# Patient Record
Sex: FEMALE | Race: WHITE | NOT HISPANIC OR LATINO | Employment: OTHER | URBAN - METROPOLITAN AREA
[De-identification: names, ages, dates, MRNs, and addresses within clinical notes are randomized per-mention and may not be internally consistent; named-entity substitution may affect disease eponyms.]

---

## 2017-05-12 ENCOUNTER — TRANSFERRED RECORDS (OUTPATIENT)
Dept: HEALTH INFORMATION MANAGEMENT | Facility: CLINIC | Age: 72
End: 2017-05-12

## 2017-05-12 ENCOUNTER — HOSPITAL ENCOUNTER (INPATIENT)
Facility: CLINIC | Age: 72
LOS: 8 days | Discharge: HOME | DRG: 546 | End: 2017-05-21
Attending: INTERNAL MEDICINE | Admitting: INTERNAL MEDICINE
Payer: MEDICARE

## 2017-05-12 DIAGNOSIS — K59.00 CONSTIPATION, UNSPECIFIED CONSTIPATION TYPE: ICD-10-CM

## 2017-05-12 DIAGNOSIS — I77.6 VASCULITIS (H): ICD-10-CM

## 2017-05-12 DIAGNOSIS — R05.9 COUGH: ICD-10-CM

## 2017-05-12 DIAGNOSIS — Z29.9 PROPHYLACTIC MEASURE: ICD-10-CM

## 2017-05-12 DIAGNOSIS — Z29.89 NEED FOR PNEUMOCYSTIS PROPHYLAXIS: ICD-10-CM

## 2017-05-12 DIAGNOSIS — R52 GENERALIZED PAIN: Primary | ICD-10-CM

## 2017-05-12 PROBLEM — J18.9 PNEUMONIA: Status: ACTIVE | Noted: 2017-05-12

## 2017-05-12 PROCEDURE — 99220 ZZC INITIAL OBSERVATION CARE,LEVL III: CPT | Performed by: INTERNAL MEDICINE

## 2017-05-12 RX ORDER — PROCHLORPERAZINE 25 MG
12.5 SUPPOSITORY, RECTAL RECTAL EVERY 12 HOURS PRN
Status: DISCONTINUED | OUTPATIENT
Start: 2017-05-12 | End: 2017-05-21 | Stop reason: HOSPADM

## 2017-05-12 RX ORDER — AZITHROMYCIN 250 MG/1
250 TABLET, FILM COATED ORAL EVERY 24 HOURS
Status: COMPLETED | OUTPATIENT
Start: 2017-05-14 | End: 2017-05-17

## 2017-05-12 RX ORDER — CEFTRIAXONE 2 G/1
2 INJECTION, POWDER, FOR SOLUTION INTRAMUSCULAR; INTRAVENOUS EVERY 24 HOURS
Status: COMPLETED | OUTPATIENT
Start: 2017-05-13 | End: 2017-05-19

## 2017-05-12 RX ORDER — ONDANSETRON 2 MG/ML
4 INJECTION INTRAMUSCULAR; INTRAVENOUS EVERY 6 HOURS PRN
Status: DISCONTINUED | OUTPATIENT
Start: 2017-05-12 | End: 2017-05-21 | Stop reason: HOSPADM

## 2017-05-12 RX ORDER — LIDOCAINE 40 MG/G
CREAM TOPICAL
Status: DISCONTINUED | OUTPATIENT
Start: 2017-05-12 | End: 2017-05-21 | Stop reason: HOSPADM

## 2017-05-12 RX ORDER — ONDANSETRON 4 MG/1
4 TABLET, ORALLY DISINTEGRATING ORAL EVERY 6 HOURS PRN
Status: DISCONTINUED | OUTPATIENT
Start: 2017-05-12 | End: 2017-05-21 | Stop reason: HOSPADM

## 2017-05-12 RX ORDER — ACETAMINOPHEN 650 MG/1
650 SUPPOSITORY RECTAL EVERY 4 HOURS PRN
Status: DISCONTINUED | OUTPATIENT
Start: 2017-05-12 | End: 2017-05-18

## 2017-05-12 RX ORDER — ALBUTEROL SULFATE 0.83 MG/ML
2.5 SOLUTION RESPIRATORY (INHALATION)
Status: DISCONTINUED | OUTPATIENT
Start: 2017-05-12 | End: 2017-05-21 | Stop reason: HOSPADM

## 2017-05-12 RX ORDER — PROCHLORPERAZINE MALEATE 5 MG
5 TABLET ORAL EVERY 6 HOURS PRN
Status: DISCONTINUED | OUTPATIENT
Start: 2017-05-12 | End: 2017-05-21 | Stop reason: HOSPADM

## 2017-05-12 RX ORDER — ACETAMINOPHEN 325 MG/1
650 TABLET ORAL EVERY 4 HOURS PRN
Status: DISCONTINUED | OUTPATIENT
Start: 2017-05-12 | End: 2017-05-18

## 2017-05-12 NOTE — IP AVS SNAPSHOT
[address]    [facility] [address]    Phone:  [phone]                                       After Visit Summary   5/12/2017    Elizabeth Galicia    MRN: [id number]           After Visit Summary Signature Page     I have received my discharge instructions, and my questions have been answered. I have discussed any challenges I see with this plan with the nurse or doctor.    ..........................................................................................................................................  Patient/Patient Representative Signature      ..........................................................................................................................................  Patient Representative Print Name and Relationship to Patient    ..................................................               ................................................  Date                                            Time    ..........................................................................................................................................  Reviewed by Signature/Title    ...................................................              ..............................................  Date                                                            Time

## 2017-05-12 NOTE — IP AVS SNAPSHOT
MRN:[id number]                      After Visit Summary   5/12/2017    Elizabeth Galicia    MRN: [id number]           Thank you!     Thank you for choosing [facility] [place] for your care. Our goal is always to provide you with excellent care. Hearing back from our patients is one way we can continue to improve our services. Please take a few minutes to complete the written survey that you may receive in the mail after you visit with us. Thank you!        Patient Information     Date Of Birth          1945        Designated Caregiver       Most Recent Value    Caregiver    Will someone help with your care after discharge? 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 You were started on treatment here in the hospital, and this will continue after discharge.                  Who to Call     For medical emergencies, please call 911.  For non-urgent questions about your medical care, please call your primary care provider or clinic, [phone]          Attending Provider     Provider Specialty    Cooley, Larry Robertson MD Internal Medicine    ComShaan godoy MD Internal Medicine       Primary Care Provider Office Phone # Fax #    Noreen Anderson [phone]       [facility] [address] [place] [address]        After Care Instructions     Activity       Your activity upon discharge:  "activity as tolerated            Diet       Follow this diet upon discharge: Regular                  Follow-up Appointments     Follow-up and recommended labs and tests        1. Follow up with nephrology (Dr. Barkley) as advised - he will arrange your ongoing Cytoxan infusions.  2. Follow up with rheumatology (Dr. Arnold) as advised in the second week of June.  3. Follow up with pulmonology (Dr. De Dios) in 6 weeks for a repeat CT of your chest and pulmonary function testing.  4. Follow up with your new PCP (Dr. Nati Rodriguez) as scheduled.                  Your next 10 appointments already scheduled     May 23, 2017  2:00 PM CDT   SHORT with Nati Littlejohn,    [facility]    [address] [facility] [id number] [phone]              Further instructions from your care team                  Pending Results     Date and Time Order Name Status Description    5/19/2017 1143 Glucose 6 phosphate dehydrogenase In process             Statement of Approval     Ordered          05/21/17 0824  I have reviewed and agree with all the recommendations and orders detailed in this document.  EFFECTIVE NOW     Approved and electronically signed by:  Shaneka Laguerre DO             Admission Information     Date & Time Provider Department Dept. Phone    5/12/2017 Shaan Diaz MD [place] [address] Oncology [phone]      Your Vitals Were     Blood Pressure Pulse Temperature Respirations Height Weight    116/83 (BP Location: Right arm) 70 96.7  F (35.9  C) (Oral) 16 1.664 m (5' 5.5\") 106.1 kg (233 lb 14.5 oz)    Pulse Oximetry BMI (Body Mass Index)                97% 38.33 kg/m2          [facility] Information     [employer] lets you send messages to your doctor, view your test results, renew your prescriptions, schedule appointments and more. To sign up, go to [email or web address] . Click on \"Log in\" on the left side of the screen, which will take you to the Welcome " "page. Then click on \"Sign up Now\" on the right side of the page.     You will be asked to enter the access code listed below, as well as some personal information. Please follow the directions to create your username and password.     Your access code is: [email or web address]  Expires: 2017  7:53 AM     Your access code will  in 90 days. If you need help or a new code, please call your [place] clinic or [phone].        Care EveryWhere ID     This is your Care EveryWhere ID. This could be used by other organizations to access your [place] medical records  [id number]           Review of your medicines      START taking        Dose / Directions    acetaminophen 500 MG tablet   Commonly known as:  TYLENOL   Used for:  Generalized pain        Dose:  1000 mg   Take 2 tablets (1,000 mg) by mouth every 8 hours   Refills:  0       guaiFENesin-codeine 100-10 MG/5ML Soln solution   Commonly known as:  ROBITUSSIN AC   Used for:  Cough        Dose:  5 mL   Take 5 mLs by mouth every 4 hours as needed for cough   Quantity:  420 mL   Refills:  0       omeprazole 40 MG capsule   Commonly known as:  priLOSEC   Used for:  Prophylactic measure        Dose:  40 mg   Take 1 capsule (40 mg) by mouth every morning   Quantity:  30 capsule   Refills:  0       predniSONE 20 MG tablet   Commonly known as:  DELTASONE   Used for:  Vasculitis (H)        Dose:  60 mg   Take 3 tablets (60 mg) by mouth daily   Quantity:  90 tablet   Refills:  0       senna-docusate 8.6-50 MG per tablet   Commonly known as:  SENOKOT-S;PERICOLACE   Used for:  Constipation, unspecified constipation type        Dose:  1-2 tablet   Take 1-2 tablets by mouth 2 times daily   Quantity:  60 tablet   Refills:  0       sulfamethoxazole-trimethoprim 400-80 MG per tablet   Commonly known as:  BACTRIM/SEPTRA   Indication:  PCP prophylaxis   Used for:  Need for pneumocystis prophylaxis        Dose:  1 tablet   Take 1 tablet by mouth daily   Quantity:  30 tablet "   Refills:  0         CONTINUE these medicines which have NOT CHANGED        Dose / Directions    CALCIUM LACTATE PO        Dose:  4 tablet   Take 4 tablets by mouth daily   Refills:  0       Levothyroxine Sodium 112 MCG Caps        Dose:  112 mcg   Take 112 mcg by mouth daily.   Refills:  0       liothyronine 5 MCG tablet   Commonly known as:  CYTOMEL        Take by mouth 2 times daily Takes 15 mcg BID   Refills:  0       MAGNESIUM LACTATE PO        Dose:  2 tablet   Take 2 tablets by mouth daily   Refills:  0       VITAMIN D3 PO        Dose:  1000 Units   Take 1,000 Units by mouth daily   Refills:  0            Where to get your medicines      These medications were sent to [facility]  RENETTA López - [address]  [address] [place] [address], [facility] [address]     Phone:  [phone]     omeprazole 40 MG capsule    predniSONE 20 MG tablet    senna-docusate 8.6-50 MG per tablet    sulfamethoxazole-trimethoprim 400-80 MG per tablet         Some of these will need a paper prescription and others can be bought over the counter. Ask your nurse if you have questions.     Bring a paper prescription for each of these medications     guaiFENesin-codeine 100-10 MG/5ML Soln solution       You don't need a prescription for these medications     acetaminophen 500 MG tablet                Protect others around you: Learn how to safely use, store and throw away your medicines at www.disposemymeds.org.             Medication List: This is a list of all your medications and when to take them. Check marks below indicate your daily home schedule. Keep this list as a reference.      Medications           Morning Afternoon Evening Bedtime As Needed    acetaminophen 500 MG tablet   Commonly known as:  TYLENOL   Take 2 tablets (1,000 mg) by mouth every 8 hours   Last time this was given:  1,000 mg on 5/21/2017 11:16 AM                                CALCIUM LACTATE PO   Take 4 tablets by mouth daily                                 guaiFENesin-codeine 100-10 MG/5ML Soln solution   Commonly known as:  ROBITUSSIN AC   Take 5 mLs by mouth every 4 hours as needed for cough   Last time this was given:  5 mLs on 5/20/2017  9:34 PM                                Levothyroxine Sodium 112 MCG Caps   Take 112 mcg by mouth daily.                                liothyronine 5 MCG tablet   Commonly known as:  CYTOMEL   Take by mouth 2 times daily Takes 15 mcg BID   Last time this was given:  15 mcg on 5/21/2017  8:52 AM                                MAGNESIUM LACTATE PO   Take 2 tablets by mouth daily                                omeprazole 40 MG capsule   Commonly known as:  priLOSEC   Take 1 capsule (40 mg) by mouth every morning   Last time this was given:  40 mg on 5/21/2017 11:15 AM                                predniSONE 20 MG tablet   Commonly known as:  DELTASONE   Take 3 tablets (60 mg) by mouth daily   Last time this was given:  60 mg on 5/21/2017  8:51 AM                                senna-docusate 8.6-50 MG per tablet   Commonly known as:  SENOKOT-S;PERICOLACE   Take 1-2 tablets by mouth 2 times daily   Last time this was given:  2 tablets on 5/21/2017  8:51 AM                                sulfamethoxazole-trimethoprim 400-80 MG per tablet   Commonly known as:  BACTRIM/SEPTRA   Take 1 tablet by mouth daily   Last time this was given:  1 tablet on 5/21/2017  8:52 AM                                VITAMIN D3 PO   Take 1,000 Units by mouth daily

## 2017-05-13 ENCOUNTER — APPOINTMENT (OUTPATIENT)
Dept: CARDIOLOGY | Facility: CLINIC | Age: 72
DRG: 546 | End: 2017-05-13
Attending: INTERNAL MEDICINE
Payer: MEDICARE

## 2017-05-13 ENCOUNTER — APPOINTMENT (OUTPATIENT)
Dept: ULTRASOUND IMAGING | Facility: CLINIC | Age: 72
DRG: 546 | End: 2017-05-13
Attending: INTERNAL MEDICINE
Payer: MEDICARE

## 2017-05-13 PROBLEM — J18.9 CAP (COMMUNITY ACQUIRED PNEUMONIA): Status: ACTIVE | Noted: 2017-05-13

## 2017-05-13 LAB
ALBUMIN SERPL-MCNC: 2.3 G/DL (ref 3.4–5)
ALBUMIN UR-MCNC: 10 MG/DL
ALP SERPL-CCNC: 74 U/L (ref 40–150)
ALT SERPL W P-5'-P-CCNC: 21 U/L (ref 0–50)
ANION GAP SERPL CALCULATED.3IONS-SCNC: 10 MMOL/L (ref 3–14)
APPEARANCE UR: CLEAR
AST SERPL W P-5'-P-CCNC: 12 U/L (ref 0–45)
BACTERIA #/AREA URNS HPF: ABNORMAL /HPF
BILIRUB DIRECT SERPL-MCNC: <0.1 MG/DL (ref 0–0.2)
BILIRUB SERPL-MCNC: 0.3 MG/DL (ref 0.2–1.3)
BILIRUB UR QL STRIP: NEGATIVE
BUN SERPL-MCNC: 28 MG/DL (ref 7–30)
CALCIUM SERPL-MCNC: 8.5 MG/DL (ref 8.5–10.1)
CHLORIDE SERPL-SCNC: 108 MMOL/L (ref 94–109)
CHLORIDE UR-SCNC: 18 MMOL/L
CHOLEST SERPL-MCNC: 126 MG/DL
CK SERPL-CCNC: 131 U/L (ref 30–225)
CO2 SERPL-SCNC: 18 MMOL/L (ref 20–32)
COLOR UR AUTO: ABNORMAL
CREAT SERPL-MCNC: 1.77 MG/DL (ref 0.52–1.04)
CREAT SERPL-MCNC: 1.77 MG/DL (ref 0.52–1.04)
CREAT UR-MCNC: 44 MG/DL
CRP SERPL-MCNC: 125 MG/L (ref 0–8)
ERYTHROCYTE [DISTWIDTH] IN BLOOD BY AUTOMATED COUNT: 14.2 % (ref 10–15)
FRACT EXCRET NA UR+SERPL-RTO: 0.7 %
GFR SERPL CREATININE-BSD FRML MDRD: 28 ML/MIN/1.7M2
GFR SERPL CREATININE-BSD FRML MDRD: 28 ML/MIN/1.7M2
GLUCOSE SERPL-MCNC: 118 MG/DL (ref 70–99)
GLUCOSE UR STRIP-MCNC: NEGATIVE MG/DL
GRAM STN SPEC: NORMAL
HCT VFR BLD AUTO: 28.3 % (ref 35–47)
HDLC SERPL-MCNC: 26 MG/DL
HGB BLD-MCNC: 9.4 G/DL (ref 11.7–15.7)
HGB UR QL STRIP: ABNORMAL
KETONES UR STRIP-MCNC: NEGATIVE MG/DL
LDLC SERPL CALC-MCNC: 71 MG/DL
LEUKOCYTE ESTERASE UR QL STRIP: ABNORMAL
MCH RBC QN AUTO: 29.1 PG (ref 26.5–33)
MCHC RBC AUTO-ENTMCNC: 33.2 G/DL (ref 31.5–36.5)
MCV RBC AUTO: 88 FL (ref 78–100)
MICRO REPORT STATUS: NORMAL
NITRATE UR QL: NEGATIVE
NONHDLC SERPL-MCNC: 100 MG/DL
PH UR STRIP: 5.5 PH (ref 5–7)
PLATELET # BLD AUTO: 344 10E9/L (ref 150–450)
POTASSIUM SERPL-SCNC: 4.2 MMOL/L (ref 3.4–5.3)
POTASSIUM UR-SCNC: 18 MMOL/L
PROCALCITONIN SERPL-MCNC: 0.24 NG/ML
PROT SERPL-MCNC: 6.4 G/DL (ref 6.8–8.8)
PROT UR-MCNC: 0.37 G/L
PROT/CREAT 24H UR: 0.85 G/G CR (ref 0–0.2)
RBC # BLD AUTO: 3.23 10E12/L (ref 3.8–5.2)
RBC #/AREA URNS AUTO: 11 /HPF (ref 0–2)
SODIUM SERPL-SCNC: 136 MMOL/L (ref 133–144)
SODIUM SERPL-SCNC: 137 MMOL/L (ref 133–144)
SODIUM UR-SCNC: 23 MMOL/L
SP GR UR STRIP: 1.01 (ref 1–1.03)
SPECIMEN SOURCE: NORMAL
TRIGL SERPL-MCNC: 145 MG/DL
TSH SERPL DL<=0.05 MIU/L-ACNC: 4.87 MU/L (ref 0.4–4)
URN SPEC COLLECT METH UR: ABNORMAL
UROBILINOGEN UR STRIP-MCNC: NORMAL MG/DL (ref 0–2)
WBC # BLD AUTO: 14.4 10E9/L (ref 4–11)
WBC #/AREA URNS AUTO: 3 /HPF (ref 0–2)

## 2017-05-13 PROCEDURE — 82565 ASSAY OF CREATININE: CPT | Performed by: INTERNAL MEDICINE

## 2017-05-13 PROCEDURE — 36415 COLL VENOUS BLD VENIPUNCTURE: CPT | Performed by: INTERNAL MEDICINE

## 2017-05-13 PROCEDURE — 84295 ASSAY OF SERUM SODIUM: CPT | Performed by: INTERNAL MEDICINE

## 2017-05-13 PROCEDURE — 80076 HEPATIC FUNCTION PANEL: CPT | Performed by: INTERNAL MEDICINE

## 2017-05-13 PROCEDURE — 96375 TX/PRO/DX INJ NEW DRUG ADDON: CPT

## 2017-05-13 PROCEDURE — 86335 IMMUNFIX E-PHORSIS/URINE/CSF: CPT | Performed by: INTERNAL MEDICINE

## 2017-05-13 PROCEDURE — 84300 ASSAY OF URINE SODIUM: CPT | Performed by: INTERNAL MEDICINE

## 2017-05-13 PROCEDURE — 82550 ASSAY OF CK (CPK): CPT | Performed by: INTERNAL MEDICINE

## 2017-05-13 PROCEDURE — 80048 BASIC METABOLIC PNL TOTAL CA: CPT | Performed by: INTERNAL MEDICINE

## 2017-05-13 PROCEDURE — 84145 PROCALCITONIN (PCT): CPT | Performed by: INTERNAL MEDICINE

## 2017-05-13 PROCEDURE — 82436 ASSAY OF URINE CHLORIDE: CPT | Performed by: INTERNAL MEDICINE

## 2017-05-13 PROCEDURE — 87070 CULTURE OTHR SPECIMN AEROBIC: CPT | Performed by: INTERNAL MEDICINE

## 2017-05-13 PROCEDURE — 84156 ASSAY OF PROTEIN URINE: CPT | Performed by: INTERNAL MEDICINE

## 2017-05-13 PROCEDURE — 86140 C-REACTIVE PROTEIN: CPT | Performed by: INTERNAL MEDICINE

## 2017-05-13 PROCEDURE — 25500064 ZZH RX 255 OP 636: Performed by: INTERNAL MEDICINE

## 2017-05-13 PROCEDURE — A9270 NON-COVERED ITEM OR SERVICE: HCPCS | Mod: GY | Performed by: INTERNAL MEDICINE

## 2017-05-13 PROCEDURE — 84166 PROTEIN E-PHORESIS/URINE/CSF: CPT | Performed by: INTERNAL MEDICINE

## 2017-05-13 PROCEDURE — 25000128 H RX IP 250 OP 636: Performed by: INTERNAL MEDICINE

## 2017-05-13 PROCEDURE — 87205 SMEAR GRAM STAIN: CPT | Performed by: INTERNAL MEDICINE

## 2017-05-13 PROCEDURE — 83883 ASSAY NEPHELOMETRY NOT SPEC: CPT | Performed by: INTERNAL MEDICINE

## 2017-05-13 PROCEDURE — 25000132 ZZH RX MED GY IP 250 OP 250 PS 637: Mod: GY | Performed by: INTERNAL MEDICINE

## 2017-05-13 PROCEDURE — 87186 SC STD MICRODIL/AGAR DIL: CPT | Performed by: INTERNAL MEDICINE

## 2017-05-13 PROCEDURE — 12000007 ZZH R&B INTERMEDIATE

## 2017-05-13 PROCEDURE — 40000264 ECHO COMPLETE WITH OPTISON

## 2017-05-13 PROCEDURE — 96374 THER/PROPH/DIAG INJ IV PUSH: CPT

## 2017-05-13 PROCEDURE — 84133 ASSAY OF URINE POTASSIUM: CPT | Performed by: INTERNAL MEDICINE

## 2017-05-13 PROCEDURE — 84443 ASSAY THYROID STIM HORMONE: CPT | Performed by: INTERNAL MEDICINE

## 2017-05-13 PROCEDURE — 85027 COMPLETE CBC AUTOMATED: CPT | Performed by: INTERNAL MEDICINE

## 2017-05-13 PROCEDURE — 99233 SBSQ HOSP IP/OBS HIGH 50: CPT | Performed by: INTERNAL MEDICINE

## 2017-05-13 PROCEDURE — 76770 US EXAM ABDO BACK WALL COMP: CPT

## 2017-05-13 PROCEDURE — G0378 HOSPITAL OBSERVATION PER HR: HCPCS

## 2017-05-13 PROCEDURE — 81001 URINALYSIS AUTO W/SCOPE: CPT | Performed by: INTERNAL MEDICINE

## 2017-05-13 PROCEDURE — 87077 CULTURE AEROBIC IDENTIFY: CPT | Performed by: INTERNAL MEDICINE

## 2017-05-13 PROCEDURE — 93306 TTE W/DOPPLER COMPLETE: CPT | Mod: 26 | Performed by: INTERNAL MEDICINE

## 2017-05-13 PROCEDURE — 80061 LIPID PANEL: CPT | Performed by: INTERNAL MEDICINE

## 2017-05-13 RX ORDER — SODIUM CHLORIDE 9 MG/ML
INJECTION, SOLUTION INTRAVENOUS CONTINUOUS
Status: DISCONTINUED | OUTPATIENT
Start: 2017-05-13 | End: 2017-05-16

## 2017-05-13 RX ORDER — FUROSEMIDE 20 MG
20 TABLET ORAL DAILY
Status: DISCONTINUED | OUTPATIENT
Start: 2017-05-13 | End: 2017-05-13

## 2017-05-13 RX ORDER — CODEINE PHOSPHATE AND GUAIFENESIN 10; 100 MG/5ML; MG/5ML
5 SOLUTION ORAL EVERY 4 HOURS PRN
Status: DISCONTINUED | OUTPATIENT
Start: 2017-05-13 | End: 2017-05-14

## 2017-05-13 RX ORDER — LEVOTHYROXINE SODIUM 112 UG/1
112 TABLET ORAL DAILY
Status: DISCONTINUED | OUTPATIENT
Start: 2017-05-13 | End: 2017-05-21 | Stop reason: HOSPADM

## 2017-05-13 RX ADMIN — GUAIFENESIN 10 ML: 100 SOLUTION ORAL at 05:41

## 2017-05-13 RX ADMIN — ACETAMINOPHEN 650 MG: 325 TABLET, FILM COATED ORAL at 19:10

## 2017-05-13 RX ADMIN — ACETAMINOPHEN 650 MG: 325 TABLET, FILM COATED ORAL at 08:19

## 2017-05-13 RX ADMIN — Medication 2.5 MG: at 22:16

## 2017-05-13 RX ADMIN — AZITHROMYCIN MONOHYDRATE 500 MG: 500 INJECTION, POWDER, LYOPHILIZED, FOR SOLUTION INTRAVENOUS at 01:36

## 2017-05-13 RX ADMIN — CEFTRIAXONE 2 G: 2 INJECTION, POWDER, FOR SOLUTION INTRAMUSCULAR; INTRAVENOUS at 00:20

## 2017-05-13 RX ADMIN — Medication 15 MCG: at 08:19

## 2017-05-13 RX ADMIN — SODIUM CHLORIDE: 9 INJECTION, SOLUTION INTRAVENOUS at 19:13

## 2017-05-13 RX ADMIN — FUROSEMIDE 20 MG: 20 TABLET ORAL at 12:50

## 2017-05-13 RX ADMIN — ACETAMINOPHEN 650 MG: 325 TABLET, FILM COATED ORAL at 12:56

## 2017-05-13 RX ADMIN — Medication 15 MCG: at 21:29

## 2017-05-13 RX ADMIN — HUMAN ALBUMIN MICROSPHERES AND PERFLUTREN 3 ML: 10; .22 INJECTION, SOLUTION INTRAVENOUS at 10:40

## 2017-05-13 RX ADMIN — GUAIFENESIN 10 ML: 100 SOLUTION ORAL at 20:31

## 2017-05-13 RX ADMIN — Medication 2.5 MG: at 01:04

## 2017-05-13 RX ADMIN — LEVOTHYROXINE SODIUM 112 MCG: 112 TABLET ORAL at 08:19

## 2017-05-13 NOTE — H&P
[facility]    History and Physical  Hospitalist       Date of Admission:  5/12/2017    Assessment & Plan   Elizabeth Galicia is a 71 year old female who presents with bilateral lower extremity swelling associated discomfort over the past 2 weeks, 12 weeks of night sweats which have been worsening over the past 2 weeks    Community acquired right-sided pneumonia: Patient with a right midlung infiltrate noted on outside facility chest x-ray. D dimer was noted to be elevated at outside facility, though with findings of leukocytosis, low grade temperature and discrete infiltrate on CXR, presentation was though to be more consistent with acute infectious process. Patient also with significantly elevated C-reactive protein at primary care visit 5/1/17. Given patient's complex of symptoms, I cannot ignore reported infiltrate on chest x-ray as primary etiology for laboratory abnormalities and patient's symptoms, though duration of symptoms and notably elevated inflammatory markers without significant respiratory symptoms are somewhat unusual/inconsistent with clinical diagnosis of pneumonia.  -Recommend follow-up chest x-ray in approximately 4-6 weeks to ensure resolution of infiltrate noted at outside facility. The patient's leukocytosis and x-ray finding are suggestive of acute infectious process, patient does not have significant respiratory symptoms currently  -Ceftriaxone and azithromycin for community-acquired pneumonia; did receive a dose of doxycycline prior to transfer from outside facility  -Repeat CBC should be obtained when patient is clinically improved in the outpatient setting.   -Request image study to be couriered from outside urgent care center and uploaded so that images may be viewed given patient's somewhat atypical presentation for pneumonia.  -Pro calcitonin in a.m.    Bilateral lower extremity edema: Potentially multifactorial. Patient has some symptoms/signs concerning for sleep  apnea including falling asleep while watching TV, obesity, increased neck diameter. Patient denies being told she has apneic symptoms while sleeping. Denies snoring. Certainly obesity can also contribute to bilateral lower extremity edema. If patient with uncontrolled sleep apnea and pulmonary hypertension, this could explain lower extremity edema. Systolic heart failure seems less likely as patient with no hypoxia, no pulmonary edema reported on chest x-ray. Hypertensive diastolic heart failure also seems less likely in the setting of well-controlled blood pressures off of medications. As above, inflammatory etiologies/rheumatologic disease could also result in edema, though would not necessarily explain patient's chest x-ray findings  -TTE pending  -Pulse oximetry spot checks  -Assess oximetry during ambulation to ensure no hypoxia  -cardiac telemetry    Elevated d-dimer: Patient noted to have an elevated d-dimer at outside facility. This is in the setting of right sided lung infiltrate and leukocytosis. Note also recent elevation in CRP through outside facility. Patient did have travel approximate 2 weeks ago from [place], negative bilateral lower extremity ultrasound for DVT. As patient denies shortness of breath, has no hypoxia or tachycardia, no pleuritic discomfort. Low suspicion for pulmonary embolism. This said, PE could present with lower extremity edema if resulting in significant cardiac strain.   -TTE pending  -Could consider VQ scan in a.m. if clinically worsening or pending TTE results.   -In the setting of elevated inflammatory markers, could consider autoimmune workup, though with suspected pneumonia, would likely be elevated and nonspecific manner regardless. If chest x-ray findings do not resolve at follow-up for patient does not clinically improve with treatment of suspected pneumonia, would pursue CT chest, potentially rheumatologic consultation and evaluation at that time.  -Age-appropriate  cancer screening recommended. I did not note last colonoscopy on review of outside records. Last mammogram appears to be in 2005.    Chronic kidney disease: Patient with a creatinine of 1.9 currently. With no hypoxia and only mild lower extremity edema, will await TTE prior to diuresis. Certainly possible that patient has some degree of cardiorenal congestion resulting in lower extremity edema and increased creatinine.  -Diet as tolerated  -Avoid nephrotoxic agents  -Repeat BMP  -Await TTE, consideration for diuretics at that time  -Compression stockings to lower extremities for mobilization of fluid     Hyperlipidemia: Patient with a history of hyperlipidemia with low HDL at 39,  in October 2014. Patient is not on any lipid controlling medications.  -Lipid panel pending for a.m.    Generalized weakness and physical deconditioning: Patient states that she has been more weak over the past 12 weeks. Still ambulatory at home.  -Ambulate with nursing staff q. shift  -If patient requiring significant assistance, low threshold for consult to physical therapy. This said, patient is currently observation status.    Anemia: Patient with a hemoglobin in the mid 10 range. No reported blood loss. 11.5 5/1/17. Peripheral blood morphology was obtained through primary clinic 5/1/17 with findings suggestive of an inflammatory condition.  -As above, repeat CBC recommended when patient has clinically improved, potentially in one week. Would not obtain anemia studies currently in the setting of suspected acute infectious process.     Hypothyroidism:  -Continue prior to admission levothyroxine and Cytomel. Most recent TSH last T3 within normal limits 5/1.    DVT Prophylaxis: Ambulate with nursing staff q. shift    Code Status: Full code    Disposition: Expected discharge potentially 5/14 late afternoon versus 5/15. This depends on clinical course as well as results from TTE.    Larry Robertson Cooley    Primary Care Physician   Noreen  LINDY Anderson    Chief Complaint   Shortness of breath    History is obtained from the patient, chart review, discussion with outside urgent care provider. Reviewed outside records through [facility] including recent laboratory studies.     History of Present Illness   Elizabeth Galicia is a 71 year old female who presents with pain in her bilateral lower extremities associated with edema present for the past 2 weeks.    Patient states for the past 12 weeks she has been intermittently ill. Several months ago actually contracted influenza, and since that time, has not felt completely well. Endorses intermittent night sweats associated with influenza and persisting even now. Patient denies any significant weight gain or weight loss, has not noted any lymphadenopathy. Since initial influenza and treatment, patient states that she obtained another viral illness, and her partner/spouse as well contracted influenza. Patient recently traveled to [place], states that she was ambulatory while in [place]. Denies any significant change in her diet traveling. Did feel as though she had a viral illness in [place] as well. Following return from [place] approximately 2 weeks ago, however, patient noted bilateral lower extremity edema. States that she has never had a history of edema in the past. As above, patient does not believe she has gained weight, though does admit that she does not know her baseline weight. Patient saw her primary provider 5/1/17 through [facility] and was diagnosed with a viral illness. Note at that time, patient with an elevated CRP to 85. I asked patient what symptoms she was experiencing at that time, and patient states that she was experiencing a milder form of her current complaints, which include bilateral lower extremity edema, pain associated with the swelling in her lower extremities, mild abdominal bloating. Patient denies any shortness of breath, though states  that when she is walking she finds herself sharply exhaling to help her cope with the discomfort of her bilateral lower extremities. Patient denies any orthopnea, denies any significant cough or sputum production. Patient states that she does have sinus symptoms which are relatively chronic and related to seasonal allergies. Believe she does have some postnasal drip. Patient denies any history of sleep apnea or being told that she stops breathing when she sleeps. Denies snoring, though does state that she occasionally falls asleep while sitting upright and watching television.    Patient's wife decided today that patient needed to be evaluated given her ongoing symptoms and recommended she be seen in the urgent care. Patient was subsequently transferred to the urgency Center for further evaluation given concern for DVT with lower extremity swelling and recent travel, subsequently transferred to [facility] after being noted to have a right middle lobe infiltrate on chest x-ray. Unfortunately, the imaging study at outside facility is not currently available to me. Patient with normal sinus rhythm on EKG with no evidence of right heart strain, no tachycardia, no hypoxia, no tachypnea. A lower extremity ultrasound was obtained and negative for DVT. Patient was noted to have a creatinine of 1.9, 1.42 at most recent follow-up. Albumin low at 2.7.    Patient endorses ongoing weakness/fatigue which she feels has been worsening over the past 12 weeks.    To summarize, patient endorses intermittent night sweats, weakness and fatigue worsening over the past 12 weeks following diagnosis with influenza.    Traveled to [place] with return 2 weeks ago and subsequently noting bilateral lower extremity edema, no history of such.    Follow-up with primary care provider 5/1/17 where patient was diagnosed with an acute viral illness, though noted to have markedly elevated inflammatory markers. Leukocytosis was present  at that time as well    Patient with ongoing discomfort in her bilateral lower extremities related to edema which has persisted resulting in urgent care evaluation. Found to have a right mid lung field infiltrate on chest x-ray, elevated d-dimer, though no respiratory symptoms are reported by patient. Negative lower extremity ultrasound, though further evaluation for pulmonary embolism was not performed given patient's creatinine elevation. No tachycardia, no tachypnea, no pleuritic discomfort.    Past Medical History    I have reviewed this patient's medical history and updated it with pertinent information if needed.   Past Medical History:   Diagnosis Date     Anxiety      Gastro-oesophageal reflux disease      Herpes      Hyperlipidemia      Hypothyroid      Lesion of upper eyelid LEFT     Obesity (BMI 30-39.9)      Uterine prolapse      Past Surgical History   I have reviewed this patient's surgical history and updated it with pertinent information if needed.  Past Surgical History:   Procedure Laterality Date     COLONOSCOPY       DILATION AND CURETTAGE       ENT SURGERY      partial thyroidectomy; tonsillectomy     EXCISE LESION EYELID Left 3/10/2016    Procedure: EXCISE LESION EYELID;  Surgeon: Rg Nazario MD;  Location: [facility]     HAND SURGERY       HYSTERECTOMY VAGINAL, COLPORRHAPHY ANTERIOR, POSTERIOR, COMBINED N/A 9/9/2014    Procedure: COMBINED HYSTERECTOMY VAGINAL, COLPORRHAPHY ANTERIOR, POSTERIOR;  Surgeon: Shay Winkler MD;  Location: [facility]     LAPAROSCOPIC SALPINGO-OOPHORECTOMY  6/27/2011    Procedure:LAPAROSCOPIC SALPINGO-OOPHORECTOMY; resection of left pelvic mass. ; Surgeon:MAYRA OLEARY; Location:[facility]     ORTHOPEDIC SURGERY       SALPINGO OOPHORECTOMY,R/L/PEDRO LUIS      Salpingo Oophorectomy for torsion in past       Prior to Admission Medications   Prior to Admission Medications   Prescriptions Last Dose Informant Patient Reported? Taking?   Ascorbic Acid (VITAMIN C PO)   Yes No   Sig: Take  "by mouth daily Unknown dose   CALCIUM LACTATE PO   Yes No   Sig: Take 4 tablets by mouth daily    Cholecalciferol (VITAMIN D3 PO)   Yes No   Sig: Take 1,000 Units by mouth daily   Levothyroxine Sodium 112 MCG CAPS   Yes No   Sig: Take 112 mcg by mouth daily.   MAGNESIUM LACTATE PO   Yes No   Sig: Take 2 tablets by mouth daily   diazepam (VALIUM) 5 MG tablet   No No   Sig: Take 1 tablet (5 mg) by mouth nightly as needed for anxiety   liothyronine (CYTOMEL) 5 MCG tablet   Yes No   Sig: Take by mouth 2 times daily Takes 15 mcg BID      Facility-Administered Medications: None     Allergies   Allergies   Allergen Reactions     Dairy [Milk Products]      Perfume Other (See Comments)     Stuffy in nose, HA     Seasonal Allergies      Sulfa Drugs Unknown       Social History   I have reviewed this patient's social history and updated it with pertinent information if needed. Elizabeth Galicia  reports that she has never smoked. She does not have any smokeless tobacco history on file. She reports that she drinks alcohol. She reports that she does not use illicit drugs.    Family History   I have reviewed this patient's family history and updated it with pertinent information if needed.   Family History   Problem Relation Age of Onset     Unknown/Adopted Mother      HEART DISEASE      specifically, patient states that she knows nothing of her father's side of the family or father, mother apparently had some \"minor heart issues\" not otherwise specified. Unclear if this represents dysrhythmia or coronary artery disease    Review of Systems   The 10 point Review of Systems is negative other than noted in the HPI or here.  Patient denies fevers in the preceding weeks, though believe she had a low-grade temperature earlier today. MAXIMUM TEMPERATURE in the 99 range at outside facility.    Physical Exam   Temp: 98.5  F (36.9  C) Temp src: Oral BP: 151/64 Pulse: 95     SpO2: 94 % O2 Device: None (Room air)    Vital Signs with " Ranges  Temp:  [98.5  F (36.9  C)] 98.5  F (36.9  C)  Pulse:  [95] 95  BP: (151)/(64) 151/64  SpO2:  [94 %] 94 %  0 lbs 0 oz    Constitutional: no acute distress, alert, conversant, obese female lying comfortably in bed  Eyes: no scleral icterus or injection  HEENT: moist mucous membranes  Respiratory: breath soundswith bibasilar crackles on inspiration into mid lung fields. Apices spared. No wheezes.   Cardiovascular: regular rate and rhythm, distant 1/6 murmur appreciated at left upper sternal border.   GI: abdomen soft, non-tender, normoactive bowel sounds, no masses  Lymph/Hematologic: Trace bilateral lower extremity edema. Minimal pitting component. No cervical chain lymphadenopathy.  Skin: no rashes  Musculoskeletal: muscular tone intact in all extremities  Neurologic: mental status grossly intact, no focal deficits, alert  Psychiatric: normal affect    Data   Data reviewed today:  I personally reviewed the EKG tracing showing Normal sinus rhythm. I do not appreciate any T-wave inversion in III. poor quality study as EKG has been faxed..    No lab results found in last 7 days. per outside records review, patient with leukocytosis to 17.2 including PMN elevation to 13+, monocyte 1.15, eosinophils 0.66, all elevated. Thrombocytosis to 582, likely reactive as well.    Outside CRP in the 85 range obtained 5/1/17 5/1/17 TSH and T3 were within normal limits, suggesting appropriate correction with Cytomel and levothyroxine. No change in dose since that last recheck        No results found for this or any previous visit (from the past 24 hour(s)).

## 2017-05-13 NOTE — PLAN OF CARE
Problem: Goal Outcome Summary  Goal: Goal Outcome Summary  Outcome: No Change  VSS on RA, A&Ox4. Up with SBA to BR. PRN tylenol for pain control. UA, sputum culture, echo and renal ultrasound completed today, pending results. Tele NSR. Will continue to monitor.

## 2017-05-13 NOTE — PLAN OF CARE
Problem: Goal Outcome Summary  Goal: Goal Outcome Summary  Outcome: No Change  Patient direct admit @ 11:00pm. A&Ox4, VSS On RA O2 sat 90's. Up with SBA. Void BR. Iv saline locked. On Iv Abx.  Bruises & edema on BLE. Tele NSR. On Reg diet. C/o cough Robitussin prn given. Pt refused Compressing stocking( debbie loya)   .Will continue monitoring.

## 2017-05-13 NOTE — PROGRESS NOTES
Care Coordination:    Noted pt was admitted to observation on 5/12/17 at 2347.   Met with patient in room, introduced self and role 05/13/17 at 3:13 PM  Outpatient / Observation brochure provided to patient and explained.  Pt and her partner denied having questions.     Of note, Dr. Diaz suspects pt will be admitted to inpatient shortly.    Rupa Wei RN, BSN  [facility] Care Coordinator   Mobile Phone: [phone]

## 2017-05-13 NOTE — PROGRESS NOTES
[facility]    Hospitalist Progress Note    Date of Service (when I saw the patient): 05/13/2017    Assessment & Plan   Elizabeth Galicia is a 71 year old female who was admitted on 5/12/2017 with dyspnea.    1. CAP  - CXR demonstrates RML infiltrate  - Elevated WBC but negative fever and procalcitonin  - Continue treatment with Rocephin and Azithromycin    2. BLE edema  - BLE doppler negative at [facility] ED  - Echo pending  - Check urine protein  - Start lasix 20 mg bid    3. Renal failure  - Unclear chronicity  - Check renal US, UA, FENa, U protein, U anion gap    4. Fevers/joint pain/muscle weakenss  - Check CRP, CK, TSH    5. HT  - Continue synthroid and cytomel    DVT Prophylaxis: Pneumatic Compression Devices  Code Status: Full Code    Disposition: Expected discharge in 1-3 days.    Shaan Diaz  Text Page (7 am to 6 pm)    Interval History   The patient is resting in bed.  She is very concerned about her BLE edema.  She complains of pain and swelling in her hands.    -Data reviewed today: I reviewed all new labs and imaging results over the last 24 hours. I personally reviewed no images or EKG's today.    Physical Exam   Temp: 99.6  F (37.6  C) Temp src: Oral BP: 138/64 Pulse: 86   Resp: 18 SpO2: 96 % O2 Device: None (Room air)    Vitals:    05/13/17 0109   Weight: 103.2 kg (227 lb 8.2 oz)     Vital Signs with Ranges  Temp:  [98.5  F (36.9  C)-99.6  F (37.6  C)] 99.6  F (37.6  C)  Pulse:  [86-95] 86  Resp:  [18-20] 18  BP: (138-151)/(64) 138/64  SpO2:  [94 %-96 %] 96 %  I/O last 3 completed shifts:  In: -   Out: 700 [Urine:700]    Gen: Well nourished, well developed, alert and oriented x 3, no acute distressed  HEENT: Atraumatic, normocephalic  Lungs: Clear to ausculation without wheezes, rhonchi, or rales  Heart: Regular rate and rhythm, no murmurs, gallops, or rubs  GI: Bowel sound normal, no hepatosplenomegaly or masses  Lymph: No lymphadenopathy, 1 + BLE edema  Skin: No rashes      Medications        liothyronine  15 mcg Oral BID     levothyroxine  112 mcg Oral Daily     furosemide  20 mg Oral Daily     sodium chloride (PF)  3 mL Intracatheter Q8H     cefTRIAXone  2 g Intravenous Q24H     [START ON 5/14/2017] azithromycin  250 mg Oral Q24H       Data     Recent Labs  Lab 05/13/17  0605   WBC 14.4*   HGB 9.4*   MCV 88         POTASSIUM 4.2   CHLORIDE 108   CO2 18*   BUN 28   CR 1.77*   ANIONGAP 10   ARABELLA 8.5   *       No results found for this or any previous visit (from the past 24 hour(s)).

## 2017-05-14 ENCOUNTER — APPOINTMENT (OUTPATIENT)
Dept: CT IMAGING | Facility: CLINIC | Age: 72
DRG: 546 | End: 2017-05-14
Attending: INTERNAL MEDICINE
Payer: MEDICARE

## 2017-05-14 LAB
ANION GAP SERPL CALCULATED.3IONS-SCNC: 10 MMOL/L (ref 3–14)
BUN SERPL-MCNC: 32 MG/DL (ref 7–30)
CALCIUM SERPL-MCNC: 8.2 MG/DL (ref 8.5–10.1)
CHLORIDE SERPL-SCNC: 110 MMOL/L (ref 94–109)
CO2 SERPL-SCNC: 19 MMOL/L (ref 20–32)
CREAT SERPL-MCNC: 1.83 MG/DL (ref 0.52–1.04)
ERYTHROCYTE [DISTWIDTH] IN BLOOD BY AUTOMATED COUNT: 14.3 % (ref 10–15)
FOLATE SERPL-MCNC: 6.8 NG/ML
GFR SERPL CREATININE-BSD FRML MDRD: 27 ML/MIN/1.7M2
GLUCOSE SERPL-MCNC: 117 MG/DL (ref 70–99)
HBA1C MFR BLD: 6.9 % (ref 4.3–6)
HCT VFR BLD AUTO: 27.8 % (ref 35–47)
HGB BLD-MCNC: 9.1 G/DL (ref 11.7–15.7)
IRON SATN MFR SERPL: 12 % (ref 15–46)
IRON SERPL-MCNC: 18 UG/DL (ref 35–180)
MCH RBC QN AUTO: 28.7 PG (ref 26.5–33)
MCHC RBC AUTO-ENTMCNC: 32.7 G/DL (ref 31.5–36.5)
MCV RBC AUTO: 88 FL (ref 78–100)
PLATELET # BLD AUTO: 409 10E9/L (ref 150–450)
POTASSIUM SERPL-SCNC: 4.4 MMOL/L (ref 3.4–5.3)
RBC # BLD AUTO: 3.17 10E12/L (ref 3.8–5.2)
SODIUM SERPL-SCNC: 139 MMOL/L (ref 133–144)
TIBC SERPL-MCNC: 147 UG/DL (ref 240–430)
VIT B12 SERPL-MCNC: 338 PG/ML (ref 193–986)
WBC # BLD AUTO: 14.9 10E9/L (ref 4–11)

## 2017-05-14 PROCEDURE — 82784 ASSAY IGA/IGD/IGG/IGM EACH: CPT | Performed by: INTERNAL MEDICINE

## 2017-05-14 PROCEDURE — 87340 HEPATITIS B SURFACE AG IA: CPT | Performed by: INTERNAL MEDICINE

## 2017-05-14 PROCEDURE — A9270 NON-COVERED ITEM OR SERVICE: HCPCS | Mod: GY | Performed by: INTERNAL MEDICINE

## 2017-05-14 PROCEDURE — 00000402 ZZHCL STATISTIC TOTAL PROTEIN: Performed by: INTERNAL MEDICINE

## 2017-05-14 PROCEDURE — 86038 ANTINUCLEAR ANTIBODIES: CPT | Performed by: INTERNAL MEDICINE

## 2017-05-14 PROCEDURE — 74176 CT ABD & PELVIS W/O CONTRAST: CPT

## 2017-05-14 PROCEDURE — 86803 HEPATITIS C AB TEST: CPT | Performed by: INTERNAL MEDICINE

## 2017-05-14 PROCEDURE — 83550 IRON BINDING TEST: CPT | Performed by: INTERNAL MEDICINE

## 2017-05-14 PROCEDURE — 84165 PROTEIN E-PHORESIS SERUM: CPT | Performed by: INTERNAL MEDICINE

## 2017-05-14 PROCEDURE — 12000007 ZZH R&B INTERMEDIATE

## 2017-05-14 PROCEDURE — 86706 HEP B SURFACE ANTIBODY: CPT | Performed by: INTERNAL MEDICINE

## 2017-05-14 PROCEDURE — 83876 ASSAY MYELOPEROXIDASE: CPT | Performed by: INTERNAL MEDICINE

## 2017-05-14 PROCEDURE — 83516 IMMUNOASSAY NONANTIBODY: CPT | Performed by: INTERNAL MEDICINE

## 2017-05-14 PROCEDURE — 83540 ASSAY OF IRON: CPT | Performed by: INTERNAL MEDICINE

## 2017-05-14 PROCEDURE — 86334 IMMUNOFIX E-PHORESIS SERUM: CPT | Performed by: INTERNAL MEDICINE

## 2017-05-14 PROCEDURE — 82607 VITAMIN B-12: CPT | Performed by: INTERNAL MEDICINE

## 2017-05-14 PROCEDURE — 36415 COLL VENOUS BLD VENIPUNCTURE: CPT | Performed by: INTERNAL MEDICINE

## 2017-05-14 PROCEDURE — 80048 BASIC METABOLIC PNL TOTAL CA: CPT | Performed by: INTERNAL MEDICINE

## 2017-05-14 PROCEDURE — 85027 COMPLETE CBC AUTOMATED: CPT | Performed by: INTERNAL MEDICINE

## 2017-05-14 PROCEDURE — 99233 SBSQ HOSP IP/OBS HIGH 50: CPT | Performed by: INTERNAL MEDICINE

## 2017-05-14 PROCEDURE — 82746 ASSAY OF FOLIC ACID SERUM: CPT | Performed by: INTERNAL MEDICINE

## 2017-05-14 PROCEDURE — 25000132 ZZH RX MED GY IP 250 OP 250 PS 637: Mod: GY | Performed by: INTERNAL MEDICINE

## 2017-05-14 PROCEDURE — 86704 HEP B CORE ANTIBODY TOTAL: CPT | Performed by: INTERNAL MEDICINE

## 2017-05-14 PROCEDURE — 86431 RHEUMATOID FACTOR QUANT: CPT | Performed by: INTERNAL MEDICINE

## 2017-05-14 PROCEDURE — 25000128 H RX IP 250 OP 636: Performed by: INTERNAL MEDICINE

## 2017-05-14 PROCEDURE — 83036 HEMOGLOBIN GLYCOSYLATED A1C: CPT | Performed by: INTERNAL MEDICINE

## 2017-05-14 RX ORDER — ACETAMINOPHEN 500 MG
1000 TABLET ORAL EVERY 8 HOURS SCHEDULED
Status: DISCONTINUED | OUTPATIENT
Start: 2017-05-14 | End: 2017-05-21 | Stop reason: HOSPADM

## 2017-05-14 RX ORDER — CODEINE PHOSPHATE AND GUAIFENESIN 10; 100 MG/5ML; MG/5ML
5 SOLUTION ORAL EVERY 4 HOURS PRN
Status: DISCONTINUED | OUTPATIENT
Start: 2017-05-14 | End: 2017-05-21 | Stop reason: HOSPADM

## 2017-05-14 RX ORDER — ALBUTEROL SULFATE 90 UG/1
2 AEROSOL, METERED RESPIRATORY (INHALATION) 4 TIMES DAILY PRN
Status: DISCONTINUED | OUTPATIENT
Start: 2017-05-14 | End: 2017-05-21 | Stop reason: HOSPADM

## 2017-05-14 RX ADMIN — ACETAMINOPHEN 1000 MG: 500 TABLET, FILM COATED ORAL at 22:31

## 2017-05-14 RX ADMIN — Medication 15 MCG: at 22:31

## 2017-05-14 RX ADMIN — CEFTRIAXONE 2 G: 2 INJECTION, POWDER, FOR SOLUTION INTRAMUSCULAR; INTRAVENOUS at 00:21

## 2017-05-14 RX ADMIN — ACETAMINOPHEN 650 MG: 325 TABLET, FILM COATED ORAL at 08:17

## 2017-05-14 RX ADMIN — SODIUM CHLORIDE: 9 INJECTION, SOLUTION INTRAVENOUS at 03:14

## 2017-05-14 RX ADMIN — AZITHROMYCIN 250 MG: 250 TABLET, FILM COATED ORAL at 08:16

## 2017-05-14 RX ADMIN — SODIUM CHLORIDE: 9 INJECTION, SOLUTION INTRAVENOUS at 22:37

## 2017-05-14 RX ADMIN — ACETAMINOPHEN 650 MG: 325 TABLET, FILM COATED ORAL at 00:32

## 2017-05-14 RX ADMIN — Medication 2.5 MG: at 22:35

## 2017-05-14 RX ADMIN — GUAIFENESIN AND CODEINE PHOSPHATE 5 ML: 100; 10 SOLUTION ORAL at 17:09

## 2017-05-14 RX ADMIN — SODIUM CHLORIDE: 9 INJECTION, SOLUTION INTRAVENOUS at 11:16

## 2017-05-14 RX ADMIN — Medication 15 MCG: at 08:16

## 2017-05-14 RX ADMIN — ACETAMINOPHEN 650 MG: 325 TABLET, FILM COATED ORAL at 14:01

## 2017-05-14 RX ADMIN — LEVOTHYROXINE SODIUM 112 MCG: 112 TABLET ORAL at 08:16

## 2017-05-14 RX ADMIN — GUAIFENESIN AND CODEINE PHOSPHATE 5 ML: 100; 10 SOLUTION ORAL at 22:31

## 2017-05-14 NOTE — PLAN OF CARE
"Problem: Goal Outcome Summary  Goal: Goal Outcome Summary  Outcome: No Change  Patient up IND in room. Adequate I/O. Tylenol given for \"generalized pain\".       "

## 2017-05-14 NOTE — PLAN OF CARE
Problem: Goal Outcome Summary  Goal: Goal Outcome Summary  Outcome: No Change  Pt. Alert and oriented x4. VSS on RA. Lung sounds diminished. Tele: NSR. Regular diet. Tylenol given for general body aches. Up SBA (to unplug IV pole, otherwise had been independent). IV antibiotics. Will continue to monitor.

## 2017-05-14 NOTE — PLAN OF CARE
Problem: Goal Outcome Summary  Goal: Goal Outcome Summary  Outcome: No Change  VSS, A&Ox4. Pt up ad hemant in room, calls appropriately. PRN tylenol for pain. Down for CT this a.m. at 1145, awaiting results. Will continue to monitor.

## 2017-05-14 NOTE — PROGRESS NOTES
HOSPITALIST SERVICE CROSS-COVER NOTE:    Ordered Robitussin-codeine prn for cough.      Zoe Randolph MD  Hospitalist  Pager # [phone]

## 2017-05-14 NOTE — PROGRESS NOTES
[facility]    Hospitalist Progress Note    Date of Service (when I saw the patient): 05/14/2017    Assessment & Plan   Elizabeth Galicia is a 71 year old female who was admitted on 5/12/2017 with dyspnea.    1. Bilateral CAP  - CT chest demonstrates RUL, RLL, LEELA infiltrates consistent with pneumonia  - CXR demonstrates RML infiltrate  - Elevated WBC but negative fever and procalcitonin  - Continue treatment with Rocephin and Azithromycin  - Robitussin w/ codeine for cough    2. BLE edema  - BLE doppler negative at [facility]  - Echo unremarkable  - Urine protein 0.85 g/g Cr  - Check SPEP, UPEP, SIEP, UIEP, UFLC    3. Renal failure  - Unclear chronicity  - FENa 0.7%, HbA1c 6.9%  - Renal US unremarkable  - UA bland sediment  - Check SPEP, UPEP, SIEP, UIEP, UFLC  - Check JULIAN, RF, vasculitis panel  - Will gently hydrate again overnight    4. Fevers/joint pain/muscle weakenss  - CK, TSH normal  -   - Acetaminophen 1 g tid    5. HT  - Continue synthroid and cytomel    DVT Prophylaxis: Pneumatic Compression Devices  Code Status: Full Code    Disposition: Expected discharge in 1-3 days.    Shaan Diaz  Text Page (7 am to 6 pm)    Interval History   The patient is resting in bed.  She complains of joint pain, poor appetite and lethargy.    -Data reviewed today: I reviewed all new labs and imaging results over the last 24 hours. I personally reviewed no images or EKG's today.    Physical Exam   Temp: 98.5  F (36.9  C) Temp src: Oral BP: 154/74 Pulse: 89   Resp: 16 SpO2: 96 % O2 Device: None (Room air)    Vitals:    05/13/17 0109 05/14/17 0544   Weight: 103.2 kg (227 lb 8.2 oz) 102.4 kg (225 lb 12 oz)     Vital Signs with Ranges  Temp:  [97.9  F (36.6  C)-99.3  F (37.4  C)] 98.5  F (36.9  C)  Pulse:  [73-91] 89  Resp:  [16-20] 16  BP: (124-154)/(67-74) 154/74  SpO2:  [96 %] 96 %  I/O last 3 completed shifts:  In: 1210 [P.O.:1210]  Out: 1850 [Urine:1850]    Gen: Well nourished, well developed, alert  and oriented x 3, no acute distressed  HEENT: Atraumatic, normocephalic  Lungs: Clear to ausculation without wheezes, rhonchi, or rales  Heart: Regular rate and rhythm, no murmurs, gallops, or rubs  GI: Bowel sound normal, no hepatosplenomegaly or masses  Lymph: No lymphadenopathy, 1 + BLE edema  Skin: No rashes     Medications     NaCl 125 mL/hr at 05/14/17 1116       acetaminophen (TYLENOL) tablet 1,000 mg  1,000 mg Oral Q8H DESTIN     liothyronine  15 mcg Oral BID     levothyroxine  112 mcg Oral Daily     sodium chloride (PF)  3 mL Intracatheter Q8H     cefTRIAXone  2 g Intravenous Q24H     azithromycin  250 mg Oral Q24H       Data     Recent Labs  Lab 05/14/17  0608 05/13/17  1111 05/13/17  0605   WBC 14.9*  --  14.4*   HGB 9.1*  --  9.4*   MCV 88  --  88     --  344    137 136   POTASSIUM 4.4  --  4.2   CHLORIDE 110*  --  108   CO2 19*  --  18*   BUN 32*  --  28   CR 1.83* 1.77* 1.77*   ANIONGAP 10  --  10   ARABELLA 8.2*  --  8.5   *  --  118*   ALBUMIN  --  2.3*  --    PROTTOTAL  --  6.4*  --    BILITOTAL  --  0.3  --    ALKPHOS  --  74  --    ALT  --  21  --    AST  --  12  --        Recent Results (from the past 24 hour(s))   CT Chest Abdomen Pelvis w/o Contrast    Narrative    CT CHEST, ABDOMEN AND PELVIS WITHOUT CONTRAST 5/14/2017 11:47 AM     HISTORY: Elevated CRP, BLE edema, joint pain, fevers.    TECHNIQUE: Volumetric acquisition of CT images from the lung apices  through the ischial tuberosities without contrast. Radiation dose for  this scan was reduced using automated exposure control, adjustment of  the mA and/or kV according to patient size, or iterative  reconstruction technique.    COMPARISON: None.    FINDINGS:   Chest: There is extensive airspace consolidation in the right upper  lobe. Smaller areas of consolidation in the left upper lobe and right  lower lobe. No pleural or pericardial effusion. Heart size is normal.  There is a small hiatal hernia.    Abdomen and pelvis:  Calcified gallstones are present. Allowing for  unenhanced technique, the liver, spleen, pancreas, adrenal glands, and  kidneys are unremarkable. The small and large bowel are normal in  caliber. A few scattered colonic diverticula. Appendix is not seen.  There is no free intraperitoneal air. Atherosclerotic changes in the  aorta but no evidence of aneurysm. No ascites or fluid collections.      Impression    IMPRESSION:  1. Extensive airspace consolidation in the right upper lobe. Smaller  areas of consolidation in the right lower lobe and left upper lobe.  This is likely infectious in etiology.  2. Cholelithiasis.  3. Small hiatal hernia.    HUGO PICHARDO MD

## 2017-05-15 ENCOUNTER — APPOINTMENT (OUTPATIENT)
Dept: PHYSICAL THERAPY | Facility: CLINIC | Age: 72
DRG: 546 | End: 2017-05-15
Attending: INTERNAL MEDICINE
Payer: MEDICARE

## 2017-05-15 LAB
ALBUMIN MFR UR ELPH: 43.7 %
ALBUMIN SERPL ELPH-MCNC: 2.6 G/DL (ref 3.7–5.1)
ALPHA1 GLOB MFR UR ELPH: 0 %
ALPHA1 GLOB SERPL ELPH-MCNC: 0.6 G/DL (ref 0.2–0.4)
ALPHA2 GLOB MFR UR ELPH: 0 %
ALPHA2 GLOB SERPL ELPH-MCNC: 0.9 G/DL (ref 0.5–0.9)
ANA SER QL IA: 1.2
ANION GAP SERPL CALCULATED.3IONS-SCNC: 9 MMOL/L (ref 3–14)
B-GLOBULIN MFR UR ELPH: 0 %
B-GLOBULIN SERPL ELPH-MCNC: 0.7 G/DL (ref 0.6–1)
BUN SERPL-MCNC: 26 MG/DL (ref 7–30)
CALCIUM SERPL-MCNC: 8.2 MG/DL (ref 8.5–10.1)
CHLORIDE SERPL-SCNC: 111 MMOL/L (ref 94–109)
CO2 SERPL-SCNC: 19 MMOL/L (ref 20–32)
CREAT SERPL-MCNC: 1.71 MG/DL (ref 0.52–1.04)
ELP INTERPRETATION: ABNORMAL
ERYTHROCYTE [DISTWIDTH] IN BLOOD BY AUTOMATED COUNT: 14.4 % (ref 10–15)
GAMMA GLOB MFR UR ELPH: 0 %
GAMMA GLOB SERPL ELPH-MCNC: 1 G/DL (ref 0.7–1.6)
GBM IGG SER IA-ACNC: NORMAL AI (ref 0–0.9)
GFR SERPL CREATININE-BSD FRML MDRD: 29 ML/MIN/1.7M2
GLUCOSE BLDC GLUCOMTR-MCNC: 125 MG/DL (ref 70–99)
GLUCOSE BLDC GLUCOMTR-MCNC: 125 MG/DL (ref 70–99)
GLUCOSE BLDC GLUCOMTR-MCNC: 147 MG/DL (ref 70–99)
GLUCOSE SERPL-MCNC: 122 MG/DL (ref 70–99)
HBV CORE AB SERPL QL IA: NONREACTIVE
HBV SURFACE AB SERPL IA-ACNC: 0.18 M[IU]/ML
HBV SURFACE AG SERPL QL IA: NONREACTIVE
HCT VFR BLD AUTO: 25.1 % (ref 35–47)
HCV AB SERPL QL IA: NORMAL
HGB BLD-MCNC: 8.2 G/DL (ref 11.7–15.7)
IGA SERPL-MCNC: 312 MG/DL (ref 70–380)
IGG SERPL-MCNC: 1030 MG/DL (ref 695–1620)
IGM SERPL-MCNC: 89 MG/DL (ref 60–265)
IMMUNOFIX ELP, URINE: NORMAL
IMMUNOFIXATION ELP: NORMAL
M PROTEIN MFR UR ELPH: 0 %
M PROTEIN SERPL ELPH-MCNC: 0 G/DL
MCH RBC QN AUTO: 28.7 PG (ref 26.5–33)
MCHC RBC AUTO-ENTMCNC: 32.7 G/DL (ref 31.5–36.5)
MCV RBC AUTO: 88 FL (ref 78–100)
MYELOPEROXIDASE AB SER-ACNC: ABNORMAL AI (ref 0–0.9)
PLATELET # BLD AUTO: 375 10E9/L (ref 150–450)
POTASSIUM SERPL-SCNC: 3.9 MMOL/L (ref 3.4–5.3)
PROT PATTERN UR ELPH-IMP: ABNORMAL
PROTEINASE3 IGG SER-ACNC: ABNORMAL AI (ref 0–0.9)
RBC # BLD AUTO: 2.86 10E12/L (ref 3.8–5.2)
RHEUMATOID FACT SER NEPH-ACNC: 81 IU/ML (ref 0–20)
SODIUM SERPL-SCNC: 139 MMOL/L (ref 133–144)
WBC # BLD AUTO: 13.1 10E9/L (ref 4–11)

## 2017-05-15 PROCEDURE — 25000131 ZZH RX MED GY IP 250 OP 636 PS 637: Mod: GY | Performed by: INTERNAL MEDICINE

## 2017-05-15 PROCEDURE — 12000007 ZZH R&B INTERMEDIATE

## 2017-05-15 PROCEDURE — 25000132 ZZH RX MED GY IP 250 OP 250 PS 637: Mod: GY | Performed by: INTERNAL MEDICINE

## 2017-05-15 PROCEDURE — 97161 PT EVAL LOW COMPLEX 20 MIN: CPT | Mod: GP | Performed by: PHYSICAL THERAPIST

## 2017-05-15 PROCEDURE — 97530 THERAPEUTIC ACTIVITIES: CPT | Mod: GP | Performed by: PHYSICAL THERAPIST

## 2017-05-15 PROCEDURE — 85027 COMPLETE CBC AUTOMATED: CPT | Performed by: INTERNAL MEDICINE

## 2017-05-15 PROCEDURE — 25000128 H RX IP 250 OP 636: Performed by: INTERNAL MEDICINE

## 2017-05-15 PROCEDURE — A9270 NON-COVERED ITEM OR SERVICE: HCPCS | Mod: GY | Performed by: INTERNAL MEDICINE

## 2017-05-15 PROCEDURE — 36415 COLL VENOUS BLD VENIPUNCTURE: CPT | Performed by: INTERNAL MEDICINE

## 2017-05-15 PROCEDURE — 99233 SBSQ HOSP IP/OBS HIGH 50: CPT | Performed by: INTERNAL MEDICINE

## 2017-05-15 PROCEDURE — 00000146 ZZHCL STATISTIC GLUCOSE BY METER IP

## 2017-05-15 PROCEDURE — 80048 BASIC METABOLIC PNL TOTAL CA: CPT | Performed by: INTERNAL MEDICINE

## 2017-05-15 PROCEDURE — 40000193 ZZH STATISTIC PT WARD VISIT: Performed by: PHYSICAL THERAPIST

## 2017-05-15 RX ORDER — NALOXONE HYDROCHLORIDE 0.4 MG/ML
.1-.4 INJECTION, SOLUTION INTRAMUSCULAR; INTRAVENOUS; SUBCUTANEOUS
Status: DISCONTINUED | OUTPATIENT
Start: 2017-05-15 | End: 2017-05-18

## 2017-05-15 RX ORDER — HYDROMORPHONE HYDROCHLORIDE 1 MG/ML
0.2 INJECTION, SOLUTION INTRAMUSCULAR; INTRAVENOUS; SUBCUTANEOUS EVERY 4 HOURS PRN
Status: DISCONTINUED | OUTPATIENT
Start: 2017-05-15 | End: 2017-05-21 | Stop reason: HOSPADM

## 2017-05-15 RX ORDER — BENZONATATE 100 MG/1
200 CAPSULE ORAL 3 TIMES DAILY PRN
Status: DISCONTINUED | OUTPATIENT
Start: 2017-05-15 | End: 2017-05-21 | Stop reason: HOSPADM

## 2017-05-15 RX ORDER — DEXTROSE MONOHYDRATE 25 G/50ML
25-50 INJECTION, SOLUTION INTRAVENOUS
Status: DISCONTINUED | OUTPATIENT
Start: 2017-05-15 | End: 2017-05-21 | Stop reason: HOSPADM

## 2017-05-15 RX ORDER — PSEUDOEPHEDRINE HCL 30 MG
30 TABLET ORAL EVERY 4 HOURS PRN
Status: DISCONTINUED | OUTPATIENT
Start: 2017-05-15 | End: 2017-05-21 | Stop reason: HOSPADM

## 2017-05-15 RX ORDER — NICOTINE POLACRILEX 4 MG
15-30 LOZENGE BUCCAL
Status: DISCONTINUED | OUTPATIENT
Start: 2017-05-15 | End: 2017-05-21 | Stop reason: HOSPADM

## 2017-05-15 RX ORDER — FERROUS SULFATE 325(65) MG
325 TABLET ORAL 2 TIMES DAILY WITH MEALS
Status: DISCONTINUED | OUTPATIENT
Start: 2017-05-15 | End: 2017-05-17

## 2017-05-15 RX ADMIN — FERROUS SULFATE TAB 325 MG (65 MG ELEMENTAL FE) 325 MG: 325 (65 FE) TAB at 18:42

## 2017-05-15 RX ADMIN — ACETAMINOPHEN 1000 MG: 500 TABLET, FILM COATED ORAL at 20:25

## 2017-05-15 RX ADMIN — GUAIFENESIN AND CODEINE PHOSPHATE 5 ML: 100; 10 SOLUTION ORAL at 20:25

## 2017-05-15 RX ADMIN — HYDROMORPHONE HYDROCHLORIDE 0.2 MG: 1 INJECTION, SOLUTION INTRAMUSCULAR; INTRAVENOUS; SUBCUTANEOUS at 20:23

## 2017-05-15 RX ADMIN — Medication 15 MCG: at 09:16

## 2017-05-15 RX ADMIN — CEFTRIAXONE 2 G: 2 INJECTION, POWDER, FOR SOLUTION INTRAMUSCULAR; INTRAVENOUS at 00:13

## 2017-05-15 RX ADMIN — LEVOTHYROXINE SODIUM 112 MCG: 112 TABLET ORAL at 09:16

## 2017-05-15 RX ADMIN — SODIUM CHLORIDE: 9 INJECTION, SOLUTION INTRAVENOUS at 11:17

## 2017-05-15 RX ADMIN — GUAIFENESIN AND CODEINE PHOSPHATE 5 ML: 100; 10 SOLUTION ORAL at 09:56

## 2017-05-15 RX ADMIN — ACETAMINOPHEN 650 MG: 325 TABLET, FILM COATED ORAL at 03:49

## 2017-05-15 RX ADMIN — SODIUM CHLORIDE: 9 INJECTION, SOLUTION INTRAVENOUS at 23:29

## 2017-05-15 RX ADMIN — INSULIN ASPART 1 UNITS: 100 INJECTION, SOLUTION INTRAVENOUS; SUBCUTANEOUS at 18:41

## 2017-05-15 RX ADMIN — CEFTRIAXONE 2 G: 2 INJECTION, POWDER, FOR SOLUTION INTRAMUSCULAR; INTRAVENOUS at 23:29

## 2017-05-15 RX ADMIN — ACETAMINOPHEN 1000 MG: 500 TABLET, FILM COATED ORAL at 14:28

## 2017-05-15 RX ADMIN — Medication 15 MCG: at 20:25

## 2017-05-15 RX ADMIN — AZITHROMYCIN 250 MG: 250 TABLET, FILM COATED ORAL at 09:15

## 2017-05-15 RX ADMIN — GUAIFENESIN AND CODEINE PHOSPHATE 5 ML: 100; 10 SOLUTION ORAL at 14:28

## 2017-05-15 NOTE — PLAN OF CARE
Problem: Goal Outcome Summary  Goal: Goal Outcome Summary  Outcome: No Change  Patient up IND in room. Adequate I/O. Patient slept in between cares this evening. Patient c/o generalized pain, managed with tylenol.

## 2017-05-15 NOTE — PROGRESS NOTES
The following has been entered in pt's AVS:   You have a follow up appointment with your primary Dr Anderson ([facility]) on Tuesday May 23rd at 2:45 PM.  Please bring your discharge instructions at your appointment.  Please call your clinic at [phone] if you have any questions.

## 2017-05-15 NOTE — PROGRESS NOTES
[facility]    Hospitalist Progress Note    Date of Service (when I saw the patient): 05/15/2017    Assessment & Plan   Elizabeth Galicia is a 71 year old female who was admitted on 5/12/2017 with dyspnea.    1. Bilateral CAP  - CT chest demonstrates RUL, RLL, LEELA infiltrates consistent with pneumonia  - CXR demonstrates RML infiltrate  - Elevated WBC but negative fever and procalcitonin  - Continue treatment with Rocephin and Azithromycin  - Robitussin w/ codeine for cough  - Add tessalon perrles, pseudoephedrine, and IV dilaudid for cough    2. BLE edema  - BLE doppler negative at [facility]  - Echo unremarkable  - Urine protein 0.85 g/g Cr  - Check SPEP, UPEP, SIEP, UIEP, UFLC    3. Renal failure  - Unclear chronicity  - FENa 0.7%, HbA1c 6.9%  - Renal US unremarkable  - UA bland sediment  - Check SPEP, UPEP, SIEP, UIEP, UFLC  - Will gently hydrate again overnight    4. Fevers/joint pain/muscle weakenss  - CK, TSH normal  - Hep B and C negative  -   - Acetaminophen 1 g tid  - JULIAN 1.2, RF 81, MPO is > 8.0  - Concerning for microscopic polyangiitis, consult Rheumatology    5. HT  - Continue synthroid and cytomel    6. Iron deficiency anemia  - Start ferrous sulfate 325 mg bid    DVT Prophylaxis: Pneumatic Compression Devices  Code Status: Full Code    Disposition: Expected discharge in 1-3 days.    Shaan Diaz  Text Page (7 am to 6 pm)    Interval History   The patient is resting in bed.  She complains of joint pain, poor appetite and lethargy.    -Data reviewed today: I reviewed all new labs and imaging results over the last 24 hours. I personally reviewed no images or EKG's today.    Physical Exam   Temp: 97.6  F (36.4  C) Temp src: Oral BP: 156/69 Pulse: 86   Resp: 16 SpO2: 96 % O2 Device: None (Room air)    Vitals:    05/13/17 0109 05/14/17 0544 05/15/17 0955   Weight: 103.2 kg (227 lb 8.2 oz) 102.4 kg (225 lb 12 oz) 105.6 kg (232 lb 12.8 oz)     Vital Signs with Ranges  Temp:  [97.6   F (36.4  C)-98.2  F (36.8  C)] 97.6  F (36.4  C)  Pulse:  [81-86] 86  Resp:  [16-20] 16  BP: (123-156)/(55-69) 156/69  SpO2:  [92 %-96 %] 96 %  I/O last 3 completed shifts:  In: 4835 [P.O.:830; I.V.:4005]  Out: 500 [Urine:500]    Gen: Well nourished, well developed, alert and oriented x 3, no acute distressed  HEENT: Atraumatic, normocephalic  Lungs: Clear to ausculation without wheezes, rhonchi, or rales  Heart: Regular rate and rhythm, no murmurs, gallops, or rubs  GI: Bowel sound normal, no hepatosplenomegaly or masses  Lymph: No lymphadenopathy, 1 + BLE edema  Skin: BLE rash on dorsum of feet    Medications     NaCl 75 mL/hr at 05/15/17 1117       ferrous sulfate  325 mg Oral BID w/meals     acetaminophen (TYLENOL) tablet 1,000 mg  1,000 mg Oral Q8H DESTIN     liothyronine  15 mcg Oral BID     levothyroxine  112 mcg Oral Daily     sodium chloride (PF)  3 mL Intracatheter Q8H     cefTRIAXone  2 g Intravenous Q24H     azithromycin  250 mg Oral Q24H       Data     Recent Labs  Lab 05/15/17  0633 05/14/17  0608 05/13/17  1111 05/13/17  0605   WBC 13.1* 14.9*  --  14.4*   HGB 8.2* 9.1*  --  9.4*   MCV 88 88  --  88    409  --  344    139 137 136   POTASSIUM 3.9 4.4  --  4.2   CHLORIDE 111* 110*  --  108   CO2 19* 19*  --  18*   BUN 26 32*  --  28   CR 1.71* 1.83* 1.77* 1.77*   ANIONGAP 9 10  --  10   ARABELLA 8.2* 8.2*  --  8.5   * 117*  --  118*   ALBUMIN  --   --  2.3*  --    PROTTOTAL  --   --  6.4*  --    BILITOTAL  --   --  0.3  --    ALKPHOS  --   --  74  --    ALT  --   --  21  --    AST  --   --  12  --        No results found for this or any previous visit (from the past 24 hour(s)).

## 2017-05-15 NOTE — PLAN OF CARE
Problem: Goal Outcome Summary  Goal: Goal Outcome Summary  Outcome: Improving  Pt A/O, up independent in room. CMS intact LS diminished. Coughing frequently, taking robitussin prn. C/o generalized aches, managed with tylenol. recieved 1 unit insulin for glucose of 147 with dinner. Will continue to monitor.

## 2017-05-15 NOTE — PROGRESS NOTES
05/15/17 0800   Quick Adds   Type of Visit Initial PT Evaluation   Living Environment   Lives With spouse   Living Arrangements house   Home Accessibility stairs (1 railing present)   Number of Stairs to Enter Home 7   Number of Stairs Within Home 17   Stair Railings at Home (railings present)   Transportation Available car;family or friend will provide   Living Environment Comment Pt's spouse will be able to assist at time of discharge.    Self-Care   Usual Activity Tolerance good   Current Activity Tolerance fair   Regular Exercise yes   Activity/Exercise Type (stretching)   Exercise Amount/Frequency daily   Equipment Currently Used at Home none   Functional Level Prior   Ambulation 0-->independent   Transferring 0-->independent   Toileting 0-->independent   Bathing 0-->independent   Dressing 0-->independent   Eating 0-->independent   Communication 0-->understands/communicates without difficulty   Swallowing 0-->swallows foods/liquids without difficulty   Cognition 0 - no cognition issues reported   Fall history within last six months no   Which of the above functional risks had a recent onset or change? ambulation;transferring   Prior Functional Level Comment Pt completed functional mobility independently without use of an AD at baseline.    General Information   Onset of Illness/Injury or Date of Surgery - Date 05/12/17   Referring Physician Shaan Diaz MD   Patient/Family Goals Statement Go home and feel comfortable on stairs. Resume normal stretching   Pertinent History of Current Problem (include personal factors and/or comorbidities that impact the POC) Pt is a 71 year old female admitted with LE edema, found to have PNA. PMH includes: CKD, hyperlipidemia, anemia and deconditioning   Precautions/Limitations fall precautions   General Info Comments ambulate with assist   Cognitive Status Examination   Orientation orientation to person, place and time   Level of Consciousness alert   Follows Commands  "and Answers Questions 100% of the time   Personal Safety and Judgment intact   Pain Assessment   Patient Currently in Pain (4/10. LLE)   Integumentary/Edema   Integumentary/Edema Comments Pt has 1+ edema on bilateral legs and 2+ edema on bilateral dorsum of feet.    Posture    Posture Forward head position;Protracted shoulders;Kyphosis   Range of Motion (ROM)   ROM Comment Pt demonstrates WNL ROM in B LE.   Strength   Strength Comments Pt demonstrates 4/5 LE strength in B LE   Bed Mobility   Bed Mobility Comments Pt performs bed mobility independently   Transfer Skills   Transfer Comments Pt performs sit <> stand independently with use of arms, no AD   Gait   Gait Comments Pt ambulates 10 ft with no AD and CGA.   Balance   Balance Comments Pt demonstrates good seated and standing balance   Sensory Examination   Sensory Perception Comments Per pt report, sometimes in hands and feet since the edema began, but not at baseline.   General Therapy Interventions   Planned Therapy Interventions gait training;neuromuscular re-education;strengthening;stretching;progressive activity/exercise;transfer training;bed mobility training   Clinical Impression   Criteria for Skilled Therapeutic Intervention yes, treatment indicated   PT Diagnosis Difficulty with gait.    Influenced by the following impairments Decreased strength and activity tolerance   Functional limitations due to impairments Pt has decreased independence with functional mobility   Clinical Presentation Stable/Uncomplicated   Clinical Presentation Rationale Based on current presentation, PMH and social support   Clinical Decision Making (Complexity) Low complexity   Therapy Frequency` daily   Predicted Duration of Therapy Intervention (days/wks) 3 days   Anticipated Discharge Disposition Home with Assist   Risk & Benefits of therapy have been explained Yes   Patient, Family & other staff in agreement with plan of care Yes   [facility] AM-PAC TM \"6 Clicks\"   " "2016, Trustees of [facility], under license to [employer].  All rights reserved.   6 Clicks Short Forms Basic Mobility Inpatient Short Form   [facility] AM-PAC  \"6 Clicks\" V.2 Basic Mobility Inpatient Short Form   1. Turning from your back to your side while in a flat bed without using bedrails? 4 - None   2. Moving from lying on your back to sitting on the side of a flat bed without using bedrails? 4 - None   3. Moving to and from a bed to a chair (including a wheelchair)? 4 - None   4. Standing up from a chair using your arms (e.g., wheelchair, or bedside chair)? 4 - None   5. To walk in hospital room? 3 - A Little   6. Climbing 3-5 steps with a railing? 3 - A Little   Basic Mobility Raw Score (Score out of 24.Lower scores equate to lower levels of function) 22   Total Evaluation Time   Total Evaluation Time (Minutes) 10     "

## 2017-05-15 NOTE — PLAN OF CARE
Problem: Goal Outcome Summary  Goal: Goal Outcome Summary  OT: orders rec'd and chart reviewed. Spoke with PT and patient and pt with no OT needs, reports no difficulty with dressing, toileting and has a walk in shower and no concerns about this. Pt with no OT needs and will complete OT orders.

## 2017-05-16 ENCOUNTER — APPOINTMENT (OUTPATIENT)
Dept: PHYSICAL THERAPY | Facility: CLINIC | Age: 72
DRG: 546 | End: 2017-05-16
Attending: INTERNAL MEDICINE
Payer: MEDICARE

## 2017-05-16 ENCOUNTER — APPOINTMENT (OUTPATIENT)
Dept: ULTRASOUND IMAGING | Facility: CLINIC | Age: 72
DRG: 546 | End: 2017-05-16
Attending: INTERNAL MEDICINE
Payer: MEDICARE

## 2017-05-16 LAB
ANION GAP SERPL CALCULATED.3IONS-SCNC: 10 MMOL/L (ref 3–14)
APTT PPP: 33 SEC (ref 22–37)
BACTERIA SPEC CULT: ABNORMAL
BASOPHILS # BLD AUTO: 0 10E9/L (ref 0–0.2)
BASOPHILS NFR BLD AUTO: 0.2 %
BUN SERPL-MCNC: 24 MG/DL (ref 7–30)
CALCIUM SERPL-MCNC: 8.1 MG/DL (ref 8.5–10.1)
CHLORIDE SERPL-SCNC: 108 MMOL/L (ref 94–109)
CLOSURE TME COLL+EPINEP BLD: 85 SEC
CO2 SERPL-SCNC: 18 MMOL/L (ref 20–32)
COPATH REPORT: NORMAL
CREAT SERPL-MCNC: 1.64 MG/DL (ref 0.52–1.04)
DIFFERENTIAL METHOD BLD: ABNORMAL
EOSINOPHIL # BLD AUTO: 0.4 10E9/L (ref 0–0.7)
EOSINOPHIL NFR BLD AUTO: 2.4 %
ERYTHROCYTE [DISTWIDTH] IN BLOOD BY AUTOMATED COUNT: 14.5 % (ref 10–15)
GFR SERPL CREATININE-BSD FRML MDRD: 31 ML/MIN/1.7M2
GLUCOSE BLDC GLUCOMTR-MCNC: 119 MG/DL (ref 70–99)
GLUCOSE BLDC GLUCOMTR-MCNC: 195 MG/DL (ref 70–99)
GLUCOSE BLDC GLUCOMTR-MCNC: 259 MG/DL (ref 70–99)
GLUCOSE SERPL-MCNC: 124 MG/DL (ref 70–99)
HCT VFR BLD AUTO: 24.3 % (ref 35–47)
HGB BLD-MCNC: 7.8 G/DL (ref 11.7–15.7)
IMM GRANULOCYTES # BLD: 0.2 10E9/L (ref 0–0.4)
IMM GRANULOCYTES NFR BLD: 1.3 %
INR PPP: 1.18 (ref 0.86–1.14)
LYMPHOCYTES # BLD AUTO: 1.9 10E9/L (ref 0.8–5.3)
LYMPHOCYTES NFR BLD AUTO: 12.1 %
MCH RBC QN AUTO: 28.1 PG (ref 26.5–33)
MCHC RBC AUTO-ENTMCNC: 32.1 G/DL (ref 31.5–36.5)
MCV RBC AUTO: 87 FL (ref 78–100)
MICRO REPORT STATUS: ABNORMAL
MICROORGANISM SPEC CULT: ABNORMAL
MICROORGANISM SPEC CULT: ABNORMAL
MONOCYTES # BLD AUTO: 1.4 10E9/L (ref 0–1.3)
MONOCYTES NFR BLD AUTO: 9.1 %
NEUTROPHILS # BLD AUTO: 11.5 10E9/L (ref 1.6–8.3)
NEUTROPHILS NFR BLD AUTO: 74.9 %
PLATELET # BLD AUTO: 373 10E9/L (ref 150–450)
PLATELET FUNCTION ASA: 542 ARU
POTASSIUM SERPL-SCNC: 4.1 MMOL/L (ref 3.4–5.3)
RBC # BLD AUTO: 2.78 10E12/L (ref 3.8–5.2)
RETICS # AUTO: 48.4 10E9/L (ref 25–95)
RETICS/RBC NFR AUTO: 1.8 % (ref 0.5–2)
SODIUM SERPL-SCNC: 136 MMOL/L (ref 133–144)
SPECIMEN SOURCE: ABNORMAL
WBC # BLD AUTO: 15.5 10E9/L (ref 4–11)

## 2017-05-16 PROCEDURE — 40000847 ZZHCL STATISTIC MORPHOLOGY W/INTERP HISTOLOGY TC 85060: Performed by: INTERNAL MEDICINE

## 2017-05-16 PROCEDURE — 83876 ASSAY MYELOPEROXIDASE: CPT | Performed by: INTERNAL MEDICINE

## 2017-05-16 PROCEDURE — 25000132 ZZH RX MED GY IP 250 OP 250 PS 637: Mod: GY | Performed by: INTERNAL MEDICINE

## 2017-05-16 PROCEDURE — A9270 NON-COVERED ITEM OR SERVICE: HCPCS | Mod: GY | Performed by: PHYSICIAN ASSISTANT

## 2017-05-16 PROCEDURE — 85610 PROTHROMBIN TIME: CPT | Performed by: INTERNAL MEDICINE

## 2017-05-16 PROCEDURE — A9270 NON-COVERED ITEM OR SERVICE: HCPCS | Mod: GY | Performed by: INTERNAL MEDICINE

## 2017-05-16 PROCEDURE — 85004 AUTOMATED DIFF WBC COUNT: CPT | Performed by: INTERNAL MEDICINE

## 2017-05-16 PROCEDURE — 86225 DNA ANTIBODY NATIVE: CPT | Performed by: INTERNAL MEDICINE

## 2017-05-16 PROCEDURE — 86635 COCCIDIOIDES ANTIBODY: CPT | Performed by: INTERNAL MEDICINE

## 2017-05-16 PROCEDURE — 25000128 H RX IP 250 OP 636: Performed by: INTERNAL MEDICINE

## 2017-05-16 PROCEDURE — 85060 BLOOD SMEAR INTERPRETATION: CPT | Performed by: INTERNAL MEDICINE

## 2017-05-16 PROCEDURE — 12000007 ZZH R&B INTERMEDIATE

## 2017-05-16 PROCEDURE — 83516 IMMUNOASSAY NONANTIBODY: CPT | Performed by: INTERNAL MEDICINE

## 2017-05-16 PROCEDURE — 85576 BLOOD PLATELET AGGREGATION: CPT | Performed by: INTERNAL MEDICINE

## 2017-05-16 PROCEDURE — 86612 BLASTOMYCES ANTIBODY: CPT | Performed by: INTERNAL MEDICINE

## 2017-05-16 PROCEDURE — 25000132 ZZH RX MED GY IP 250 OP 250 PS 637: Mod: GY | Performed by: PHYSICIAN ASSISTANT

## 2017-05-16 PROCEDURE — 25000125 ZZHC RX 250: Performed by: INTERNAL MEDICINE

## 2017-05-16 PROCEDURE — 40000193 ZZH STATISTIC PT WARD VISIT

## 2017-05-16 PROCEDURE — 85045 AUTOMATED RETICULOCYTE COUNT: CPT | Performed by: INTERNAL MEDICINE

## 2017-05-16 PROCEDURE — 00000146 ZZHCL STATISTIC GLUCOSE BY METER IP

## 2017-05-16 PROCEDURE — 36415 COLL VENOUS BLD VENIPUNCTURE: CPT | Performed by: INTERNAL MEDICINE

## 2017-05-16 PROCEDURE — 40000354 US MARKINGS: Mod: TC

## 2017-05-16 PROCEDURE — 86606 ASPERGILLUS ANTIBODY: CPT | Performed by: INTERNAL MEDICINE

## 2017-05-16 PROCEDURE — 99233 SBSQ HOSP IP/OBS HIGH 50: CPT | Performed by: INTERNAL MEDICINE

## 2017-05-16 PROCEDURE — 80048 BASIC METABOLIC PNL TOTAL CA: CPT | Performed by: INTERNAL MEDICINE

## 2017-05-16 PROCEDURE — 85027 COMPLETE CBC AUTOMATED: CPT | Performed by: INTERNAL MEDICINE

## 2017-05-16 PROCEDURE — 85730 THROMBOPLASTIN TIME PARTIAL: CPT | Performed by: INTERNAL MEDICINE

## 2017-05-16 PROCEDURE — 97116 GAIT TRAINING THERAPY: CPT | Mod: GP

## 2017-05-16 PROCEDURE — 86160 COMPLEMENT ANTIGEN: CPT | Performed by: INTERNAL MEDICINE

## 2017-05-16 PROCEDURE — 97110 THERAPEUTIC EXERCISES: CPT | Mod: GP

## 2017-05-16 PROCEDURE — 86698 HISTOPLASMA ANTIBODY: CPT | Performed by: INTERNAL MEDICINE

## 2017-05-16 RX ORDER — FUROSEMIDE 10 MG/ML
40 INJECTION INTRAMUSCULAR; INTRAVENOUS ONCE
Status: COMPLETED | OUTPATIENT
Start: 2017-05-16 | End: 2017-05-16

## 2017-05-16 RX ORDER — SENNOSIDES 8.6 MG
1 TABLET ORAL 2 TIMES DAILY
Status: DISCONTINUED | OUTPATIENT
Start: 2017-05-16 | End: 2017-05-20

## 2017-05-16 RX ORDER — PREDNISONE 20 MG/1
60 TABLET ORAL DAILY
Status: DISCONTINUED | OUTPATIENT
Start: 2017-05-19 | End: 2017-05-21 | Stop reason: HOSPADM

## 2017-05-16 RX ORDER — PREDNISONE 20 MG/1
60 TABLET ORAL DAILY
Status: DISCONTINUED | OUTPATIENT
Start: 2017-05-16 | End: 2017-05-16

## 2017-05-16 RX ADMIN — GUAIFENESIN AND CODEINE PHOSPHATE 5 ML: 100; 10 SOLUTION ORAL at 08:58

## 2017-05-16 RX ADMIN — FERROUS SULFATE TAB 325 MG (65 MG ELEMENTAL FE) 325 MG: 325 (65 FE) TAB at 08:58

## 2017-05-16 RX ADMIN — ACETAMINOPHEN 1000 MG: 500 TABLET, FILM COATED ORAL at 22:55

## 2017-05-16 RX ADMIN — HYDROMORPHONE HYDROCHLORIDE 0.2 MG: 1 INJECTION, SOLUTION INTRAMUSCULAR; INTRAVENOUS; SUBCUTANEOUS at 00:20

## 2017-05-16 RX ADMIN — CEFTRIAXONE 2 G: 2 INJECTION, POWDER, FOR SOLUTION INTRAMUSCULAR; INTRAVENOUS at 23:52

## 2017-05-16 RX ADMIN — LEVOTHYROXINE SODIUM 112 MCG: 112 TABLET ORAL at 08:57

## 2017-05-16 RX ADMIN — PSEUDOEPHEDRINE HCL 30 MG: 30 TABLET, FILM COATED ORAL at 00:23

## 2017-05-16 RX ADMIN — SODIUM CHLORIDE 500 MG: 9 INJECTION, SOLUTION INTRAVENOUS at 14:44

## 2017-05-16 RX ADMIN — FERROUS SULFATE TAB 325 MG (65 MG ELEMENTAL FE) 325 MG: 325 (65 FE) TAB at 17:51

## 2017-05-16 RX ADMIN — AZITHROMYCIN 250 MG: 250 TABLET, FILM COATED ORAL at 08:58

## 2017-05-16 RX ADMIN — Medication 15 MCG: at 21:15

## 2017-05-16 RX ADMIN — ACETAMINOPHEN 1000 MG: 500 TABLET, FILM COATED ORAL at 16:26

## 2017-05-16 RX ADMIN — FUROSEMIDE 40 MG: 10 INJECTION, SOLUTION INTRAVENOUS at 17:49

## 2017-05-16 RX ADMIN — GUAIFENESIN AND CODEINE PHOSPHATE 5 ML: 100; 10 SOLUTION ORAL at 04:34

## 2017-05-16 RX ADMIN — Medication 15 MCG: at 08:58

## 2017-05-16 RX ADMIN — SENNOSIDES 1 TABLET: 8.6 TABLET, FILM COATED ORAL at 21:16

## 2017-05-16 RX ADMIN — PREDNISONE 60 MG: 20 TABLET ORAL at 12:07

## 2017-05-16 RX ADMIN — GUAIFENESIN AND CODEINE PHOSPHATE 5 ML: 100; 10 SOLUTION ORAL at 21:16

## 2017-05-16 RX ADMIN — GUAIFENESIN AND CODEINE PHOSPHATE 5 ML: 100; 10 SOLUTION ORAL at 00:20

## 2017-05-16 RX ADMIN — ACETAMINOPHEN 1000 MG: 500 TABLET, FILM COATED ORAL at 08:57

## 2017-05-16 RX ADMIN — HYDROMORPHONE HYDROCHLORIDE 0.2 MG: 1 INJECTION, SOLUTION INTRAMUSCULAR; INTRAVENOUS; SUBCUTANEOUS at 04:34

## 2017-05-16 RX ADMIN — Medication 2 SPRAY: at 00:38

## 2017-05-16 RX ADMIN — INSULIN ASPART 2 UNITS: 100 INJECTION, SOLUTION INTRAVENOUS; SUBCUTANEOUS at 19:09

## 2017-05-16 NOTE — PROGRESS NOTES
[facility]    Hospitalist Progress Note    Date of Service (when I saw the patient): 05/16/2017    Assessment & Plan   Elizabeth Galicia is a 71 year old female who was admitted on 5/12/2017 with dyspnea.    1. 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 BLE edema  - BLE doppler negative at [facility]  - Echo unremarkable  - Urine protein 0.85 g/g Cr    3. Renal failure  - Unclear chronicity  - FENa 0.7%, HbA1c 6.9%  - Renal US unremarkable  - UA with few RBCs  - SPEP, UPEP, SIEP, UIEP, UFLC unremarkable  - Nephrology consult appreciated  - Renal Biopsy in AM    4. Fevers/joint pain/muscle weakenss  - CK, TSH normal  - Hep B and C negative  -   - Acetaminophen 1 g tid  - JULIAN 1.2, RF 81, MPO is > 8.0  - Concerning for microscopic polyangiitis  - Rheumatology consult appreciated  - Start Solumedrol 1 g daily x 2 days, then 500 mg x 1 day, then Prednisone 60 mg daily  - Consider Rituxan or Cyclophosphamide    5. HT  - Continue synthroid and cytomel    6. Iron deficiency anemia  - Start ferrous sulfate 325 mg bid    DVT Prophylaxis: Pneumatic Compression Devices  Code Status: Full Code    Disposition: Expected discharge in 1-3 days.    Shaan Diaz  Text Page (7 am to 6 pm)    Interval History   The patient is sitting in a chair.  She complains of a bothersome frequent nonproductive cough.    -Data reviewed today: I reviewed all new labs and imaging results over the last 24 hours. I personally reviewed no images or EKG's today.    Physical Exam   Temp: 97.9  F (36.6  C) Temp src: Oral BP: 136/68 Pulse: 84   Resp: 18 SpO2: 94 % O2 Device: None (Room air)    Vitals:     05/13/17 0109 05/14/17 0544 05/15/17 0955   Weight: 103.2 kg (227 lb 8.2 oz) 102.4 kg (225 lb 12 oz) 105.6 kg (232 lb 12.8 oz)     Vital Signs with Ranges  Temp:  [97.9  F (36.6  C)-99.8  F (37.7  C)] 97.9  F (36.6  C)  Pulse:  [82-92] 84  Resp:  [16-18] 18  BP: (128-145)/(58-68) 136/68  SpO2:  [90 %-94 %] 94 %  I/O last 3 completed shifts:  In: 2530 [P.O.:1705; I.V.:825]  Out: -     Gen: Well nourished, well developed, alert and oriented x 3, no acute distressed  HEENT: Atraumatic, normocephalic  Lungs: Clear to ausculation without wheezes, rhonchi, or rales  Heart: Regular rate and rhythm, no murmurs, gallops, or rubs  GI: Bowel sound normal, no hepatosplenomegaly or masses  Lymph: No lymphadenopathy, 1 + BLE edema  Skin: BLE rash on dorsum of feet    Medications        [START ON 5/17/2017] methylPREDNISolone  1,000 mg Intravenous Daily     [START ON 5/19/2017] predniSONE  60 mg Oral Daily     ferrous sulfate  325 mg Oral BID w/meals     insulin aspart  1-7 Units Subcutaneous TID AC     insulin aspart  1-5 Units Subcutaneous At Bedtime     acetaminophen (TYLENOL) tablet 1,000 mg  1,000 mg Oral Q8H DESTIN     liothyronine  15 mcg Oral BID     levothyroxine  112 mcg Oral Daily     sodium chloride (PF)  3 mL Intracatheter Q8H     cefTRIAXone  2 g Intravenous Q24H     azithromycin  250 mg Oral Q24H       Data     Recent Labs  Lab 05/16/17  1500 05/16/17  0700 05/15/17  0633 05/14/17  0608 05/13/17  1111   WBC  --  15.5* 13.1* 14.9*  --    HGB  --  7.8* 8.2* 9.1*  --    MCV  --  87 88 88  --    PLT  --  373 375 409  --    INR 1.18*  --   --   --   --    NA  --  136 139 139 137   POTASSIUM  --  4.1 3.9 4.4  --    CHLORIDE  --  108 111* 110*  --    CO2  --  18* 19* 19*  --    BUN  --  24 26 32*  --    CR  --  1.64* 1.71* 1.83* 1.77*   ANIONGAP  --  10 9 10  --    ARABELLA  --  8.1* 8.2* 8.2*  --    GLC  --  124* 122* 117*  --    ALBUMIN  --   --   --   --  2.3*   PROTTOTAL  --   --   --   --  6.4*   BILITOTAL  --   --   --   --   0.3   ALKPHOS  --   --   --   --  74   ALT  --   --   --   --  21   AST  --   --   --   --  12       Recent Results (from the past 24 hour(s))   US Markings    Narrative    This exam was marked as non-reportable because it will not be read by a   radiologist or a [place] non-radiologist provider.

## 2017-05-16 NOTE — CONSULTS
"RENAL CONSULTATION NOTE    REFERRING MD:  Shaan Diaz MD    REASON FOR CONSULTATION:  Renal biopsy?    HPI:  71 y.o woman with obesity and hypothyroidism, who was admitted on 5/12 for bilateral lower extremities edema. Three months ago, she developed severe coughing daily for two weeks. She has allergy to \"snow mold\", and she thought it was due to that. She is not sure if she other respiratory symptoms beside the coughing. She also has fatigued. She says the cough resolved, but she was still feeling \"sick\", and she described it as being tired and fatigued. She went to [place] for a business trip. Within a couple days, the cough returned and she felt worse. This time, she felt like it was more of the upper airway type of cough from post nasal drainage, where as it was deeper in the lungs previously. She had some hemoptysis and blood in her nasal drainage. She went to [place] around 4/11, and her symptoms continue to declined. She went to an urgent care in [place] for possible ear infection. She was told she had viral bronchitis and that her ears looked fine. Soon after she came back from [place], her legs started to swell, and that is what brought her in beside the persistent cough, fatigue, tiredness and feeling not well. She states she has had persistent night sweat for the last three months. She denies any visual changes and headaches.     She had an ultrasound that did now DVT. CT scan showed extensive consolidation in the RUL, small areas of consolidation in the RLL and LEELA. She was diagnosed with CAP, and she was started on Zithromax and Rocephin. Her procalcitonin is normal.     Other labs include + JULIAN, MPO, RF, microscopic hematuria, severe anemia, CRP, leukocytosis, proteinuria and elevated serum creatinine. Her serum creatinine was 1.4 mg/dl on 5/1 in care everywhere with a Hgb of 11.5.     She was evaluated by Dr. Arnold from Rheumatology. 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     ROS:  A complete review of systems was performed and is negative except as noted above.    PMH:    Past Medical History:   Diagnosis Date     Anxiety      Gastro-oesophageal reflux disease      Herpes      Hyperlipidemia      Hypothyroid      Lesion of upper eyelid LEFT     Obesity (BMI 30-39.9)      Uterine prolapse        PSH:    Past Surgical History:   Procedure Laterality Date     COLONOSCOPY       DILATION AND CURETTAGE       ENT SURGERY      partial thyroidectomy; tonsillectomy     EXCISE LESION EYELID Left 3/10/2016    Procedure: EXCISE LESION EYELID;  Surgeon: Rg Nazario MD;  Location: [facility]     HAND SURGERY       HYSTERECTOMY VAGINAL, COLPORRHAPHY ANTERIOR, POSTERIOR, COMBINED N/A 9/9/2014    Procedure: COMBINED HYSTERECTOMY VAGINAL, COLPORRHAPHY ANTERIOR, POSTERIOR;  Surgeon: Shay Winkler MD;  Location: [facility]     LAPAROSCOPIC SALPINGO-OOPHORECTOMY  6/27/2011    Procedure:LAPAROSCOPIC SALPINGO-OOPHORECTOMY; resection of left pelvic mass. ; Surgeon:MAYRA OLEARY; Location:[facility]     ORTHOPEDIC SURGERY       SALPINGO OOPHORECTOMY,R/L/PEDRO LUIS      Salpingo Oophorectomy for torsion in past       MEDICATIONS:      predniSONE  60 mg Oral Daily     ferrous sulfate  325 mg Oral BID w/meals     insulin aspart  1-7 Units Subcutaneous TID AC     insulin aspart  1-5 Units Subcutaneous At Bedtime     acetaminophen (TYLENOL) tablet 1,000 mg  1,000 mg Oral Q8H DESTIN     liothyronine  15 mcg Oral BID     levothyroxine  112 mcg Oral Daily     sodium chloride (PF)  3 mL Intracatheter Q8H     cefTRIAXone  2 g Intravenous Q24H     azithromycin  250 mg Oral Q24H       ALLERGIES:    Allergies as of 05/12/2017 - Shaan as Reviewed 05/12/2017   Allergen Reaction Noted     Dairy [milk products]  07/13/2010     Perfume Other (See Comments) 09/09/2014     Seasonal allergies  07/13/2010     Sulfa drugs Unknown 07/13/2010       FH:    Family History   Problem Relation Age of Onset     Unknown/Adopted Mother       "HEART DISEASE         SH:    Social History     Social History     Marital status:      Spouse name: Eboni \"Ashleigh\" Ralf     Number of children: 0     Years of education: N/A     Occupational History     Organizational psychologist      Social History Main Topics     Smoking status: Never Smoker     Smokeless tobacco: Not on file     Alcohol use Yes      Comment: rarely     Drug use: No     Sexual activity: No     Other Topics Concern     Not on file     Social History Narrative       PHYSICAL EXAM:    /60 (BP Location: Right arm)  Pulse 90  Temp 99.8  F (37.7  C) (Oral)  Resp 18  Ht 1.664 m (5' 5.5\")  Wt 105.6 kg (232 lb 12.8 oz)  SpO2 90%  BMI 38.15 kg/m2  GENERAL: pleasant, alert, NAD  HEENT:  Normocephalic. No gross abnormalities.  Pupils equal.  MMM.    CV: RRR, no murmurs, no clicks, gallops, or rubs, 2+ edema, no carotid bruits  RESP: Good airflow. + crackles. No wheezes  GI: Abdomen obese, soft, NT  MUSCULOSKELETAL: Legs are pretty swollen with areas of erythema on the lateral dorsal of the feet. There is a blister on the left foot.   SKIN: erythema dorsal part of the feet with one blister on on the left foott  NEURO:  Strength normal and symmetric. A/O x 3  PSYCH: mood good, affect appropriate  LYMPH: No palpable ant/post cervical     LABS:      CBC RESULTS:     Recent Labs  Lab 05/16/17  0700 05/15/17  0633 05/14/17  0608 05/13/17  0605   WBC 15.5* 13.1* 14.9* 14.4*   RBC 2.78* 2.86* 3.17* 3.23*   HGB 7.8* 8.2* 9.1* 9.4*   HCT 24.3* 25.1* 27.8* 28.3*    375 409 344       BMP RESULTS:    Recent Labs  Lab 05/16/17  0700 05/15/17  0633 05/14/17  0608 05/13/17  1111 05/13/17  0605    139 139 137 136   POTASSIUM 4.1 3.9 4.4  --  4.2   CHLORIDE 108 111* 110*  --  108   CO2 18* 19* 19*  --  18*   BUN 24 26 32*  --  28   CR 1.64* 1.71* 1.83* 1.77* 1.77*   * 122* 117*  --  118*   ARABELLA 8.1* 8.2* 8.2*  --  8.5       INRNo lab results found in last 7 days.     DIAGNOSTICS:  " Reviewed    Renal ultrasound. Normal appearing kidneys. No obstruction. 1.8 cm cyst LMP. I personally reviewed the images    A/P:  71 y.o woman with respiratory symptoms and fatigue/tiredness for three months, admitted for LE edema, found to have elevated serum creatinine, active urine sediment and MPO+, consulted for renal biopsy.    1. Pt with Scr of 1.4 mg/dl on 5/1. Hgb was 11.5. No UA.     2. Pt was admitted on 5/13 with a Scr of 1.77 mg/dl with an active urine sediment. 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 I think the infiltrates on CT is due to lung involvement from the vasculitis rather then PNA since her procalcitonin is normal. I agree with a renal biopsy for a definitive diagnosis and treatmentt. Given her lung involvement, I would favor pulse high dose of 1 gram solumedrol x 3 days rather than starting oral prednisone. I will recheck all her labs, and plan for a renal biopsy in the morning if INR/PPT and platelet function test are normal. She was taking ibuprofen daily for the last 12 weeks, and her last dose was the night prior to admission.    -0.85 mg/g proteinuria  + microscopic hematuria   -MPO >8, PR3 neg, RF 80, , JULIAN+   -antiGBM neg   -I explained the risk and benefits of the renal biopsy. She agreed to proceed. It may be a difficult biopsy base on her body habitus, and I may send her to the radiologist for CT guided biopsy if she does not have a good window on ultrasound    3. Bilateral pulmonary infiltrates. 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 I do not expect procalcitonin to be normal with this extensive infiltrate on CT. I suspect she will need Cytoxan/Rituxan, and possible plasmapheresis.  Please consult ID for a second opinion. I discussed the case with Dr. Arnold and Abdi. Dr. Arnold agreed with IV Solumedrol.     4. Anemia and leukocytosis. I suspect this is due to active vasculitis.    -check  peripheral smear    5. Bilateral LE edema.    -stop normal saline   -diuretic prn    Daquan Barkley MD  [facility]ph[facility]  Office Phone: [phone]  Pager: [phone]
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
## 2017-05-16 NOTE — PLAN OF CARE
Problem: Goal Outcome Summary  Goal: Goal Outcome Summary  Outcome: No Change  Patient is A&O, VSS on RA, CMS intact, regular diet, up independently. Taking robitussin with codeine, Tessalon, sudafed, IV dilaudid for cough. 1-2+ edema on both legs, she slept on the recliner all night due to coughing a lot while laying down in bed. Will continue to monitor

## 2017-05-16 NOTE — PROGRESS NOTES
Pt had a mild reaction to Sulfa drug. She does not remember what it was, but she says it was mild and not anaphylactic reaction. Her breathing is a little bit labor.     Plan.  1. Lasix 40 mg iv x 1  2. Type and Screen tomorrow  3. Recheck coag in AM  4. May delay biopsy for 1-2 days since last aspirin dose was on the 11th or 12th. Will re-evaluate tomorrow, but tentatively plan for renal biopsy at 10 tomorrow. NPO at [place].

## 2017-05-16 NOTE — PROVIDER NOTIFICATION
Writer messaged MD about pt request for stool softener. Pt reports abdominal fullness and stated she has taken a stool softener, 2 tabs twice daily in the past and it has helped. Waiting for call back.

## 2017-05-16 NOTE — PLAN OF CARE
Problem: Goal Outcome Summary  Goal: Goal Outcome Summary  Outcome: Improving  Diagnosed with Vasculitis, consult with nephrology during shift, kidney biopsy planned for tomorrow. Admitted 5/12/17. A&O x4. Up as independent.  Arjun controlled with scheduled Tylenol. Voiding adequately via BR. IV SL. New blister formed on L ankle, continue to monitor. Regularly taking cough medicine with codeine. Edema on LE's. Up in chair for most of shift. Will continue to monitor.

## 2017-05-16 NOTE — PLAN OF CARE
Problem: Goal Outcome Summary  Goal: Goal Outcome Summary  PT: Pt performs sit <> stand independently with no AD. Pt ambulates 10 ft with SBA and no AD. Pt negotiates 9 stairs with use of 1 railing with CGA, fatigues quickly.  PT Recommendation: Home with assist from wife as needed.

## 2017-05-16 NOTE — PROVIDER NOTIFICATION
Writer messaged MD about pt requesting stool softener. Pt reports abdominal fullness and stated she has taken stool softeners 2 tabs, twice daily in past. Waiting for reply.

## 2017-05-17 ENCOUNTER — APPOINTMENT (OUTPATIENT)
Dept: PHYSICAL THERAPY | Facility: CLINIC | Age: 72
DRG: 546 | End: 2017-05-17
Attending: INTERNAL MEDICINE
Payer: MEDICARE

## 2017-05-17 LAB
ABO + RH BLD: NORMAL
ABO + RH BLD: NORMAL
ALBUMIN UR QL: ABNORMAL
ALPHA1 GLOB 24H UR QL ELPH: ABNORMAL
ALPHA2 GLOB UR QL ELPH: ABNORMAL
ANION GAP SERPL CALCULATED.3IONS-SCNC: 12 MMOL/L (ref 3–14)
APTT PPP: 35 SEC (ref 22–37)
B-GLOBULIN UR QL ELPH: ABNORMAL
BLD GP AB SCN SERPL QL: NORMAL
BLOOD BANK CMNT PATIENT-IMP: NORMAL
BUN SERPL-MCNC: 37 MG/DL (ref 7–30)
C3 SERPL-MCNC: 103 MG/DL (ref 76–169)
C4 SERPL-MCNC: 11 MG/DL (ref 15–50)
CALCIUM SERPL-MCNC: 9 MG/DL (ref 8.5–10.1)
CHLORIDE SERPL-SCNC: 109 MMOL/L (ref 94–109)
CO2 SERPL-SCNC: 18 MMOL/L (ref 20–32)
COLLECT DURATION TIME SPEC: ABNORMAL H
CREAT SERPL-MCNC: 1.71 MG/DL (ref 0.52–1.04)
DSDNA AB SER-ACNC: 1 IU/ML
ERYTHROCYTE [DISTWIDTH] IN BLOOD BY AUTOMATED COUNT: 14.3 % (ref 10–15)
GAMMA GLOB UR QL ELPH: ABNORMAL
GBM IGG SER IA-ACNC: NORMAL AI (ref 0–0.9)
GFR SERPL CREATININE-BSD FRML MDRD: 29 ML/MIN/1.7M2
GLUCOSE BLDC GLUCOMTR-MCNC: 179 MG/DL (ref 70–99)
GLUCOSE BLDC GLUCOMTR-MCNC: 213 MG/DL (ref 70–99)
GLUCOSE BLDC GLUCOMTR-MCNC: 240 MG/DL (ref 70–99)
GLUCOSE SERPL-MCNC: 190 MG/DL (ref 70–99)
HCT VFR BLD AUTO: 27.2 % (ref 35–47)
HGB BLD-MCNC: 9.1 G/DL (ref 11.7–15.7)
INR PPP: 1.18 (ref 0.86–1.14)
INTERPRETATION UR IFE-IMP: ABNORMAL
KAPPA LC FREE 24H UR-MRATE: ABNORMAL MG/(24.H)
KAPPA LC FREE UR-MCNC: 20.4 MG/DL
KAPPA LC FREE/LAMBDA FREE UR: 11.21
LAMBDA LC FREE 24H UR-MRATE: ABNORMAL MG/(24.H)
LAMBDA LC FREE UR-MCNC: 1.82 MG/DL
MCH RBC QN AUTO: 28.8 PG (ref 26.5–33)
MCHC RBC AUTO-ENTMCNC: 33.5 G/DL (ref 31.5–36.5)
MCV RBC AUTO: 86 FL (ref 78–100)
MYELOPEROXIDASE AB SER-ACNC: ABNORMAL AI (ref 0–0.9)
PLATELET # BLD AUTO: 450 10E9/L (ref 150–450)
PLATELET FUNCTION ASA: 602 ARU
POTASSIUM SERPL-SCNC: 3.9 MMOL/L (ref 3.4–5.3)
PROT 24H UR-MRATE: ABNORMAL G/(24.H)
PROTEINASE3 IGG SER-ACNC: ABNORMAL AI (ref 0–0.9)
RBC # BLD AUTO: 3.16 10E12/L (ref 3.8–5.2)
SODIUM SERPL-SCNC: 139 MMOL/L (ref 133–144)
SPECIMEN EXP DATE BLD: NORMAL
SPECIMEN VOL ?TM UR: ABNORMAL ML
WBC # BLD AUTO: 20.5 10E9/L (ref 4–11)

## 2017-05-17 PROCEDURE — 12000007 ZZH R&B INTERMEDIATE

## 2017-05-17 PROCEDURE — 25000132 ZZH RX MED GY IP 250 OP 250 PS 637: Mod: GY | Performed by: PHYSICIAN ASSISTANT

## 2017-05-17 PROCEDURE — 97116 GAIT TRAINING THERAPY: CPT | Mod: GP

## 2017-05-17 PROCEDURE — 85576 BLOOD PLATELET AGGREGATION: CPT | Performed by: INTERNAL MEDICINE

## 2017-05-17 PROCEDURE — 85730 THROMBOPLASTIN TIME PARTIAL: CPT | Performed by: INTERNAL MEDICINE

## 2017-05-17 PROCEDURE — 99207 ZZC CDG-MDM COMPONENT: MEETS MODERATE - UP CODED: CPT | Performed by: INTERNAL MEDICINE

## 2017-05-17 PROCEDURE — A9270 NON-COVERED ITEM OR SERVICE: HCPCS | Mod: GY | Performed by: INTERNAL MEDICINE

## 2017-05-17 PROCEDURE — A9270 NON-COVERED ITEM OR SERVICE: HCPCS | Mod: GY | Performed by: PHYSICIAN ASSISTANT

## 2017-05-17 PROCEDURE — 86900 BLOOD TYPING SEROLOGIC ABO: CPT | Performed by: INTERNAL MEDICINE

## 2017-05-17 PROCEDURE — 25000128 H RX IP 250 OP 636: Performed by: INTERNAL MEDICINE

## 2017-05-17 PROCEDURE — 86850 RBC ANTIBODY SCREEN: CPT | Performed by: INTERNAL MEDICINE

## 2017-05-17 PROCEDURE — 85027 COMPLETE CBC AUTOMATED: CPT | Performed by: INTERNAL MEDICINE

## 2017-05-17 PROCEDURE — 25000132 ZZH RX MED GY IP 250 OP 250 PS 637: Mod: GY | Performed by: INTERNAL MEDICINE

## 2017-05-17 PROCEDURE — 40000193 ZZH STATISTIC PT WARD VISIT

## 2017-05-17 PROCEDURE — 80048 BASIC METABOLIC PNL TOTAL CA: CPT | Performed by: INTERNAL MEDICINE

## 2017-05-17 PROCEDURE — 86901 BLOOD TYPING SEROLOGIC RH(D): CPT | Performed by: INTERNAL MEDICINE

## 2017-05-17 PROCEDURE — 99233 SBSQ HOSP IP/OBS HIGH 50: CPT | Performed by: INTERNAL MEDICINE

## 2017-05-17 PROCEDURE — 00000146 ZZHCL STATISTIC GLUCOSE BY METER IP

## 2017-05-17 PROCEDURE — 36415 COLL VENOUS BLD VENIPUNCTURE: CPT | Performed by: INTERNAL MEDICINE

## 2017-05-17 PROCEDURE — 85610 PROTHROMBIN TIME: CPT | Performed by: INTERNAL MEDICINE

## 2017-05-17 RX ORDER — FUROSEMIDE 10 MG/ML
80 INJECTION INTRAMUSCULAR; INTRAVENOUS
Status: DISCONTINUED | OUTPATIENT
Start: 2017-05-17 | End: 2017-05-18

## 2017-05-17 RX ORDER — FERROUS GLUCONATE 324(38)MG
324 TABLET ORAL
Status: DISCONTINUED | OUTPATIENT
Start: 2017-05-17 | End: 2017-05-17

## 2017-05-17 RX ORDER — DOCUSATE SODIUM 100 MG/1
200 CAPSULE, LIQUID FILLED ORAL 3 TIMES DAILY PRN
Status: DISCONTINUED | OUTPATIENT
Start: 2017-05-17 | End: 2017-05-21 | Stop reason: HOSPADM

## 2017-05-17 RX ADMIN — ACETAMINOPHEN 1000 MG: 500 TABLET, FILM COATED ORAL at 14:28

## 2017-05-17 RX ADMIN — GUAIFENESIN AND CODEINE PHOSPHATE 5 ML: 100; 10 SOLUTION ORAL at 19:37

## 2017-05-17 RX ADMIN — DOCUSATE SODIUM 200 MG: 100 CAPSULE, LIQUID FILLED ORAL at 17:03

## 2017-05-17 RX ADMIN — INSULIN ASPART 1 UNITS: 100 INJECTION, SOLUTION INTRAVENOUS; SUBCUTANEOUS at 16:57

## 2017-05-17 RX ADMIN — FUROSEMIDE 80 MG: 10 INJECTION, SOLUTION INTRAVENOUS at 16:40

## 2017-05-17 RX ADMIN — SENNOSIDES 1 TABLET: 8.6 TABLET, FILM COATED ORAL at 21:33

## 2017-05-17 RX ADMIN — Medication 15 MCG: at 08:57

## 2017-05-17 RX ADMIN — INSULIN ASPART 2 UNITS: 100 INJECTION, SOLUTION INTRAVENOUS; SUBCUTANEOUS at 08:51

## 2017-05-17 RX ADMIN — LEVOTHYROXINE SODIUM 112 MCG: 112 TABLET ORAL at 08:55

## 2017-05-17 RX ADMIN — SODIUM CHLORIDE 1000 MG: 9 INJECTION, SOLUTION INTRAVENOUS at 08:44

## 2017-05-17 RX ADMIN — ACETAMINOPHEN 1000 MG: 500 TABLET, FILM COATED ORAL at 21:33

## 2017-05-17 RX ADMIN — Medication 15 MCG: at 21:33

## 2017-05-17 RX ADMIN — DOCUSATE SODIUM 200 MG: 100 CAPSULE, LIQUID FILLED ORAL at 21:33

## 2017-05-17 RX ADMIN — FUROSEMIDE 80 MG: 10 INJECTION, SOLUTION INTRAVENOUS at 12:23

## 2017-05-17 RX ADMIN — SENNOSIDES 1 TABLET: 8.6 TABLET, FILM COATED ORAL at 08:57

## 2017-05-17 RX ADMIN — AZITHROMYCIN 250 MG: 250 TABLET, FILM COATED ORAL at 08:55

## 2017-05-17 RX ADMIN — INSULIN ASPART 2 UNITS: 100 INJECTION, SOLUTION INTRAVENOUS; SUBCUTANEOUS at 11:03

## 2017-05-17 NOTE — PLAN OF CARE
Problem: Goal Outcome Summary  Goal: Goal Outcome Summary  PT: Pt ambulates x50 ft independently with no AD. Pt negotiates 12 stairs with 1 railing and independence. Pt SOB upon return from walking, VSS. Pt encouraged to walk with nursing 4x daily and perform LE exercises. PT Recommendation: Home with assist from wife as needed.     Walk with nursing 4x daily.    Physical Therapy Discharge Summary    Reason for therapy discharge:    No further skilled therapy needs    Progress towards therapy goal(s). See goals on Care Plan in [facility] record for goal details.  Goals partially met.  Barriers to achieving goals:   Anticipate pt will continue to improve activity tolerance with ambulation and repeated transfers with frequent walks while in the hospital..    Therapy recommendation(s):    No further therapy is recommended.  Continue home exercise program.  Pt encouraged to walk with nursing 4x daily

## 2017-05-17 NOTE — PROGRESS NOTES
Renal Medicine Progress Note            Assessment/Plan:     Pt with Scr of 1.4 mg/dl on 5/1. Hgb was 11.5. No UA.      2. Pt was admitted on 5/13 with a Scr of 1.77 mg/dl with an active urine sediment. 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 I think the infiltrates on CT is due to lung involvement from the vasculitis rather then PNA since her procalcitonin is normal.  -0.85 mg/g proteinuria + microscopic hematuria  -MPO >8, PR3 neg, RF 80, , JULIAN+  -antiGBM neg  -day 2 of high dose Solumedrol    3. Bilateral pulmonary infiltrates. 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 I do not expect procalcitonin to be normal with this extensive infiltrate on CT. I suspect she will need Cytoxan/Rituxan, and possible plasmapheresis.She has some hemoptysis this morning, and it looks like old blood rather fresh bleeding.      4. Anemia and leukocytosis. I suspect this is due to active vasculitis.   - peripheral smear without schistocytes     5. Bilateral LE edema.   -start Lasix 80 mg bid  -strict i/o    Plan.   1. Will delay biopsy 1-2 days or so to let INR normalized and Advil to get out of her system to reduce the risk of bleeding. I advised her to eat more green leafy vegetables today to get some vitamin K. Her condition is stable, and she feels better. Delaying a renal biopsy 1-2 days would not change our treatment plan. I would not add Cytoxan or Rituxan yet until I have the result of the biopsy and it would takes a couple days to get back. I am tentatively planning for a renal biopsy at 1100 tomorrow.     2. She will need to be on antibiotic for PCP prophylaxis and she will be a prolong course of steroid. She is allergic to sulfa drugs. She states she was told she had a mild allergy to some sulfa drug. I wonder if we should try to see if she is truly allergic to Bactrim and try to desensitize her vs using alternative agents    3. Lasix 80 mg IV  "bid    4. Change Fe sulfate to gluconate          Interval History:     She feels better since the steroid was started. She is coughing up some blood, but overall, it is better. She tolerates Lasix without any reaction. She has constipation from Fe.          Medications and Allergies:       methylPREDNISolone  1,000 mg Intravenous Daily     [START ON 5/19/2017] predniSONE  60 mg Oral Daily     sennosides  1 tablet Oral BID     ferrous sulfate  325 mg Oral BID w/meals     insulin aspart  1-7 Units Subcutaneous TID AC     insulin aspart  1-5 Units Subcutaneous At Bedtime     acetaminophen (TYLENOL) tablet 1,000 mg  1,000 mg Oral Q8H DESTIN     liothyronine  15 mcg Oral BID     levothyroxine  112 mcg Oral Daily     sodium chloride (PF)  3 mL Intracatheter Q8H     cefTRIAXone  2 g Intravenous Q24H        Allergies   Allergen Reactions     Dairy [Milk Products]      Perfume Other (See Comments)     Stuffy in nose, HA     Seasonal Allergies      Sulfa Drugs Unknown            Physical Exam:   Vitals were reviewed   , Blood pressure 136/77, pulse 78, temperature 97.8  F (36.6  C), temperature source Oral, resp. rate 18, height 1.664 m (5' 5.5\"), weight 105.6 kg (232 lb 12.8 oz), SpO2 94 %, not currently breastfeeding.    Wt Readings from Last 3 Encounters:   05/15/17 105.6 kg (232 lb 12.8 oz)   10/27/16 100.7 kg (222 lb)   03/10/16 99.3 kg (219 lb)       Intake/Output Summary (Last 24 hours) at 05/17/17 1015  Last data filed at 05/16/17 1915   Gross per 24 hour   Intake             1175 ml   Output                0 ml   Net             1175 ml       GENERAL APPEARANCE: pleasant, NAD, alert  HEENT:  Eyes/ears/nose/neck grossly normal  RESP: lungs cta b c good efforts, no crackles, rhonchi or wheezes  CV: RRR, nl S1/S2, no m/r/g   ABDOMEN: Obese, soft, NT  EXTREMITIES/SKIN: no rashes/lesions on observed skin;2+ edema  Neuro: a/o x3. nonfocal          Data:     CBC RESULTS:     Recent Labs  Lab 05/17/17  0650 05/16/17  0700 " 05/15/17  0633 05/14/17  0608 05/13/17  0605   WBC 20.5* 15.5* 13.1* 14.9* 14.4*   RBC 3.16* 2.78* 2.86* 3.17* 3.23*   HGB 9.1* 7.8* 8.2* 9.1* 9.4*   HCT 27.2* 24.3* 25.1* 27.8* 28.3*    373 375 409 344       Basic Metabolic Panel:    Recent Labs  Lab 05/17/17  0650 05/16/17  0700 05/15/17  0633 05/14/17  0608 05/13/17  1111 05/13/17  0605    136 139 139 137 136   POTASSIUM 3.9 4.1 3.9 4.4  --  4.2   CHLORIDE 109 108 111* 110*  --  108   CO2 18* 18* 19* 19*  --  18*   BUN 37* 24 26 32*  --  28   CR 1.71* 1.64* 1.71* 1.83* 1.77* 1.77*   * 124* 122* 117*  --  118*   ARABELLA 9.0 8.1* 8.2* 8.2*  --  8.5       INR  Recent Labs  Lab 05/17/17  0650 05/16/17  1500   INR 1.18* 1.18*      Attestation:   I have reviewed today's relevant vital signs, notes, medications, labs and imaging.    Daquan Barkley MD  [facility]  Office phone :[phone]  Pager: [phone]

## 2017-05-17 NOTE — PLAN OF CARE
Problem: Goal Outcome Summary  Goal: Goal Outcome Summary  Outcome: Improving  Pt A&O. VSS. Up independently. Edema BLE. Intact fluid filled blister anterior L foot w/surrounding blanchable redness, blanchable redness L anterior foot. , 259. Infrequent non productive cough, robitussin w/codeine given. Voiding adequately in BR. Will continue to monitor.

## 2017-05-17 NOTE — PROGRESS NOTES
Rheumatology    S:Seen with Elizabeth's spouse. Many questionse asked.concerning  diagnosis, treatment and prognosis. She is much improved today and feels that her breathing is better.She additionally has less swelling and pain in the hands. Not as much swelling and tenderness in the feet.    O: Clearing of the soft tissue swelling in the hands and synovitis in the small hand joints. No pedal edema. At the center of the vasculitic rash over the dorsum of the left foot, there is a vesicle filled with clear fluid. Pedal edema has resolved,    A: Microscopic polyangiitis with upper airway, lung, kidney, skin and joint disease. Case discussed with Delisa Diaz and Filippo.     P:  1.Agree with solumedrol 1 gm X 3 days.            2. For kidney biopsy tomorrow.         3. Consider cytotoxic or biologic immunosuppressant pending results of renal biopsy.    Derrick Arnold MD
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
## 2017-05-17 NOTE — PROGRESS NOTES
[facility]    Hospitalist Progress Note    Date of Service (when I saw the patient): 05/17/2017    Assessment & Plan   Elizabeth Galicia is a 71 year old female who was admitted on 5/12/2017 with dyspnea.    1. 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 BLE edema  - BLE doppler negative at [facility]  - Echo unremarkable  - Urine protein 0.85 g/g Cr  - Diuresis with IV lasix    3. Renal failure  - Unclear chronicity, but suspect acute vasculitis  - FENa 0.7%, HbA1c 6.9%  - Renal US unremarkable  - UA with few RBCs  - SPEP, UPEP, SIEP, UIEP, UFLC unremarkable  - Nephrology consult appreciated  - Renal Biopsy per Nephrology    4. Fevers/joint pain/muscle weakenss  - CK, TSH normal  - Hep B and C negative  -   - Acetaminophen 1 g tid  - JULIAN 1.2, RF 81, MPO is > 8.0  - Concerning for microscopic polyangiitis  - Rheumatology consult appreciated  - Start Solumedrol 1 g daily x 2 days, then 500 mg x 1 day, then Prednisone 60 mg daily  - Consider Rituxan or Cyclophosphamide after renal biopsy    5. HT  - Continue synthroid and cytomel    6. Iron deficiency anemia  - Patient declines iron at this time    DVT Prophylaxis: Pneumatic Compression Devices  Code Status: Full Code    Disposition: Expected discharge in 1-3 days.    Shaan Diaz  Text Page (7 am to 6 pm)    Interval History   The patient is sitting in a chair.  She states that her cough and dyspnea are improved.  She had a small amount of hemoptysis.    -Data reviewed today: I reviewed all new labs and imaging results over the last 24 hours. I personally reviewed no images or EKG's  today.    Physical Exam   Temp: 97.8  F (36.6  C) Temp src: Oral BP: 136/77 Pulse: 78   Resp: 18 SpO2: 94 % O2 Device: None (Room air)    Vitals:    05/13/17 0109 05/14/17 0544 05/15/17 0955   Weight: 103.2 kg (227 lb 8.2 oz) 102.4 kg (225 lb 12 oz) 105.6 kg (232 lb 12.8 oz)     Vital Signs with Ranges  Temp:  [97.4  F (36.3  C)-97.8  F (36.6  C)] 97.8  F (36.6  C)  Pulse:  [77-78] 78  Resp:  [18-20] 18  BP: (136-138)/(74-77) 136/77  SpO2:  [94 %] 94 %  I/O last 3 completed shifts:  In: 150 [P.O.:150]  Out: 800 [Urine:800]    Gen: Well nourished, well developed, alert and oriented x 3, no acute distressed  HEENT: Atraumatic, normocephalic  Lungs: Clear to ausculation without wheezes, rhonchi, or rales  Heart: Regular rate and rhythm, no murmurs, gallops, or rubs  GI: Bowel sound normal, no hepatosplenomegaly or masses  Lymph: No lymphadenopathy, 1 + BLE edema  Skin: BLE rash on dorsum of feet    Medications        furosemide  80 mg Intravenous BID     ferrous gluconate  324 mg Oral Daily with breakfast     methylPREDNISolone  1,000 mg Intravenous Daily     [START ON 5/19/2017] predniSONE  60 mg Oral Daily     sennosides  1 tablet Oral BID     insulin aspart  1-7 Units Subcutaneous TID AC     insulin aspart  1-5 Units Subcutaneous At Bedtime     acetaminophen (TYLENOL) tablet 1,000 mg  1,000 mg Oral Q8H DESTIN     liothyronine  15 mcg Oral BID     levothyroxine  112 mcg Oral Daily     sodium chloride (PF)  3 mL Intracatheter Q8H     cefTRIAXone  2 g Intravenous Q24H       Data     Recent Labs  Lab 05/17/17  0650 05/16/17  1500 05/16/17  0700 05/15/17  0633  05/13/17  1111   WBC 20.5*  --  15.5* 13.1*  < >  --    HGB 9.1*  --  7.8* 8.2*  < >  --    MCV 86  --  87 88  < >  --      --  373 375  < >  --    INR 1.18* 1.18*  --   --   --   --      --  136 139  < > 137   POTASSIUM 3.9  --  4.1 3.9  < >  --    CHLORIDE 109  --  108 111*  < >  --    CO2 18*  --  18* 19*  < >  --    BUN 37*  --  24 26  < >  --    CR  1.71*  --  1.64* 1.71*  < > 1.77*   ANIONGAP 12  --  10 9  < >  --    ARABELLA 9.0  --  8.1* 8.2*  < >  --    *  --  124* 122*  < >  --    ALBUMIN  --   --   --   --   --  2.3*   PROTTOTAL  --   --   --   --   --  6.4*   BILITOTAL  --   --   --   --   --  0.3   ALKPHOS  --   --   --   --   --  74   ALT  --   --   --   --   --  21   AST  --   --   --   --   --  12   < > = values in this interval not displayed.    No results found for this or any previous visit (from the past 24 hour(s)).

## 2017-05-17 NOTE — PLAN OF CARE
Problem: Goal Outcome Summary  Goal: Goal Outcome Summary  Outcome: Improving  Pt states she is feeling better. Legs with 2-3 + edema. Independent in room. Plan tentative biopsy tomorrow.

## 2017-05-17 NOTE — PLAN OF CARE
Problem: Goal Outcome Summary  Goal: Goal Outcome Summary  Outcome: Improving  Patient is A&O, VSS on RA, CMS intact, NPO since midnight, up independently, coughing is getting better. Voiding adequately in the bathroom, 3+ edema on both legs, blister on top of L foot with redness/rash. Kidney biopsy later today. Will continue to monitor

## 2017-05-18 ENCOUNTER — APPOINTMENT (OUTPATIENT)
Dept: ULTRASOUND IMAGING | Facility: CLINIC | Age: 72
DRG: 546 | End: 2017-05-18
Attending: INTERNAL MEDICINE
Payer: MEDICARE

## 2017-05-18 LAB
ALBUMIN SERPL-MCNC: 2.4 G/DL (ref 3.4–5)
ANION GAP SERPL CALCULATED.3IONS-SCNC: 13 MMOL/L (ref 3–14)
APTT PPP: 28 SEC (ref 22–37)
BASOPHILS # BLD AUTO: 0 10E9/L (ref 0–0.2)
BASOPHILS NFR BLD AUTO: 0 %
BUN SERPL-MCNC: 57 MG/DL (ref 7–30)
CALCIUM SERPL-MCNC: 8.8 MG/DL (ref 8.5–10.1)
CHLORIDE SERPL-SCNC: 108 MMOL/L (ref 94–109)
CLOSURE TME COLL+EPINEP BLD: 89 SEC
CO2 SERPL-SCNC: 18 MMOL/L (ref 20–32)
COPATH REPORT: NORMAL
CREAT SERPL-MCNC: 2.27 MG/DL (ref 0.52–1.04)
DIFFERENTIAL METHOD BLD: ABNORMAL
EOSINOPHIL # BLD AUTO: 0 10E9/L (ref 0–0.7)
EOSINOPHIL NFR BLD AUTO: 0 %
ERYTHROCYTE [DISTWIDTH] IN BLOOD BY AUTOMATED COUNT: 14.6 % (ref 10–15)
GFR SERPL CREATININE-BSD FRML MDRD: 21 ML/MIN/1.7M2
GLUCOSE BLDC GLUCOMTR-MCNC: 165 MG/DL (ref 70–99)
GLUCOSE BLDC GLUCOMTR-MCNC: 168 MG/DL (ref 70–99)
GLUCOSE BLDC GLUCOMTR-MCNC: 191 MG/DL (ref 70–99)
GLUCOSE SERPL-MCNC: 180 MG/DL (ref 70–99)
HCT VFR BLD AUTO: 25.2 % (ref 35–47)
HGB BLD-MCNC: 8.4 G/DL (ref 11.7–15.7)
HGB BLD-MCNC: 8.5 G/DL (ref 11.7–15.7)
HGB BLD-MCNC: 8.7 G/DL (ref 11.7–15.7)
IMM GRANULOCYTES # BLD: 0.8 10E9/L (ref 0–0.4)
IMM GRANULOCYTES NFR BLD: 2.8 %
INR PPP: 1.2 (ref 0.86–1.14)
LYMPHOCYTES # BLD AUTO: 2.8 10E9/L (ref 0.8–5.3)
LYMPHOCYTES NFR BLD AUTO: 10.1 %
MCH RBC QN AUTO: 28.7 PG (ref 26.5–33)
MCHC RBC AUTO-ENTMCNC: 33.7 G/DL (ref 31.5–36.5)
MCV RBC AUTO: 85 FL (ref 78–100)
MONOCYTES # BLD AUTO: 1 10E9/L (ref 0–1.3)
MONOCYTES NFR BLD AUTO: 3.8 %
NEUTROPHILS # BLD AUTO: 22.8 10E9/L (ref 1.6–8.3)
NEUTROPHILS NFR BLD AUTO: 83.3 %
NRBC # BLD AUTO: 0 10*3/UL
NRBC BLD AUTO-RTO: 0 /100
PHOSPHATE SERPL-MCNC: 4.8 MG/DL (ref 2.5–4.5)
PLATELET # BLD AUTO: 505 10E9/L (ref 150–450)
PLATELET FUNCTION ASA: 596 ARU
POTASSIUM SERPL-SCNC: 3.4 MMOL/L (ref 3.4–5.3)
RBC # BLD AUTO: 2.96 10E12/L (ref 3.8–5.2)
SODIUM SERPL-SCNC: 139 MMOL/L (ref 133–144)
WBC # BLD AUTO: 27.4 10E9/L (ref 4–11)

## 2017-05-18 PROCEDURE — 76942 ECHO GUIDE FOR BIOPSY: CPT

## 2017-05-18 PROCEDURE — 99232 SBSQ HOSP IP/OBS MODERATE 35: CPT | Performed by: INTERNAL MEDICINE

## 2017-05-18 PROCEDURE — 12000007 ZZH R&B INTERMEDIATE

## 2017-05-18 PROCEDURE — A9270 NON-COVERED ITEM OR SERVICE: HCPCS | Mod: GY | Performed by: PHYSICIAN ASSISTANT

## 2017-05-18 PROCEDURE — 88305 TISSUE EXAM BY PATHOLOGIST: CPT | Performed by: INTERNAL MEDICINE

## 2017-05-18 PROCEDURE — 85610 PROTHROMBIN TIME: CPT | Performed by: INTERNAL MEDICINE

## 2017-05-18 PROCEDURE — 85025 COMPLETE CBC W/AUTO DIFF WBC: CPT | Performed by: INTERNAL MEDICINE

## 2017-05-18 PROCEDURE — A9270 NON-COVERED ITEM OR SERVICE: HCPCS | Mod: GY | Performed by: INTERNAL MEDICINE

## 2017-05-18 PROCEDURE — 85730 THROMBOPLASTIN TIME PARTIAL: CPT | Performed by: INTERNAL MEDICINE

## 2017-05-18 PROCEDURE — 88350 IMFLUOR EA ADDL 1ANTB STN PX: CPT | Performed by: INTERNAL MEDICINE

## 2017-05-18 PROCEDURE — 0TB13ZX EXCISION OF LEFT KIDNEY, PERCUTANEOUS APPROACH, DIAGNOSTIC: ICD-10-PCS | Performed by: INTERNAL MEDICINE

## 2017-05-18 PROCEDURE — 25000128 H RX IP 250 OP 636: Performed by: INTERNAL MEDICINE

## 2017-05-18 PROCEDURE — 99207 ZZC CDG-MDM COMPONENT: MEETS LOW - DOWN CODED: CPT | Performed by: INTERNAL MEDICINE

## 2017-05-18 PROCEDURE — 36415 COLL VENOUS BLD VENIPUNCTURE: CPT | Performed by: INTERNAL MEDICINE

## 2017-05-18 PROCEDURE — 25000132 ZZH RX MED GY IP 250 OP 250 PS 637: Mod: GY | Performed by: INTERNAL MEDICINE

## 2017-05-18 PROCEDURE — 88313 SPECIAL STAINS GROUP 2: CPT | Performed by: INTERNAL MEDICINE

## 2017-05-18 PROCEDURE — 25000132 ZZH RX MED GY IP 250 OP 250 PS 637: Mod: GY | Performed by: PHYSICIAN ASSISTANT

## 2017-05-18 PROCEDURE — 85576 BLOOD PLATELET AGGREGATION: CPT | Performed by: INTERNAL MEDICINE

## 2017-05-18 PROCEDURE — 85018 HEMOGLOBIN: CPT | Performed by: INTERNAL MEDICINE

## 2017-05-18 PROCEDURE — 88348 ELECTRON MICROSCOPY DX: CPT | Performed by: INTERNAL MEDICINE

## 2017-05-18 PROCEDURE — 00000146 ZZHCL STATISTIC GLUCOSE BY METER IP

## 2017-05-18 PROCEDURE — 82955 ASSAY OF G6PD ENZYME: CPT | Performed by: INTERNAL MEDICINE

## 2017-05-18 PROCEDURE — 80069 RENAL FUNCTION PANEL: CPT | Performed by: INTERNAL MEDICINE

## 2017-05-18 PROCEDURE — 88346 IMFLUOR 1ST 1ANTB STAIN PX: CPT | Performed by: INTERNAL MEDICINE

## 2017-05-18 RX ORDER — NALOXONE HYDROCHLORIDE 0.4 MG/ML
.1-.4 INJECTION, SOLUTION INTRAMUSCULAR; INTRAVENOUS; SUBCUTANEOUS
Status: DISCONTINUED | OUTPATIENT
Start: 2017-05-18 | End: 2017-05-21 | Stop reason: HOSPADM

## 2017-05-18 RX ORDER — ACETAMINOPHEN 325 MG/1
650 TABLET ORAL EVERY 4 HOURS PRN
Status: DISCONTINUED | OUTPATIENT
Start: 2017-05-18 | End: 2017-05-21 | Stop reason: HOSPADM

## 2017-05-18 RX ORDER — CLONIDINE HYDROCHLORIDE 0.1 MG/1
0.1 TABLET ORAL ONCE
Status: COMPLETED | OUTPATIENT
Start: 2017-05-18 | End: 2017-05-18

## 2017-05-18 RX ADMIN — ACETAMINOPHEN 1000 MG: 500 TABLET, FILM COATED ORAL at 08:26

## 2017-05-18 RX ADMIN — ACETAMINOPHEN 1000 MG: 500 TABLET, FILM COATED ORAL at 15:43

## 2017-05-18 RX ADMIN — LEVOTHYROXINE SODIUM 112 MCG: 112 TABLET ORAL at 08:26

## 2017-05-18 RX ADMIN — Medication 15 MCG: at 08:25

## 2017-05-18 RX ADMIN — GUAIFENESIN AND CODEINE PHOSPHATE 5 ML: 100; 10 SOLUTION ORAL at 09:57

## 2017-05-18 RX ADMIN — SODIUM CHLORIDE 1000 MG: 9 INJECTION, SOLUTION INTRAVENOUS at 08:27

## 2017-05-18 RX ADMIN — ACETAMINOPHEN 1000 MG: 500 TABLET, FILM COATED ORAL at 23:56

## 2017-05-18 RX ADMIN — GUAIFENESIN AND CODEINE PHOSPHATE 5 ML: 100; 10 SOLUTION ORAL at 22:49

## 2017-05-18 RX ADMIN — INSULIN ASPART 1 UNITS: 100 INJECTION, SOLUTION INTRAVENOUS; SUBCUTANEOUS at 08:26

## 2017-05-18 RX ADMIN — INSULIN ASPART 1 UNITS: 100 INJECTION, SOLUTION INTRAVENOUS; SUBCUTANEOUS at 12:50

## 2017-05-18 RX ADMIN — CEFTRIAXONE 2 G: 2 INJECTION, POWDER, FOR SOLUTION INTRAMUSCULAR; INTRAVENOUS at 00:37

## 2017-05-18 RX ADMIN — Medication 2.5 MG: at 00:40

## 2017-05-18 RX ADMIN — CEFTRIAXONE 2 G: 2 INJECTION, POWDER, FOR SOLUTION INTRAMUSCULAR; INTRAVENOUS at 23:56

## 2017-05-18 RX ADMIN — INSULIN ASPART 1 UNITS: 100 INJECTION, SOLUTION INTRAVENOUS; SUBCUTANEOUS at 15:42

## 2017-05-18 RX ADMIN — GUAIFENESIN AND CODEINE PHOSPHATE 5 ML: 100; 10 SOLUTION ORAL at 15:48

## 2017-05-18 RX ADMIN — SENNOSIDES 1 TABLET: 8.6 TABLET, FILM COATED ORAL at 21:19

## 2017-05-18 RX ADMIN — Medication 15 MCG: at 21:18

## 2017-05-18 RX ADMIN — SENNOSIDES 1 TABLET: 8.6 TABLET, FILM COATED ORAL at 08:25

## 2017-05-18 RX ADMIN — DOCUSATE SODIUM 200 MG: 100 CAPSULE, LIQUID FILLED ORAL at 22:49

## 2017-05-18 RX ADMIN — CLONIDINE HYDROCHLORIDE 0.1 MG: 0.1 TABLET ORAL at 18:08

## 2017-05-18 NOTE — PLAN OF CARE
Problem: Goal Outcome Summary  Goal: Goal Outcome Summary  Outcome: Improving  A&OX4. VSS.  Denies pain.  Bilateral LE edema.  Blister on top of left foot intact.  Night sweats X1 this shift, needed bed linen and gown changed.  NPO since midnight.  Plan for US renal biopsy today at 1100.

## 2017-05-18 NOTE — PROGRESS NOTES
[facility]    Hospitalist Progress Note    Assessment & Plan   Elizabeth Galicia is a 71 year old female with PMHx of GERD, dyslipidemia, hypothyroidism, anxiety and uterine prolabse who was admitted on 5/12/2017 with dyspnea, LE edema and night sweats.     Bilateral CAP  CXR on admission showed a RML infiltrate. 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 Has profoudnd leukocytosis though remains afebrile and procalcitonin okay.   -- completed course of Azithromycin, conts on Rocephin  -- ID following, recommend 7-10d course of antibiotics   -- cont supportive cares with antitussives    Bilateral LE edema of unclear etiology:  Bilateral LE dopplers at outside hospital were negative for DVT. Echocardiogram unremarkable this hospital stay. Urine protein/Cr 0.85 this stay. Had been diuresing with IV Lasix  -- nephrology following, additional studies   -- Lasix dc'd 5/18 given bump in Cr    Acute Renal Failure  Cr 1.7 on presentation, trending up. Underlying etiology not clear though suspect secondary to vasculitis. Renal US unremarkable. US bland. , SPEP/UPEP neg.  -- Cr up to 2.2 today, Lasix dc'd  -- nephrology following, to have renal biopsy today    Fever / Arthralgias and Muscle Weakness:  Additional lab testing this stay - CT, TSH nl, Hep B/C neg, JULIAN 1.2, rheumatoid factor 81 and MPO >8.0.  Concern is for microscopic polyangittis.   -- rheumatology consulted this stay.   -- started on solumedrol 1g daily x2d, change to prednisone 60mg daily beginning 5/19  -- considering Rituxan or cyclophosphamide after renal biopsy  -- will also need start PCP ppx - appreciate ID recs    Hypothyroidism:  Chronic and stable on Synthroid and Cytomel    Iron Deficiency Anemia:  Declines iron replacement    FEN: no IVFs, lytes stable, regular diet  DVT Prophylaxis: PCDs  Code Status: Full Code    Disposition: Suspect 1-2 more days, pending stable renal  function and decision for antibiotics.    Shaneka Mechelleekta Laguerre    Interval History   Feeling okay. No specific complaints.     -Data reviewed today: I reviewed all new labs and imaging results over the last 24 hours. I personally reviewed no images or EKG's today.    Physical Exam   Temp: 97.7  F (36.5  C) Temp src: Oral BP: 129/69 Pulse: 73   Resp: 16 SpO2: 91 % O2 Device: None (Room air)    Vitals:    05/14/17 0544 05/15/17 0955 05/18/17 0714   Weight: 102.4 kg (225 lb 12 oz) 105.6 kg (232 lb 12.8 oz) 101.5 kg (223 lb 12.3 oz)     Vital Signs with Ranges  Temp:  [97.4  F (36.3  C)-97.7  F (36.5  C)] 97.7  F (36.5  C)  Pulse:  [73-80] 73  Resp:  [16-18] 16  BP: (129-141)/(69-75) 129/69  SpO2:  [91 %-95 %] 91 %  I/O last 3 completed shifts:  In: 900 [P.O.:900]  Out: 3300 [Urine:3300]    Constitutional: Resting comfortably, alert and conversing appropriately, NAD  Respiratory: scattered coarse breath sounds, no wheezing, no increased work of breathing  Cardiovascular: HRRR, no MGR  GI: S, NT, ND, +BS  Skin/Integumen: warm/dry  Other: ++bilateral LE edema    Medications        [START ON 5/19/2017] predniSONE  60 mg Oral Daily     sennosides  1 tablet Oral BID     insulin aspart  1-7 Units Subcutaneous TID AC     insulin aspart  1-5 Units Subcutaneous At Bedtime     acetaminophen (TYLENOL) tablet 1,000 mg  1,000 mg Oral Q8H DESTIN     liothyronine  15 mcg Oral BID     levothyroxine  112 mcg Oral Daily     sodium chloride (PF)  3 mL Intracatheter Q8H     cefTRIAXone  2 g Intravenous Q24H       Data     Recent Labs  Lab 05/18/17  0702 05/17/17  0650 05/16/17  1500 05/16/17  0700  05/13/17  1111   WBC 27.4* 20.5*  --  15.5*  < >  --    HGB 8.5* 9.1*  --  7.8*  < >  --    MCV 85 86  --  87  < >  --    * 450  --  373  < >  --    INR 1.20* 1.18* 1.18*  --   --   --     139  --  136  < > 137   POTASSIUM 3.4 3.9  --  4.1  < >  --    CHLORIDE 108 109  --  108  < >  --    CO2 18* 18*  --  18*  < >  --    BUN 57* 37*   --  24  < >  --    CR 2.27* 1.71*  --  1.64*  < > 1.77*   ANIONGAP 13 12  --  10  < >  --    ARABELLA 8.8 9.0  --  8.1*  < >  --    * 190*  --  124*  < >  --    ALBUMIN 2.4*  --   --   --   --  2.3*   PROTTOTAL  --   --   --   --   --  6.4*   BILITOTAL  --   --   --   --   --  0.3   ALKPHOS  --   --   --   --   --  74   ALT  --   --   --   --   --  21   AST  --   --   --   --   --  12   < > = values in this interval not displayed.    No results found for this or any previous visit (from the past 24 hour(s)).

## 2017-05-18 NOTE — PLAN OF CARE
Problem: Goal Outcome Summary  Goal: Goal Outcome Summary  Outcome: No Change  VSS, denies pain.  Up in room independent prior to renal US, now BR for 6 hours.  Renal bx bandaid CD&I. BLE edema, blister on top of left foot intact.

## 2017-05-18 NOTE — PROGRESS NOTES
" Renal Medicine Progress Note            Assessment/Plan:       1. Pt with a Scr of 1.4 mg/dl on 5/1. Hgb was 11.5. No UA.       2. Pt was admitted on 5/13 with a Scr of 1.77 mg/dl with an active urine sediment. 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   -0.85 mg/g proteinuria + microscopic hematuria  -MPO >8, PR3 neg, RF 80, , JULIAN+. 0.85 g/g proteinuria  -antiGBM neg  -day 3 of high dose Solumedrol     3. Bilateral pulmonary infiltrates. 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       4. Anemia and leukocytosis. I suspect this is due to active vasculitis.   - peripheral smear without schistocytes      5. Bilateral LE edema. Improved with Lasix  -strict i/o     Plan.   1. Scr increased today likely due to diuresis. Renal biopsy today. Pt understand the risks and benefits of the renal biopsy. Day 3 of Solumedrol. Prednisone 60 mg daily starting 5/19.   2. Check G6PD  3. Stop Lasix        Interval History:     Overall, she feels 25% better. She is coughing less, and there is less blood with her cough. She has more stamina. No fever or chill.           Medications and Allergies:       [START ON 5/19/2017] predniSONE  60 mg Oral Daily     sennosides  1 tablet Oral BID     insulin aspart  1-7 Units Subcutaneous TID AC     insulin aspart  1-5 Units Subcutaneous At Bedtime     acetaminophen (TYLENOL) tablet 1,000 mg  1,000 mg Oral Q8H DESTIN     liothyronine  15 mcg Oral BID     levothyroxine  112 mcg Oral Daily     sodium chloride (PF)  3 mL Intracatheter Q8H     cefTRIAXone  2 g Intravenous Q24H        Allergies   Allergen Reactions     Dairy [Milk Products]      Perfume Other (See Comments)     Stuffy in nose, HA     Seasonal Allergies      Sulfa Drugs Unknown            Physical Exam:   Vitals were reviewed   , Blood pressure 129/69, pulse 73, temperature 97.7  F (36.5  C), temperature source Oral, resp. rate 16, height 1.664 m (5' 5.5\"), weight " 101.5 kg (223 lb 12.3 oz), SpO2 91 %, not currently breastfeeding.    Wt Readings from Last 3 Encounters:   05/18/17 101.5 kg (223 lb 12.3 oz)   10/27/16 100.7 kg (222 lb)   03/10/16 99.3 kg (219 lb)       Intake/Output Summary (Last 24 hours) at 05/18/17 1059  Last data filed at 05/18/17 0517   Gross per 24 hour   Intake              900 ml   Output             3300 ml   Net            -2400 ml       GENERAL APPEARANCE: pleasant, NAD, alert  HEENT: Eyes/ears/nose/neck grossly normal  RESP: lungs cta b c good efforts, no crackles, rhonchi or wheezes  CV: RRR, nl S1/S2, no m/r/g   ABDOMEN: Obese, soft, NT  EXTREMITIES/SKIN: no rashes/lesions on observed skin;1+ edema  Neuro: a/o x3. nonfocal          Data:     CBC RESULTS:     Recent Labs  Lab 05/18/17  0702 05/17/17  0650 05/16/17  0700 05/15/17  0633 05/14/17  0608 05/13/17  0605   WBC 27.4* 20.5* 15.5* 13.1* 14.9* 14.4*   RBC 2.96* 3.16* 2.78* 2.86* 3.17* 3.23*   HGB 8.5* 9.1* 7.8* 8.2* 9.1* 9.4*   HCT 25.2* 27.2* 24.3* 25.1* 27.8* 28.3*   * 450 373 375 409 344       Basic Metabolic Panel:    Recent Labs  Lab 05/18/17  0702 05/17/17  0650 05/16/17  0700 05/15/17  0633 05/14/17  0608 05/13/17  1111 05/13/17  0605    139 136 139 139 137 136   POTASSIUM 3.4 3.9 4.1 3.9 4.4  --  4.2   CHLORIDE 108 109 108 111* 110*  --  108   CO2 18* 18* 18* 19* 19*  --  18*   BUN 57* 37* 24 26 32*  --  28   CR 2.27* 1.71* 1.64* 1.71* 1.83* 1.77* 1.77*   * 190* 124* 122* 117*  --  118*   ARABELLA 8.8 9.0 8.1* 8.2* 8.2*  --  8.5       INR  Recent Labs  Lab 05/18/17  0702 05/17/17  0650 05/16/17  1500   INR 1.20* 1.18* 1.18*      Attestation:   I have reviewed today's relevant vital signs, notes, medications, labs and imaging.    Daquan Barkley MD  [facility] - Nephrology  Office phone :[phone]  Pager: [phone]

## 2017-05-18 NOTE — PLAN OF CARE
Problem: Goal Outcome Summary  Goal: Goal Outcome Summary  Outcome: Improving  Pt A&O. VSS. Up independently in room. BLE edema, intact blister L foot. , 240. Robitussin w/codeine given for cough. 2 doses of prn docusate given per pt request. Will continue to monitor.

## 2017-05-18 NOTE — PROGRESS NOTES
Rheumatology    S:  Continues to improve. Energy better, able to participate in PT with more vigor and energy. Slept well last night with the exception of a massive night sweat. Less postnasal discharge and can hear better out of the left ear. Had lauren hemoptysis this morning with first coughing spell. Less coughing during the day. No joint pain. Voiding well.    Renal biopsy postponed until tomorrow to allow reduction in bleeding possibility from ibuprofen.  O: Alert and animated. Asking questions. No synovitis in the small hand joints and wrists. Vasculitic rash over the dorsum of the feet is fading. Persistent blister on the dorsum of the left foot. Grade 1 pedal edema.    A: 1. MPA with renal, sinus, lung, skin bone marrow and joint involvement.:     P: 1. Renal bx 5/18.       2. Continue 1 GM solumedrol tomorrow for 3rd day of treatment then switch to prednisone 60 mg daily.       3. Determine immunosuppressant as dictated by renal bx.       4. Consider prophylactic antibiotic while on immunsuppressant tx.       5. Possible home Saturday.    Derrick Arnold MD

## 2017-05-18 NOTE — PROGRESS NOTES
[facility]    Infectious Disease Progress Note    Date of Service (when I saw the patient): 05/18/2017     Assessment & Plan   Elizabeth Galicia is a 71 year old female who was admitted on 5/12/2017.     Impression:   71 y.o female who has been having 3 months of progressive symptoms of shortness of breath and cough, joint pains and swelling.   Admitted with concerns of pneumonia. Sputum cultures positive for MSSA.   Being evaluated for vasculitis given combination of above mentioned symptoms.   On ceftriaxone and Azithromycin.   Improved with steroids.      Recommendations:   Staph aureus is the sputum could be a colonizer and not a pathogen but hard to exclude that possibility completely. I would recommend atleast a short course of antibiotics targeted towards MSSA such as Bactrim or Levaquin technically ceftriaxone counts too, probably 7- 10 days. Day 6 today.   Agree with the combination of symptoms vasculitis a probable diagnosis as already in process of evaluation with Rheum.   Noted sulfa listed as allergy ( per patient slightly allergic to sulfa).   Dapsone/ Aerosolized pentamidine/ atovaquone all possible options for PCP prophylaxis.   Alternatively can be desensitized to Bactrim, will need to transferred to ICU.     Nicho Curry MD    Interval History   Afebrile   No new microbiological data     Physical Exam   Temp: 97.7  F (36.5  C) Temp src: Oral BP: 129/69 Pulse: 73   Resp: 16 SpO2: 91 % O2 Device: None (Room air)    Vitals:    05/14/17 0544 05/15/17 0955 05/18/17 0714   Weight: 102.4 kg (225 lb 12 oz) 105.6 kg (232 lb 12.8 oz) 101.5 kg (223 lb 12.3 oz)     Vital Signs with Ranges  Temp:  [97.4  F (36.3  C)-97.7  F (36.5  C)] 97.7  F (36.5  C)  Pulse:  [73-80] 73  Resp:  [16-18] 16  BP: (129-141)/(69-75) 129/69  SpO2:  [91 %-95 %] 91 %    Constitutional: Awake, alert, cooperative, no apparent distress  Lungs: Clear to auscultation bilaterally, no crackles or wheezing  Cardiovascular:  Regular rate and rhythm, normal S1 and S2, and no murmur noted  Abdomen: Normal bowel sounds, soft, non-distended, non-tender  Skin: No rashes, no cyanosis, no edema  Other:    Medications        [START ON 5/19/2017] predniSONE  60 mg Oral Daily     sennosides  1 tablet Oral BID     insulin aspart  1-7 Units Subcutaneous TID AC     insulin aspart  1-5 Units Subcutaneous At Bedtime     acetaminophen (TYLENOL) tablet 1,000 mg  1,000 mg Oral Q8H DESTIN     liothyronine  15 mcg Oral BID     levothyroxine  112 mcg Oral Daily     sodium chloride (PF)  3 mL Intracatheter Q8H     cefTRIAXone  2 g Intravenous Q24H       Data   All microbiology laboratory data reviewed.  Recent Labs   Lab Test  05/18/17   0702  05/17/17   0650  05/16/17   0700   WBC  27.4*  20.5*  15.5*   HGB  8.5*  9.1*  7.8*   HCT  25.2*  27.2*  24.3*   MCV  85  86  87   PLT  505*  450  373     Recent Labs   Lab Test  05/18/17   0702  05/17/17   0650  05/16/17   0700   CR  2.27*  1.71*  1.64*     No lab results found.  Recent Labs   Lab Test  05/13/17   0825  08/10/13   2240   CULT  Moderate growth Staphylococcus aureus  Moderate growth Strain 2 Staphylococcus aureus  Moderate growth Normal dari  *  50,000 to 100,000 colonies/mL Escherichia coli

## 2017-05-18 NOTE — PROGRESS NOTES
PROCEDURE NOTE - RENAL BIOPSY    Procedure:  left native renal biopsy  Indication:  MELANIA, active urine sediment, + MPO consistent with ANCA  renopulmonary vasculitis  Consent:  Obtained  Anesthesia:  1% lidocaine.  Narrative:  Using real-time u/s guidance, pt's kidney was visualized.  5 passes were made into the lower pole c core tissue obtained.  Specimens will be sent to [facility] for evaluation under LM, IF, EM.    Complications:  no hematoma noted on post-procedure u/s.  Disposition:  To care suites for observation.

## 2017-05-19 LAB
ANION GAP SERPL CALCULATED.3IONS-SCNC: 13 MMOL/L (ref 3–14)
BUN SERPL-MCNC: 64 MG/DL (ref 7–30)
CALCIUM SERPL-MCNC: 8.4 MG/DL (ref 8.5–10.1)
CHLORIDE SERPL-SCNC: 110 MMOL/L (ref 94–109)
CO2 SERPL-SCNC: 19 MMOL/L (ref 20–32)
CREAT SERPL-MCNC: 2.05 MG/DL (ref 0.52–1.04)
ERYTHROCYTE [DISTWIDTH] IN BLOOD BY AUTOMATED COUNT: 14.6 % (ref 10–15)
GFR SERPL CREATININE-BSD FRML MDRD: 24 ML/MIN/1.7M2
GLUCOSE BLDC GLUCOMTR-MCNC: 157 MG/DL (ref 70–99)
GLUCOSE BLDC GLUCOMTR-MCNC: 191 MG/DL (ref 70–99)
GLUCOSE SERPL-MCNC: 165 MG/DL (ref 70–99)
HCT VFR BLD AUTO: 23.9 % (ref 35–47)
HGB BLD-MCNC: 8 G/DL (ref 11.7–15.7)
MCH RBC QN AUTO: 28.7 PG (ref 26.5–33)
MCHC RBC AUTO-ENTMCNC: 33.5 G/DL (ref 31.5–36.5)
MCV RBC AUTO: 86 FL (ref 78–100)
PLATELET # BLD AUTO: 491 10E9/L (ref 150–450)
POTASSIUM SERPL-SCNC: 3.5 MMOL/L (ref 3.4–5.3)
RBC # BLD AUTO: 2.79 10E12/L (ref 3.8–5.2)
SODIUM SERPL-SCNC: 142 MMOL/L (ref 133–144)
WBC # BLD AUTO: 22 10E9/L (ref 4–11)

## 2017-05-19 PROCEDURE — 25000132 ZZH RX MED GY IP 250 OP 250 PS 637: Mod: GY | Performed by: INTERNAL MEDICINE

## 2017-05-19 PROCEDURE — 80048 BASIC METABOLIC PNL TOTAL CA: CPT | Performed by: INTERNAL MEDICINE

## 2017-05-19 PROCEDURE — A9270 NON-COVERED ITEM OR SERVICE: HCPCS | Mod: GY | Performed by: INTERNAL MEDICINE

## 2017-05-19 PROCEDURE — A9270 NON-COVERED ITEM OR SERVICE: HCPCS | Mod: GY | Performed by: PHYSICIAN ASSISTANT

## 2017-05-19 PROCEDURE — 36415 COLL VENOUS BLD VENIPUNCTURE: CPT | Performed by: INTERNAL MEDICINE

## 2017-05-19 PROCEDURE — 85027 COMPLETE CBC AUTOMATED: CPT | Performed by: INTERNAL MEDICINE

## 2017-05-19 PROCEDURE — 99233 SBSQ HOSP IP/OBS HIGH 50: CPT | Performed by: INTERNAL MEDICINE

## 2017-05-19 PROCEDURE — 00000146 ZZHCL STATISTIC GLUCOSE BY METER IP

## 2017-05-19 PROCEDURE — 25000125 ZZHC RX 250: Performed by: INTERNAL MEDICINE

## 2017-05-19 PROCEDURE — 82955 ASSAY OF G6PD ENZYME: CPT | Performed by: INTERNAL MEDICINE

## 2017-05-19 PROCEDURE — 25000132 ZZH RX MED GY IP 250 OP 250 PS 637: Mod: GY | Performed by: PHYSICIAN ASSISTANT

## 2017-05-19 PROCEDURE — 12000007 ZZH R&B INTERMEDIATE

## 2017-05-19 RX ORDER — SULFAMETHOXAZOLE AND TRIMETHOPRIM 400; 80 MG/1; MG/1
1 TABLET ORAL ONCE
Status: COMPLETED | OUTPATIENT
Start: 2017-05-19 | End: 2017-05-19

## 2017-05-19 RX ORDER — CALCIUM CARBONATE 500 MG/1
500-1000 TABLET, CHEWABLE ORAL
Status: DISCONTINUED | OUTPATIENT
Start: 2017-05-19 | End: 2017-05-21 | Stop reason: HOSPADM

## 2017-05-19 RX ADMIN — Medication 15 MCG: at 08:47

## 2017-05-19 RX ADMIN — INSULIN ASPART 1 UNITS: 100 INJECTION, SOLUTION INTRAVENOUS; SUBCUTANEOUS at 12:58

## 2017-05-19 RX ADMIN — SENNOSIDES 1 TABLET: 8.6 TABLET, FILM COATED ORAL at 21:47

## 2017-05-19 RX ADMIN — SENNOSIDES 1 TABLET: 8.6 TABLET, FILM COATED ORAL at 08:47

## 2017-05-19 RX ADMIN — GUAIFENESIN AND CODEINE PHOSPHATE 5 ML: 100; 10 SOLUTION ORAL at 19:51

## 2017-05-19 RX ADMIN — LEVOTHYROXINE SODIUM 112 MCG: 112 TABLET ORAL at 08:47

## 2017-05-19 RX ADMIN — INSULIN ASPART 1 UNITS: 100 INJECTION, SOLUTION INTRAVENOUS; SUBCUTANEOUS at 19:50

## 2017-05-19 RX ADMIN — PREDNISONE 60 MG: 20 TABLET ORAL at 08:47

## 2017-05-19 RX ADMIN — Medication 2.5 MG: at 00:39

## 2017-05-19 RX ADMIN — CALCIUM CARBONATE (ANTACID) CHEW TAB 500 MG 1000 MG: 500 CHEW TAB at 12:57

## 2017-05-19 RX ADMIN — DOCUSATE SODIUM 200 MG: 100 CAPSULE, LIQUID FILLED ORAL at 09:00

## 2017-05-19 RX ADMIN — ACETAMINOPHEN 1000 MG: 500 TABLET, FILM COATED ORAL at 16:49

## 2017-05-19 RX ADMIN — DOCUSATE SODIUM 200 MG: 100 CAPSULE, LIQUID FILLED ORAL at 21:47

## 2017-05-19 RX ADMIN — SULFAMETHOXAZOLE AND TRIMETHOPRIM 1 TABLET: 400; 80 TABLET ORAL at 12:58

## 2017-05-19 RX ADMIN — INSULIN ASPART 1 UNITS: 100 INJECTION, SOLUTION INTRAVENOUS; SUBCUTANEOUS at 08:48

## 2017-05-19 RX ADMIN — ACETAMINOPHEN 1000 MG: 500 TABLET, FILM COATED ORAL at 08:47

## 2017-05-19 RX ADMIN — Medication 15 MCG: at 21:47

## 2017-05-19 NOTE — PROGRESS NOTES
PULMONARY NOTE    I'm aware of the request for a Pulmonary consultation on this patient.  Case discussed with Dr. Barkley.  I will be by tomorrow afternoon to complete the consultation evaluation.  If concerns of an immediate nature arise earlier, please give me a call.    Thank you,     Royce De Dios MD    [facility]  [phone] (pager)  [phone] (office)

## 2017-05-19 NOTE — PLAN OF CARE
Problem: Goal Outcome Summary  Goal: Goal Outcome Summary  Outcome: No Change  VSS, A&O x4. Up independently in room. Order in for pulmonology consult this afternoon. Progressing per plan of care, will continue to monitor.

## 2017-05-19 NOTE — PLAN OF CARE
Problem: Individualization  Goal: Patient Preferences  Outcome: Improving  Pt up independently in room at 1900 after being on bedrest for 6 hrs after renal biopsy.  Pt continues to have cough which improved with Robitussin.  Hudson ft still swollen with (L) ft more swollen and has intact water blister present.  Voided OK on bedpan.

## 2017-05-19 NOTE — PROGRESS NOTES
Pre-stiles: crescentic GN.     I discussed the case with the pulmonologist, who will see her tomorrow. I will start Cytoxan 0.5 g/m2 IV after pulm evaluation, unless Dr. Arnold has any other recommendations.

## 2017-05-19 NOTE — PROGRESS NOTES
Renal Medicine Progress Note            Assessment/Plan:     1. Pt with a Scr of 1.4 mg/dl on 5/1. Hgb was 11.5. No UA.       2. Pt was admitted on 5/13 with a Scr of 1.77 mg/dl with an active urine sediment. This is consistent with a vasculitis either MPA or GPA with  lung involvement.    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 0.85 g/g proteinuria   -antiGBM neg   -had 3 days of IV Solumedrol and now on prednisone 60 mg daily.    -start Cytoxan IV      3. Bilateral pulmonary infiltrates. 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    -symptoms are better      4. Anemia and leukocytosis. I suspect this is due to active vasculitis.   -  peripheral smear without schistocytes      5. Bilateral LE edema. Improving   -low sodium diet   -strict i/o    Plan.   1. Continue prednisone 60 mg daily x one month then taper over 3-4 months.  2. Start IV Cytoxan 0.5 g/m2 q3-4 weekly x 1 month for 3-6 months then will switch to AZA for maintenance for up 18 months. I prefer Cytoxan, especially given her lung involvement. I will give her the first dose of Cytoxan after she is evaluated by pulmonary. Will give 1 liter of NS with Cytoxan.  3. Since she has respiratory tract involvement, I would favor Bactrim SS daily for both prophylaxis and treatment. I discussed the case Dr. Curry.   4. Pt would like to see a pulmonologist. Order placed.        Interval History:     Pt continues to improve. She is coughing, and she is breathing better. She is more energized. She would like to see a pulmonologist.           Medications and Allergies:       predniSONE  60 mg Oral Daily     sennosides  1 tablet Oral BID     insulin aspart  1-7 Units Subcutaneous TID AC     insulin aspart  1-5 Units Subcutaneous At Bedtime     acetaminophen (TYLENOL) tablet 1,000 mg  1,000 mg Oral Q8H DESTIN     liothyronine  15 mcg Oral BID      "levothyroxine  112 mcg Oral Daily     sodium chloride (PF)  3 mL Intracatheter Q8H     cefTRIAXone  2 g Intravenous Q24H        Allergies   Allergen Reactions     Dairy [Milk Products]      Perfume Other (See Comments)     Stuffy in nose, HA     Seasonal Allergies      Sulfa Drugs Unknown            Physical Exam:   Vitals were reviewed   , Blood pressure 152/73, pulse 83, temperature 98.1  F (36.7  C), temperature source Temporal, resp. rate 16, height 1.664 m (5' 5.5\"), weight 103.9 kg (229 lb 0.9 oz), SpO2 96 %, not currently breastfeeding.    Wt Readings from Last 3 Encounters:   05/19/17 103.9 kg (229 lb 0.9 oz)   10/27/16 100.7 kg (222 lb)   03/10/16 99.3 kg (219 lb)       Intake/Output Summary (Last 24 hours) at 05/19/17 1259  Last data filed at 05/19/17 0608   Gross per 24 hour   Intake             1100 ml   Output             1450 ml   Net             -350 ml     GENERAL APPEARANCE: pleasant, NAD, alert  HEENT: Eyes/ears/nose/neck grossly normal  RESP: lungs cta b c good efforts, no crackles, rhonchi or wheezes  CV: RRR, nl S1/S2, no m/r/g   ABDOMEN: Obese, soft, NT  EXTREMITIES/SKIN: no rashes/lesions on observed skin;1+ edema, better.  Neuro: a/o x3. nonfocal          Data:     CBC RESULTS:     Recent Labs  Lab 05/19/17  0630 05/18/17  2220 05/18/17  1501 05/18/17  0702 05/17/17  0650 05/16/17  0700 05/15/17  0633 05/14/17  0608   WBC 22.0*  --   --  27.4* 20.5* 15.5* 13.1* 14.9*   RBC 2.79*  --   --  2.96* 3.16* 2.78* 2.86* 3.17*   HGB 8.0* 8.4* 8.7* 8.5* 9.1* 7.8* 8.2* 9.1*   HCT 23.9*  --   --  25.2* 27.2* 24.3* 25.1* 27.8*   *  --   --  505* 450 373 375 409       Basic Metabolic Panel:    Recent Labs  Lab 05/19/17  0630 05/18/17  0702 05/17/17  0650 05/16/17  0700 05/15/17  0633 05/14/17  0608    139 139 136 139 139   POTASSIUM 3.5 3.4 3.9 4.1 3.9 4.4   CHLORIDE 110* 108 109 108 111* 110*   CO2 19* 18* 18* 18* 19* 19*   BUN 64* 57* 37* 24 26 32*   CR 2.05* 2.27* 1.71* 1.64* 1.71* 1.83* "   * 180* 190* 124* 122* 117*   ARABELLA 8.4* 8.8 9.0 8.1* 8.2* 8.2*       INR  Recent Labs  Lab 05/18/17  0702 05/17/17  0650 05/16/17  1500   INR 1.20* 1.18* 1.18*      Attestation:   I have reviewed today's relevant vital signs, notes, medications, labs and imaging.    Daquan Barkley MD  [facility] - Nephrology  Office phone :[phone]  Pager: [phone]

## 2017-05-19 NOTE — PROGRESS NOTES
[facility]    Hospitalist Progress Note    Assessment & Plan   Elizabeth Galicia is a 71 year old female with PMHx of GERD, dyslipidemia, hypothyroidism, anxiety and uterine prolabse who was admitted on 5/12/2017 with dyspnea, LE edema and night sweats.     Vasculitis:  Did not carry this diagnosis prior to admission. Constellation of complaints on admission included fever, arthralgias and muscle weakness, with pulmonary and renal findings as above. 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 Underwent renal biopsy on 5/18 as above  -- rheumatology consulted this stay  -- started on IV steroids (500mg x1 on 5/16, 1000mg daily 5/17 - 5/18), changed to prednisone 60mg daily on 5/19 per rheum recs; anticipate 60mg po daily for one month, then taper off over 3-4 mths  -- rhem and nephrology considering initiation of Cytoxan this stay - await pulmonary recs  -- will also need start PCP ppx - ID assisting with this -> will try dose of Bactrim (SS) today, renally dosed, monitor response as patient is reportedly slightly allergic to sulfas; may also need to consider dapsone and inhaled pentamidine though Bactrim would be the preferred agent if she tolerates it    Possible bilateral CAP, also with pulmonary involvement of vasculitis  CXR on admission showed a RML infiltrate. 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 Has profoudnd leukocytosis though remains afebrile and procalcitonin okay. Sputum culture grew MSSA - uncertain if this represents colonization vs pathogen, per ID recommendations she completed 7d course of treatment with Rocephin this stay (also completed 5d course of Azithromycin while hospitalized)  -- not requiring supplemental O2  -- cont supportive cares with  antitussives as needed  -- pulmonology consult placed given degree of involvement    Bilateral LE edema:  Bilateral LE dopplers at outside hospital were negative for DVT. Echocardiogram unremarkable this hospital stay. Urine protein/Cr 0.85 this stay. Diuresed with IV Lasix.  -- nephrology following  -- had been diuresing with IV Lasix, diuretics held 5/18 given bump in Cr  -- cont low Na diet     Acute Renal Failure  Cr 1.7 on presentation, trending up. Underlying etiology not clear though suspect secondary to vasculitis. Renal US unremarkable. US bland. , SPEP/UPEP neg.  -- underwent renal biopsy on 5/18, pathology pending though prelim reading reportedly showed crescentic GN  -- Cr bumped to 2.2 on 5/18 so Lasix held -- 2.05 today  -- no additional Lasix today  -- nephrology following    Hypothyroidism:   Chronic and stable on Synthroid and Cytomel    Iron Deficiency Anemia:  Declined iron replacement.   Anemia may also be due in part to vasculitis    FEN: no IVFs, lytes stable, regular diet  DVT Prophylaxis: PCDs  Code Status: Full Code    Disposition: Suspect 1-2 more days, pending plans for Cytoxan and response to therapy. Anticipate follow up after discharge with new PCP (per patient's request), nephrology, rheumatology and possibly pulmonology.      Shaneka Laguerre    Interval History   Seen this afternoon. Sitting in chair visit with friend at bedside. Feeling okay. No specific complaints. Denies sob/cough, taking po.     -Data reviewed today: I reviewed all new labs and imaging results over the last 24 hours. I personally reviewed no images or EKG's today.    Physical Exam   Temp: 98.1  F (36.7  C) Temp src: Temporal BP: 152/73 Pulse: 83   Resp: 16 SpO2: 96 % O2 Device: None (Room air)    Vitals:    05/15/17 0955 05/18/17 0714 05/19/17 0456   Weight: 105.6 kg (232 lb 12.8 oz) 101.5 kg (223 lb 12.3 oz) 103.9 kg (229 lb 0.9 oz)     Vital Signs with Ranges  Temp:  [97.4  F (36.3  C)-98.3  F (36.8  C)]  98.1  F (36.7  C)  Pulse:  [73-84] 83  Resp:  [16-18] 16  BP: (124-152)/(66-86) 152/73  SpO2:  [92 %-96 %] 96 %  I/O last 3 completed shifts:  In: 1100 [P.O.:1100]  Out: 2050 [Urine:2050]    Constitutional: Resting comfortably, alert and conversing appropriately, NAD  Respiratory: few scattered coarse breath sounds, no wheezing, no increased work of breathing  Cardiovascular: HRRR, no MGR  GI: S, NT, ND, +BS  Skin/Integumen: warm/dry  Other: ++bilateral LE edema    Medications        predniSONE  60 mg Oral Daily     sennosides  1 tablet Oral BID     insulin aspart  1-7 Units Subcutaneous TID AC     insulin aspart  1-5 Units Subcutaneous At Bedtime     acetaminophen (TYLENOL) tablet 1,000 mg  1,000 mg Oral Q8H DESTIN     liothyronine  15 mcg Oral BID     levothyroxine  112 mcg Oral Daily     sodium chloride (PF)  3 mL Intracatheter Q8H     cefTRIAXone  2 g Intravenous Q24H       Data     Recent Labs  Lab 05/19/17  0630 05/18/17  2220 05/18/17  1501 05/18/17  0702 05/17/17  0650 05/16/17  1500  05/13/17  1111   WBC 22.0*  --   --  27.4* 20.5*  --   < >  --    HGB 8.0* 8.4* 8.7* 8.5* 9.1*  --   < >  --    MCV 86  --   --  85 86  --   < >  --    *  --   --  505* 450  --   < >  --    INR  --   --   --  1.20* 1.18* 1.18*  --   --      --   --  139 139  --   < > 137   POTASSIUM 3.5  --   --  3.4 3.9  --   < >  --    CHLORIDE 110*  --   --  108 109  --   < >  --    CO2 19*  --   --  18* 18*  --   < >  --    BUN 64*  --   --  57* 37*  --   < >  --    CR 2.05*  --   --  2.27* 1.71*  --   < > 1.77*   ANIONGAP 13  --   --  13 12  --   < >  --    ARABELLA 8.4*  --   --  8.8 9.0  --   < >  --    *  --   --  180* 190*  --   < >  --    ALBUMIN  --   --   --  2.4*  --   --   --  2.3*   PROTTOTAL  --   --   --   --   --   --   --  6.4*   BILITOTAL  --   --   --   --   --   --   --  0.3   ALKPHOS  --   --   --   --   --   --   --  74   ALT  --   --   --   --   --   --   --  21   AST  --   --   --   --   --   --   --  12    < > = values in this interval not displayed.    No results found for this or any previous visit (from the past 24 hour(s)).

## 2017-05-19 NOTE — PROGRESS NOTES
[facility]    Infectious Disease Progress Note    Date of Service (when I saw the patient): 05/19/2017     Assessment & Plan   Elizabeth Galicia is a 71 year old female who was admitted on 5/12/2017.     Impression:   71 y.o female who has been having 3 months of progressive symptoms of shortness of breath and cough, joint pains and swelling.   Admitted with concerns of pneumonia. Sputum cultures positive for MSSA.   Being evaluated for vasculitis given combination of above mentioned symptoms.   On ceftriaxone and Azithromycin.   Improved with steroids.      Recommendations:   Staph aureus is the sputum could be a colonizer and not a pathogen but hard to exclude that possibility completely. I would recommend atleast a short course of antibiotics targeted towards MSSA such as Bactrim or Levaquin technically ceftriaxone counts too, probably 7- 10 days. Day 7 today. Stop after today.   Agree with the combination of symptoms vasculitis a probable diagnosis as already in process of evaluation with Rheum.   Noted sulfa listed as allergy ( per patient slightly allergic to sulfa). Discussed further with patient she does not remember the drug neither the reaction.   Dapsone/ Aerosolized pentamidine/ atovaquone all possible options for PCP prophylaxis.   Alternatively can try Bactrim but patient given the creatinine might want to avoid it, further discussed with . Filippo will try bactrim dose adjusted based on renal function.   Will get G6PD in the meanwhile lest have to use Dapsone.   Pharmacy is looking into availability of inhaled pentamidine this might be an issue if she needs to be on it long time.     Nicho Curry MD    Interval History   Afebrile   No new microbiological data     Physical Exam   Temp: 97.5  F (36.4  C) Temp src: Temporal BP: 124/70 Pulse: 74   Resp: 16 SpO2: 93 % O2 Device: None (Room air)    Vitals:    05/15/17 0955 05/18/17 0714 05/19/17 0456   Weight: 105.6 kg (232 lb 12.8 oz)  101.5 kg (223 lb 12.3 oz) 103.9 kg (229 lb 0.9 oz)     Vital Signs with Ranges  Temp:  [97.4  F (36.3  C)-98.3  F (36.8  C)] 97.5  F (36.4  C)  Pulse:  [73-84] 74  Resp:  [16-18] 16  BP: (124-154)/(58-87) 124/70  SpO2:  [92 %-96 %] 93 %    Constitutional: Awake, alert, cooperative, no apparent distress  Lungs: Clear to auscultation bilaterally, no crackles or wheezing  Cardiovascular: Regular rate and rhythm, normal S1 and S2, and no murmur noted  Abdomen: Normal bowel sounds, soft, non-distended, non-tender  Skin: No rashes, no cyanosis, no edema  Other:    Medications        predniSONE  60 mg Oral Daily     sennosides  1 tablet Oral BID     insulin aspart  1-7 Units Subcutaneous TID AC     insulin aspart  1-5 Units Subcutaneous At Bedtime     acetaminophen (TYLENOL) tablet 1,000 mg  1,000 mg Oral Q8H DESTIN     liothyronine  15 mcg Oral BID     levothyroxine  112 mcg Oral Daily     sodium chloride (PF)  3 mL Intracatheter Q8H     cefTRIAXone  2 g Intravenous Q24H       Data   All microbiology laboratory data reviewed.  Recent Labs   Lab Test  05/19/17   0630  05/18/17   2220  05/18/17   1501  05/18/17   0702  05/17/17   0650   WBC  22.0*   --    --   27.4*  20.5*   HGB  8.0*  8.4*  8.7*  8.5*  9.1*   HCT  23.9*   --    --   25.2*  27.2*   MCV  86   --    --   85  86   PLT  491*   --    --   505*  450     Recent Labs   Lab Test  05/19/17   0630  05/18/17   0702  05/17/17   0650   CR  2.05*  2.27*  1.71*     No lab results found.  Recent Labs   Lab Test  05/13/17   0825  08/10/13   2240   CULT  Moderate growth Staphylococcus aureus  Moderate growth Strain 2 Staphylococcus aureus  Moderate growth Normal dari  *  50,000 to 100,000 colonies/mL Escherichia coli

## 2017-05-19 NOTE — PROGRESS NOTES
Rheumatology    S: Less coughing and little hemoptysis today. Energy level is good. Voiding well and urine appears normal. Renal biopsy went well without complications.    O: VS stable. Lungs show rales at the bases with clearing of coarse breath sounds. No wheezing.Grade 1 pedal edema. Rash over the dorsum of the feet has cleared. Still has a blister over the dorsum of the left foot.    Creatinine 2.27    A: MPO.     P: Await renal bx  Continue prednisone 60 mg daily  Discussed with Dr. Barkley, likely to use cytoxan monthly for 6 months pending renal bx results.    Derrick Arnold MD

## 2017-05-19 NOTE — PROGRESS NOTES
"BRIEF NUTRITION ASSESSMENT      REASON FOR ASSESSMENT:  LOS    NUTRITION HISTORY:  - Elizabeth reports that she has been ill for about 3 months now. Generally she does most of the cooking, however her partner has recently had to  a lot of those duties. Elizabeth states that she can tell she hasn't been getting enough vegetables (she's the one who prepares vegetables at home), so she'd been trying to get her partner to get more from the hot bar at their local co-op.   - In general, Elizabeth consumes 2 meals daily.   - She has followed the Shaan White diabetes book \"Blood Sugar Solution\" and likes a lot of the recipes he's published.   - Pt has a dairy allergy.     CURRENT DIET AND INTAKE:  Diet: Regular      Pt has not been tolerating meat as well as she usually does. It is hard for her to chew and swallow foods like chicken breast or enciso, this has never been an issue before. She is otherwise eating well over admission, tolerating ground meats just fine. She has liked the stir joseph with tofu, the turkey burger, and oatmeal mixed with applesauce. She usually gets one meal brought in by a friend or relative.       ANTHROPOMETRICS:  Height: 5' 5.5\"  Weight: 101.5 kg  BMI: 36.7 kg/m2  IBW:  58 kg  Weight Status: Obesity Grade II BMI 35-39.9  %IBW: 175%  Weight History: pt denies any recent weight changes.   Wt Readings from Last 10 Encounters:   05/19/17 103.9 kg (229 lb 0.9 oz)   10/27/16 100.7 kg (222 lb)   03/10/16 99.3 kg (219 lb)   09/09/14 100.1 kg (220 lb 9.6 oz)   10/17/13 99.3 kg (219 lb)   08/10/13 94.3 kg (208 lb)   07/11/12 101.6 kg (224 lb)   07/13/11 97.8 kg (215 lb 9.6 oz)   06/27/11 95.8 kg (211 lb 3.2 oz)   06/22/11 97.4 kg (214 lb 12.8 oz)       LABS:  Labs noted  BUN 64 (H), Cr 2.05 (H) - MELANIA    MALNUTRITION:  Patient does not meet two of the following criteria necessary for diagnosing malnutrition: significant weight loss, reduced intake, subcutaneous fat loss, muscle loss or fluid " retention    NUTRITION INTERVENTION:  Nutrition Diagnosis:  No nutrition diagnosis at this time.    Implementation:  Nutrition Education:  Per Provider order if indicated    FOLLOW UP/MONITORING:   Will re-evaluate in 7 - 10 days, or sooner, if re-consulted.    Jany Perez RD, LD

## 2017-05-19 NOTE — PLAN OF CARE
Problem: Goal Outcome Summary  Goal: Goal Outcome Summary  Outcome: Improving  Patient A&O. VSS. Renal biopsy Band-Aid clean, dry, and intact. Minimal pain, localized to back. Patient denies the need for PRN pain medications. BLE edema, blister at dorsal aspect of foot intact. Infrequent nonproductive cough. Patient up independently. Voiding adequately, urine appears normal. Blood sugars 259 and 191. Will continue to monitor.

## 2017-05-20 LAB
ANION GAP SERPL CALCULATED.3IONS-SCNC: 11 MMOL/L (ref 3–14)
BUN SERPL-MCNC: 72 MG/DL (ref 7–30)
CALCIUM SERPL-MCNC: 8.5 MG/DL (ref 8.5–10.1)
CHLORIDE SERPL-SCNC: 110 MMOL/L (ref 94–109)
CO2 SERPL-SCNC: 20 MMOL/L (ref 20–32)
CREAT SERPL-MCNC: 1.81 MG/DL (ref 0.52–1.04)
ERYTHROCYTE [DISTWIDTH] IN BLOOD BY AUTOMATED COUNT: 15 % (ref 10–15)
G6PD RBC-CCNC: 20.1
GFR SERPL CREATININE-BSD FRML MDRD: 28 ML/MIN/1.7M2
GLUCOSE BLDC GLUCOMTR-MCNC: 118 MG/DL (ref 70–99)
GLUCOSE BLDC GLUCOMTR-MCNC: 131 MG/DL (ref 70–99)
GLUCOSE BLDC GLUCOMTR-MCNC: 177 MG/DL (ref 70–99)
GLUCOSE BLDC GLUCOMTR-MCNC: 182 MG/DL (ref 70–99)
GLUCOSE BLDC GLUCOMTR-MCNC: 184 MG/DL (ref 70–99)
GLUCOSE BLDC GLUCOMTR-MCNC: 228 MG/DL (ref 70–99)
GLUCOSE BLDC GLUCOMTR-MCNC: 259 MG/DL (ref 70–99)
GLUCOSE SERPL-MCNC: 126 MG/DL (ref 70–99)
HCT VFR BLD AUTO: 26.4 % (ref 35–47)
HGB BLD-MCNC: 8.7 G/DL (ref 11.7–15.7)
MCH RBC QN AUTO: 28.5 PG (ref 26.5–33)
MCHC RBC AUTO-ENTMCNC: 33 G/DL (ref 31.5–36.5)
MCV RBC AUTO: 87 FL (ref 78–100)
PLATELET # BLD AUTO: 570 10E9/L (ref 150–450)
POTASSIUM SERPL-SCNC: 3.8 MMOL/L (ref 3.4–5.3)
RBC # BLD AUTO: 3.05 10E12/L (ref 3.8–5.2)
SODIUM SERPL-SCNC: 141 MMOL/L (ref 133–144)
WBC # BLD AUTO: 23.6 10E9/L (ref 4–11)

## 2017-05-20 PROCEDURE — A9270 NON-COVERED ITEM OR SERVICE: HCPCS | Mod: GY | Performed by: INTERNAL MEDICINE

## 2017-05-20 PROCEDURE — 00000146 ZZHCL STATISTIC GLUCOSE BY METER IP

## 2017-05-20 PROCEDURE — 25000125 ZZHC RX 250: Performed by: INTERNAL MEDICINE

## 2017-05-20 PROCEDURE — 12000000 ZZH R&B MED SURG/OB

## 2017-05-20 PROCEDURE — 80048 BASIC METABOLIC PNL TOTAL CA: CPT | Performed by: INTERNAL MEDICINE

## 2017-05-20 PROCEDURE — 25000132 ZZH RX MED GY IP 250 OP 250 PS 637: Mod: GY | Performed by: INTERNAL MEDICINE

## 2017-05-20 PROCEDURE — 99207 ZZC CDG-MDM COMPONENT: MEETS LOW - DOWN CODED: CPT | Performed by: INTERNAL MEDICINE

## 2017-05-20 PROCEDURE — 85027 COMPLETE CBC AUTOMATED: CPT | Performed by: INTERNAL MEDICINE

## 2017-05-20 PROCEDURE — 36415 COLL VENOUS BLD VENIPUNCTURE: CPT | Performed by: INTERNAL MEDICINE

## 2017-05-20 PROCEDURE — 99232 SBSQ HOSP IP/OBS MODERATE 35: CPT | Performed by: INTERNAL MEDICINE

## 2017-05-20 PROCEDURE — 25000128 H RX IP 250 OP 636: Performed by: INTERNAL MEDICINE

## 2017-05-20 RX ORDER — SODIUM CHLORIDE 9 MG/ML
INJECTION, SOLUTION INTRAVENOUS CONTINUOUS
Status: DISCONTINUED | OUTPATIENT
Start: 2017-05-20 | End: 2017-05-21

## 2017-05-20 RX ORDER — ONDANSETRON 8 MG/1
16 TABLET, FILM COATED ORAL ONCE
Status: COMPLETED | OUTPATIENT
Start: 2017-05-20 | End: 2017-05-20

## 2017-05-20 RX ORDER — SULFAMETHOXAZOLE AND TRIMETHOPRIM 400; 80 MG/1; MG/1
1 TABLET ORAL DAILY
Status: DISCONTINUED | OUTPATIENT
Start: 2017-05-20 | End: 2017-05-21 | Stop reason: HOSPADM

## 2017-05-20 RX ORDER — POLYETHYLENE GLYCOL 3350 17 G/17G
17 POWDER, FOR SOLUTION ORAL DAILY PRN
Status: DISCONTINUED | OUTPATIENT
Start: 2017-05-20 | End: 2017-05-21 | Stop reason: HOSPADM

## 2017-05-20 RX ORDER — AMOXICILLIN 250 MG
1-2 CAPSULE ORAL 2 TIMES DAILY
Status: DISCONTINUED | OUTPATIENT
Start: 2017-05-20 | End: 2017-05-21 | Stop reason: HOSPADM

## 2017-05-20 RX ORDER — SODIUM CHLORIDE 450 MG/100ML
INJECTION, SOLUTION INTRAVENOUS CONTINUOUS
Status: DISCONTINUED | OUTPATIENT
Start: 2017-05-20 | End: 2017-05-21

## 2017-05-20 RX ADMIN — SENNOSIDES AND DOCUSATE SODIUM 2 TABLET: 8.6; 5 TABLET ORAL at 11:40

## 2017-05-20 RX ADMIN — GUAIFENESIN AND CODEINE PHOSPHATE 5 ML: 100; 10 SOLUTION ORAL at 21:34

## 2017-05-20 RX ADMIN — DOCUSATE SODIUM 200 MG: 100 CAPSULE, LIQUID FILLED ORAL at 16:09

## 2017-05-20 RX ADMIN — SENNOSIDES AND DOCUSATE SODIUM 2 TABLET: 8.6; 5 TABLET ORAL at 20:42

## 2017-05-20 RX ADMIN — DOCUSATE SODIUM 200 MG: 100 CAPSULE, LIQUID FILLED ORAL at 20:42

## 2017-05-20 RX ADMIN — Medication 15 MCG: at 20:42

## 2017-05-20 RX ADMIN — GUAIFENESIN AND CODEINE PHOSPHATE 5 ML: 100; 10 SOLUTION ORAL at 06:28

## 2017-05-20 RX ADMIN — SODIUM CHLORIDE: 9 INJECTION, SOLUTION INTRAVENOUS at 10:00

## 2017-05-20 RX ADMIN — PREDNISONE 60 MG: 20 TABLET ORAL at 09:59

## 2017-05-20 RX ADMIN — ACETAMINOPHEN 1000 MG: 500 TABLET, FILM COATED ORAL at 00:12

## 2017-05-20 RX ADMIN — CYCLOPHOSPHAMIDE 1095 MG: 1 INJECTION, POWDER, FOR SOLUTION INTRAVENOUS; ORAL at 18:31

## 2017-05-20 RX ADMIN — ACETAMINOPHEN 1000 MG: 500 TABLET, FILM COATED ORAL at 09:59

## 2017-05-20 RX ADMIN — Medication 15 MCG: at 09:59

## 2017-05-20 RX ADMIN — GUAIFENESIN AND CODEINE PHOSPHATE 5 ML: 100; 10 SOLUTION ORAL at 00:12

## 2017-05-20 RX ADMIN — GUAIFENESIN AND CODEINE PHOSPHATE 5 ML: 100; 10 SOLUTION ORAL at 17:33

## 2017-05-20 RX ADMIN — INSULIN ASPART 2 UNITS: 100 INJECTION, SOLUTION INTRAVENOUS; SUBCUTANEOUS at 19:20

## 2017-05-20 RX ADMIN — ONDANSETRON 16 MG: 8 TABLET, FILM COATED ORAL at 17:34

## 2017-05-20 RX ADMIN — ACETAMINOPHEN 1000 MG: 500 TABLET, FILM COATED ORAL at 16:09

## 2017-05-20 RX ADMIN — SULFAMETHOXAZOLE AND TRIMETHOPRIM 1 TABLET: 400; 80 TABLET ORAL at 11:40

## 2017-05-20 RX ADMIN — SODIUM CHLORIDE: 9 INJECTION, SOLUTION INTRAVENOUS at 16:10

## 2017-05-20 RX ADMIN — ACETAMINOPHEN 650 MG: 325 TABLET, FILM COATED ORAL at 20:41

## 2017-05-20 RX ADMIN — LEVOTHYROXINE SODIUM 112 MCG: 112 TABLET ORAL at 10:01

## 2017-05-20 RX ADMIN — SODIUM CHLORIDE 150 MG: 9 INJECTION, SOLUTION INTRAVENOUS at 17:33

## 2017-05-20 NOTE — PROGRESS NOTES
[facility]    Hospitalist Progress Note    Assessment & Plan   Elizabeth Galicia is a 71 year old female with PMHx of GERD, dyslipidemia, hypothyroidism, anxiety and uterine prolabse who was admitted on 5/12/2017 with dyspnea, LE edema and night sweats.     Vasculitis:  Did not carry this diagnosis prior to admission. Constellation of complaints on admission included fever, arthralgias and muscle weakness, with pulmonary and renal findings as above. 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 Underwent renal biopsy on 5/18 as above  -- rheumatology consulted this stay  -- started on IV steroids (500mg x1 on 5/16, 1000mg daily 5/17 - 5/18), changed to prednisone 60mg daily on 5/19 per rheum recs; anticipate 60mg po daily for one month, then taper off over 3-4 mths  -- to start Cytoxan today, mgmt per nephrology; await pulmonary assessment  -- will also need start PCP ppx - ID assisting with this given reported allergy to sulfas -> given one time dose of Bactrim (SS) on 5/19 appears to have tolerated w/o allergic reaction thus far, cont Bactrim SS daily  -- should she develop allergic reaction to Bactrim, may also need to consider dapsone (G6PD pending) and inhaled pentamidine (though Bactrim would be the preferred agent if she tolerates it)    Possible bilateral CAP, also with pulmonary involvement of vasculitis  CXR on admission showed a RML infiltrate. 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 Has profoudnd leukocytosis though remains afebrile and procalcitonin okay. Sputum culture grew MSSA - uncertain if this represents colonization vs pathogen, per ID recommendations she completed 7d course of treatment with Rocephin this stay (also completed 5d course of Azithromycin  while hospitalized)  -- not requiring supplemental O2  -- cont supportive cares with antitussives as needed  -- pulmonology consult placed given degree of involvement    Bilateral LE edema:  Bilateral LE dopplers at outside hospital were negative for DVT. Echocardiogram unremarkable this hospital stay. Urine protein/Cr 0.85 this stay. Diuresed with IV Lasix.  -- nephrology following  -- had been diuresing with IV Lasix, diuretics held 5/18 given bump in Cr  -- cont low Na diet     Acute Renal Failure  Cr 1.7 on presentation, trending up. Underlying etiology not clear though suspect secondary to vasculitis. Renal US unremarkable. US bland, SPEP/UPEP neg.  -- underwent renal biopsy on 5/18, pathology pending though prelim reading reportedly showed crescentic GN  -- Cr bumped to 2.2 on 5/18 so Lasix held -- conts to improve off Lasix, Cr 1.81 today  -- no additional Lasix today  -- nephrology following    Hypothyroidism:   Chronic and stable on Synthroid and Cytomel    Iron Deficiency Anemia:  Declined iron replacement.   Anemia may also be due in part to vasculitis    FEN: 2L NS today given plans for Cytoxan infusion, lytes stable, regular diet  DVT Prophylaxis: PCDs  Code Status: Full Code    Disposition: To start Cytoxan today, nephrology managing. If she tolerates infusion today, anticipate discharge home tomorrow. Anticipate follow up after discharge with new PCP (per patient's request), nephrology, rheumatology and possibly pulmonology.      Shaneka Laguerre    Interval History   Seen this morning. Tearful. Tells me she's ervous about starting Cytoxan today. Given first dose of Bactrim yesterday - appears to have tolerated, no c/o rash. Otherwise breathing okay, no chest pain, abd pain/n/v.     -Data reviewed today: I reviewed all new labs and imaging results over the last 24 hours. I personally reviewed no images or EKG's today.    Physical Exam   Temp: 97.6  F (36.4  C) Temp src: Oral BP: 115/59 Pulse:  73   Resp: 16 SpO2: 96 % O2 Device: None (Room air)    Vitals:    05/15/17 0955 05/18/17 0714 05/19/17 0456   Weight: 105.6 kg (232 lb 12.8 oz) 101.5 kg (223 lb 12.3 oz) 103.9 kg (229 lb 0.9 oz)     Vital Signs with Ranges  Temp:  [97.2  F (36.2  C)-98.1  F (36.7  C)] 97.6  F (36.4  C)  Pulse:  [73-83] 73  Resp:  [16-18] 16  BP: (115-160)/(59-82) 115/59  SpO2:  [95 %-96 %] 96 %  I/O last 3 completed shifts:  In: 2000 [P.O.:2000]  Out: 1000 [Urine:1000]    Constitutional: Resting comfortably, alert and conversing appropriately, NAD  Respiratory: few scattered coarse breath sounds over RLL but otherwise CTA, no wheezing, no increased work of breathing  Cardiovascular: HRRR, no MGR  GI: S, NT, ND, +BS  Skin/Integumen: warm/dry  Other: ++bilateral LE edema    Medications     NaCl         sulfamethoxazole-trimethoprim  1 tablet Oral Daily     senna-docusate  1-2 tablet Oral BID     predniSONE  60 mg Oral Daily     insulin aspart  1-7 Units Subcutaneous TID AC     insulin aspart  1-5 Units Subcutaneous At Bedtime     acetaminophen (TYLENOL) tablet 1,000 mg  1,000 mg Oral Q8H DESTIN     liothyronine  15 mcg Oral BID     levothyroxine  112 mcg Oral Daily     sodium chloride (PF)  3 mL Intracatheter Q8H       Data     Recent Labs  Lab 05/20/17  0551 05/19/17  0630 05/18/17  2220  05/18/17  0702 05/17/17  0650 05/16/17  1500  05/13/17  1111   WBC 23.6* 22.0*  --   --  27.4* 20.5*  --   < >  --    HGB 8.7* 8.0* 8.4*  < > 8.5* 9.1*  --   < >  --    MCV 87 86  --   --  85 86  --   < >  --    * 491*  --   --  505* 450  --   < >  --    INR  --   --   --   --  1.20* 1.18* 1.18*  --   --     142  --   --  139 139  --   < > 137   POTASSIUM 3.8 3.5  --   --  3.4 3.9  --   < >  --    CHLORIDE 110* 110*  --   --  108 109  --   < >  --    CO2 20 19*  --   --  18* 18*  --   < >  --    BUN 72* 64*  --   --  57* 37*  --   < >  --    CR 1.81* 2.05*  --   --  2.27* 1.71*  --   < > 1.77*   ANIONGAP 11 13  --   --  13 12  --   < >  --     ARABELLA 8.5 8.4*  --   --  8.8 9.0  --   < >  --    * 165*  --   --  180* 190*  --   < >  --    ALBUMIN  --   --   --   --  2.4*  --   --   --  2.3*   PROTTOTAL  --   --   --   --   --   --   --   --  6.4*   BILITOTAL  --   --   --   --   --   --   --   --  0.3   ALKPHOS  --   --   --   --   --   --   --   --  74   ALT  --   --   --   --   --   --   --   --  21   AST  --   --   --   --   --   --   --   --  12   < > = values in this interval not displayed.    No results found for this or any previous visit (from the past 24 hour(s)).

## 2017-05-20 NOTE — PROGRESS NOTES
A&O X4. sched tylenol given for pain in legs 2/10. Up ind in room. Report given to 88 RN and pt transferred.

## 2017-05-20 NOTE — PROVIDER NOTIFICATION
5/20 Dr. Barkley, Nephrologist, ordered 1 dose of Cyclophosphamide, for Crescentic Glomerulonephritis/Renopulmonary vasculitis. See link for Up to Date article discussing it's use/dosage.     https://[email or web address]/contents/overview-of-the-classification-and-treatment-cy-acvtwvf-iwdidmgmwyk-crescentic-glomerulonephritis?source=search_result&search=cyclophosphamide,%20glomerulonephritis&selectedTitle=2~150    Given age > 60, and renal function, he used low end of dosage range. I ordered our standard anti-emetics, with the exception of Dexamethasone, as pt is already on Prednisone.   Alberta    If ?s, Dr. Barkley's Cell Phone # [phone]

## 2017-05-20 NOTE — PROGRESS NOTES
PULMONARY CONSULT NOTE    Full consult dictated.    Active Problems:    Pneumonia    CAP (community acquired pneumonia)           Assessment and Plan:      72 yo female nonsmoker admitted with bilateral lower extremity swelling and renal impairment with an active urine sediment. CT Chest showed extensive airspace consolidation in the RUL and smaller areas of consolidation in the RLL and LEELA, leading to a presumed dx of community acquired pneumonia. Patient ultimately dx with microscopic polyangiitis and has been treated with steroids with clinical improvement; patient starting Cytoxan today. I agree that the pulmonary infiltrates are most likely secondary to pulmonary-renal vasculitis rather than pneumonia. Respiratory status currently stable on room air.    Diagnoses  Abnl CT/CXR  R91.8  Interstitial pneum J84.111  Obesity  E66.9  SOB   R06.02    Plan:  1. Prednisone & Cytoxan per Renal & Rheum.  2. Antibiotics - Bactrim prophylaxis.  3. Recommend outpatient Pulmonary follow-up in 6 weeks for PFTs and a repeat CT Chest.    Case discussed with Dr. Barkley yesterday.    Dr. Beach will see pt in follow-up on Monday. Please call if needed over the WE. Thanks,      Royce De Dios MD    [facility]  [phone] (pager)  [phone] (office)

## 2017-05-20 NOTE — PLAN OF CARE
Problem: Goal Outcome Summary  Goal: Goal Outcome Summary  Outcome: Improving  Diagnosis of renopulmonary vasculitis. A&O x4. Up independently. Tolerating regular diet.  Mild ankle pain at sit of blister, controlled with scheduled tylenol. Voiding adequately per bathroom. VSS on RA. IV is SL. CMS intact. Needs laxatives, very constipated. Blister on top of left foot. Dark urine- enc po fluids. 3+ edema BLE. Will continue to monitor.

## 2017-05-20 NOTE — PROGRESS NOTES
" Renal Medicine Progress Note            Assessment/Plan:     1. Pt with a Scr of 1.4 mg/dl on 5/1. Hgb was 11.5. No UA.       2. Pt was admitted on 5/13 with a Scr of 1.77 mg/dl with an active urine sediment. This is consistent with a vasculitis either MPA or GPA with lung involvement.   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 0.85 g/g proteinuria  -antiGBM neg  -had 3 days of IV Solumedrol and now on prednisone 60 mg daily.   -start Cytoxan IV 5/20      3. Bilateral pulmonary infiltrates. 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   -symptoms are better      4. Anemia and leukocytosis. I suspect this is due to active vasculitis.   -  peripheral smear without schistocytes      5. Bilateral LE edema. Improving  -low sodium diet  -strict i/o     Plan.   1. Continue prednisone 60 mg daily x one month then taper over 3-4 months.  2. Start IV Cytoxan 0.5 g/m2 q3-4 weekly x 1 month for 3-6 months then will switch to AZA for maintenance for up 18 months. I prefer Cytoxan, especially given her lung involvement. Starting Cytoxan today. I discussed the logistic with pharmacy, who will order anti-emetic.   3. Since she has respiratory tract involvement, I would favor Bactrim SS daily for both prophylaxis and treatment. I discussed the case Dr. Curry.    -Bactrim SS one daily starting today. Will monitor for reaction  4. Pulm to evaluate patient today          Interval History:     Pt is feeling a lot better. \"I had the best night of sleep,\" she says. Her cough is a lot better. No fever or chill.           Medications and Allergies:       sulfamethoxazole-trimethoprim  1 tablet Oral Daily     senna-docusate  1-2 tablet Oral BID     predniSONE  60 mg Oral Daily     insulin aspart  1-7 Units Subcutaneous TID AC     insulin aspart  1-5 Units Subcutaneous At Bedtime     acetaminophen (TYLENOL) tablet 1,000 mg  " "1,000 mg Oral Q8H DESTIN     liothyronine  15 mcg Oral BID     levothyroxine  112 mcg Oral Daily     sodium chloride (PF)  3 mL Intracatheter Q8H        Allergies   Allergen Reactions     Dairy [Milk Products]      Perfume Other (See Comments)     Stuffy in nose, HA     Seasonal Allergies      Sulfa Drugs Unknown            Physical Exam:   Vitals were reviewed   , Blood pressure 115/59, pulse 73, temperature 97.6  F (36.4  C), temperature source Oral, resp. rate 16, height 1.664 m (5' 5.5\"), weight 103.9 kg (229 lb 0.9 oz), SpO2 96 %, not currently breastfeeding.    Wt Readings from Last 3 Encounters:   05/19/17 103.9 kg (229 lb 0.9 oz)   10/27/16 100.7 kg (222 lb)   03/10/16 99.3 kg (219 lb)       Intake/Output Summary (Last 24 hours) at 05/20/17 0957  Last data filed at 05/20/17 0546   Gross per 24 hour   Intake             2000 ml   Output             1000 ml   Net             1000 ml     GENERAL APPEARANCE: pleasant, NAD, alert  HEENT: Eyes/ears/nose/neck grossly normal  RESP: lungs cta b c good efforts, no crackles, rhonchi or wheezes  CV: RRR, nl S1/S2, no m/r/g   ABDOMEN: Obese, soft, NT  EXTREMITIES/SKIN: no rashes/lesions on observed skin;1+ edema, better.  Neuro: a/o x3. nonfocal          Data:     CBC RESULTS:     Recent Labs  Lab 05/20/17  0551 05/19/17  0630 05/18/17  2220 05/18/17  1501 05/18/17  0702 05/17/17  0650 05/16/17  0700 05/15/17  0633   WBC 23.6* 22.0*  --   --  27.4* 20.5* 15.5* 13.1*   RBC 3.05* 2.79*  --   --  2.96* 3.16* 2.78* 2.86*   HGB 8.7* 8.0* 8.4* 8.7* 8.5* 9.1* 7.8* 8.2*   HCT 26.4* 23.9*  --   --  25.2* 27.2* 24.3* 25.1*   * 491*  --   --  505* 450 373 375       Basic Metabolic Panel:    Recent Labs  Lab 05/20/17  0551 05/19/17  0630 05/18/17  0702 05/17/17  0650 05/16/17  0700 05/15/17  0633    142 139 139 136 139   POTASSIUM 3.8 3.5 3.4 3.9 4.1 3.9   CHLORIDE 110* 110* 108 109 108 111*   CO2 20 19* 18* 18* 18* 19*   BUN 72* 64* 57* 37* 24 26   CR 1.81* 2.05* 2.27* " 1.71* 1.64* 1.71*   * 165* 180* 190* 124* 122*   ARABELLA 8.5 8.4* 8.8 9.0 8.1* 8.2*       INR  Recent Labs  Lab 05/18/17  0702 05/17/17  0650 05/16/17  1500   INR 1.20* 1.18* 1.18*      Attestation:   I have reviewed today's relevant vital signs, notes, medications, labs and imaging.    Daquan Barkley MD  [facility] - Nephrology  Office phone :[phone]  Pager: [phone]

## 2017-05-20 NOTE — PROGRESS NOTES
Rheumatology    Pt seen on 5/19    S: Continues to improve with less coughing, minimal hemoptysis, no SOB when up and about. No joint pain. Energy better. Voiding well.    O: VS T:97.2, 160/82, P:83, RR16, O2 96% RA  Joint exam unremarkable. Trace pedal edema. Rash over dorsum of feet resolved, blister dorsum of left foot stable.    Cr. 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 Lung involvement with diffuse infiltrates, inflammatory arthritis which has resolved with steroids, skin rash c/w vasculitis cleared with pred.    P:1 Continue prednisone 60 mg daily for the next month, will see in office in 2nd week in June 2. Cytoxan monthly as prescribed by Dr. Barkley  3 PCP prophylaxis.    Will follow as outpatient.    Thanks for consult    Derrick Arnold MD

## 2017-05-20 NOTE — PLAN OF CARE
Problem: Goal Outcome Summary  Goal: Goal Outcome Summary  Outcome: Improving  Patient up independently in room. States she is becoming somewhat uncomfortable with needing a bowel movement. Note left for MD to order a laxative. Urine dark- po encouraged. Will continue to monitor.

## 2017-05-20 NOTE — PLAN OF CARE
Problem: Goal Outcome Summary  Goal: Goal Outcome Summary  Outcome: Improving  Up independent in room . Good appetite . Denies pain . Patient tolerating diet and activity well . Voiding adequate amount .

## 2017-05-20 NOTE — PROGRESS NOTES
[facility]    Infectious Disease Progress Note    Date of Service (when I saw the patient): 05/20/2017     Assessment & Plan   Elizabeth Galicia is a 71 year old female who was admitted on 5/12/2017.     Impression:   71 y.o female who has been having 3 months of progressive symptoms of shortness of breath and cough, joint pains and swelling.   Admitted with concerns of pneumonia. Sputum cultures positive for MSSA.   Being evaluated for vasculitis given combination of above mentioned symptoms.   On ceftriaxone and Azithromycin.   Improved with steroids.      Recommendations:   Staph aureus is the sputum could be a colonizer and not a pathogen but hard to exclude that possibility completely. Treated with 7 days of antibiotics.   Agree with the combination of symptoms vasculitis a probable diagnosis as already in process of evaluation with Rheum.   Noted sulfa listed as allergy ( per patient slightly allergic to sulfa). Discussed further with patient she does not remember the drug neither the reaction. Started on Low dose for PCP prophylaxis, so far nos issues tolerating it.       Nicho Curry MD    Interval History   Afebrile   No new microbiological data   Tolerating Bactrim oK    Physical Exam   Temp: 97.6  F (36.4  C) Temp src: Oral BP: 115/59 Pulse: 73   Resp: 16 SpO2: 96 % O2 Device: None (Room air)    Vitals:    05/15/17 0955 05/18/17 0714 05/19/17 0456   Weight: 105.6 kg (232 lb 12.8 oz) 101.5 kg (223 lb 12.3 oz) 103.9 kg (229 lb 0.9 oz)     Vital Signs with Ranges  Temp:  [97.2  F (36.2  C)-98.1  F (36.7  C)] 97.6  F (36.4  C)  Pulse:  [73-83] 73  Resp:  [16-18] 16  BP: (115-160)/(59-82) 115/59  SpO2:  [95 %-96 %] 96 %    Constitutional: Awake, alert, cooperative, no apparent distress  Lungs: Clear to auscultation bilaterally, no crackles or wheezing  Cardiovascular: Regular rate and rhythm, normal S1 and S2, and no murmur noted  Abdomen: Normal bowel sounds, soft, non-distended,  non-tender  Skin: No rashes, no cyanosis, no edema  Other:    Medications     NaCl         predniSONE  60 mg Oral Daily     sennosides  1 tablet Oral BID     insulin aspart  1-7 Units Subcutaneous TID AC     insulin aspart  1-5 Units Subcutaneous At Bedtime     acetaminophen (TYLENOL) tablet 1,000 mg  1,000 mg Oral Q8H DESTIN     liothyronine  15 mcg Oral BID     levothyroxine  112 mcg Oral Daily     sodium chloride (PF)  3 mL Intracatheter Q8H       Data   All microbiology laboratory data reviewed.  Recent Labs   Lab Test  05/20/17   0551  05/19/17   0630  05/18/17   2220   05/18/17   0702   WBC  23.6*  22.0*   --    --   27.4*   HGB  8.7*  8.0*  8.4*   < >  8.5*   HCT  26.4*  23.9*   --    --   25.2*   MCV  87  86   --    --   85   PLT  570*  491*   --    --   505*    < > = values in this interval not displayed.     Recent Labs   Lab Test  05/20/17   0551  05/19/17   0630  05/18/17   0702   CR  1.81*  2.05*  2.27*     No lab results found.  Recent Labs   Lab Test  05/13/17   0825  08/10/13   2240   CULT  Moderate growth Staphylococcus aureus  Moderate growth Strain 2 Staphylococcus aureus  Moderate growth Normal dari  *  50,000 to 100,000 colonies/mL Escherichia coli

## 2017-05-20 NOTE — CONSULTS
PULMONARY CONSULTATION      REASON FOR CONSULTATION:   Pulmonary-renal syndrome, microscopic polyangiitis, pulmonary infiltrates.      HISTORY OF PRESENT ILLNESS:  Elizabeth Galicia is a 71-year-old female nonsmoker who was admitted to [facility] 05/12/17 with bilateral lower extremity swelling and renal impairment with an active sediment.  She was initially felt to have community-acquired pneumonia and possible heart failure.  CT scan of the chest showed extensive airspace consolidation in the right upper lobe and smaller areas of consolidation in the right lower lobe and left upper lobe leading to a presumed diagnosis of community-acquired pneumonia and treatment with antibiotics.  Renal evaluation was notable for proteinuria and microscopic hematuria.  Inflammatory markers were abnormal and the patient underwent a renal biopsy with preliminary results showing crescentic glomerulonephritis.  A diagnosis of microscopic polyangiitis was established and patient was started on IV Solu-Medrol for 3 days and has now been on prednisone 60 mg daily with a plan to start Cytoxan today.  We are consulted regarding evaluation of the pulmonary infiltrates.  The patient states that she is significantly improved since starting steroid therapy.  Her cough is much less and her previously noted night sweats have improved.      CURRENT MEDICATIONS:  Include Tylenol, Cytoxan, Emend, insulin, Synthroid, Cytomel, Zofran, prednisone, and Bactrim prophylaxis.      PAST MEDICAL HISTORY:   1.  Anxiety.   2.  GERD.   3.  Hypothyroidism.   4.  Obesity.      ALLERGIES:  Include dairy products, perfume and seasonal allergies.      SOCIAL HISTORY:  The patient is a nonsmoker.      FAMILY HISTORY:  Noncontributory.      REVIEW OF SYSTEMS:  Noncontributory.      PHYSICAL EXAMINATION:   GENERAL:  Reveals a very pleasant older female in no respiratory distress.   VITAL SIGNS:  She is afebrile.  Respiratory rate is 16, pulse is in the 70s,  blood pressure 115/59 and oxygen saturation is currently 96% on room air.   HEENT:  Grossly unremarkable.   NECK:  Supple.   LUNGS:  Revealed decreased breath sounds but fairly clear bilaterally without wheezes, rales or rhonchi.   CARDIOVASCULAR:  Revealed regular rate and rhythm.   ABDOMEN:  Obese, soft and nontender.   EXTREMITIES:  Shows 1 to 2+ pitting edema to mid shin bilaterally but no cyanosis or clubbing.      LABORATORY DATA:  Sodium 141, potassium 3.8, chloride 110, bicarbonate 20, BUN is 72, creatinine is 1.81.  White count 23.6, hemoglobin 8.7, hematocrit 26.4, platelet count is 570,000.        CT scan of the chest is as summarized above.      ASSESSMENT:  A 71-year-old female nonsmoker admitted with bilateral lower extremity swelling, renal impairment and active urine sediment and ultimately diagnosed with microscopic polyangiitis.  CT scan of the chest shows extensive airspace consolidation.  I agree that the pulmonary infiltrates are most likely secondary to pulmonary-renal syndrome (vasculitis) rather than pneumonia.  The patient has clinically improved with steroid therapy and is currently not requiring any oxygen.      PLAN:   1.  Prednisone and Cytoxan as per Renal and Rheumatology.   2.  Antibiotics, Bactrim prophylaxis.   3.  Recommend outpatient pulmonary followup in 6 weeks for pulmonary function testing and a repeat CT scan of the chest.      Case was discussed with Dr. Barkley.  I appreciate the opportunity to participate in her care.         TALHA SHANNON MD             D: 2017 13:19   T: 2017 13:57   MT: APOLINAR      Name:     PAUL ALANIZ   MRN:      [id number]        Account:       [id number]   :      1945           Consult Date:  2017      Document: [id number]       cc: Shaneka Arnold MD

## 2017-05-21 VITALS
HEART RATE: 70 BPM | BODY MASS INDEX: 37.59 KG/M2 | SYSTOLIC BLOOD PRESSURE: 116 MMHG | HEIGHT: 66 IN | DIASTOLIC BLOOD PRESSURE: 83 MMHG | RESPIRATION RATE: 16 BRPM | OXYGEN SATURATION: 97 % | TEMPERATURE: 96.7 F | WEIGHT: 233.91 LBS

## 2017-05-21 LAB
ANION GAP SERPL CALCULATED.3IONS-SCNC: 10 MMOL/L (ref 3–14)
ASPERGILLUS AB TITR SER CF: NORMAL {TITER}
B DERMAT AB TITR SER CF: NORMAL {TITER}
BUN SERPL-MCNC: 53 MG/DL (ref 7–30)
CALCIUM SERPL-MCNC: 7.6 MG/DL (ref 8.5–10.1)
CHLORIDE SERPL-SCNC: 113 MMOL/L (ref 94–109)
CO2 SERPL-SCNC: 19 MMOL/L (ref 20–32)
COCCIDIOIDES AB TITR SER CF: NORMAL {TITER}
CREAT SERPL-MCNC: 1.61 MG/DL (ref 0.52–1.04)
ERYTHROCYTE [DISTWIDTH] IN BLOOD BY AUTOMATED COUNT: 15.3 % (ref 10–15)
G6PD RBC-CCNC: 21.5
GFR SERPL CREATININE-BSD FRML MDRD: 32 ML/MIN/1.7M2
GLUCOSE BLDC GLUCOMTR-MCNC: 137 MG/DL (ref 70–99)
GLUCOSE SERPL-MCNC: 111 MG/DL (ref 70–99)
H CAPSUL MYC AB TITR SER CF: NORMAL {TITER}
H CAPSUL YST AB TITR SER CF: NORMAL {TITER}
HCT VFR BLD AUTO: 24.5 % (ref 35–47)
HGB BLD-MCNC: 8 G/DL (ref 11.7–15.7)
MCH RBC QN AUTO: 28.8 PG (ref 26.5–33)
MCHC RBC AUTO-ENTMCNC: 32.7 G/DL (ref 31.5–36.5)
MCV RBC AUTO: 88 FL (ref 78–100)
PLATELET # BLD AUTO: 501 10E9/L (ref 150–450)
POTASSIUM SERPL-SCNC: 3.9 MMOL/L (ref 3.4–5.3)
RBC # BLD AUTO: 2.78 10E12/L (ref 3.8–5.2)
SODIUM SERPL-SCNC: 142 MMOL/L (ref 133–144)
WBC # BLD AUTO: 17.2 10E9/L (ref 4–11)

## 2017-05-21 PROCEDURE — 27210995 ZZH RX 272: Performed by: INTERNAL MEDICINE

## 2017-05-21 PROCEDURE — 99239 HOSP IP/OBS DSCHRG MGMT >30: CPT | Performed by: INTERNAL MEDICINE

## 2017-05-21 PROCEDURE — A9270 NON-COVERED ITEM OR SERVICE: HCPCS | Mod: GY | Performed by: INTERNAL MEDICINE

## 2017-05-21 PROCEDURE — 25000132 ZZH RX MED GY IP 250 OP 250 PS 637: Mod: GY | Performed by: INTERNAL MEDICINE

## 2017-05-21 PROCEDURE — 36415 COLL VENOUS BLD VENIPUNCTURE: CPT | Performed by: INTERNAL MEDICINE

## 2017-05-21 PROCEDURE — 25000125 ZZHC RX 250: Performed by: INTERNAL MEDICINE

## 2017-05-21 PROCEDURE — 85027 COMPLETE CBC AUTOMATED: CPT | Performed by: INTERNAL MEDICINE

## 2017-05-21 PROCEDURE — 00000146 ZZHCL STATISTIC GLUCOSE BY METER IP

## 2017-05-21 PROCEDURE — 80048 BASIC METABOLIC PNL TOTAL CA: CPT | Performed by: INTERNAL MEDICINE

## 2017-05-21 RX ORDER — OMEPRAZOLE 40 MG/1
40 CAPSULE, DELAYED RELEASE ORAL EVERY MORNING
Qty: 30 CAPSULE | Refills: 0 | Status: SHIPPED | OUTPATIENT
Start: 2017-05-21 | End: 2017-12-14

## 2017-05-21 RX ORDER — SULFAMETHOXAZOLE AND TRIMETHOPRIM 400; 80 MG/1; MG/1
1 TABLET ORAL DAILY
Qty: 30 TABLET | Refills: 0 | Status: SHIPPED | OUTPATIENT
Start: 2017-05-21 | End: 2017-06-20

## 2017-05-21 RX ORDER — AMOXICILLIN 250 MG
1-2 CAPSULE ORAL 2 TIMES DAILY
Qty: 60 TABLET | Refills: 0 | Status: SHIPPED | OUTPATIENT
Start: 2017-05-21 | End: 2017-11-30

## 2017-05-21 RX ORDER — ACETAMINOPHEN 500 MG
1000 TABLET ORAL EVERY 8 HOURS
COMMUNITY
Start: 2017-05-21 | End: 2021-01-05

## 2017-05-21 RX ORDER — PREDNISONE 20 MG/1
60 TABLET ORAL DAILY
Qty: 90 TABLET | Refills: 0 | Status: SHIPPED | OUTPATIENT
Start: 2017-05-21 | End: 2017-06-20

## 2017-05-21 RX ORDER — CODEINE PHOSPHATE AND GUAIFENESIN 10; 100 MG/5ML; MG/5ML
5 SOLUTION ORAL EVERY 4 HOURS PRN
Qty: 420 ML | Refills: 0 | Status: SHIPPED | OUTPATIENT
Start: 2017-05-21 | End: 2017-12-20

## 2017-05-21 RX ADMIN — OMEPRAZOLE 40 MG: 20 CAPSULE, DELAYED RELEASE ORAL at 11:15

## 2017-05-21 RX ADMIN — Medication 15 MCG: at 08:52

## 2017-05-21 RX ADMIN — SODIUM CHLORIDE: 4.5 INJECTION, SOLUTION INTRAVENOUS at 00:20

## 2017-05-21 RX ADMIN — ACETAMINOPHEN 1000 MG: 500 TABLET, FILM COATED ORAL at 11:16

## 2017-05-21 RX ADMIN — ACETAMINOPHEN 1000 MG: 500 TABLET, FILM COATED ORAL at 00:12

## 2017-05-21 RX ADMIN — DOCUSATE SODIUM 200 MG: 100 CAPSULE, LIQUID FILLED ORAL at 07:41

## 2017-05-21 RX ADMIN — SENNOSIDES AND DOCUSATE SODIUM 2 TABLET: 8.6; 5 TABLET ORAL at 08:51

## 2017-05-21 RX ADMIN — SULFAMETHOXAZOLE AND TRIMETHOPRIM 1 TABLET: 400; 80 TABLET ORAL at 08:52

## 2017-05-21 RX ADMIN — PREDNISONE 60 MG: 20 TABLET ORAL at 08:51

## 2017-05-21 RX ADMIN — LEVOTHYROXINE SODIUM 112 MCG: 112 TABLET ORAL at 08:50

## 2017-05-21 RX ADMIN — Medication 1 TABLET: at 11:16

## 2017-05-21 NOTE — PLAN OF CARE
Problem: Goal Outcome Summary  Goal: Goal Outcome Summary  Outcome: No Change  Transferred from 55 at 1630 for cytoxan infusion. Pt tolerated fine, denies nausea. A&O, VSS on RA. Mild pain in BLE w/edema, improved with tylenol. PRN tylenol given per pt instead of scheduled dose at midnight (pt wishes to sleep). BS+, senokot and colace given per pt request. Regular diet. Chemo precautions. Up ind. NS infusing in L PIV@150, plan to switch to 0.45NS x1L after current bag is complete. Continue to monitor, possible DC tomorrow.

## 2017-05-21 NOTE — PROGRESS NOTES
Discharge instructions reviewed thoroughly with patient.  Medications given to patient.  IV removed.  Spouse will be transport home.  Escorted out via wheelchair with belongings in hand.

## 2017-05-21 NOTE — PLAN OF CARE
Problem: Goal Outcome Summary  Goal: Goal Outcome Summary  Outcome: Improving  Transferred from 55 last joy for cytoxan transfusion, tolerated. A&Ox4. VSS. C/o mild back pain, given Tylenol & heat pack. Two night sweat, linens changed. BLE edema, L blister; dressing CDI. PIV to be SL after bag is finished. Up independent but calls for assistance. Pt appeared to rest comfortably throughout the night. Will continue to monitor.

## 2017-05-21 NOTE — DISCHARGE SUMMARY
[facility]    Discharge Summary  Hospitalist    Date of Admission:  5/12/2017  Date of Discharge:  5/21/2017  Discharging Provider: Shaneka Laguerre    Discharge Diagnoses   Microscopic Polyangiitis  Bilateral Pulmonary infiltrates secondary to vasculitis  Acute renal failure secondary to vasculitis  Bilateral LE edema  Steroid Induced Hyperglycemia  Hypothyroidism  Anemia secondary to iron deficiency and vasculitis  L foot Blister    History of Present Illness   Elizabeth Galicia is a 71 year old female with PMHx of GERD, dyslipidemia, hypothyroidism, anxiety and uterine prolapse who was admitted on 5/12/2017 with dyspnea, LE edema and night sweats.     Hospital Course   Elizabeth Galicia was admitted on 5/12/2017.  The following problems were addressed during her hospitalization:    Renopulmonary Vasculitis - Microscopic Polyangiitis:  Did not carry this diagnosis prior to admission. Constellation of complaints on admission included fever, arthralgias and muscle weakness, with pulmonary infiltrates and renal failure as below. 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    Seen by multiple subspecialists this stay including nephrology, rheumatology and pulmonology. Initially started on IV steroids. Subsequently transitioned to prednisone. Was started on cyclophosphamide this stay. Plan at discharge is for her to continue prednisone 60mg daily for the next month, then taper over a 3-4 month period. She will continue IV cyclophosphamide (as directed per nephrology, for anticipated treatment course of 3-6mths), with eventual plan to change to transition to azathioprine.     She will follow up with nephrology as advised, rheumatology (  [place]ina) in the second week of June and pulmonology (Dr. De Dios) in 6 weeks with repeat CT chest and PFTs.     Regarding PCP prophylaxis, patient reported she may have had an allergic reaction to sulfas in the past. ID consulted - started on Bactrim SS and appeared to tolerate it. (Do have alternatives if she becomes intolerant to sulfas including dapsone (G6PD pending) or inh pentamidine, though Bactrim is the preferred agent)      Bilateral pulmonary infiltrates secondary to vasculitis:  CXR on admission showed a RML infiltrate. 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 Has profoudnd leukocytosis though remains afebrile and procalcitonin okay. Sputum culture grew MSSA - uncertain if this represents colonization vs pathogen, per ID recommendations she completed 7d course of treatment with Rocephin this stay (also completed 5d course of Azithromycin while hospitalized). Started on steroids and cyclophosphamide as above. Weaned off supplemental O2 during her stay. Respiratory status had returned to baseline by the time of discharge. Discharged with medication for ongoing cough. Will follow up with pulmonology as above after discharge.    Acute Renal Failure: Improving  Cr 1.7 on presentation, trending up. Underlying etiology not clear though suspect secondary to vasculitis. Renal US unremarkable. US bland, SPEP/UPEP neg. Underwent renal biopsy on 5/18, pathology showed crescentic GN. Had been diuresed this stay given her LE edema. Lasix dc'd on 5/18 as Cr bumped to 2.2. Renal function improved off diuretics and with treatment of vasculitis as above. Will follow up with nephrology after discharge.     Bilateral LE edema:  Bilateral LE dopplers at outside hospital were negative for DVT. Echocardiogram unremarkable this hospital stay. Urine protein/Cr 0.85 this stay. Diuresed with IV Lasix this stay. Lasix ultimately held on 5/18 given worsening renal failure.      Steroid Induced Hyperglycemia:  Hyperglycemia noted during this stay. Secondary to high doses of steroids. Was managed with SSI. Did not start oral agent such as Metformin given her renal failure this stay. Recommended to follow up with PCP after discharge to discuss ongoing management.      Hypothyroidism:   Chronic and stable on Synthroid and Cytomel.     Anemia secondary to iron deficiency and vasculitis:  Fe studies obtained this stay - Fe 18 (L), TIBC 147 (L) Declined iron replacement this stay. Suspect anemia may also be due in part to vasculitis.     Left Foot Blister:  Likely exacerbated by LE edema. No s/sx of infection appreciated this stay. Cont local dressings.     Shaneka Laguerre    Significant Results and Procedures   1. Renal biopsy on 5/18/17 -  No formal report in Epic but per nephrology did show crescentric GN.    Pending Results   These results will be followed up by nephrology/PCP  Unresulted Labs Ordered in the Past 30 Days of this Admission     Date and Time Order Name Status Description    5/19/2017 1143 Glucose 6 phosphate dehydrogenase In process           Code Status   Full Code       Primary Care Physician   Noreen Anderson    Physical Exam   Temp: 96.7  F (35.9  C) Temp src: Oral BP: 116/83 Pulse: 70 Heart Rate: 71 Resp: 16 SpO2: 97 % O2 Device: None (Room air)    Vitals:    05/18/17 0714 05/19/17 0456 05/21/17 0424   Weight: 101.5 kg (223 lb 12.3 oz) 103.9 kg (229 lb 0.9 oz) 106.1 kg (233 lb 14.5 oz)     Vital Signs with Ranges  Temp:  [95.8  F (35.4  C)-96.7  F (35.9  C)] 96.7  F (35.9  C)  Pulse:  [70] 70  Heart Rate:  [61-71] 71  Resp:  [16-17] 16  BP: (115-131)/(61-83) 116/83  SpO2:  [94 %-97 %] 97 %  I/O last 3 completed shifts:  In: 4003 [P.O.:1300; I.V.:2703]  Out: 3300 [Urine:3300]    General: Resting comfortably, alert, conversive, NAD  CVS: HRRR, no MGR  Respiratory: CTAB, no wheeze/rales/rhonchi  GI: S, NT, ND, +BS  Ext: ++bilateral LE edema  Skin: Warm/dry, no  rashes, small blister on the dorsum of her left foot - no oozing or signs of infection    Discharge Disposition   Discharged to home  Condition at discharge: Stable    Consultations This Hospital Stay   PHYSICAL THERAPY ADULT IP CONSULT  OCCUPATIONAL THERAPY ADULT IP CONSULT  NEPHROLOGY IP CONSULT  INFECTIOUS DISEASES IP CONSULT  PULMONARY IP CONSULT    Time Spent on this Encounter   I, Shaneka Mechelleekta Laguerre, personally saw the patient today and spent greater than 30 minutes discharging this patient.    Discharge Orders     Reason for your hospital stay   Evaluation of your constellation of symptoms including shortness of breath, night sweats and leg swelling - workup this stay ultimately led to you being diagnosed with a type of vasculitis called microscopic polyangiits, which can affect your kidneys and lungs. You were started on treatment here in the hospital, and this will continue after discharge.     Follow-up and recommended labs and tests    1. Follow up with nephrology (Dr. Barkley) as advised - he will arrange your ongoing Cytoxan infusions.  2. Follow up with rheumatology (Dr. Arnold) as advised in the second week of June.  3. Follow up with pulmonology (Dr. De Dios) in 6 weeks for a repeat CT of your chest and pulmonary function testing.  4. Follow up with your new PCP (Dr. Nati Rodriguez) as scheduled.     Activity   Your activity upon discharge: activity as tolerated     Full Code     Diet   Follow this diet upon discharge: Regular       Discharge Medications   Current Discharge Medication List      START taking these medications    Details   acetaminophen (TYLENOL) 500 MG tablet Take 2 tablets (1,000 mg) by mouth every 8 hours    Associated Diagnoses: Generalized pain      predniSONE (DELTASONE) 20 MG tablet Take 3 tablets (60 mg) by mouth daily  Qty: 90 tablet, Refills: 0    Associated Diagnoses: Vasculitis (H)      senna-docusate (SENOKOT-S;PERICOLACE) 8.6-50 MG per tablet Take 1-2 tablets by mouth 2 times  daily  Qty: 60 tablet, Refills: 0    Associated Diagnoses: Constipation, unspecified constipation type      sulfamethoxazole-trimethoprim (BACTRIM/SEPTRA) 400-80 MG per tablet Take 1 tablet by mouth daily  Qty: 30 tablet, Refills: 0    Associated Diagnoses: Need for pneumocystis prophylaxis      omeprazole (PRILOSEC) 40 MG capsule Take 1 capsule (40 mg) by mouth every morning  Qty: 30 capsule, Refills: 0    Associated Diagnoses: Prophylactic measure      guaiFENesin-codeine (ROBITUSSIN AC) 100-10 MG/5ML SOLN solution Take 5 mLs by mouth every 4 hours as needed for cough  Qty: 420 mL, Refills: 0    Associated Diagnoses: Cough         CONTINUE these medications which have NOT CHANGED    Details   MAGNESIUM LACTATE PO Take 2 tablets by mouth daily      Cholecalciferol (VITAMIN D3 PO) Take 1,000 Units by mouth daily      CALCIUM LACTATE PO Take 4 tablets by mouth daily       Levothyroxine Sodium 112 MCG CAPS Take 112 mcg by mouth daily.      liothyronine (CYTOMEL) 5 MCG tablet Take by mouth 2 times daily Takes 15 mcg BID           Allergies   Allergies   Allergen Reactions     Dairy [Milk Products]      Perfume Other (See Comments)     Stuffy in nose, HA     Seasonal Allergies      Sulfa Drugs Unknown     Data   Most Recent 3 CBC's:  Recent Labs   Lab Test  05/21/17   0640  05/20/17   0551  05/19/17   0630   WBC  17.2*  23.6*  22.0*   HGB  8.0*  8.7*  8.0*   MCV  88  87  86   PLT  501*  570*  491*      Most Recent 3 BMP's:  Recent Labs   Lab Test  05/21/17   0640  05/20/17   0551  05/19/17   0630   NA  142  141  142   POTASSIUM  3.9  3.8  3.5   CHLORIDE  113*  110*  110*   CO2  19*  20  19*   BUN  53*  72*  64*   CR  1.61*  1.81*  2.05*   ANIONGAP  10  11  13   ARABELLA  7.6*  8.5  8.4*   GLC  111*  126*  165*     Most Recent 2 LFT's:  Recent Labs   Lab Test  05/13/17   1111   AST  12   ALT  21   ALKPHOS  74   BILITOTAL  0.3     Most Recent INR's and Anticoagulation Dosing History:  Anticoagulation Dose History     Recent  Dosing and Labs Latest Ref Rng & Units 5/16/2017 5/17/2017 5/18/2017    INR 0.86 - 1.14 1.18(H) 1.18(H) 1.20(H)        Most Recent Cholesterol Panel:  Recent Labs   Lab Test  05/13/17   0605   CHOL  126   LDL  71   HDL  26*   TRIG  145     Most Recent 6 Bacteria Isolates From Any Culture (See EPIC Reports for Culture Details):  Recent Labs   Lab Test  05/13/17   0825  08/10/13   2240   CULT  Moderate growth Staphylococcus aureus  Moderate growth Strain 2 Staphylococcus aureus  Moderate growth Normal dari  *  50,000 to 100,000 colonies/mL Escherichia coli     Most Recent TSH, T4 and A1c Labs:  Recent Labs   Lab Test  05/14/17   0608  05/13/17   1111   TSH   --   4.87*   A1C  6.9*   --      Results for orders placed or performed during the hospital encounter of 05/12/17   US Renal Complete    Narrative    ULTRASOUND RENAL COMPLETE 5/13/2017 12:35 PM     HISTORY: Elevated creatinine.     FINDINGS: There is a 1.8 cm left renal cyst. Otherwise, both kidneys  appear unremarkable without hydronephrosis. The bladder is  incompletely distended.      Impression    IMPRESSION: Negative for hydronephrosis.    HERNANDEZ JARAMILLO MD   CT Chest Abdomen Pelvis w/o Contrast    Narrative    CT CHEST, ABDOMEN AND PELVIS WITHOUT CONTRAST 5/14/2017 11:47 AM     HISTORY: Elevated CRP, BLE edema, joint pain, fevers.    TECHNIQUE: Volumetric acquisition of CT images from the lung apices  through the ischial tuberosities without contrast. Radiation dose for  this scan was reduced using automated exposure control, adjustment of  the mA and/or kV according to patient size, or iterative  reconstruction technique.    COMPARISON: None.    FINDINGS:   Chest: There is extensive airspace consolidation in the right upper  lobe. Smaller areas of consolidation in the left upper lobe and right  lower lobe. No pleural or pericardial effusion. Heart size is normal.  There is a small hiatal hernia.    Abdomen and pelvis: Calcified gallstones are present.  Allowing for  unenhanced technique, the liver, spleen, pancreas, adrenal glands, and  kidneys are unremarkable. The small and large bowel are normal in  caliber. A few scattered colonic diverticula. Appendix is not seen.  There is no free intraperitoneal air. Atherosclerotic changes in the  aorta but no evidence of aneurysm. No ascites or fluid collections.      Impression    IMPRESSION:  1. Extensive airspace consolidation in the right upper lobe. Smaller  areas of consolidation in the right lower lobe and left upper lobe.  This is likely infectious in etiology.  2. Cholelithiasis.  3. Small hiatal hernia.    HUGO PICHARDO MD   US Markings    Narrative    This exam was marked as non-reportable because it will not be read by a   radiologist or a [facility] non-radiologist provider.             US Biopsy Renal    Narrative    ULTRASOUND BIOPSY KIDNEY 5/18/2017 12:33 PM     HISTORY: Acute kidney injury. Vasculitis.    COMPARISON: None.      Impression    IMPRESSION: Ultrasound guidance provided to Dr. Cooley. No postbiopsy  hematoma.    FRANCIA JOSEPH MD

## 2017-05-21 NOTE — PROGRESS NOTES
Renal Medicine Progress Note            Assessment/Plan:     1. Pt with a Scr of 1.4 mg/dl on 5/1. Hgb was 11.5. No UA.       2. Pt was admitted on 5/13 with a Scr of 1.77 mg/dl with an active urine sediment. This is consistent with a vasculitis either MPA or GPA with lung involvement. Biopsy confirmed crescentic ANCA vasculitis.   -0.85 mg/g proteinuria + microscopic hematuria   -MPO >8, PR3 neg, RF 80, , JULIAN+. 0.85 g/g proteinuria   -antiGBM neg   -had 3 days of IV Solumedrol and now on prednisone 60 mg daily    -start Cytoxan IV 5/20      3. Bilateral pulmonary infiltrates. 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    -symptoms are better      4. Anemia and leukocytosis. I suspect this is due to active vasculitis.   - peripheral smear without schistocytes      5. Bilateral LE edema. Improving   -low sodium diet   -strict i/o      Plan.   1. Continue prednisone 60 mg daily x one month then taper over 3-4 months.  2. Start IV Cytoxan 0.5 g/m2 q3-4 weekly x 1 month for 3-6 months then will switch to AZA for maintenance for up 18 months. I prefer Cytoxan, especially given her lung involvement. Starting Cytoxan today. I discussed the logistic with pharmacy, who will order anti-emetic.   3. Cont Bactrim SS daily for PCP prophylaxis  4. Start PPI for GI protection and calcium/vit D for bone protection  5. Follow-up with pulm in 6 weeks  6. See me in 3 weeks. We will recheck labs in a week.   7. Okay to discharge if there are no other active issues.        Interval History:                Medications and Allergies:       sulfamethoxazole-trimethoprim  1 tablet Oral Daily     senna-docusate  1-2 tablet Oral BID     predniSONE  60 mg Oral Daily     insulin aspart  1-7 Units Subcutaneous TID AC     insulin aspart  1-5 Units Subcutaneous At Bedtime     acetaminophen (TYLENOL) tablet 1,000 mg  1,000 mg Oral Q8H DESTIN     liothyronine  15 mcg Oral BID     levothyroxine  112 mcg Oral Daily      "sodium chloride (PF)  3 mL Intracatheter Q8H        Allergies   Allergen Reactions     Dairy [Milk Products]      Perfume Other (See Comments)     Stuffy in nose, HA     Seasonal Allergies      Sulfa Drugs Unknown            Physical Exam:   Vitals were reviewed  Heart Rate: 71, Blood pressure 116/83, pulse 70, temperature 96.7  F (35.9  C), temperature source Oral, resp. rate 16, height 1.664 m (5' 5.5\"), weight 106.1 kg (233 lb 14.5 oz), SpO2 97 %, not currently breastfeeding.    Wt Readings from Last 3 Encounters:   05/21/17 106.1 kg (233 lb 14.5 oz)   10/27/16 100.7 kg (222 lb)   03/10/16 99.3 kg (219 lb)       Intake/Output Summary (Last 24 hours) at 05/21/17 1002  Last data filed at 05/21/17 0821   Gross per 24 hour   Intake             4243 ml   Output             3300 ml   Net              943 ml     GENERAL APPEARANCE: pleasant, NAD, alert  HEENT: Eyes/ears/nose/neck grossly normal  RESP: lungs cta b c good efforts, no crackles, rhonchi or wheezes  CV: RRR, nl S1/S2, no m/r/g   ABDOMEN: Obese, soft, NT  EXTREMITIES/SKIN: no rashes/lesions on observed skin;1+ edema  Neuro: a/o x3. nonfocal          Data:     CBC RESULTS:     Recent Labs  Lab 05/21/17  0640 05/20/17  0551 05/19/17  0630 05/18/17  2220 05/18/17  1501 05/18/17  0702 05/17/17  0650 05/16/17  0700   WBC 17.2* 23.6* 22.0*  --   --  27.4* 20.5* 15.5*   RBC 2.78* 3.05* 2.79*  --   --  2.96* 3.16* 2.78*   HGB 8.0* 8.7* 8.0* 8.4* 8.7* 8.5* 9.1* 7.8*   HCT 24.5* 26.4* 23.9*  --   --  25.2* 27.2* 24.3*   * 570* 491*  --   --  505* 450 373       Basic Metabolic Panel:    Recent Labs  Lab 05/21/17  0640 05/20/17  0551 05/19/17  0630 05/18/17  0702 05/17/17  0650 05/16/17  0700    141 142 139 139 136   POTASSIUM 3.9 3.8 3.5 3.4 3.9 4.1   CHLORIDE 113* 110* 110* 108 109 108   CO2 19* 20 19* 18* 18* 18*   BUN 53* 72* 64* 57* 37* 24   CR 1.61* 1.81* 2.05* 2.27* 1.71* 1.64*   * 126* 165* 180* 190* 124*   ARABELLA 7.6* 8.5 8.4* 8.8 9.0 8.1* "       INR  Recent Labs  Lab 05/18/17  0702 05/17/17  0650 05/16/17  1500   INR 1.20* 1.18* 1.18*      Attestation:   I have reviewed today's relevant vital signs, notes, medications, labs and imaging.    Daquan Barkley MD  [facility] - Nephrology   Office phone :[phone]  Pager: [phone]

## 2017-05-22 DIAGNOSIS — N17.9 ACUTE RENAL FAILURE SYNDROME (H): ICD-10-CM

## 2017-05-22 DIAGNOSIS — I77.89 HYPERPLASIA OF RENAL ARTERY (H): Primary | ICD-10-CM

## 2017-05-23 ENCOUNTER — OFFICE VISIT (OUTPATIENT)
Dept: FAMILY MEDICINE | Facility: CLINIC | Age: 72
End: 2017-05-23
Payer: COMMERCIAL

## 2017-05-23 VITALS
HEART RATE: 72 BPM | TEMPERATURE: 97 F | RESPIRATION RATE: 20 BRPM | WEIGHT: 239 LBS | HEIGHT: 66 IN | OXYGEN SATURATION: 97 % | SYSTOLIC BLOOD PRESSURE: 141 MMHG | DIASTOLIC BLOOD PRESSURE: 65 MMHG | BODY MASS INDEX: 38.41 KG/M2

## 2017-05-23 DIAGNOSIS — D64.9 ANEMIA, UNSPECIFIED TYPE: ICD-10-CM

## 2017-05-23 DIAGNOSIS — Z09 HOSPITAL DISCHARGE FOLLOW-UP: Primary | ICD-10-CM

## 2017-05-23 DIAGNOSIS — E89.0 POSTOPERATIVE HYPOTHYROIDISM: ICD-10-CM

## 2017-05-23 DIAGNOSIS — I77.89 HYPERPLASIA OF RENAL ARTERY (H): ICD-10-CM

## 2017-05-23 DIAGNOSIS — R73.09 ELEVATED GLUCOSE: ICD-10-CM

## 2017-05-23 DIAGNOSIS — M31.7 MICROSCOPIC POLYANGIITIS (H): ICD-10-CM

## 2017-05-23 LAB
ERYTHROCYTE [DISTWIDTH] IN BLOOD BY AUTOMATED COUNT: 16.1 % (ref 10–15)
HBA1C MFR BLD: 6.7 % (ref 4.3–6)
HCT VFR BLD AUTO: 27.1 % (ref 35–47)
HGB BLD-MCNC: 8.7 G/DL (ref 11.7–15.7)
MCH RBC QN AUTO: 28.9 PG (ref 26.5–33)
MCHC RBC AUTO-ENTMCNC: 32.1 G/DL (ref 31.5–36.5)
MCV RBC AUTO: 90 FL (ref 78–100)
PLATELET # BLD AUTO: 686 10E9/L (ref 150–450)
RBC # BLD AUTO: 3.01 10E12/L (ref 3.8–5.2)
WBC # BLD AUTO: 18 10E9/L (ref 4–11)

## 2017-05-23 PROCEDURE — 99496 TRANSJ CARE MGMT HIGH F2F 7D: CPT | Performed by: INTERNAL MEDICINE

## 2017-05-23 PROCEDURE — 83036 HEMOGLOBIN GLYCOSYLATED A1C: CPT

## 2017-05-23 PROCEDURE — 36415 COLL VENOUS BLD VENIPUNCTURE: CPT

## 2017-05-23 PROCEDURE — 85027 COMPLETE CBC AUTOMATED: CPT

## 2017-05-23 PROCEDURE — 80069 RENAL FUNCTION PANEL: CPT

## 2017-05-23 RX ORDER — LEVOTHYROXINE SODIUM 112 UG/1
112 CAPSULE ORAL EVERY MORNING
Qty: 90 CAPSULE | Refills: 3 | Status: SHIPPED | OUTPATIENT
Start: 2017-05-23 | End: 2018-02-27 | Stop reason: DRUGHIGH

## 2017-05-23 RX ORDER — LIOTHYRONINE SODIUM 5 UG/1
15 TABLET ORAL 2 TIMES DAILY
Qty: 540 TABLET | Refills: 3 | Status: SHIPPED | OUTPATIENT
Start: 2017-05-23 | End: 2018-02-27

## 2017-05-23 NOTE — PROGRESS NOTES
SUBJECTIVE:                                                    Elizabeth Galicia is a 71 year old female who presents to clinic today for the following health issues:        Hospital Follow-up Visit:    Hospital/Nursing Home/IP Rehab Facility: [facility]  Date of Admission: 5/12/2017  Date of Discharge: 5/21/2017  Reason(s) for Admission: Generalized pain, Vasculitis, Constipation, Acute renal failurer            Problems taking medications regularly:  None       Medication changes since discharge: See medication list       Problems adhering to non-medication therapy:  None    Summary of hospitalization:  [facility] discharge summary reviewed  Diagnostic Tests/Treatments reviewed.  Follow up needed: Follow up with rheumatology (Dr. Arnold) as advised in the second week of June and with pulmonology (Dr. De Dios) in 6 weeks for a repeat CT of your chest and pulmonary function testing as well as nephrology.  Other Healthcare Providers Involved in Patient s Care:         Specialist appointment - As above  Update since discharge: improved.     Post Discharge Medication Reconciliation: discharge medications reconciled, continue medications without change.  Plan of care communicated with patient     Coding guidelines for this visit:  Type of Medical   Decision Making Face-to-Face Visit       within 7 Days of discharge Face-to-Face Visit        within 14 days of discharge   Moderate Complexity [id number]   High Complexity [phone] [id number]            Elizabeth is looking for a new PCP since hers is retiring. She would like to establish with me as PCP. She last ate about 4 hours ago. Dx with new ANCA-associated renopulmonary vasculitis (microscopic polyangiitis) after renal biopsy.   Complaining of fatigue and edema. She has been sleeping more frequently. Night sweats for the past four months.  Not wearing compression stockings. She is not able to wear shoes d/t edema so she presents today in a wheel chair,  "however she is able to walk. States had negative US for DVT prior to admission at an outside urgent care.  Says she feels like a \"generic version of herself\" since her features are diminished d/t edema and is on high dose prednisone.   She is feeling much better since discharge. She is very happy with care from Dr. Rizo.  Requested a refill for liothyronine 5 mg. S/p partial thyroidectomy d/t goiter. No hx/o hyperthyroidism.   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     Problem list and histories reviewed & adjusted, as indicated.    ROS:  Detailed as above     This document serves as a record of the services and decisions personally performed and made by Nati Littlejohn DO. It was created on his/her behalf by Jaci Brown, a trained medical scribe. The creation of this document is based the provider's statements to the medical scribe.  Scribgisele Brown 1:12 PM, May 23, 2017   OBJECTIVE:                                                    /65 (BP Location: Right arm, Patient Position: Chair, Cuff Size: Adult Large)  Pulse 72  Temp 97  F (36.1  C) (Oral)  Resp 20  Ht 1.664 m (5' 5.5\")  Wt 108.4 kg (239 lb)  SpO2 97%  BMI 39.17 kg/m2  Body mass index is 39.17 kg/(m^2).   Alert, pleasant, cooperative, NAD, in wheelchair but able to stand-up on her own  HRRR without mrg   Lungs overall clear with scattered crackles   Normal affect  Well healing blister on dorsum of left foot  Bandage over L flank from biopsy, well healed puncture site    Lower ext edema bilaterally to knees     ASSESSMENT/PLAN:                                                    1. Hospital discharge follow-up for new diagnosis of ANCA-associated renopulmonary vasculitis   Pt handling situation remarkably well, improved since discharge  Biggest higinio now is her edema and improving her conditioning  Has f/u with: rheum, pulm and renal after hospitalization  She does have help at home  On Prednisone, Cytoxan " and PCP prophy with SS Bactrim d/t to slight h/o reaction to Bactrim - she states she is tolerating current dose well    2. 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 Barkley) and has apt coming up as well as rheum (Dr. Arnold) and pulm (Dr. 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 Pulmonary infiltrates  R/t vasculitis and will also f/u with pulmonary for PFTs and repeat chest CT (Dr. De Dios)  Breathing comfortably on room air    4. Anemia, unspecified type  No signs of blood loss ; does have iron deficiency   Likely r/t renal failure and vasculitis   - CBC with platelets    5. Postoperative hypothyroidism  Long term stable doses after partial thyroidectomy for goiter  - Levothyroxine Sodium 112 MCG CAPS; Take 112 mcg by mouth every morning  Dispense: 90 capsule; Refill: 3  - liothyronine (CYTOMEL) 5 MCG tablet; Take 3 tablets (15 mcg) by mouth 2 times daily  Dispense: 540 tablet; Refill: 3    6. Elevated glucose  Likely d/t prednisone, pt denies polydipsia/polyuria  - Hemoglobin A1c    Patient Instructions   Labs today   Prescriptions faxed to your pharmacy   Keep feet elevated, above the level of your heart, as much as possible   Try using an ACE wrap during the day instead of compression socks  -Take them off at night   Monitor blister everyday  -Ok to leave open to the air at night   Let me know immediately If you have any trouble breathing  Follow up with me in a few months or as needed      The information in this document, created by the medical scribe for me, accurately reflects the services I personally performed and the decisions made by me. I have reviewed and approved this document for accuracy prior to leaving the patient care area.  Nati Littlejohn DO  1:13 PM, 05/23/17    Nati Littlejohn DO  [facility]

## 2017-05-23 NOTE — LETTER
[facility]  [address]  [address]  [place]  [address]  Tel: [phone]    May 25, 2017    Elizabeth NISA Galicia  [address] [facility] [address]        Elizabeth,      It was nice meeting you yesterday!  I'm happy to report that your labs are [id number].  Your white blood cell count remains slightly high related to the inflammation and the prednisone therapy. Your hemoglobin is improved from 8 to 8.7. Your platelets (sticky part of blood that helps you clot) is slightly elevated which is often seen in times of bodily stress/inflammation and this should come down as you improve.    Your kidney test (creatinine) is slightly improved.  There is a test called the Hemoglobin A1c which is a calculated number looking at overall blood sugar control in a 3 month time period. Goal is less than 6.5% and your number is 6.7% which places you in a diabetic range. This is not at a point where any medication is needed for this. Please try to work on reducing the portions of carbohydrates in your diet (such as: breads, rice, pasta, sugars) which will help to lower the blood sugar and we'll check this again in about 3 months or so at a follow up office visit. This should improve too as you taper off of the prednisone as well.    Please continue with the plan of care as we discussed and let me know if you have any questions/concerns. Thanks!      Sincerely,    Nati Littlejohn DO/REBECCA,MA          Enclosure: Lab Results

## 2017-05-23 NOTE — NURSING NOTE
"Chief Complaint   Patient presents with     Hospital F/U       Initial /65 (BP Location: Right arm, Patient Position: Chair, Cuff Size: Adult Large)  Pulse 72  Temp 97  F (36.1  C) (Oral)  Resp 20  Ht 5' 5.5\" (1.664 m)  Wt 239 lb (108.4 kg)  SpO2 97%  BMI 39.17 kg/m2 Estimated body mass index is 39.17 kg/(m^2) as calculated from the following:    Height as of this encounter: 5' 5.5\" (1.664 m).    Weight as of this encounter: 239 lb (108.4 kg).  Medication Reconciliation: complete   Vanessa Godfrey CMA (AAMA)      "

## 2017-05-23 NOTE — MR AVS SNAPSHOT
After Visit Summary   5/23/2017    Elizabeth Galicia    MRN: [id number]           Patient Information     Date Of Birth          1945        Visit Information        Provider Department      5/23/2017 2:00 PM Nati Littlejohn,  [facility]        Today's Diagnoses     Hospital discharge follow-up    -  1    Postoperative hypothyroidism        Elevated glucose        Anemia, unspecified type          Care Instructions    Labs today   Prescriptions faxed to your pharmacy   Keep feet elevated, above the level of your heart, as much as possible   Try using an ACE wrap during the day instead of compression socks  -Take them off at night   Monitor blister everyday  -Ok to leave open to the air at night   Let me know immediately If you have any trouble breathing  Follow up with me in a few months or as needed        Follow-ups after your visit        Future tests that were ordered for you today     Open Future Orders        Priority Expected Expires Ordered    CT Chest w/o Contrast Routine  5/22/2018 5/22/2017    Renal panel (Alb, BUN, Ca, Cl, CO2, Creat, Gluc, Phos, K, Na) Routine 5/23/2017 5/22/2018 5/22/2017            Who to contact     If you have questions or need follow up information about today's clinic visit or your schedule please contact [facility] directly at [phone].  Normal or non-critical lab and imaging results will be communicated to you by MyChart, letter or phone within 4 business days after the clinic has received the results. If you do not hear from us within 7 days, please contact the clinic through MyChart or phone. If you have a critical or abnormal lab result, we will notify you by phone as soon as possible.  Submit refill requests through [employer] or call your pharmacy and they will forward the refill request to us. Please allow 3 business days for your refill to be completed.          Additional Information About Your Visit        MyChart  "Information     [employer] lets you send messages to your doctor, view your test results, renew your prescriptions, schedule appointments and more. To sign up, go to [email or web address] . Click on \"Log in\" on the left side of the screen, which will take you to the Welcome page. Then click on \"Sign up Now\" on the right side of the page.     You will be asked to enter the access code listed below, as well as some personal information. Please follow the directions to create your username and password.     Your access code is: [email or web address]  Expires: 2017  7:53 AM     Your access code will  in 90 days. If you need help or a new code, please call your [place] clinic or [phone].        Care EveryWhere ID     This is your Care EveryWhere ID. This could be used by other organizations to access your [place] medical records  [id number]        Your Vitals Were     Pulse Temperature Respirations Height Pulse Oximetry BMI (Body Mass Index)    72 97  F (36.1  C) (Oral) 20 5' 5.5\" (1.664 m) 97% 39.17 kg/m2       Blood Pressure from Last 3 Encounters:   17 141/65   17 116/83   10/27/16 136/72    Weight from Last 3 Encounters:   17 239 lb (108.4 kg)   17 233 lb 14.5 oz (106.1 kg)   10/27/16 222 lb (100.7 kg)              We Performed the Following     CBC with platelets     Glucose     Hemoglobin A1c          Today's Medication Changes          These changes are accurate as of: 17  2:41 PM.  If you have any questions, ask your nurse or doctor.               These medicines have changed or have updated prescriptions.        Dose/Directions    Levothyroxine Sodium 112 MCG Caps   This may have changed:  Another medication with the same name was removed. Continue taking this medication, and follow the directions you see here.   Used for:  Postoperative hypothyroidism   Changed by:  Nati Littlejohn,         Dose:  112 mcg   Take 112 mcg by mouth every morning   Quantity:  90 capsule "   Refills:  3       liothyronine 5 MCG tablet   Commonly known as:  CYTOMEL   This may have changed:    - how much to take  - additional instructions   Used for:  Postoperative hypothyroidism   Changed by:  Nati Littlejohn DO        Dose:  15 mcg   Take 3 tablets (15 mcg) by mouth 2 times daily   Quantity:  540 tablet   Refills:  3            Where to get your medicines      Some of these will need a paper prescription and others can be bought over the counter.  Ask your nurse if you have questions.     Bring a paper prescription for each of these medications     Levothyroxine Sodium 112 MCG Caps    liothyronine 5 MCG tablet                Primary Care Provider Office Phone # Fax #    Nati Littlejohn [phone]       [facility] [address] [place] [address] [address] [address]        Thank you!     Thank you for choosing [facility]  for your care. Our goal is always to provide you with excellent care. Hearing back from our patients is one way we can continue to improve our services. Please take a few minutes to complete the written survey that you may receive in the mail after your visit with us. Thank you!             Your Updated Medication List - Protect others around you: Learn how to safely use, store and throw away your medicines at www.disposemymeds.org.          This list is accurate as of: 5/23/17  2:41 PM.  Always use your most recent med list.                   Brand Name Dispense Instructions for use    acetaminophen 500 MG tablet    TYLENOL     Take 2 tablets (1,000 mg) by mouth every 8 hours       CALCIUM LACTATE PO      Take 4 tablets by mouth daily       guaiFENesin-codeine 100-10 MG/5ML Soln solution    ROBITUSSIN AC    420 mL    Take 5 mLs by mouth every 4 hours as needed for cough       Levothyroxine Sodium 112 MCG Caps     90 capsule    Take 112 mcg by mouth every morning       liothyronine 5 MCG tablet    CYTOMEL    540 tablet    Take 3 tablets (15 mcg) by  mouth 2 times daily       MAGNESIUM LACTATE PO      Take 2 tablets by mouth daily       omeprazole 40 MG capsule    priLOSEC    30 capsule    Take 1 capsule (40 mg) by mouth every morning       predniSONE 20 MG tablet    DELTASONE    90 tablet    Take 3 tablets (60 mg) by mouth daily       senna-docusate 8.6-50 MG per tablet    SENOKOT-S;PERICOLACE    60 tablet    Take 1-2 tablets by mouth 2 times daily       sulfamethoxazole-trimethoprim 400-80 MG per tablet    BACTRIM/SEPTRA    30 tablet    Take 1 tablet by mouth daily       VITAMIN D3 PO      Take 1,000 Units by mouth daily

## 2017-05-23 NOTE — PATIENT INSTRUCTIONS
Labs today   Prescriptions faxed to your pharmacy   Keep feet elevated, above the level of your heart, as much as possible   Try using an ACE wrap during the day instead of compression socks  -Take them off at night   Monitor blister everyday  -Ok to leave open to the air at night   Let me know immediately If you have any trouble breathing  Follow up with me in a few months or as needed

## 2017-05-24 PROBLEM — E11.9 TYPE 2 DIABETES MELLITUS WITHOUT COMPLICATION (H): Chronic | Status: ACTIVE | Noted: 2017-05-24

## 2017-05-24 LAB
ALBUMIN SERPL-MCNC: 2.8 G/DL (ref 3.4–5)
ANION GAP SERPL CALCULATED.3IONS-SCNC: 10 MMOL/L (ref 3–14)
BUN SERPL-MCNC: 46 MG/DL (ref 7–30)
CALCIUM SERPL-MCNC: 8.8 MG/DL (ref 8.5–10.1)
CHLORIDE SERPL-SCNC: 110 MMOL/L (ref 94–109)
CO2 SERPL-SCNC: 21 MMOL/L (ref 20–32)
CREAT SERPL-MCNC: 1.46 MG/DL (ref 0.52–1.04)
GFR SERPL CREATININE-BSD FRML MDRD: 35 ML/MIN/1.7M2
GLUCOSE SERPL-MCNC: 176 MG/DL (ref 70–99)
PHOSPHATE SERPL-MCNC: 2.5 MG/DL (ref 2.5–4.5)
POTASSIUM SERPL-SCNC: 4.9 MMOL/L (ref 3.4–5.3)
SODIUM SERPL-SCNC: 141 MMOL/L (ref 133–144)

## 2017-05-25 NOTE — PROGRESS NOTES
Please send a copy of their test results and a letter to the patient as follows. Thanks!  PLEASE ALSO INCLUDE HER RENAL PANEL WHICH WAS ORDERED UNDER HER KIDNEY SPECIALIST AND WAS DRAWN ON THE SAME DATE.  Elizabeth,  It was nice meeting you yesterday!  I'm happy to report that your labs are [id number].  Your white blood cell count remains slightly high related to the inflammation and the prednisone therapy. Your hemoglobin is improved from 8 to 8.7. Your platelets (sticky part of blood that helps you clot) is slightly elevated which is often seen in times of bodily stress/inflammation and this should come down as you improve.  Your kidney test (creatinine) is slightly improved.  There is a test called the Hemoglobin A1c which is a calculated number looking at overall blood sugar control in a 3 month time period. Goal is less than 6.5% and your number is 6.7% which places you in a diabetic range. This is not at a point where any medication is needed for this. Please try to work on reducing the portions of carbohydrates in your diet (such as: breads, rice, pasta, sugars) which will help to lower the blood sugar and we'll check this again in about 3 months or so at a follow up office visit. This should improve too as you taper off of the prednisone as well.  Please continue with the plan of care as we discussed and let me know if you have any questions/concerns. Thanks!

## 2017-06-07 ENCOUNTER — TRANSFERRED RECORDS (OUTPATIENT)
Dept: HEALTH INFORMATION MANAGEMENT | Facility: CLINIC | Age: 72
End: 2017-06-07

## 2017-06-08 ENCOUNTER — TRANSFERRED RECORDS (OUTPATIENT)
Dept: HEALTH INFORMATION MANAGEMENT | Facility: CLINIC | Age: 72
End: 2017-06-08

## 2017-06-09 DIAGNOSIS — I77.82 ANCA-ASSOCIATED VASCULITIS (H): Primary | ICD-10-CM

## 2017-06-09 RX ORDER — ACETAMINOPHEN 500 MG
1000 TABLET ORAL EVERY 6 HOURS PRN
Status: CANCELLED
Start: 2017-06-12

## 2017-06-09 RX ORDER — PROCHLORPERAZINE MALEATE 5 MG
10 TABLET ORAL EVERY 6 HOURS PRN
Status: CANCELLED
Start: 2017-06-12

## 2017-06-11 PROBLEM — I77.89 HYPERPLASIA OF RENAL ARTERY (H): Chronic | Status: ACTIVE | Noted: 2017-06-11

## 2017-06-11 PROBLEM — E89.0 POSTOPERATIVE HYPOTHYROIDISM: Chronic | Status: ACTIVE | Noted: 2017-06-11

## 2017-06-11 PROBLEM — J18.9 PNEUMONIA: Status: RESOLVED | Noted: 2017-05-12 | Resolved: 2017-06-11

## 2017-06-11 PROBLEM — I77.82 ANCA-ASSOCIATED VASCULITIS (H): Chronic | Status: ACTIVE | Noted: 2017-06-09

## 2017-06-11 PROBLEM — E89.0 POSTOPERATIVE HYPOTHYROIDISM: Status: ACTIVE | Noted: 2017-06-11

## 2017-06-11 PROBLEM — D64.9 ANEMIA, UNSPECIFIED TYPE: Status: ACTIVE | Noted: 2017-06-11

## 2017-06-11 PROBLEM — J18.9 CAP (COMMUNITY ACQUIRED PNEUMONIA): Status: RESOLVED | Noted: 2017-05-13 | Resolved: 2017-06-11

## 2017-06-11 PROBLEM — I77.89 HYPERPLASIA OF RENAL ARTERY (H): Status: ACTIVE | Noted: 2017-06-11

## 2017-06-12 ENCOUNTER — HOSPITAL ENCOUNTER (OUTPATIENT)
Facility: CLINIC | Age: 72
Setting detail: SPECIMEN
Discharge: HOME | End: 2017-06-12
Attending: INTERNAL MEDICINE | Admitting: INTERNAL MEDICINE
Payer: MEDICARE

## 2017-06-12 ENCOUNTER — INFUSION THERAPY VISIT (OUTPATIENT)
Dept: INFUSION THERAPY | Facility: CLINIC | Age: 72
End: 2017-06-12
Attending: INTERNAL MEDICINE
Payer: MEDICARE

## 2017-06-12 VITALS
TEMPERATURE: 97.7 F | WEIGHT: 212.8 LBS | SYSTOLIC BLOOD PRESSURE: 124 MMHG | HEART RATE: 64 BPM | HEIGHT: 66 IN | RESPIRATION RATE: 18 BRPM | BODY MASS INDEX: 34.2 KG/M2 | DIASTOLIC BLOOD PRESSURE: 63 MMHG

## 2017-06-12 DIAGNOSIS — I77.82 ANCA-ASSOCIATED VASCULITIS (H): Primary | ICD-10-CM

## 2017-06-12 LAB
ALBUMIN SERPL-MCNC: 3.2 G/DL (ref 3.4–5)
ALP SERPL-CCNC: 49 U/L (ref 40–150)
ALT SERPL W P-5'-P-CCNC: 20 U/L (ref 0–50)
AST SERPL W P-5'-P-CCNC: 9 U/L (ref 0–45)
BILIRUB DIRECT SERPL-MCNC: <0.1 MG/DL (ref 0–0.2)
BILIRUB SERPL-MCNC: 0.3 MG/DL (ref 0.2–1.3)
ERYTHROCYTE [DISTWIDTH] IN BLOOD BY AUTOMATED COUNT: 17.4 % (ref 10–15)
HCT VFR BLD AUTO: 30.9 % (ref 35–47)
HGB BLD-MCNC: 10 G/DL (ref 11.7–15.7)
MCH RBC QN AUTO: 29.1 PG (ref 26.5–33)
MCHC RBC AUTO-ENTMCNC: 32.4 G/DL (ref 31.5–36.5)
MCV RBC AUTO: 90 FL (ref 78–100)
PLATELET # BLD AUTO: 310 10E9/L (ref 150–450)
PROT SERPL-MCNC: 6.2 G/DL (ref 6.8–8.8)
RBC # BLD AUTO: 3.44 10E12/L (ref 3.8–5.2)
WBC # BLD AUTO: 12.9 10E9/L (ref 4–11)

## 2017-06-12 PROCEDURE — 96413 CHEMO IV INFUSION 1 HR: CPT

## 2017-06-12 PROCEDURE — 25000125 ZZHC RX 250: Performed by: INTERNAL MEDICINE

## 2017-06-12 PROCEDURE — 25000128 H RX IP 250 OP 636: Performed by: INTERNAL MEDICINE

## 2017-06-12 PROCEDURE — 85027 COMPLETE CBC AUTOMATED: CPT | Performed by: INTERNAL MEDICINE

## 2017-06-12 PROCEDURE — 80076 HEPATIC FUNCTION PANEL: CPT | Performed by: INTERNAL MEDICINE

## 2017-06-12 PROCEDURE — 96361 HYDRATE IV INFUSION ADD-ON: CPT

## 2017-06-12 PROCEDURE — 96367 TX/PROPH/DG ADDL SEQ IV INF: CPT

## 2017-06-12 PROCEDURE — 96417 CHEMO IV INFUS EACH ADDL SEQ: CPT

## 2017-06-12 RX ORDER — PROCHLORPERAZINE MALEATE 5 MG
10 TABLET ORAL EVERY 6 HOURS PRN
Status: CANCELLED
Start: 2017-06-12

## 2017-06-12 RX ORDER — ACETAMINOPHEN 500 MG
1000 TABLET ORAL EVERY 6 HOURS PRN
Status: CANCELLED
Start: 2017-06-12

## 2017-06-12 RX ADMIN — SODIUM CHLORIDE 1000 ML: 9 INJECTION, SOLUTION INTRAVENOUS at 10:03

## 2017-06-12 RX ADMIN — CYCLOPHOSPHAMIDE 1300 MG: 2 INJECTION, POWDER, FOR SOLUTION INTRAVENOUS; ORAL at 12:45

## 2017-06-12 RX ADMIN — DEXAMETHASONE SODIUM PHOSPHATE: 10 INJECTION, SOLUTION INTRAMUSCULAR; INTRAVENOUS at 11:55

## 2017-06-12 RX ADMIN — MESNA 440 MG: 100 INJECTION, SOLUTION INTRAVENOUS at 12:20

## 2017-06-12 ASSESSMENT — PAIN SCALES - GENERAL: PAINLEVEL: NO PAIN (0)

## 2017-06-12 NOTE — PROGRESS NOTES
"Infusion Nursing Note:  Elizabeth Galicia presents today for cytoxan.    Patient seen by provider today: No   present during visit today: Dr. Barkley's office contacted re: HgB level-okay tp proceed with treatment per CLARITZA Helms/ Dr. Barkley with HgB 10    Note: Cytoxan/mesna IV & PO administered-Intravenous Access:  Peripheral IV placed.    Treatment Conditions:  Rheumatology Infusion Checklist: PRIOR TO INFUSION OF BIOLOGICAL MEDICATIONS OR ANY OF THESE AS LISTED: Cytoxan (cyclophosphamide) \".rheumbiologicalchecklist\"    Prior to Infusion of biological medications or any of these as listed:    1. Elevated temperature, fever, chills, productive cough or abnormal vital signs, night sweats, coughing up blood or sputum, no appetite or abnormal vital signs : NO    2. Open wounds or new incisions: NO    3. Recent hospitalization: [facility],  aware and saw patient in the hospital    4.  Recent surgeries:  NO    5. Any upcoming surgeries or dental procedures?:NO    6. Any current or recent bouts of illness or infection? On any antibiotics? : YES, precribed by Dr. Barkley and Dr. Arnold, rheumatologist    7. Any new, sudden or worsening abdominal pain :NO    8. Vaccination within 4 weeks? Patient or someone in the household is scheduled to receive vaccination? No live virus vaccines prior to or during treatment :NO    9. Any nervous system diseases [i.e. multiple sclerosis, Guillain-[place], seizures, neurological  changes]: NO    10. Pregnant or breast feeding; or plans on pregnancy in the future: NO    11. Signs of worsening depression or suicidal ideations while taking benlysta:NO    12. New-onset medical symptoms: NO    13.  New cancer diagnosis or on chemotherapy or radiation YES, cytoxan    14.  Evaluate for any sign of active TB [Unexplained weight loss, Loss of appetite, Night sweats, Fever, Fatigue, Chills, Coughing for 3 weeks or longer, Hemoptysis (coughing up blood), " Chest pain]: NO    **Note: If answered yes to any of the above, hold the infusion and contact ordering rheumatologist or on-call rheumatologist.   .        Post Infusion Assessment:  Patient tolerated infusion without incident.  Blood return noted pre and post infusion.  Site patent and intact, free from redness, edema or discomfort.  No evidence of extravasations.  Access discontinued per protocol.  Rheumatology Post Infusion: POST-INFUSION OF BIOLOGICAL MEDICATION:    Reviewed with patient.  Given biologic medication or medication hand-out. Inform patient if any fever, chills or signs of infection, new symptoms, abdominal pain, heart palpitations, shortness of breath, reaction, weakness, neurological changes, seek medical attention immediately and should not receive infusions. No live virus vaccines prior to or during treatment or up to 6 months post infusion. If the patient has an upcoming procedure or surgery, this should be discussed with the rheumatologist and surgeon or provider..    Discharge Plan:   Prescription and medication filled at [facility] given for mesna and reviewed by Cyrus Pharm,D and Shadia Pharm Liaison .  Discharge instructions reviewed with: Patient and Family.  Patient and/or family verbalized understanding of discharge instructions and all questions answered.  Copy of AVS not available due to EPIC downtime     Patient discharged in stable condition accompanied by: spouse.  Departure Mode: Ambulatory.    Frank Venegas RN      EPIC downtime today from 10am-4:30pm

## 2017-06-12 NOTE — MR AVS SNAPSHOT
"              After Visit Summary   6/12/2017    Elizabeth Galicia    MRN: [id number]           Patient Information     Date Of Birth          1945        Visit Information        Provider Department      6/12/2017 9:00 AM  INFUSION CHAIR 3 [facility]        Today's Diagnoses     ANCA-associated vasculitis (Microscopic polyangiitis)    -  1       Follow-ups after your visit        Future tests that were ordered for you today     Open Standing Orders        Priority Remaining Interval Expires Ordered    Notify Physician Routine [id number] PRN  6/12/2017            Who to contact     If you have questions or need follow up information about today's clinic visit or your schedule please contact [facility] directly at [phone].  Normal or non-critical lab and imaging results will be communicated to you by [employer], letter or phone within 4 business days after the clinic has received the results. If you do not hear from us within 7 days, please contact the clinic through [employer] or phone. If you have a critical or abnormal lab result, we will notify you by phone as soon as possible.  Submit refill requests through [employer] or call your pharmacy and they will forward the refill request to us. Please allow 3 business days for your refill to be completed.          Additional Information About Your Visit        [employer] Information     [employer] lets you send messages to your doctor, view your test results, renew your prescriptions, schedule appointments and more. To sign up, go to [email or web address] . Click on \"Log in\" on the left side of the screen, which will take you to the Welcome page. Then click on \"Sign up Now\" on the right side of the page.     You will be asked to enter the access code listed below, as well as some personal information. Please follow the directions to create your username and password.     Your access code is: " [email or web address]  Expires: 2017  7:53 AM     Your access code will  in 90 days. If you need help or a new code, please call your [place] clinic or [phone].        Care EveryWhere ID     This is your Care EveryWhere ID. This could be used by other organizations to access your [place] medical records  [id number]        Your Vitals Were     Pulse Temperature Respirations Height BMI (Body Mass Index)       64 97.7  F (36.5  C) (Oral) 18 1.664 m (5' 5.5\") 34.87 kg/m2        Blood Pressure from Last 3 Encounters:   17 124/63   17 141/65   17 116/83    Weight from Last 3 Encounters:   17 96.5 kg (212 lb 12.8 oz)   17 108.4 kg (239 lb)   17 106.1 kg (233 lb 14.5 oz)              We Performed the Following     CBC with platelets     Hepatic panel        Primary Care Provider Office Phone # Fax #    Nati Littlejohn,  [phone]       [facility] [place] [address] [facility] [address] [facility] [address]  [place] [address]        Thank you!     Thank you for choosing [facility]  for your care. Our goal is always to provide you with excellent care. Hearing back from our patients is one way we can continue to improve our services. Please take a few minutes to complete the written survey that you may receive in the mail after your visit with us. Thank you!             Your Updated Medication List - Protect others around you: Learn how to safely use, store and throw away your medicines at www.disposemymeds.org.          This list is accurate as of: 17  5:05 PM.  Always use your most recent med list.                   Brand Name Dispense Instructions for use    acetaminophen 500 MG tablet    TYLENOL     Take 2 tablets (1,000 mg) by mouth every 8 hours       CALCIUM LACTATE PO      Take 4 tablets by mouth daily       guaiFENesin-codeine 100-10 MG/5ML Soln solution    ROBITUSSIN AC    420 mL    Take 5 mLs by mouth every 4 hours as needed for cough       " Levothyroxine Sodium 112 MCG Caps     90 capsule    Take 112 mcg by mouth every morning       liothyronine 5 MCG tablet    CYTOMEL    540 tablet    Take 3 tablets (15 mcg) by mouth 2 times daily       MAGNESIUM LACTATE PO      Take 2 tablets by mouth daily       omeprazole 40 MG capsule    priLOSEC    30 capsule    Take 1 capsule (40 mg) by mouth every morning       predniSONE 20 MG tablet    DELTASONE    90 tablet    Take 3 tablets (60 mg) by mouth daily       senna-docusate 8.6-50 MG per tablet    SENOKOT-S;PERICOLACE    60 tablet    Take 1-2 tablets by mouth 2 times daily       sulfamethoxazole-trimethoprim 400-80 MG per tablet    BACTRIM/SEPTRA    30 tablet    Take 1 tablet by mouth daily       VITAMIN D3 PO      Take 1,000 Units by mouth daily

## 2017-06-14 ENCOUNTER — TRANSFERRED RECORDS (OUTPATIENT)
Dept: HEALTH INFORMATION MANAGEMENT | Facility: CLINIC | Age: 72
End: 2017-06-14

## 2017-06-16 ENCOUNTER — HOSPITAL ENCOUNTER (OUTPATIENT)
Dept: LAB | Facility: CLINIC | Age: 72
Discharge: HOME | End: 2017-06-16
Attending: INTERNAL MEDICINE | Admitting: INTERNAL MEDICINE
Payer: MEDICARE

## 2017-06-16 DIAGNOSIS — E87.20 ACIDOSIS: ICD-10-CM

## 2017-06-16 DIAGNOSIS — N08 NEPHROTIC SYNDROME SECONDARY TO SYSTEMIC DISEASE: ICD-10-CM

## 2017-06-16 DIAGNOSIS — M31.7: Primary | ICD-10-CM

## 2017-06-16 DIAGNOSIS — E87.20 ACIDOSIS: Primary | ICD-10-CM

## 2017-06-16 LAB
ALBUMIN SERPL-MCNC: 3.4 G/DL (ref 3.4–5)
ANION GAP SERPL CALCULATED.3IONS-SCNC: 10 MMOL/L (ref 3–14)
BUN SERPL-MCNC: 38 MG/DL (ref 7–30)
CALCIUM SERPL-MCNC: 9.4 MG/DL (ref 8.5–10.1)
CHLORIDE SERPL-SCNC: 107 MMOL/L (ref 94–109)
CO2 SERPL-SCNC: 18 MMOL/L (ref 20–32)
CREAT SERPL-MCNC: 1.38 MG/DL (ref 0.52–1.04)
GFR SERPL CREATININE-BSD FRML MDRD: 38 ML/MIN/1.7M2
GLUCOSE SERPL-MCNC: 168 MG/DL (ref 70–99)
PHOSPHATE SERPL-MCNC: 2.8 MG/DL (ref 2.5–4.5)
POTASSIUM SERPL-SCNC: 4.7 MMOL/L (ref 3.4–5.3)
SODIUM SERPL-SCNC: 135 MMOL/L (ref 133–144)

## 2017-06-16 PROCEDURE — 36415 COLL VENOUS BLD VENIPUNCTURE: CPT | Performed by: INTERNAL MEDICINE

## 2017-06-16 PROCEDURE — 80069 RENAL FUNCTION PANEL: CPT | Performed by: INTERNAL MEDICINE

## 2017-06-16 PROCEDURE — 83516 IMMUNOASSAY NONANTIBODY: CPT | Performed by: INTERNAL MEDICINE

## 2017-06-16 PROCEDURE — 83876 ASSAY MYELOPEROXIDASE: CPT | Performed by: INTERNAL MEDICINE

## 2017-06-19 LAB
MYELOPEROXIDASE AB SER-ACNC: 7.1 AI (ref 0–0.9)
PROTEINASE3 IGG SER-ACNC: ABNORMAL AI (ref 0–0.9)

## 2017-06-21 DIAGNOSIS — N08 SICKLE CELL NEPHROPATHY (H): Primary | ICD-10-CM

## 2017-06-21 DIAGNOSIS — N17.9 ACUTE KIDNEY FAILURE, UNSPECIFIED (H): ICD-10-CM

## 2017-06-21 DIAGNOSIS — D57.1 SICKLE CELL NEPHROPATHY (H): Primary | ICD-10-CM

## 2017-06-21 DIAGNOSIS — E87.20 ACIDOSIS: ICD-10-CM

## 2017-06-23 ENCOUNTER — TELEPHONE (OUTPATIENT)
Dept: FAMILY MEDICINE | Facility: CLINIC | Age: 72
End: 2017-06-23

## 2017-06-23 ENCOUNTER — ALLIED HEALTH/NURSE VISIT (OUTPATIENT)
Dept: NURSING | Facility: CLINIC | Age: 72
End: 2017-06-23
Payer: COMMERCIAL

## 2017-06-23 DIAGNOSIS — D64.9 ANEMIA, UNSPECIFIED TYPE: Primary | ICD-10-CM

## 2017-06-23 DIAGNOSIS — N08 SICKLE CELL NEPHROPATHY (H): ICD-10-CM

## 2017-06-23 DIAGNOSIS — Z15.89 HETEROZYGOUS FOR MTHFR GENE MUTATION: ICD-10-CM

## 2017-06-23 DIAGNOSIS — D57.1 SICKLE CELL NEPHROPATHY (H): ICD-10-CM

## 2017-06-23 DIAGNOSIS — N17.9 ACUTE KIDNEY FAILURE, UNSPECIFIED (H): ICD-10-CM

## 2017-06-23 DIAGNOSIS — E87.20 ACIDOSIS: ICD-10-CM

## 2017-06-23 LAB
ALBUMIN UR-MCNC: 100 MG/DL
APPEARANCE UR: CLEAR
BACTERIA #/AREA URNS HPF: ABNORMAL /HPF
BILIRUB UR QL STRIP: NEGATIVE
COLOR UR AUTO: YELLOW
CRP SERPL-MCNC: <2.9 MG/L (ref 0–8)
GLUCOSE UR STRIP-MCNC: NEGATIVE MG/DL
HGB UR QL STRIP: ABNORMAL
KETONES UR STRIP-MCNC: NEGATIVE MG/DL
LEUKOCYTE ESTERASE UR QL STRIP: NEGATIVE
NITRATE UR QL: NEGATIVE
PH UR STRIP: 6 PH (ref 5–7)
PROT UR-MCNC: 0.46 G/L
PROT/CREAT 24H UR: 1.77 G/G CR (ref 0–0.2)
RBC #/AREA URNS AUTO: ABNORMAL /HPF (ref 0–2)
SP GR UR STRIP: 1.01 (ref 1–1.03)
URN SPEC COLLECT METH UR: ABNORMAL
UROBILINOGEN UR STRIP-ACNC: 0.2 EU/DL (ref 0.2–1)
WBC #/AREA URNS AUTO: ABNORMAL /HPF (ref 0–2)

## 2017-06-23 PROCEDURE — 99207 ZZC NO CHARGE NURSE ONLY: CPT

## 2017-06-23 PROCEDURE — 86140 C-REACTIVE PROTEIN: CPT | Performed by: INTERNAL MEDICINE

## 2017-06-23 PROCEDURE — 81001 URINALYSIS AUTO W/SCOPE: CPT | Performed by: INTERNAL MEDICINE

## 2017-06-23 PROCEDURE — 96372 THER/PROPH/DIAG INJ SC/IM: CPT

## 2017-06-23 PROCEDURE — 36415 COLL VENOUS BLD VENIPUNCTURE: CPT | Performed by: INTERNAL MEDICINE

## 2017-06-23 PROCEDURE — 80069 RENAL FUNCTION PANEL: CPT | Performed by: INTERNAL MEDICINE

## 2017-06-23 PROCEDURE — 84156 ASSAY OF PROTEIN URINE: CPT | Performed by: INTERNAL MEDICINE

## 2017-06-23 RX ORDER — CYANOCOBALAMIN 1000 UG/ML
1 INJECTION, SOLUTION INTRAMUSCULAR; SUBCUTANEOUS ONCE
Qty: 1 ML | Refills: 0 | COMMUNITY
Start: 2017-06-23 | End: 2017-06-24

## 2017-06-23 NOTE — TELEPHONE ENCOUNTER
I phoned pt.  She said her current medical treatments car  Vit b12 from her body and a low Vit b12 level  can bring on shingles which she thinks  she might have a slight case of.  Wants to have a Vit B12 shot when she is in for lab only appt at 3:45 today.    I suggested she have someone look at her to confirm shingles and then be able to give appropriate orders.  She said she does not care what route we take just as long as she gets a Vit B12 shot as soon as possible.       Mirtha GROSS MA

## 2017-06-23 NOTE — TELEPHONE ENCOUNTER
Reason for Call: Request for an order :    Order or referral being requested: B12 shot, requested by patient    Date needed: as soon as possible    Has the patient been seen by the PCP for this problem? NO    Additional comments: Patient is being seen in lab at 3:45 would like an   Order for a B12 shot for shingles.  Please call    Phone number Patient can be reached at:  Cell number on file:    Telephone Information:   Mobile [phone]       Best Time:  anytime    Can we leave a detailed message on this number?  YES       Call taken on 6/23/2017 at 12:32 PM by Susan Rico

## 2017-06-23 NOTE — TELEPHONE ENCOUNTER
We caught Dr. Littlejohn between pt's.  She said ok to Vit b12 1000 mcg injection today.  I pended as a one time only prn.

## 2017-06-24 LAB
ALBUMIN SERPL-MCNC: 3.3 G/DL (ref 3.4–5)
ANION GAP SERPL CALCULATED.3IONS-SCNC: 11 MMOL/L (ref 3–14)
BUN SERPL-MCNC: 34 MG/DL (ref 7–30)
CALCIUM SERPL-MCNC: 9.3 MG/DL (ref 8.5–10.1)
CHLORIDE SERPL-SCNC: 105 MMOL/L (ref 94–109)
CO2 SERPL-SCNC: 22 MMOL/L (ref 20–32)
CREAT SERPL-MCNC: 1.57 MG/DL (ref 0.52–1.04)
GFR SERPL CREATININE-BSD FRML MDRD: 32 ML/MIN/1.7M2
GLUCOSE SERPL-MCNC: 175 MG/DL (ref 70–99)
PHOSPHATE SERPL-MCNC: 2.7 MG/DL (ref 2.5–4.5)
POTASSIUM SERPL-SCNC: 5.3 MMOL/L (ref 3.4–5.3)
SODIUM SERPL-SCNC: 138 MMOL/L (ref 133–144)

## 2017-06-24 RX ORDER — CYANOCOBALAMIN 1000 UG/ML
1 INJECTION, SOLUTION INTRAMUSCULAR; SUBCUTANEOUS
Qty: 1 ML | Refills: 0 | OUTPATIENT
Start: 2017-06-24 | End: 2017-11-30

## 2017-06-29 ENCOUNTER — TRANSFERRED RECORDS (OUTPATIENT)
Dept: HEALTH INFORMATION MANAGEMENT | Facility: CLINIC | Age: 72
End: 2017-06-29

## 2017-06-30 DIAGNOSIS — E87.20 ACIDOSIS: ICD-10-CM

## 2017-06-30 DIAGNOSIS — D57.1 SICKLE CELL NEPHROPATHY (H): ICD-10-CM

## 2017-06-30 DIAGNOSIS — N17.9 ACUTE KIDNEY FAILURE, UNSPECIFIED (H): ICD-10-CM

## 2017-06-30 DIAGNOSIS — N08 SICKLE CELL NEPHROPATHY (H): ICD-10-CM

## 2017-06-30 LAB
ALBUMIN UR-MCNC: 100 MG/DL
APPEARANCE UR: CLEAR
BILIRUB UR QL STRIP: NEGATIVE
COLOR UR AUTO: YELLOW
CRP SERPL-MCNC: <2.9 MG/L (ref 0–8)
GLUCOSE UR STRIP-MCNC: NEGATIVE MG/DL
HGB UR QL STRIP: ABNORMAL
KETONES UR STRIP-MCNC: NEGATIVE MG/DL
LEUKOCYTE ESTERASE UR QL STRIP: NEGATIVE
NITRATE UR QL: NEGATIVE
PH UR STRIP: 7 PH (ref 5–7)
PROT UR-MCNC: 0.39 G/L
PROT/CREAT 24H UR: 1.65 G/G CR (ref 0–0.2)
RBC #/AREA URNS AUTO: ABNORMAL /HPF (ref 0–2)
SP GR UR STRIP: 1.01 (ref 1–1.03)
URN SPEC COLLECT METH UR: ABNORMAL
UROBILINOGEN UR STRIP-ACNC: 0.2 EU/DL (ref 0.2–1)
WBC #/AREA URNS AUTO: ABNORMAL /HPF (ref 0–2)

## 2017-06-30 PROCEDURE — 80069 RENAL FUNCTION PANEL: CPT | Performed by: INTERNAL MEDICINE

## 2017-06-30 PROCEDURE — 36415 COLL VENOUS BLD VENIPUNCTURE: CPT | Performed by: INTERNAL MEDICINE

## 2017-06-30 PROCEDURE — 84156 ASSAY OF PROTEIN URINE: CPT | Performed by: INTERNAL MEDICINE

## 2017-06-30 PROCEDURE — 86140 C-REACTIVE PROTEIN: CPT | Performed by: INTERNAL MEDICINE

## 2017-06-30 PROCEDURE — 81001 URINALYSIS AUTO W/SCOPE: CPT | Performed by: INTERNAL MEDICINE

## 2017-07-01 LAB
ALBUMIN SERPL-MCNC: 3.3 G/DL (ref 3.4–5)
ANION GAP SERPL CALCULATED.3IONS-SCNC: 10 MMOL/L (ref 3–14)
BUN SERPL-MCNC: 26 MG/DL (ref 7–30)
CALCIUM SERPL-MCNC: 8.9 MG/DL (ref 8.5–10.1)
CHLORIDE SERPL-SCNC: 94 MMOL/L (ref 94–109)
CO2 SERPL-SCNC: 23 MMOL/L (ref 20–32)
CREAT SERPL-MCNC: 1.38 MG/DL (ref 0.52–1.04)
GFR SERPL CREATININE-BSD FRML MDRD: 38 ML/MIN/1.7M2
GLUCOSE SERPL-MCNC: 124 MG/DL (ref 70–99)
PHOSPHATE SERPL-MCNC: 3.2 MG/DL (ref 2.5–4.5)
POTASSIUM SERPL-SCNC: 4.5 MMOL/L (ref 3.4–5.3)
SODIUM SERPL-SCNC: 127 MMOL/L (ref 133–144)

## 2017-07-05 ENCOUNTER — HOSPITAL ENCOUNTER (OUTPATIENT)
Dept: CT IMAGING | Facility: CLINIC | Age: 72
Discharge: HOME | End: 2017-07-05
Attending: INTERNAL MEDICINE | Admitting: INTERNAL MEDICINE
Payer: MEDICARE

## 2017-07-05 DIAGNOSIS — J18.9 PNEUMONIA DUE TO INFECTIOUS ORGANISM, UNSPECIFIED LATERALITY, UNSPECIFIED PART OF LUNG: ICD-10-CM

## 2017-07-05 PROCEDURE — 71250 CT THORAX DX C-: CPT

## 2017-07-07 DIAGNOSIS — I77.82 ANCA-ASSOCIATED VASCULITIS (H): Primary | Chronic | ICD-10-CM

## 2017-07-07 DIAGNOSIS — N17.9 ACUTE KIDNEY FAILURE, UNSPECIFIED (H): Primary | ICD-10-CM

## 2017-07-07 DIAGNOSIS — E87.20 ACIDOSIS: ICD-10-CM

## 2017-07-07 DIAGNOSIS — D57.1 SICKLE CELL NEPHROPATHY (H): ICD-10-CM

## 2017-07-07 DIAGNOSIS — E87.20 ACIDEMIA: ICD-10-CM

## 2017-07-07 DIAGNOSIS — N08 SICKLE CELL NEPHROPATHY (H): ICD-10-CM

## 2017-07-07 LAB
ALBUMIN UR-MCNC: 100 MG/DL
APPEARANCE UR: CLEAR
BASOPHILS # BLD AUTO: 0 10E9/L (ref 0–0.2)
BASOPHILS NFR BLD AUTO: 0.2 %
BILIRUB UR QL STRIP: NEGATIVE
COLOR UR AUTO: YELLOW
CRP SERPL-MCNC: <2.9 MG/L (ref 0–8)
DIFFERENTIAL METHOD BLD: ABNORMAL
EOSINOPHIL # BLD AUTO: 0.1 10E9/L (ref 0–0.7)
EOSINOPHIL NFR BLD AUTO: 0.6 %
ERYTHROCYTE [DISTWIDTH] IN BLOOD BY AUTOMATED COUNT: 18 % (ref 10–15)
GLUCOSE UR STRIP-MCNC: NEGATIVE MG/DL
HCT VFR BLD AUTO: 32.3 % (ref 35–47)
HGB BLD-MCNC: 10.5 G/DL (ref 11.7–15.7)
HGB UR QL STRIP: ABNORMAL
IMM GRANULOCYTES # BLD: 0.3 10E9/L (ref 0–0.4)
IMM GRANULOCYTES NFR BLD: 2.8 %
KETONES UR STRIP-MCNC: NEGATIVE MG/DL
LEUKOCYTE ESTERASE UR QL STRIP: NEGATIVE
LYMPHOCYTES # BLD AUTO: 2.9 10E9/L (ref 0.8–5.3)
LYMPHOCYTES NFR BLD AUTO: 23.2 %
MCH RBC QN AUTO: 29.8 PG (ref 26.5–33)
MCHC RBC AUTO-ENTMCNC: 32.5 G/DL (ref 31.5–36.5)
MCV RBC AUTO: 92 FL (ref 78–100)
MONOCYTES # BLD AUTO: 1.1 10E9/L (ref 0–1.3)
MONOCYTES NFR BLD AUTO: 9.1 %
NEUTROPHILS # BLD AUTO: 7.9 10E9/L (ref 1.6–8.3)
NEUTROPHILS NFR BLD AUTO: 64.1 %
NITRATE UR QL: NEGATIVE
NRBC # BLD AUTO: 0 10*3/UL
NRBC BLD AUTO-RTO: 0 /100
PH UR STRIP: 6 PH (ref 5–7)
PLATELET # BLD AUTO: 294 10E9/L (ref 150–450)
PROT UR-MCNC: 0.62 G/L
PROT/CREAT 24H UR: 1.33 G/G CR (ref 0–0.2)
RBC # BLD AUTO: 3.52 10E12/L (ref 3.8–5.2)
RBC #/AREA URNS AUTO: ABNORMAL /HPF (ref 0–2)
SP GR UR STRIP: 1.01 (ref 1–1.03)
URN SPEC COLLECT METH UR: ABNORMAL
UROBILINOGEN UR STRIP-ACNC: 0.2 EU/DL (ref 0.2–1)
WBC # BLD AUTO: 12.4 10E9/L (ref 4–11)
WBC #/AREA URNS AUTO: ABNORMAL /HPF (ref 0–2)

## 2017-07-07 PROCEDURE — 81001 URINALYSIS AUTO W/SCOPE: CPT | Performed by: INTERNAL MEDICINE

## 2017-07-07 PROCEDURE — 80069 RENAL FUNCTION PANEL: CPT | Performed by: INTERNAL MEDICINE

## 2017-07-07 PROCEDURE — 86140 C-REACTIVE PROTEIN: CPT | Performed by: INTERNAL MEDICINE

## 2017-07-07 PROCEDURE — 84156 ASSAY OF PROTEIN URINE: CPT | Performed by: INTERNAL MEDICINE

## 2017-07-07 PROCEDURE — 85025 COMPLETE CBC W/AUTO DIFF WBC: CPT

## 2017-07-07 PROCEDURE — 36415 COLL VENOUS BLD VENIPUNCTURE: CPT

## 2017-07-07 RX ORDER — ACETAMINOPHEN 500 MG
1000 TABLET ORAL EVERY 6 HOURS PRN
Status: CANCELLED
Start: 2017-07-10

## 2017-07-08 LAB
ALBUMIN SERPL-MCNC: 3.1 G/DL (ref 3.4–5)
ANION GAP SERPL CALCULATED.3IONS-SCNC: 11 MMOL/L (ref 3–14)
BUN SERPL-MCNC: 30 MG/DL (ref 7–30)
CALCIUM SERPL-MCNC: 9 MG/DL (ref 8.5–10.1)
CHLORIDE SERPL-SCNC: 104 MMOL/L (ref 94–109)
CO2 SERPL-SCNC: 21 MMOL/L (ref 20–32)
CREAT SERPL-MCNC: 1.41 MG/DL (ref 0.52–1.04)
GFR SERPL CREATININE-BSD FRML MDRD: 37 ML/MIN/1.7M2
GLUCOSE SERPL-MCNC: 92 MG/DL (ref 70–99)
PHOSPHATE SERPL-MCNC: 2.6 MG/DL (ref 2.5–4.5)
POTASSIUM SERPL-SCNC: 4.3 MMOL/L (ref 3.4–5.3)
SODIUM SERPL-SCNC: 136 MMOL/L (ref 133–144)

## 2017-07-10 ENCOUNTER — INFUSION THERAPY VISIT (OUTPATIENT)
Dept: INFUSION THERAPY | Facility: CLINIC | Age: 72
End: 2017-07-10
Attending: INTERNAL MEDICINE
Payer: MEDICARE

## 2017-07-10 ENCOUNTER — HOSPITAL ENCOUNTER (OUTPATIENT)
Facility: CLINIC | Age: 72
Setting detail: SPECIMEN
Discharge: HOME | End: 2017-07-10
Attending: INTERNAL MEDICINE | Admitting: INTERNAL MEDICINE
Payer: MEDICARE

## 2017-07-10 VITALS — SYSTOLIC BLOOD PRESSURE: 135 MMHG | RESPIRATION RATE: 18 BRPM | DIASTOLIC BLOOD PRESSURE: 65 MMHG

## 2017-07-10 DIAGNOSIS — I77.82 ANCA-ASSOCIATED VASCULITIS (H): Primary | ICD-10-CM

## 2017-07-10 DIAGNOSIS — D69.59 DRUG-INDUCED LOW PLATELET COUNT: ICD-10-CM

## 2017-07-10 DIAGNOSIS — T50.905A DRUG-INDUCED LOW PLATELET COUNT: ICD-10-CM

## 2017-07-10 LAB
ALBUMIN SERPL-MCNC: 2.8 G/DL (ref 3.4–5)
ALP SERPL-CCNC: 44 U/L (ref 40–150)
ALT SERPL W P-5'-P-CCNC: 25 U/L (ref 0–50)
AST SERPL W P-5'-P-CCNC: 27 U/L (ref 0–45)
BILIRUB DIRECT SERPL-MCNC: <0.1 MG/DL (ref 0–0.2)
BILIRUB SERPL-MCNC: 0.2 MG/DL (ref 0.2–1.3)
ERYTHROCYTE [DISTWIDTH] IN BLOOD BY AUTOMATED COUNT: 17.7 % (ref 10–15)
HCT VFR BLD AUTO: 31.6 % (ref 35–47)
HGB BLD-MCNC: 10.4 G/DL (ref 11.7–15.7)
MCH RBC QN AUTO: 29.4 PG (ref 26.5–33)
MCHC RBC AUTO-ENTMCNC: 32.9 G/DL (ref 31.5–36.5)
MCV RBC AUTO: 89 FL (ref 78–100)
PLATELET # BLD AUTO: 81 10E9/L (ref 150–450)
PROT SERPL-MCNC: 5.8 G/DL (ref 6.8–8.8)
RBC # BLD AUTO: 3.54 10E12/L (ref 3.8–5.2)
WBC # BLD AUTO: 14 10E9/L (ref 4–11)

## 2017-07-10 PROCEDURE — 96361 HYDRATE IV INFUSION ADD-ON: CPT

## 2017-07-10 PROCEDURE — 85027 COMPLETE CBC AUTOMATED: CPT | Performed by: INTERNAL MEDICINE

## 2017-07-10 PROCEDURE — 25000125 ZZHC RX 250: Performed by: INTERNAL MEDICINE

## 2017-07-10 PROCEDURE — 80076 HEPATIC FUNCTION PANEL: CPT | Performed by: INTERNAL MEDICINE

## 2017-07-10 PROCEDURE — 25000128 H RX IP 250 OP 636: Performed by: INTERNAL MEDICINE

## 2017-07-10 PROCEDURE — 96417 CHEMO IV INFUS EACH ADDL SEQ: CPT

## 2017-07-10 PROCEDURE — 96375 TX/PRO/DX INJ NEW DRUG ADDON: CPT

## 2017-07-10 PROCEDURE — 96413 CHEMO IV INFUSION 1 HR: CPT

## 2017-07-10 RX ORDER — ACETAMINOPHEN 500 MG
1000 TABLET ORAL EVERY 6 HOURS PRN
Status: CANCELLED
Start: 2017-07-10

## 2017-07-10 RX ADMIN — SODIUM CHLORIDE 1000 ML: 9 INJECTION, SOLUTION INTRAVENOUS at 13:21

## 2017-07-10 RX ADMIN — ONDANSETRON HYDROCHLORIDE 10 MG: 2 INJECTION, SOLUTION INTRAVENOUS at 14:19

## 2017-07-10 RX ADMIN — CYCLOPHOSPHAMIDE 1300 MG: 1 INJECTION, POWDER, FOR SOLUTION INTRAVENOUS; ORAL at 14:54

## 2017-07-10 RX ADMIN — MESNA 440 MG: 100 INJECTION, SOLUTION INTRAVENOUS at 14:34

## 2017-07-10 NOTE — PROGRESS NOTES
Infusion Nursing Note:  Elizabeth Galicia presents today for mesna/cytoxan.    Patient seen by provider today: No   present during visit today: Not Applicable.    Note: N/A.    Intravenous Access:  Peripheral IV placed.    Treatment Conditions:  Lab Results   Component Value Date    HGB 10.4 07/10/2017     Lab Results   Component Value Date    WBC 14.0 07/10/2017      Lab Results   Component Value Date    ANEU 7.9 07/07/2017     Lab Results   Component Value Date    PLT 81 07/10/2017      Telephone oreder to proceed with treatment today with platelet count of 81,000 per CLARITZA Helms /Dr. Barkley.      Post Infusion Assessment:  Patient tolerated infusion without incident.  Blood return noted pre and post infusion.  Site patent and intact, free from redness, edema or discomfort.  No evidence of extravasations.  Access discontinued per protocol.    Discharge Plan:   Discharge instructions reviewed with: Patient.  Patient and/or family verbalized understanding of discharge instructions and all questions answered.  Copy of AVS reviewed with patient and/or family. Patient discharged in stable condition accompanied by: spouse.  Departure Mode: Ambulatory.    Frank Venegas RN    ~~~ NOTE: If the patient answers yes to any of the questions below, hold the infusion and contact ordering provider or on-call provider.    1. Have you recently had an elevated temperature, fever, chills, productive cough, coughing for 3 weeks or longer or hemoptysis,  abnormal vital signs, night sweats,  chest pain or have you noticed a decrease in your appetite, unexplained weight loss or fatigue? No  2. Do you have any open wounds or new incisions? No  3. Do you have any recent or upcoming hospitalizations, surgeries or dental procedures? No  4. Do you currently have or recently have had any signs of illness or infection or are you on any antibiotics? Yes, prescribed by Dr. Barkley-maria luisa  5. Have you had any new, sudden or  worsening abdominal pain? No  6. Have you or anyone in your household received a live vaccination in the past 4 weeks? Please note:  No live vaccines while on biologic/chemotherapy until 6 months after the last treatment.  Patient can receive the flu vaccine (shot only) and the pneumovax.  It is optimal for the patient to get these vaccines mid cycle, but they can be given at any time as long as it is not on the day of the infusion. No  7. Have you recently been diagnosed with any new nervous system diseases (ie. Multiple sclerosis, Guillain [place], seizures, neurological changes) or cancer diagnosis? Are you on any form of radiation or chemotherapy? No  8. Are you pregnant or breast feeding or do you have plans of pregnancy in the future? No  9. Have you been having any signs of worsening depression or suicidal ideations?  (benlysta only) No  10. Have there been any other new onset medical symptoms? No       EDUCATION POST BIOLOGICAL/CHEMOTHERAPY INFUSION  Call the triage nurse at your clinic or seek medical attention if you have chills and/or temperature greater than or equal to 100.5, uncontrolled nausea/vomiting, diarrhea, constipation, dizziness, shortness of breath, chest pain, heart palpitations, weakness or any other new or concerning symptoms, questions or concerns.  You can not have any live virus vaccines prior to or during treatment or up to 6 months post infusion.  If you have an upcoming surgery, medical procedure or dental procedure during treatment, this should be discussed with your ordering physician and your surgeon/dentist.  If you are having any concerning symptom, if you are unsure if you should get your next infusion or wish to speak to a provider before your next infusion, please call your care coordinator or triage nurse at your clinic to notify them so we can adequately serve you.       Frank Venegas RN        HIT Panel to be drawn at next lab draw. In open orders

## 2017-07-10 NOTE — MR AVS SNAPSHOT
After Visit Summary   7/10/2017    Elizabeth Galicia    MRN: [id number]           Patient Information     Date Of Birth          1945        Visit Information        Provider Department      7/10/2017 11:00 AM  INFUSION CHAIR 15 [facility]        Today's Diagnoses     ANCA-associated vasculitis (Microscopic polyangiitis)    -  1    Drug-induced low platelet count          Care Instructions     EDUCATION POST BIOLOGICAL/CHEMOTHERAPY INFUSION  Call the triage nurse at your clinic or seek medical attention if you have chills and/or temperature greater than or equal to 100.5, uncontrolled nausea/vomiting, diarrhea, constipation, dizziness, shortness of breath, chest pain, heart palpitations, weakness or any other new or concerning symptoms, questions or concerns.  You can not have any live virus vaccines prior to or during treatment or up to 6 months post infusion.  If you have an upcoming surgery, medical procedure or dental procedure during treatment, this should be discussed with your ordering physician and your surgeon/dentist.  If you are having any concerning symptom, if you are unsure if you should get your next infusion or wish to speak to a provider before your next infusion, please call your care coordinator or triage nurse at your clinic to notify them so we can adequately serve you.                  Follow-ups after your visit        Future tests that were ordered for you today     Open Standing Orders        Priority Remaining Interval Expires Ordered    Notify Physician Routine [id number] PRN  7/10/2017          Open Future Orders        Priority Expected Expires Ordered    Heparin induced thrombocytopenia Routine 7/14/2017 7/16/2017 7/10/2017            Who to contact     If you have questions or need follow up information about today's clinic visit or your schedule please contact [facility] directly at  "[phone].  Normal or non-critical lab and imaging results will be communicated to you by MyChart, letter or phone within 4 business days after the clinic has received the results. If you do not hear from us within 7 days, please contact the clinic through [employer] or phone. If you have a critical or abnormal lab result, we will notify you by phone as soon as possible.  Submit refill requests through [employer] or call your pharmacy and they will forward the refill request to us. Please allow 3 business days for your refill to be completed.          Additional Information About Your Visit        [employer] Information     [employer] lets you send messages to your doctor, view your test results, renew your prescriptions, schedule appointments and more. To sign up, go to [email or web address] . Click on \"Log in\" on the left side of the screen, which will take you to the Welcome page. Then click on \"Sign up Now\" on the right side of the page.     You will be asked to enter the access code listed below, as well as some personal information. Please follow the directions to create your username and password.     Your access code is: [email or web address]  Expires: 2017  7:53 AM     Your access code will  in 90 days. If you need help or a new code, please call your [place] clinic or [phone].        Care EveryWhere ID     This is your Care EveryWhere ID. This could be used by other organizations to access your [place] medical records  [id number]        Your Vitals Were     Respirations                   18            Blood Pressure from Last 3 Encounters:   07/10/17 135/65   17 124/63   17 141/65    Weight from Last 3 Encounters:   17 96.5 kg (212 lb 12.8 oz)   17 108.4 kg (239 lb)   17 106.1 kg (233 lb 14.5 oz)              We Performed the Following     CBC with platelets     Hepatic panel          Today's Medication Changes          These changes are accurate as of: 7/10/17  5:00 PM.  " If you have any questions, ask your nurse or doctor.               Start taking these medicines.        Dose/Directions    mesna 400 MG Tabs tablet   Commonly known as:  MESNEX   Used for:  ANCA-associated vasculitis (H)        Dose:  800 mg   Take 2 tablets (800 mg) by mouth every 3 hours for 2 doses Take at 3 hours and 6 hours after cyclophosphamide.   Quantity:  4 tablet   Refills:  0            Where to get your medicines      Some of these will need a paper prescription and others can be bought over the counter.  Ask your nurse if you have questions.     Bring a paper prescription for each of these medications     mesna 400 MG Tabs tablet                Primary Care Provider Office Phone # Fax #    Nati Littlejohn, [phone]       [facility] [address] [address]        Equal Access to Services     LORENZA RICHARDS AH: Hadii aad ku hadasho Soomaali, waaxda luqadaha, qaybta kaalmada adeegyada, georgette bates . So [facility] [phone].    ATENCIÓN: Si habla español, tiene a norman disposición servicios gratuitos de asistencia lingüística. Ll[facility] [phone].    We comply with applicable federal civil rights laws and [place] laws. We do not discriminate on the basis of race, color, national origin, age, disability sex, sexual orientation or gender identity.            Thank you!     Thank you for choosing [facility]  for your care. Our goal is always to provide you with excellent care. Hearing back from our patients is one way we can continue to improve our services. Please take a few minutes to complete the written survey that you may receive in the mail after your visit with us. Thank you!             Your Updated Medication List - Protect others around you: Learn how to safely use, store and throw away your medicines at www.disposemymeds.org.          This list is accurate as of: 7/10/17  5:00 PM.  Always use  your most recent med list.                   Brand Name Dispense Instructions for use Diagnosis    acetaminophen 500 MG tablet    TYLENOL     Take 2 tablets (1,000 mg) by mouth every 8 hours    Generalized pain       CALCIUM LACTATE PO      Take 4 tablets by mouth daily        cyanocobalamin 1000 MCG/ML injection    VITAMIN B12    1 mL    Inject 1 mL (1,000 mcg) into the muscle once as needed    Anemia, unspecified type       guaiFENesin-codeine 100-10 MG/5ML Soln solution    ROBITUSSIN AC    420 mL    Take 5 mLs by mouth every 4 hours as needed for cough    Cough       Levothyroxine Sodium 112 MCG Caps     90 capsule    Take 112 mcg by mouth every morning    Postoperative hypothyroidism       liothyronine 5 MCG tablet    CYTOMEL    540 tablet    Take 3 tablets (15 mcg) by mouth 2 times daily    Postoperative hypothyroidism       MAGNESIUM LACTATE PO      Take 2 tablets by mouth daily        mesna 400 MG Tabs tablet    MESNEX    4 tablet    Take 2 tablets (800 mg) by mouth every 3 hours for 2 doses Take at 3 hours and 6 hours after cyclophosphamide.    ANCA-associated vasculitis (H)       omeprazole 40 MG capsule    priLOSEC    30 capsule    Take 1 capsule (40 mg) by mouth every morning    Prophylactic measure       senna-docusate 8.6-50 MG per tablet    SENOKOT-S;PERICOLACE    60 tablet    Take 1-2 tablets by mouth 2 times daily    Constipation, unspecified constipation type       UNKNOWN TO PATIENT           VITAMIN D3 PO      Take 1,000 Units by mouth daily

## 2017-07-10 NOTE — PATIENT INSTRUCTIONS
EDUCATION POST BIOLOGICAL/CHEMOTHERAPY INFUSION  Call the triage nurse at your clinic or seek medical attention if you have chills and/or temperature greater than or equal to 100.5, uncontrolled nausea/vomiting, diarrhea, constipation, dizziness, shortness of breath, chest pain, heart palpitations, weakness or any other new or concerning symptoms, questions or concerns.  You can not have any live virus vaccines prior to or during treatment or up to 6 months post infusion.  If you have an upcoming surgery, medical procedure or dental procedure during treatment, this should be discussed with your ordering physician and your surgeon/dentist.  If you are having any concerning symptom, if you are unsure if you should get your next infusion or wish to speak to a provider before your next infusion, please call your care coordinator or triage nurse at your clinic to notify them so we can adequately serve you.

## 2017-07-12 DIAGNOSIS — D64.9 ANEMIA, UNSPECIFIED: Primary | ICD-10-CM

## 2017-07-12 DIAGNOSIS — N08 NEPHROTIC SYNDROME SECONDARY TO SYSTEMIC DISEASE: ICD-10-CM

## 2017-07-13 ENCOUNTER — TRANSFERRED RECORDS (OUTPATIENT)
Dept: HEALTH INFORMATION MANAGEMENT | Facility: CLINIC | Age: 72
End: 2017-07-13

## 2017-07-14 DIAGNOSIS — T50.905A DRUG-INDUCED LOW PLATELET COUNT: ICD-10-CM

## 2017-07-14 DIAGNOSIS — N08 NEPHROTIC SYNDROME SECONDARY TO SYSTEMIC DISEASE: ICD-10-CM

## 2017-07-14 DIAGNOSIS — D64.9 ANEMIA, UNSPECIFIED: ICD-10-CM

## 2017-07-14 DIAGNOSIS — D69.59 DRUG-INDUCED LOW PLATELET COUNT: ICD-10-CM

## 2017-07-14 LAB
BASOPHILS # BLD AUTO: 0 10E9/L (ref 0–0.2)
BASOPHILS NFR BLD AUTO: 0.3 %
DIFFERENTIAL METHOD BLD: ABNORMAL
EOSINOPHIL # BLD AUTO: 0 10E9/L (ref 0–0.7)
EOSINOPHIL NFR BLD AUTO: 0 %
ERYTHROCYTE [DISTWIDTH] IN BLOOD BY AUTOMATED COUNT: 17.5 % (ref 10–15)
HCT VFR BLD AUTO: 35.3 % (ref 35–47)
HGB BLD-MCNC: 11.3 G/DL (ref 11.7–15.7)
LYMPHOCYTES # BLD AUTO: 0.6 10E9/L (ref 0.8–5.3)
LYMPHOCYTES NFR BLD AUTO: 4.2 %
MCH RBC QN AUTO: 29.6 PG (ref 26.5–33)
MCHC RBC AUTO-ENTMCNC: 32 G/DL (ref 31.5–36.5)
MCV RBC AUTO: 92 FL (ref 78–100)
MONOCYTES # BLD AUTO: 0.2 10E9/L (ref 0–1.3)
MONOCYTES NFR BLD AUTO: 1.3 %
NEUTROPHILS # BLD AUTO: 12.7 10E9/L (ref 1.6–8.3)
NEUTROPHILS NFR BLD AUTO: 94.2 %
PLATELET # BLD AUTO: 241 10E9/L (ref 150–450)
RBC # BLD AUTO: 3.82 10E12/L (ref 3.8–5.2)
WBC # BLD AUTO: 13.5 10E9/L (ref 4–11)

## 2017-07-14 PROCEDURE — 80069 RENAL FUNCTION PANEL: CPT | Performed by: FAMILY MEDICINE

## 2017-07-14 PROCEDURE — 36415 COLL VENOUS BLD VENIPUNCTURE: CPT | Performed by: FAMILY MEDICINE

## 2017-07-14 PROCEDURE — 86022 PLATELET ANTIBODIES: CPT | Performed by: FAMILY MEDICINE

## 2017-07-14 PROCEDURE — 85025 COMPLETE CBC W/AUTO DIFF WBC: CPT | Performed by: FAMILY MEDICINE

## 2017-07-15 LAB
ALBUMIN SERPL-MCNC: 3.5 G/DL (ref 3.4–5)
ANION GAP SERPL CALCULATED.3IONS-SCNC: 12 MMOL/L (ref 3–14)
BUN SERPL-MCNC: 35 MG/DL (ref 7–30)
CALCIUM SERPL-MCNC: 9.5 MG/DL (ref 8.5–10.1)
CHLORIDE SERPL-SCNC: 98 MMOL/L (ref 94–109)
CO2 SERPL-SCNC: 24 MMOL/L (ref 20–32)
CREAT SERPL-MCNC: 1.36 MG/DL (ref 0.52–1.04)
GFR SERPL CREATININE-BSD FRML MDRD: 38 ML/MIN/1.7M2
GLUCOSE SERPL-MCNC: 168 MG/DL (ref 70–99)
PF4 HEPARIN CMPLX AB SER QL: NORMAL
PHOSPHATE SERPL-MCNC: 3.3 MG/DL (ref 2.5–4.5)
POTASSIUM SERPL-SCNC: 4.5 MMOL/L (ref 3.4–5.3)
SODIUM SERPL-SCNC: 134 MMOL/L (ref 133–144)

## 2017-07-21 ENCOUNTER — DOCUMENTATION ONLY (OUTPATIENT)
Dept: LAB | Facility: CLINIC | Age: 72
End: 2017-07-21

## 2017-07-21 DIAGNOSIS — N08 SICKLE CELL NEPHROPATHY (H): ICD-10-CM

## 2017-07-21 DIAGNOSIS — D57.1 SICKLE CELL NEPHROPATHY (H): ICD-10-CM

## 2017-07-21 DIAGNOSIS — N17.9 ACUTE KIDNEY FAILURE, UNSPECIFIED (H): ICD-10-CM

## 2017-07-21 DIAGNOSIS — E87.20 ACIDOSIS: ICD-10-CM

## 2017-07-21 LAB
ALBUMIN UR-MCNC: 30 MG/DL
APPEARANCE UR: CLEAR
BILIRUB UR QL STRIP: NEGATIVE
COLOR UR AUTO: YELLOW
CRP SERPL-MCNC: <2.9 MG/L (ref 0–8)
GLUCOSE UR STRIP-MCNC: NEGATIVE MG/DL
HGB UR QL STRIP: ABNORMAL
KETONES UR STRIP-MCNC: NEGATIVE MG/DL
LEUKOCYTE ESTERASE UR QL STRIP: NEGATIVE
NITRATE UR QL: NEGATIVE
PH UR STRIP: 6.5 PH (ref 5–7)
RBC #/AREA URNS AUTO: ABNORMAL /HPF (ref 0–2)
SP GR UR STRIP: 1.01 (ref 1–1.03)
URN SPEC COLLECT METH UR: ABNORMAL
UROBILINOGEN UR STRIP-ACNC: 0.2 EU/DL (ref 0.2–1)
WBC #/AREA URNS AUTO: ABNORMAL /HPF (ref 0–2)

## 2017-07-21 PROCEDURE — 81001 URINALYSIS AUTO W/SCOPE: CPT | Performed by: INTERNAL MEDICINE

## 2017-07-21 PROCEDURE — 86140 C-REACTIVE PROTEIN: CPT | Performed by: INTERNAL MEDICINE

## 2017-07-21 PROCEDURE — 80069 RENAL FUNCTION PANEL: CPT | Performed by: INTERNAL MEDICINE

## 2017-07-21 PROCEDURE — 36415 COLL VENOUS BLD VENIPUNCTURE: CPT | Performed by: INTERNAL MEDICINE

## 2017-07-21 PROCEDURE — 84156 ASSAY OF PROTEIN URINE: CPT | Performed by: INTERNAL MEDICINE

## 2017-07-22 LAB
ALBUMIN SERPL-MCNC: 3.3 G/DL (ref 3.4–5)
ANION GAP SERPL CALCULATED.3IONS-SCNC: 7 MMOL/L (ref 3–14)
BUN SERPL-MCNC: 31 MG/DL (ref 7–30)
CALCIUM SERPL-MCNC: 9 MG/DL (ref 8.5–10.1)
CHLORIDE SERPL-SCNC: 99 MMOL/L (ref 94–109)
CO2 SERPL-SCNC: 26 MMOL/L (ref 20–32)
CREAT SERPL-MCNC: 1.59 MG/DL (ref 0.52–1.04)
GFR SERPL CREATININE-BSD FRML MDRD: 32 ML/MIN/1.7M2
GLUCOSE SERPL-MCNC: 123 MG/DL (ref 70–99)
PHOSPHATE SERPL-MCNC: 2.8 MG/DL (ref 2.5–4.5)
POTASSIUM SERPL-SCNC: 4.8 MMOL/L (ref 3.4–5.3)
SODIUM SERPL-SCNC: 132 MMOL/L (ref 133–144)

## 2017-07-23 LAB
PROT UR-MCNC: 0.35 G/L
PROT/CREAT 24H UR: 0.79 G/G CR (ref 0–0.2)

## 2017-07-28 DIAGNOSIS — N17.9 ACUTE KIDNEY FAILURE, UNSPECIFIED (H): ICD-10-CM

## 2017-07-28 DIAGNOSIS — N08 SICKLE CELL NEPHROPATHY (H): Primary | ICD-10-CM

## 2017-07-28 DIAGNOSIS — D57.1 SICKLE CELL NEPHROPATHY (H): Primary | ICD-10-CM

## 2017-07-28 DIAGNOSIS — D64.9 ANEMIA, UNSPECIFIED: ICD-10-CM

## 2017-08-01 DIAGNOSIS — I77.82 ANCA-ASSOCIATED VASCULITIS (H): Primary | Chronic | ICD-10-CM

## 2017-08-02 DIAGNOSIS — D64.9 ANEMIA, UNSPECIFIED: ICD-10-CM

## 2017-08-02 DIAGNOSIS — N17.9 ACUTE KIDNEY FAILURE, UNSPECIFIED (H): ICD-10-CM

## 2017-08-02 DIAGNOSIS — D57.1 SICKLE CELL NEPHROPATHY (H): ICD-10-CM

## 2017-08-02 DIAGNOSIS — N08 SICKLE CELL NEPHROPATHY (H): ICD-10-CM

## 2017-08-02 LAB
ALBUMIN UR-MCNC: 30 MG/DL
APPEARANCE UR: CLEAR
BILIRUB UR QL STRIP: NEGATIVE
COLOR UR AUTO: YELLOW
GLUCOSE UR STRIP-MCNC: NEGATIVE MG/DL
HGB UR QL STRIP: ABNORMAL
KETONES UR STRIP-MCNC: NEGATIVE MG/DL
LEUKOCYTE ESTERASE UR QL STRIP: NEGATIVE
NITRATE UR QL: NEGATIVE
NON-SQ EPI CELLS #/AREA URNS LPF: NORMAL /LPF
PH UR STRIP: 6 PH (ref 5–7)
RBC #/AREA URNS AUTO: NORMAL /HPF (ref 0–2)
SP GR UR STRIP: 1.01 (ref 1–1.03)
URN SPEC COLLECT METH UR: ABNORMAL
UROBILINOGEN UR STRIP-ACNC: 0.2 EU/DL (ref 0.2–1)
WBC #/AREA URNS AUTO: NORMAL /HPF (ref 0–2)

## 2017-08-02 PROCEDURE — 80069 RENAL FUNCTION PANEL: CPT

## 2017-08-02 PROCEDURE — 86160 COMPLEMENT ANTIGEN: CPT

## 2017-08-02 PROCEDURE — 85025 COMPLETE CBC W/AUTO DIFF WBC: CPT

## 2017-08-02 PROCEDURE — 36415 COLL VENOUS BLD VENIPUNCTURE: CPT

## 2017-08-02 PROCEDURE — 81001 URINALYSIS AUTO W/SCOPE: CPT

## 2017-08-03 ENCOUNTER — TRANSFERRED RECORDS (OUTPATIENT)
Dept: HEALTH INFORMATION MANAGEMENT | Facility: CLINIC | Age: 72
End: 2017-08-03

## 2017-08-03 LAB
ALBUMIN SERPL-MCNC: 3.6 G/DL (ref 3.4–5)
ANION GAP SERPL CALCULATED.3IONS-SCNC: 10 MMOL/L (ref 3–14)
BUN SERPL-MCNC: 29 MG/DL (ref 7–30)
C3 SERPL-MCNC: 105 MG/DL (ref 76–169)
C4 SERPL-MCNC: 23 MG/DL (ref 15–50)
CALCIUM SERPL-MCNC: 9.3 MG/DL (ref 8.5–10.1)
CHLORIDE SERPL-SCNC: 97 MMOL/L (ref 94–109)
CO2 SERPL-SCNC: 24 MMOL/L (ref 20–32)
CREAT SERPL-MCNC: 1.21 MG/DL (ref 0.52–1.04)
DIFFERENTIAL METHOD BLD: ABNORMAL
EOSINOPHIL # BLD AUTO: 0.1 10E9/L (ref 0–0.7)
EOSINOPHIL NFR BLD AUTO: 1 %
ERYTHROCYTE [DISTWIDTH] IN BLOOD BY AUTOMATED COUNT: 17.4 % (ref 10–15)
GFR SERPL CREATININE-BSD FRML MDRD: 44 ML/MIN/1.7M2
GLUCOSE SERPL-MCNC: 180 MG/DL (ref 70–99)
HCT VFR BLD AUTO: 39 % (ref 35–47)
HGB BLD-MCNC: 12.8 G/DL (ref 11.7–15.7)
LYMPHOCYTES # BLD AUTO: 1.9 10E9/L (ref 0.8–5.3)
LYMPHOCYTES NFR BLD AUTO: 14 %
MCH RBC QN AUTO: 30.7 PG (ref 26.5–33)
MCHC RBC AUTO-ENTMCNC: 32.8 G/DL (ref 31.5–36.5)
MCV RBC AUTO: 94 FL (ref 78–100)
METAMYELOCYTES # BLD: 0.1 10E9/L
METAMYELOCYTES NFR BLD MANUAL: 1 %
MONOCYTES # BLD AUTO: 0.3 10E9/L (ref 0–1.3)
MONOCYTES NFR BLD AUTO: 2 %
NEUTROPHILS # BLD AUTO: 11.4 10E9/L (ref 1.6–8.3)
NEUTROPHILS NFR BLD AUTO: 82 %
PHOSPHATE SERPL-MCNC: 2.9 MG/DL (ref 2.5–4.5)
PLATELET # BLD AUTO: 283 10E9/L (ref 150–450)
PLATELET # BLD EST: ABNORMAL 10*3/UL
POTASSIUM SERPL-SCNC: 5 MMOL/L (ref 3.4–5.3)
RBC # BLD AUTO: 4.17 10E12/L (ref 3.8–5.2)
RBC MORPH BLD: ABNORMAL
SODIUM SERPL-SCNC: 131 MMOL/L (ref 133–144)
WBC # BLD AUTO: 13.8 10E9/L (ref 4–11)

## 2017-08-07 ENCOUNTER — INFUSION THERAPY VISIT (OUTPATIENT)
Dept: INFUSION THERAPY | Facility: CLINIC | Age: 72
End: 2017-08-07
Attending: INTERNAL MEDICINE
Payer: MEDICARE

## 2017-08-07 ENCOUNTER — HOSPITAL ENCOUNTER (OUTPATIENT)
Facility: CLINIC | Age: 72
Setting detail: SPECIMEN
Discharge: HOME | End: 2017-08-07
Attending: INTERNAL MEDICINE | Admitting: INTERNAL MEDICINE
Payer: MEDICARE

## 2017-08-07 VITALS
HEART RATE: 74 BPM | RESPIRATION RATE: 18 BRPM | WEIGHT: 233.25 LBS | SYSTOLIC BLOOD PRESSURE: 150 MMHG | TEMPERATURE: 98 F | HEIGHT: 65 IN | BODY MASS INDEX: 38.86 KG/M2 | DIASTOLIC BLOOD PRESSURE: 80 MMHG

## 2017-08-07 DIAGNOSIS — N08 SICKLE CELL NEPHROPATHY (H): ICD-10-CM

## 2017-08-07 DIAGNOSIS — D57.1 SICKLE CELL NEPHROPATHY (H): ICD-10-CM

## 2017-08-07 DIAGNOSIS — E87.20 ACIDOSIS: ICD-10-CM

## 2017-08-07 DIAGNOSIS — I77.82 ANCA-ASSOCIATED VASCULITIS (H): Primary | ICD-10-CM

## 2017-08-07 DIAGNOSIS — N17.9 ACUTE KIDNEY FAILURE, UNSPECIFIED (H): ICD-10-CM

## 2017-08-07 LAB
ALBUMIN SERPL-MCNC: 3.4 G/DL (ref 3.4–5)
ALBUMIN UR-MCNC: 30 MG/DL
ANION GAP SERPL CALCULATED.3IONS-SCNC: 8 MMOL/L (ref 3–14)
APPEARANCE UR: CLEAR
BASOPHILS # BLD AUTO: 0 10E9/L (ref 0–0.2)
BASOPHILS NFR BLD AUTO: 0.3 %
BILIRUB UR QL STRIP: NEGATIVE
BUN SERPL-MCNC: 21 MG/DL (ref 7–30)
CALCIUM SERPL-MCNC: 9.7 MG/DL (ref 8.5–10.1)
CHLORIDE SERPL-SCNC: 97 MMOL/L (ref 94–109)
CO2 SERPL-SCNC: 26 MMOL/L (ref 20–32)
COLOR UR AUTO: YELLOW
CREAT SERPL-MCNC: 1.23 MG/DL (ref 0.52–1.04)
CREAT UR-MCNC: 47 MG/DL
CRP SERPL-MCNC: <2.9 MG/L (ref 0–8)
DIFFERENTIAL METHOD BLD: ABNORMAL
EOSINOPHIL # BLD AUTO: 0.1 10E9/L (ref 0–0.7)
EOSINOPHIL NFR BLD AUTO: 1 %
ERYTHROCYTE [DISTWIDTH] IN BLOOD BY AUTOMATED COUNT: 16.8 % (ref 10–15)
GFR SERPL CREATININE-BSD FRML MDRD: 43 ML/MIN/1.7M2
GLUCOSE SERPL-MCNC: 79 MG/DL (ref 70–99)
GLUCOSE UR STRIP-MCNC: NEGATIVE MG/DL
HCT VFR BLD AUTO: 36.8 % (ref 35–47)
HGB BLD-MCNC: 12.1 G/DL (ref 11.7–15.7)
HGB UR QL STRIP: NEGATIVE
IMM GRANULOCYTES # BLD: 0.3 10E9/L (ref 0–0.4)
IMM GRANULOCYTES NFR BLD: 2.2 %
KETONES UR STRIP-MCNC: NEGATIVE MG/DL
LEUKOCYTE ESTERASE UR QL STRIP: NEGATIVE
LYMPHOCYTES # BLD AUTO: 3.3 10E9/L (ref 0.8–5.3)
LYMPHOCYTES NFR BLD AUTO: 27.8 %
MCH RBC QN AUTO: 30 PG (ref 26.5–33)
MCHC RBC AUTO-ENTMCNC: 32.9 G/DL (ref 31.5–36.5)
MCV RBC AUTO: 91 FL (ref 78–100)
MONOCYTES # BLD AUTO: 1 10E9/L (ref 0–1.3)
MONOCYTES NFR BLD AUTO: 8.2 %
NEUTROPHILS # BLD AUTO: 7.3 10E9/L (ref 1.6–8.3)
NEUTROPHILS NFR BLD AUTO: 60.5 %
NITRATE UR QL: NEGATIVE
NRBC # BLD AUTO: 0 10*3/UL
NRBC BLD AUTO-RTO: 0 /100
PH UR STRIP: 7 PH (ref 5–7)
PHOSPHATE SERPL-MCNC: 3.6 MG/DL (ref 2.5–4.5)
PLATELET # BLD AUTO: 254 10E9/L (ref 150–450)
POTASSIUM SERPL-SCNC: 4.5 MMOL/L (ref 3.4–5.3)
PROT UR-MCNC: 0.55 G/L
PROT/CREAT 24H UR: 1.17 G/G CR (ref 0–0.2)
RBC # BLD AUTO: 4.04 10E12/L (ref 3.8–5.2)
RBC #/AREA URNS AUTO: <1 /HPF (ref 0–2)
SODIUM SERPL-SCNC: 131 MMOL/L (ref 133–144)
SP GR UR STRIP: 1.01 (ref 1–1.03)
URN SPEC COLLECT METH UR: ABNORMAL
UROBILINOGEN UR STRIP-MCNC: NORMAL MG/DL (ref 0–2)
WBC # BLD AUTO: 12 10E9/L (ref 4–11)
WBC #/AREA URNS AUTO: 2 /HPF (ref 0–2)

## 2017-08-07 PROCEDURE — 86140 C-REACTIVE PROTEIN: CPT | Performed by: INTERNAL MEDICINE

## 2017-08-07 PROCEDURE — 81001 URINALYSIS AUTO W/SCOPE: CPT | Performed by: INTERNAL MEDICINE

## 2017-08-07 PROCEDURE — 80069 RENAL FUNCTION PANEL: CPT | Performed by: INTERNAL MEDICINE

## 2017-08-07 PROCEDURE — 96367 TX/PROPH/DG ADDL SEQ IV INF: CPT

## 2017-08-07 PROCEDURE — 85025 COMPLETE CBC W/AUTO DIFF WBC: CPT | Performed by: INTERNAL MEDICINE

## 2017-08-07 PROCEDURE — 96413 CHEMO IV INFUSION 1 HR: CPT

## 2017-08-07 PROCEDURE — 96417 CHEMO IV INFUS EACH ADDL SEQ: CPT

## 2017-08-07 PROCEDURE — 25000128 H RX IP 250 OP 636: Performed by: INTERNAL MEDICINE

## 2017-08-07 PROCEDURE — 84156 ASSAY OF PROTEIN URINE: CPT | Performed by: INTERNAL MEDICINE

## 2017-08-07 RX ORDER — ACETAMINOPHEN 500 MG
1000 TABLET ORAL EVERY 6 HOURS PRN
Status: CANCELLED
Start: 2017-08-07

## 2017-08-07 RX ADMIN — SODIUM CHLORIDE 750 ML: 9 INJECTION, SOLUTION INTRAVENOUS at 11:39

## 2017-08-07 RX ADMIN — ONDANSETRON HYDROCHLORIDE 10 MG: 2 INJECTION, SOLUTION INTRAVENOUS at 12:00

## 2017-08-07 RX ADMIN — CYCLOPHOSPHAMIDE 1300 MG: 1 INJECTION, POWDER, FOR SOLUTION INTRAVENOUS; ORAL at 12:46

## 2017-08-07 RX ADMIN — MESNA 440 MG: 100 INJECTION, SOLUTION INTRAVENOUS at 12:21

## 2017-08-07 ASSESSMENT — PAIN SCALES - GENERAL: PAINLEVEL: NO PAIN (0)

## 2017-08-07 NOTE — PROGRESS NOTES
Infusion Nursing Note:  Elizabeth Galicia presents today for cytoxan/mesna.    Patient seen by provider today: No   present during visit today: Not Applicable.    ~~~ NOTE: If the patient answers yes to any of the questions below, hold the infusion and contact ordering provider or on-call provider.    1. Have you recently had an elevated temperature, fever, chills, productive cough, coughing for 3 weeks or longer or hemoptysis,  abnormal vital signs, night sweats,  chest pain or have you noticed a decrease in your appetite, unexplained weight loss or fatigue? No  2. Do you have any open wounds or new incisions? No  3. Do you have any recent or upcoming hospitalizations, surgeries or dental procedures? No  4. Do you currently have or recently have had any signs of illness or infection or are you on any antibiotics? Yes, Bactrim  5. Have you had any new, sudden or worsening abdominal pain? No  6. Have you or anyone in your household received a live vaccination in the past 4 weeks? Please note:  No live vaccines while on biologic/chemotherapy until 6 months after the last treatment.  Patient can receive the flu vaccine (shot only) and the pneumovax.  It is optimal for the patient to get these vaccines mid cycle, but they can be given at any time as long as it is not on the day of the infusion. No  7. Have you recently been diagnosed with any new nervous system diseases (ie. Multiple sclerosis, Guillain [place], seizures, neurological changes) or cancer diagnosis? Are you on any form of radiation or chemotherapy? No  8. Are you pregnant or breast feeding or do you have plans of pregnancy in the future? No  9. Have you been having any signs of worsening depression or suicidal ideations?  (benlysta only) No  10. Have there been any other new onset medical symptoms? No      Note: patient stated that Dr. Barkley prescribed the bactrim.    Intravenous Access:  Labs drawn without difficulty. Right AC, butterfly, 23  lance.  Peripheral IV placed.    Treatment Conditions:  Lab Results   Component Value Date    HGB 12.1 08/07/2017     Lab Results   Component Value Date    WBC 12.0 08/07/2017      Lab Results   Component Value Date    ANEU 7.3 08/07/2017     Lab Results   Component Value Date     08/07/2017      Lab Results   Component Value Date     08/07/2017                   Lab Results   Component Value Date    POTASSIUM 4.5 08/07/2017           No results found for: MAG         Lab Results   Component Value Date    CR 1.23 08/07/2017                   Lab Results   Component Value Date    ARABELLA 9.7 08/07/2017                                      Lab Results   Component Value Date    ALBUMIN 3.4 08/07/2017                        Results reviewed, labs MET treatment parameters, ok to proceed with treatment.          Post Infusion Assessment:  Patient tolerated infusion without incident.  Blood return noted pre and post infusion.  Site patent and intact, free from redness, edema or discomfort.  No evidence of extravasations.  Access discontinued per protocol.    Discharge Plan:   Discharge instructions reviewed with: Patient.  Patient and/or family verbalized understanding of discharge instructions and all questions answered.  Copy of AVS reviewed with patient and/or family.  Patient will return prn for next appointment.  Patient discharged in stable condition accompanied by: wife.  Departure Mode: Ambulatory.    Faby Esposito RN

## 2017-08-07 NOTE — MR AVS SNAPSHOT
After Visit Summary   8/7/2017    Elizabeth Galicia    MRN: [id number]           Patient Information     Date Of Birth          1945        Visit Information        Provider Department      8/7/2017 10:00 AM  INFUSION CHAIR 11 [facility]        Today's Diagnoses     ANCA-associated vasculitis (Microscopic polyangiitis)    -  1    Sickle cell nephropathy (H)        Acute kidney failure, unspecified (H)        Acidosis          Care Instructions    EDUCATION POST BIOLOGICAL/CHEMOTHERAPY INFUSION  Call the triage nurse at your clinic or seek medical attention if you have chills and/or temperature greater than or equal to 100.5, uncontrolled nausea/vomiting, diarrhea, constipation, dizziness, shortness of breath, chest pain, heart palpitations, weakness or any other new or concerning symptoms, questions or concerns.  You can not have any live virus vaccines prior to or during treatment or up to 6 months post infusion.  If you have an upcoming surgery, medical procedure or dental procedure during treatment, this should be discussed with your ordering physician and your surgeon/dentist.  If you are having any concerning symptom, if you are unsure if you should get your next infusion or wish to speak to a provider before your next infusion, please call your care coordinator or triage nurse at your clinic to notify them so we can adequately serve you.            Follow-ups after your visit        Future tests that were ordered for you today     Open Standing Orders        Priority Remaining Interval Expires Ordered    Notify Physician Routine [id number] PRN  8/7/2017            Who to contact     If you have questions or need follow up information about today's clinic visit or your schedule please contact [facility] AND [facility] directly at [phone].  Normal or non-critical lab and imaging results will be communicated to you by Karlit, letter  "or phone within 4 business days after the clinic has received the results. If you do not hear from us within 7 days, please contact the clinic through L-[employer] GCS or phone. If you have a critical or abnormal lab result, we will notify you by phone as soon as possible.  Submit refill requests through L-[employer] or call your pharmacy and they will forward the refill request to us. Please allow 3 business days for your refill to be completed.          Additional Information About Your Visit        L-[employer] GCS Information     L-[employer]CS lets you send messages to your doctor, view your test results, renew your prescriptions, schedule appointments and more. To sign up, go to [email or web address] . Click on \"Log in\" on the left side of the screen, which will take you to the Welcome page. Then click on \"Sign up Now\" on the right side of the page.     You will be asked to enter the access code listed below, as well as some personal information. Please follow the directions to create your username and password.     Your access code is: [email or web address]  Expires: 2017  7:53 AM     Your access code will  in 90 days. If you need help or a new code, please call your [place] clinic or [phone].        Care EveryWhere ID     This is your Care EveryWhere ID. This could be used by other organizations to access your [place] medical records  [id number]        Your Vitals Were     Pulse Temperature Respirations Height BMI (Body Mass Index)       74 98  F (36.7  C) (Oral) 18 1.651 m (5' 5\") 38.81 kg/m2        Blood Pressure from Last 3 Encounters:   17 150/80   07/10/17 135/65   17 124/63    Weight from Last 3 Encounters:   17 105.8 kg (233 lb 4 oz)   17 96.5 kg (212 lb 12.8 oz)   17 108.4 kg (239 lb)              We Performed the Following     CBC with platelets differential     Creatinine urine calculation only     CRP, inflammation     Protein  random urine     Renal panel (Alb, BUN, Ca, Cl, CO2, " Creat, Gluc, Phos, K, Na)     UA with Microscopic          Today's Medication Changes          These changes are accurate as of: 8/7/17  2:58 PM.  If you have any questions, ask your nurse or doctor.               These medicines have changed or have updated prescriptions.        Dose/Directions    * mesna 400 MG Tabs tablet   Commonly known as:  MESNEX   This may have changed:  Another medication with the same name was added. Make sure you understand how and when to take each.   Used for:  ANCA-associated vasculitis (H)        Dose:  800 mg   Take 2 tablets (800 mg) by mouth every 3 hours for 2 doses Take at 3 hours and 6 hours after cyclophosphamide.   Quantity:  4 tablet   Refills:  0       * mesna 400 MG Tabs tablet   Commonly known as:  MESNEX   This may have changed:  You were already taking a medication with the same name, and this prescription was added. Make sure you understand how and when to take each.   Used for:  ANCA-associated vasculitis (H)        Dose:  800 mg   Take 2 tablets (800 mg) by mouth every 3 hours for 2 doses Take at 3 hours and 6 hours after cyclophosphamide.   Quantity:  4 tablet   Refills:  0       * Notice:  This list has 2 medication(s) that are the same as other medications prescribed for you. Read the directions carefully, and ask your doctor or other care provider to review them with you.         Where to get your medicines      Some of these will need a paper prescription and others can be bought over the counter.  Ask your nurse if you have questions.     Bring a paper prescription for each of these medications     mesna 400 MG Tabs tablet                Primary Care Provider Office Phone # Fax #    Daquan Barkley [phone]       [facility] [address] [address]        Equal Access to Services     LORENZA RICHARDS AH: Mandeep Pennington, warebecca lueze, qageorgette barlow  ah. So [facility] [phone].    ATENCIÓN: Si anju you, tiene a norman disposición servicios gratuitos de asistencia lingüística. Abiodun fisher [phone].    We comply with applicable federal civil rights laws and [place] laws. We do not discriminate on the basis of race, color, national origin, age, disability sex, sexual orientation or gender identity.            Thank you!     Thank you for choosing [facility] CANCER [facility] AND [facility] CENTER  for your care. Our goal is always to provide you with excellent care. Hearing back from our patients is one way we can continue to improve our services. Please take a few minutes to complete the written survey that you may receive in the mail after your visit with us. Thank you!             Your Updated Medication List - Protect others around you: Learn how to safely use, store and throw away your medicines at www.disposemymeds.org.          This list is accurate as of: 8/7/17  2:58 PM.  Always use your most recent med list.                   Brand Name Dispense Instructions for use Diagnosis    acetaminophen 500 MG tablet    TYLENOL     Take 2 tablets (1,000 mg) by mouth every 8 hours    Generalized pain       CALCIUM LACTATE PO      Take 4 tablets by mouth daily        cyanocobalamin 1000 MCG/ML injection    VITAMIN B12    1 mL    Inject 1 mL (1,000 mcg) into the muscle once as needed    Anemia, unspecified type       guaiFENesin-codeine 100-10 MG/5ML Soln solution    ROBITUSSIN AC    420 mL    Take 5 mLs by mouth every 4 hours as needed for cough    Cough       Levothyroxine Sodium 112 MCG Caps     90 capsule    Take 112 mcg by mouth every morning    Postoperative hypothyroidism       liothyronine 5 MCG tablet    CYTOMEL    540 tablet    Take 3 tablets (15 mcg) by mouth 2 times daily    Postoperative hypothyroidism       MAGNESIUM LACTATE PO      Take 2 tablets by mouth daily        * mesna 400 MG Tabs tablet    MESNEX    4 tablet    Take 2 tablets (800 mg) by mouth every 3  hours for 2 doses Take at 3 hours and 6 hours after cyclophosphamide.    ANCA-associated vasculitis (H)       * mesna 400 MG Tabs tablet    MESNEX    4 tablet    Take 2 tablets (800 mg) by mouth every 3 hours for 2 doses Take at 3 hours and 6 hours after cyclophosphamide.    ANCA-associated vasculitis (H)       omeprazole 40 MG capsule    priLOSEC    30 capsule    Take 1 capsule (40 mg) by mouth every morning    Prophylactic measure       senna-docusate 8.6-50 MG per tablet    SENOKOT-S;PERICOLACE    60 tablet    Take 1-2 tablets by mouth 2 times daily    Constipation, unspecified constipation type       UNKNOWN TO PATIENT           VITAMIN D3 PO      Take 1,000 Units by mouth daily        * Notice:  This list has 2 medication(s) that are the same as other medications prescribed for you. Read the directions carefully, and ask your doctor or other care provider to review them with you.

## 2017-08-17 ENCOUNTER — HOSPITAL ENCOUNTER (OUTPATIENT)
Dept: LAB | Facility: CLINIC | Age: 72
Discharge: HOME | End: 2017-08-17
Attending: INTERNAL MEDICINE | Admitting: INTERNAL MEDICINE
Payer: MEDICARE

## 2017-08-17 ENCOUNTER — TRANSFERRED RECORDS (OUTPATIENT)
Dept: HEALTH INFORMATION MANAGEMENT | Facility: CLINIC | Age: 72
End: 2017-08-17

## 2017-08-17 DIAGNOSIS — N17.9 ACUTE KIDNEY FAILURE, UNSPECIFIED (H): Primary | ICD-10-CM

## 2017-08-17 DIAGNOSIS — I77.82 ANCA-ASSOCIATED VASCULITIS (H): Primary | ICD-10-CM

## 2017-08-17 DIAGNOSIS — D57.1 SICKLE CELL NEPHROPATHY (H): ICD-10-CM

## 2017-08-17 DIAGNOSIS — E87.20 ACIDOSIS: ICD-10-CM

## 2017-08-17 DIAGNOSIS — N17.9 ACUTE KIDNEY FAILURE, UNSPECIFIED (H): ICD-10-CM

## 2017-08-17 DIAGNOSIS — N08 SICKLE CELL NEPHROPATHY (H): ICD-10-CM

## 2017-08-17 LAB
ALBUMIN SERPL-MCNC: 3.5 G/DL (ref 3.4–5)
ALP SERPL-CCNC: 51 U/L (ref 40–150)
ALT SERPL W P-5'-P-CCNC: 26 U/L (ref 0–50)
ANION GAP SERPL CALCULATED.3IONS-SCNC: 9 MMOL/L (ref 3–14)
AST SERPL W P-5'-P-CCNC: 14 U/L (ref 0–45)
BASOPHILS # BLD AUTO: 0 10E9/L (ref 0–0.2)
BASOPHILS NFR BLD AUTO: 0.2 %
BILIRUB SERPL-MCNC: 0.2 MG/DL (ref 0.2–1.3)
BUN SERPL-MCNC: 21 MG/DL (ref 7–30)
CALCIUM SERPL-MCNC: 9.4 MG/DL (ref 8.5–10.1)
CHLORIDE SERPL-SCNC: 99 MMOL/L (ref 94–109)
CO2 SERPL-SCNC: 25 MMOL/L (ref 20–32)
CREAT SERPL-MCNC: 1.4 MG/DL (ref 0.52–1.04)
CRP SERPL-MCNC: <2.9 MG/L (ref 0–8)
DIFFERENTIAL METHOD BLD: ABNORMAL
EOSINOPHIL # BLD AUTO: 0 10E9/L (ref 0–0.7)
EOSINOPHIL NFR BLD AUTO: 0.3 %
ERYTHROCYTE [DISTWIDTH] IN BLOOD BY AUTOMATED COUNT: 15.9 % (ref 10–15)
ERYTHROCYTE [SEDIMENTATION RATE] IN BLOOD BY WESTERGREN METHOD: 9 MM/H (ref 0–30)
GFR SERPL CREATININE-BSD FRML MDRD: 37 ML/MIN/1.7M2
GLUCOSE SERPL-MCNC: 109 MG/DL (ref 70–99)
HCT VFR BLD AUTO: 36.3 % (ref 35–47)
HGB BLD-MCNC: 11.9 G/DL (ref 11.7–15.7)
IMM GRANULOCYTES # BLD: 0.1 10E9/L (ref 0–0.4)
IMM GRANULOCYTES NFR BLD: 1 %
LYMPHOCYTES # BLD AUTO: 0.9 10E9/L (ref 0.8–5.3)
LYMPHOCYTES NFR BLD AUTO: 10.5 %
MCH RBC QN AUTO: 30 PG (ref 26.5–33)
MCHC RBC AUTO-ENTMCNC: 32.8 G/DL (ref 31.5–36.5)
MCV RBC AUTO: 91 FL (ref 78–100)
MONOCYTES # BLD AUTO: 0.5 10E9/L (ref 0–1.3)
MONOCYTES NFR BLD AUTO: 5.5 %
NEUTROPHILS # BLD AUTO: 7.1 10E9/L (ref 1.6–8.3)
NEUTROPHILS NFR BLD AUTO: 82.5 %
NRBC # BLD AUTO: 0 10*3/UL
NRBC BLD AUTO-RTO: 0 /100
PHOSPHATE SERPL-MCNC: 3.1 MG/DL (ref 2.5–4.5)
PLATELET # BLD AUTO: 302 10E9/L (ref 150–450)
POTASSIUM SERPL-SCNC: 4.4 MMOL/L (ref 3.4–5.3)
PROT SERPL-MCNC: 6.4 G/DL (ref 6.8–8.8)
RBC # BLD AUTO: 3.97 10E12/L (ref 3.8–5.2)
SODIUM SERPL-SCNC: 133 MMOL/L (ref 133–144)
WBC # BLD AUTO: 8.6 10E9/L (ref 4–11)

## 2017-08-17 PROCEDURE — 85025 COMPLETE CBC W/AUTO DIFF WBC: CPT | Performed by: INTERNAL MEDICINE

## 2017-08-17 PROCEDURE — 86160 COMPLEMENT ANTIGEN: CPT | Performed by: INTERNAL MEDICINE

## 2017-08-17 PROCEDURE — 85652 RBC SED RATE AUTOMATED: CPT | Performed by: INTERNAL MEDICINE

## 2017-08-17 PROCEDURE — 36415 COLL VENOUS BLD VENIPUNCTURE: CPT | Performed by: INTERNAL MEDICINE

## 2017-08-17 PROCEDURE — 80053 COMPREHEN METABOLIC PANEL: CPT | Performed by: INTERNAL MEDICINE

## 2017-08-17 PROCEDURE — 84100 ASSAY OF PHOSPHORUS: CPT | Performed by: INTERNAL MEDICINE

## 2017-08-17 PROCEDURE — 83876 ASSAY MYELOPEROXIDASE: CPT | Performed by: INTERNAL MEDICINE

## 2017-08-17 PROCEDURE — 86140 C-REACTIVE PROTEIN: CPT | Performed by: INTERNAL MEDICINE

## 2017-08-18 DIAGNOSIS — D57.1 SICKLE CELL NEPHROPATHY (H): ICD-10-CM

## 2017-08-18 DIAGNOSIS — N17.9 ACUTE KIDNEY FAILURE, UNSPECIFIED (H): ICD-10-CM

## 2017-08-18 DIAGNOSIS — N08 SICKLE CELL NEPHROPATHY (H): ICD-10-CM

## 2017-08-18 LAB
C3 SERPL-MCNC: 97 MG/DL (ref 76–169)
C4 SERPL-MCNC: 22 MG/DL (ref 15–50)
COLLECT DURATION TIME UR: 24 H
MYELOPEROXIDASE AB SER-ACNC: 5.5 AI (ref 0–0.9)
SPECIMEN VOL UR: 4500 ML

## 2017-08-18 PROCEDURE — 84156 ASSAY OF PROTEIN URINE: CPT | Performed by: INTERNAL MEDICINE

## 2017-08-18 PROCEDURE — 81050 URINALYSIS VOLUME MEASURE: CPT | Mod: 59 | Performed by: INTERNAL MEDICINE

## 2017-08-18 PROCEDURE — 82570 ASSAY OF URINE CREATININE: CPT | Performed by: INTERNAL MEDICINE

## 2017-08-19 LAB
COLLECT DURATION TIME UR: 24 H
CREAT 24H UR-MRATE: 1.16 G/(24.H) (ref 0.8–1.8)
CREAT UR-MCNC: 26 MG/DL
PROT 24H UR-MRATE: 1.06 G/(24.H) (ref 0.04–0.23)
PROT UR-MCNC: 0.24 G/L
PROT/CREAT 24H UR: 0.94 G/G CR (ref 0–0.2)
SPECIMEN VOL UR: 4500 ML

## 2017-09-06 DIAGNOSIS — N08: Primary | ICD-10-CM

## 2017-09-06 DIAGNOSIS — D64.9 ANEMIA, UNSPECIFIED: ICD-10-CM

## 2017-09-06 DIAGNOSIS — R80.9 MODERATELY INCREASED ALBUMINURIA: ICD-10-CM

## 2017-09-07 DIAGNOSIS — I77.82 ANCA-ASSOCIATED VASCULITIS (H): Primary | Chronic | ICD-10-CM

## 2017-09-07 RX ORDER — ACETAMINOPHEN 500 MG
1000 TABLET ORAL EVERY 6 HOURS PRN
Status: CANCELLED
Start: 2017-09-11

## 2017-09-11 ENCOUNTER — INFUSION THERAPY VISIT (OUTPATIENT)
Dept: INFUSION THERAPY | Facility: CLINIC | Age: 72
End: 2017-09-11
Attending: INTERNAL MEDICINE
Payer: MEDICARE

## 2017-09-11 ENCOUNTER — HOSPITAL ENCOUNTER (OUTPATIENT)
Facility: CLINIC | Age: 72
Setting detail: SPECIMEN
Discharge: HOME | End: 2017-09-11
Attending: INTERNAL MEDICINE | Admitting: INTERNAL MEDICINE
Payer: MEDICARE

## 2017-09-11 VITALS
RESPIRATION RATE: 16 BRPM | HEART RATE: 90 BPM | OXYGEN SATURATION: 92 % | WEIGHT: 235.4 LBS | SYSTOLIC BLOOD PRESSURE: 116 MMHG | DIASTOLIC BLOOD PRESSURE: 75 MMHG | TEMPERATURE: 97.8 F | HEIGHT: 65 IN | BODY MASS INDEX: 39.22 KG/M2

## 2017-09-11 DIAGNOSIS — I77.82 ANCA-ASSOCIATED VASCULITIS (H): ICD-10-CM

## 2017-09-11 DIAGNOSIS — R80.9 MODERATELY INCREASED ALBUMINURIA: ICD-10-CM

## 2017-09-11 DIAGNOSIS — N17.9 ACUTE KIDNEY FAILURE, UNSPECIFIED (H): ICD-10-CM

## 2017-09-11 DIAGNOSIS — N08: Primary | ICD-10-CM

## 2017-09-11 DIAGNOSIS — D64.9 ANEMIA, UNSPECIFIED: ICD-10-CM

## 2017-09-11 DIAGNOSIS — E87.20 ACIDOSIS: ICD-10-CM

## 2017-09-11 DIAGNOSIS — D57.1 SICKLE CELL NEPHROPATHY (H): ICD-10-CM

## 2017-09-11 DIAGNOSIS — N08 SICKLE CELL NEPHROPATHY (H): ICD-10-CM

## 2017-09-11 LAB
ALBUMIN SERPL-MCNC: 3.3 G/DL (ref 3.4–5)
ALBUMIN UR-MCNC: 10 MG/DL
ALT SERPL W P-5'-P-CCNC: 26 U/L (ref 0–50)
ANION GAP SERPL CALCULATED.3IONS-SCNC: 8 MMOL/L (ref 3–14)
APPEARANCE UR: CLEAR
AST SERPL W P-5'-P-CCNC: 12 U/L (ref 0–45)
BASOPHILS # BLD AUTO: 0 10E9/L (ref 0–0.2)
BASOPHILS NFR BLD AUTO: 0.5 %
BILIRUB UR QL STRIP: NEGATIVE
BUN SERPL-MCNC: 21 MG/DL (ref 7–30)
CALCIUM SERPL-MCNC: 9.2 MG/DL (ref 8.5–10.1)
CHLORIDE SERPL-SCNC: 103 MMOL/L (ref 94–109)
CO2 SERPL-SCNC: 25 MMOL/L (ref 20–32)
COLOR UR AUTO: ABNORMAL
CREAT SERPL-MCNC: 1.41 MG/DL (ref 0.52–1.04)
CREAT UR-MCNC: 28 MG/DL
CRP SERPL-MCNC: <2.9 MG/L (ref 0–8)
DIFFERENTIAL METHOD BLD: NORMAL
EOSINOPHIL # BLD AUTO: 0.2 10E9/L (ref 0–0.7)
EOSINOPHIL NFR BLD AUTO: 3.5 %
ERYTHROCYTE [DISTWIDTH] IN BLOOD BY AUTOMATED COUNT: 14.7 % (ref 10–15)
GFR SERPL CREATININE-BSD FRML MDRD: 37 ML/MIN/1.7M2
GLUCOSE SERPL-MCNC: 114 MG/DL (ref 70–99)
GLUCOSE UR STRIP-MCNC: NEGATIVE MG/DL
HCT VFR BLD AUTO: 35.8 % (ref 35–47)
HGB BLD-MCNC: 12 G/DL (ref 11.7–15.7)
HGB UR QL STRIP: NEGATIVE
IMM GRANULOCYTES # BLD: 0.1 10E9/L (ref 0–0.4)
IMM GRANULOCYTES NFR BLD: 1.1 %
KETONES UR STRIP-MCNC: NEGATIVE MG/DL
LEUKOCYTE ESTERASE UR QL STRIP: NEGATIVE
LYMPHOCYTES # BLD AUTO: 1.9 10E9/L (ref 0.8–5.3)
LYMPHOCYTES NFR BLD AUTO: 29 %
MCH RBC QN AUTO: 30.8 PG (ref 26.5–33)
MCHC RBC AUTO-ENTMCNC: 33.5 G/DL (ref 31.5–36.5)
MCV RBC AUTO: 92 FL (ref 78–100)
MONOCYTES # BLD AUTO: 0.7 10E9/L (ref 0–1.3)
MONOCYTES NFR BLD AUTO: 10 %
NEUTROPHILS # BLD AUTO: 3.6 10E9/L (ref 1.6–8.3)
NEUTROPHILS NFR BLD AUTO: 55.9 %
NITRATE UR QL: NEGATIVE
NRBC # BLD AUTO: 0 10*3/UL
NRBC BLD AUTO-RTO: 0 /100
PH UR STRIP: 6.5 PH (ref 5–7)
PHOSPHATE SERPL-MCNC: 3.9 MG/DL (ref 2.5–4.5)
PLATELET # BLD AUTO: 282 10E9/L (ref 150–450)
POTASSIUM SERPL-SCNC: 4.3 MMOL/L (ref 3.4–5.3)
PROT UR-MCNC: 0.17 G/L
PROT/CREAT 24H UR: 0.62 G/G CR (ref 0–0.2)
RBC # BLD AUTO: 3.89 10E12/L (ref 3.8–5.2)
RBC #/AREA URNS AUTO: 0 /HPF (ref 0–2)
SODIUM SERPL-SCNC: 136 MMOL/L (ref 133–144)
SOURCE: ABNORMAL
SP GR UR STRIP: 1 (ref 1–1.03)
UROBILINOGEN UR STRIP-MCNC: NORMAL MG/DL (ref 0–2)
WBC # BLD AUTO: 6.5 10E9/L (ref 4–11)
WBC #/AREA URNS AUTO: 1 /HPF (ref 0–2)

## 2017-09-11 PROCEDURE — 84450 TRANSFERASE (AST) (SGOT): CPT | Performed by: INTERNAL MEDICINE

## 2017-09-11 PROCEDURE — 80069 RENAL FUNCTION PANEL: CPT | Performed by: INTERNAL MEDICINE

## 2017-09-11 PROCEDURE — 81001 URINALYSIS AUTO W/SCOPE: CPT | Performed by: INTERNAL MEDICINE

## 2017-09-11 PROCEDURE — 86140 C-REACTIVE PROTEIN: CPT | Performed by: INTERNAL MEDICINE

## 2017-09-11 PROCEDURE — 84460 ALANINE AMINO (ALT) (SGPT): CPT | Performed by: INTERNAL MEDICINE

## 2017-09-11 PROCEDURE — 96413 CHEMO IV INFUSION 1 HR: CPT

## 2017-09-11 PROCEDURE — 84156 ASSAY OF PROTEIN URINE: CPT | Performed by: INTERNAL MEDICINE

## 2017-09-11 PROCEDURE — 96417 CHEMO IV INFUS EACH ADDL SEQ: CPT

## 2017-09-11 PROCEDURE — 96361 HYDRATE IV INFUSION ADD-ON: CPT

## 2017-09-11 PROCEDURE — 96367 TX/PROPH/DG ADDL SEQ IV INF: CPT

## 2017-09-11 PROCEDURE — 25000128 H RX IP 250 OP 636: Performed by: INTERNAL MEDICINE

## 2017-09-11 PROCEDURE — 85025 COMPLETE CBC W/AUTO DIFF WBC: CPT | Performed by: INTERNAL MEDICINE

## 2017-09-11 RX ORDER — ACETAMINOPHEN 500 MG
1000 TABLET ORAL EVERY 6 HOURS PRN
Status: CANCELLED
Start: 2017-09-11

## 2017-09-11 RX ADMIN — SODIUM CHLORIDE 750 ML: 9 INJECTION, SOLUTION INTRAVENOUS at 14:41

## 2017-09-11 RX ADMIN — ONDANSETRON HYDROCHLORIDE 10 MG: 2 INJECTION, SOLUTION INTRAVENOUS at 15:36

## 2017-09-11 RX ADMIN — CYCLOPHOSPHAMIDE 1300 MG: 1 INJECTION, POWDER, FOR SOLUTION INTRAVENOUS; ORAL at 16:14

## 2017-09-11 RX ADMIN — MESNA 440 MG: 100 INJECTION, SOLUTION INTRAVENOUS at 15:55

## 2017-09-11 ASSESSMENT — PAIN SCALES - GENERAL: PAINLEVEL: NO PAIN (0)

## 2017-09-11 NOTE — MR AVS SNAPSHOT
"              After Visit Summary   9/11/2017    Elizabeth Galicia    MRN: [id number]           Patient Information     Date Of Birth          1945        Visit Information        Provider Department      9/11/2017 1:00 PM  INFUSION CHAIR 15 [facility]        Today's Diagnoses     Chronic secondary nephritis    -  1    Moderately increased albuminuria        Anemia, unspecified        Sickle cell nephropathy (H)        Acute kidney failure, unspecified (H)        Acidosis        ANCA-associated vasculitis (Microscopic polyangiitis)           Follow-ups after your visit        Who to contact     If you have questions or need follow up information about today's clinic visit or your schedule please contact [facility] directly at [phone].  Normal or non-critical lab and imaging results will be communicated to you by HashTiphart, letter or phone within 4 business days after the clinic has received the results. If you do not hear from us within 7 days, please contact the clinic through HashTiphart or phone. If you have a critical or abnormal lab result, we will notify you by phone as soon as possible.  Submit refill requests through LabourNet or call your pharmacy and they will forward the refill request to us. Please allow 3 business days for your refill to be completed.          Additional Information About Your Visit        MyChart Information     LabourNet lets you send messages to your doctor, view your test results, renew your prescriptions, schedule appointments and more. To sign up, go to [email or web address] . Click on \"Log in\" on the left side of the screen, which will take you to the Welcome page. Then click on \"Sign up Now\" on the right side of the page.     You will be asked to enter the access code listed below, as well as some personal information. Please follow the directions to create your username and password.     Your access code " "is: [email or web address]  Expires: 12/10/2017  5:23 PM     Your access code will  in 90 days. If you need help or a new code, please call your [facility] or [phone].        Care EveryWhere ID     This is your Care EveryWhere ID. This could be used by other organizations to access your [place] medical records  [id number]        Your Vitals Were     Pulse Temperature Respirations Height Pulse Oximetry BMI (Body Mass Index)    90 97.8  F (36.6  C) (Oral) 16 1.651 m (5' 5\") 92% 39.17 kg/m2       Blood Pressure from Last 3 Encounters:   17 116/75   17 150/80   07/10/17 135/65    Weight from Last 3 Encounters:   17 106.8 kg (235 lb 6.4 oz)   17 105.8 kg (233 lb 4 oz)   17 96.5 kg (212 lb 12.8 oz)              We Performed the Following     ALT     AST     CBC with platelets differential     Creatinine urine calculation only     CRP, inflammation     Protein  random urine     Renal panel (Alb, BUN, Ca, Cl, CO2, Creat, Gluc, Phos, K, Na)     UA with Microscopic ([place]; [facility])        Primary Care Provider Office Phone # Fax #    Daquan Barkley [phone]       [facility] [address] [place] [address] 400  [facility] [address]        Equal Access to Services     [facility] : Hadii wally kinseyo Aditi, waaxda luqadaha, qaybta kaalmada kim, georgette vuong adesoraay gonzalez. So [facility] [place] [facility] [phone].    ATENCIÓN: Si habla español, tiene a norman disposición servicios gratuitos de asistencia lingüística. Llame al [phone].    We comply with applicable federal civil rights laws and [place] laws. We do not discriminate on the basis of race, color, national origin, age, disability sex, sexual orientation or gender identity.            Thank you!     Thank you for choosing [facility] CANCER [facility] AND INFUSION CENTER  for your care. Our goal is always to provide you with excellent care. Hearing back from our patients is one way we can " continue to improve our services. Please take a few minutes to complete the written survey that you may receive in the mail after your visit with us. Thank you!             Your Updated Medication List - Protect others around you: Learn how to safely use, store and throw away your medicines at www.disposemymeds.org.          This list is accurate as of: 9/11/17  5:23 PM.  Always use your most recent med list.                   Brand Name Dispense Instructions for use Diagnosis    acetaminophen 500 MG tablet    TYLENOL     Take 2 tablets (1,000 mg) by mouth every 8 hours    Generalized pain       CALCIUM LACTATE PO      Take 4 tablets by mouth daily        cyanocobalamin 1000 MCG/ML injection    VITAMIN B12    1 mL    Inject 1 mL (1,000 mcg) into the muscle once as needed    Anemia, unspecified type       guaiFENesin-codeine 100-10 MG/5ML Soln solution    ROBITUSSIN AC    420 mL    Take 5 mLs by mouth every 4 hours as needed for cough    Cough       Levothyroxine Sodium 112 MCG Caps     90 capsule    Take 112 mcg by mouth every morning    Postoperative hypothyroidism       liothyronine 5 MCG tablet    CYTOMEL    540 tablet    Take 3 tablets (15 mcg) by mouth 2 times daily    Postoperative hypothyroidism       MAGNESIUM LACTATE PO      Take 2 tablets by mouth daily        omeprazole 40 MG capsule    priLOSEC    30 capsule    Take 1 capsule (40 mg) by mouth every morning    Prophylactic measure       ONDANSETRON PO      Take 4 mg by mouth every 8 hours as needed for nausea        senna-docusate 8.6-50 MG per tablet    SENOKOT-S;PERICOLACE    60 tablet    Take 1-2 tablets by mouth 2 times daily    Constipation, unspecified constipation type       UNKNOWN TO PATIENT           VITAMIN D3 PO      Take 1,000 Units by mouth daily

## 2017-09-11 NOTE — PROGRESS NOTES
Infusion Nursing Note:  Elizabeth Galicia presents today for Cytoxan.    Patient seen by provider today: No   present during visit today: Not Applicable.    Note: No concerns to report except for request reg. IV start to be separate from needed lab draw..    Intravenous Access:  Lab draw site left AC, Needle type butterfly, Gauge 23.  Labs drawn without difficulty.  Pt wanted labs done first and no IV started til decision to go ahead with treatment today.    Treatment Conditions:  Lab Results   Component Value Date    HGB 12.0 09/11/2017     Lab Results   Component Value Date    WBC 6.5 09/11/2017      Lab Results   Component Value Date    ANEU 3.6 09/11/2017     Lab Results   Component Value Date     09/11/2017      Lab Results   Component Value Date     09/11/2017                   Lab Results   Component Value Date    POTASSIUM 4.3 09/11/2017           No results found for: MAG         Lab Results   Component Value Date    CR 1.41 09/11/2017                   Lab Results   Component Value Date    ARABELLA 9.2 09/11/2017                Lab Results   Component Value Date    BILITOTAL 0.2 08/17/2017           Lab Results   Component Value Date    ALBUMIN 3.3 09/11/2017                    Lab Results   Component Value Date    ALT 26 09/11/2017           Lab Results   Component Value Date    AST 12 09/11/2017     Results reviewed, labs MET treatment parameters, ok to proceed with treatment.          Post Infusion Assessment:  Patient tolerated infusion without incident.  Blood return noted pre and post infusion.  Site patent and intact, free from redness, edema or discomfort.  No evidence of extravasations.  Access discontinued per protocol.    Discharge Plan:   Discharge instructions reviewed with: Patient.  Patient and/or family verbalized understanding of discharge instructions and all questions answered.  Copy of AVS reviewed with patient and/or family.  Patient will return : none needed at  this time with us.  Patient discharged in stable condition accompanied by: significant other.  Departure Mode: Ambulatory.    Shirley Woo RN

## 2017-09-12 ENCOUNTER — HOSPITAL ENCOUNTER (OUTPATIENT)
Facility: CLINIC | Age: 72
Setting detail: SPECIMEN
End: 2017-09-12
Attending: INTERNAL MEDICINE
Payer: MEDICARE

## 2017-09-21 DIAGNOSIS — N08 GLOMERULAR DISORDERS IN DISEASES CLASSIFIED ELSEWHERE: ICD-10-CM

## 2017-09-21 DIAGNOSIS — D64.9 ANEMIA, UNSPECIFIED: ICD-10-CM

## 2017-09-21 DIAGNOSIS — R80.9 PROTEIN, URINE, ABNORMAL PRESENCE: Primary | ICD-10-CM

## 2017-09-29 ENCOUNTER — HOSPITAL ENCOUNTER (OUTPATIENT)
Dept: LAB | Facility: CLINIC | Age: 72
Discharge: HOME | End: 2017-09-29
Attending: INTERNAL MEDICINE | Admitting: INTERNAL MEDICINE
Payer: MEDICARE

## 2017-09-29 DIAGNOSIS — D57.1 SICKLE CELL NEPHROPATHY (H): ICD-10-CM

## 2017-09-29 DIAGNOSIS — N17.9 ACUTE KIDNEY FAILURE, UNSPECIFIED (H): ICD-10-CM

## 2017-09-29 DIAGNOSIS — D64.9 ANEMIA, UNSPECIFIED: ICD-10-CM

## 2017-09-29 DIAGNOSIS — E87.20 ACIDOSIS: ICD-10-CM

## 2017-09-29 DIAGNOSIS — N08 SICKLE CELL NEPHROPATHY (H): ICD-10-CM

## 2017-09-29 DIAGNOSIS — R80.9 PROTEIN, URINE, ABNORMAL PRESENCE: ICD-10-CM

## 2017-09-29 DIAGNOSIS — N08 GLOMERULAR DISORDERS IN DISEASES CLASSIFIED ELSEWHERE: ICD-10-CM

## 2017-09-29 LAB
ALBUMIN SERPL-MCNC: 3.4 G/DL (ref 3.4–5)
ALBUMIN UR-MCNC: NEGATIVE MG/DL
ANION GAP SERPL CALCULATED.3IONS-SCNC: 7 MMOL/L (ref 3–14)
APPEARANCE UR: CLEAR
BASOPHILS # BLD AUTO: 0.1 10E9/L (ref 0–0.2)
BASOPHILS NFR BLD AUTO: 1.1 %
BILIRUB UR QL STRIP: NEGATIVE
BUN SERPL-MCNC: 20 MG/DL (ref 7–30)
CALCIUM SERPL-MCNC: 9.1 MG/DL (ref 8.5–10.1)
CHLORIDE SERPL-SCNC: 103 MMOL/L (ref 94–109)
CO2 SERPL-SCNC: 25 MMOL/L (ref 20–32)
COLOR UR AUTO: NORMAL
CREAT SERPL-MCNC: 1.45 MG/DL (ref 0.52–1.04)
CREAT UR-MCNC: 27 MG/DL
CRP SERPL-MCNC: <2.9 MG/L (ref 0–8)
DIFFERENTIAL METHOD BLD: NORMAL
EOSINOPHIL # BLD AUTO: 0.2 10E9/L (ref 0–0.7)
EOSINOPHIL NFR BLD AUTO: 3.6 %
ERYTHROCYTE [DISTWIDTH] IN BLOOD BY AUTOMATED COUNT: 14.1 % (ref 10–15)
GFR SERPL CREATININE-BSD FRML MDRD: 36 ML/MIN/1.7M2
GLUCOSE SERPL-MCNC: 107 MG/DL (ref 70–99)
GLUCOSE UR STRIP-MCNC: NEGATIVE MG/DL
HCT VFR BLD AUTO: 35.9 % (ref 35–47)
HGB BLD-MCNC: 12 G/DL (ref 11.7–15.7)
HGB UR QL STRIP: NEGATIVE
IMM GRANULOCYTES # BLD: 0.1 10E9/L (ref 0–0.4)
IMM GRANULOCYTES NFR BLD: 1.1 %
KETONES UR STRIP-MCNC: NEGATIVE MG/DL
LEUKOCYTE ESTERASE UR QL STRIP: NEGATIVE
LYMPHOCYTES # BLD AUTO: 1.6 10E9/L (ref 0.8–5.3)
LYMPHOCYTES NFR BLD AUTO: 34.7 %
MCH RBC QN AUTO: 31 PG (ref 26.5–33)
MCHC RBC AUTO-ENTMCNC: 33.4 G/DL (ref 31.5–36.5)
MCV RBC AUTO: 93 FL (ref 78–100)
MONOCYTES # BLD AUTO: 0.7 10E9/L (ref 0–1.3)
MONOCYTES NFR BLD AUTO: 14.4 %
NEUTROPHILS # BLD AUTO: 2 10E9/L (ref 1.6–8.3)
NEUTROPHILS NFR BLD AUTO: 45.1 %
NITRATE UR QL: NEGATIVE
NRBC # BLD AUTO: 0 10*3/UL
NRBC BLD AUTO-RTO: 0 /100
PH UR STRIP: 6.5 PH (ref 5–7)
PHOSPHATE SERPL-MCNC: 3 MG/DL (ref 2.5–4.5)
PLATELET # BLD AUTO: 316 10E9/L (ref 150–450)
POTASSIUM SERPL-SCNC: 4 MMOL/L (ref 3.4–5.3)
PROT UR-MCNC: 0.18 G/L
PROT/CREAT 24H UR: 0.66 G/G CR (ref 0–0.2)
RBC # BLD AUTO: 3.87 10E12/L (ref 3.8–5.2)
RBC #/AREA URNS AUTO: 0 /HPF (ref 0–2)
SODIUM SERPL-SCNC: 135 MMOL/L (ref 133–144)
SOURCE: NORMAL
SP GR UR STRIP: 1 (ref 1–1.03)
SQUAMOUS #/AREA URNS AUTO: <1 /HPF (ref 0–1)
UROBILINOGEN UR STRIP-MCNC: NORMAL MG/DL (ref 0–2)
WBC # BLD AUTO: 4.5 10E9/L (ref 4–11)
WBC #/AREA URNS AUTO: 2 /HPF (ref 0–2)

## 2017-09-29 PROCEDURE — 86140 C-REACTIVE PROTEIN: CPT | Performed by: INTERNAL MEDICINE

## 2017-09-29 PROCEDURE — 81001 URINALYSIS AUTO W/SCOPE: CPT | Performed by: INTERNAL MEDICINE

## 2017-09-29 PROCEDURE — 36415 COLL VENOUS BLD VENIPUNCTURE: CPT | Performed by: INTERNAL MEDICINE

## 2017-09-29 PROCEDURE — 84156 ASSAY OF PROTEIN URINE: CPT | Performed by: INTERNAL MEDICINE

## 2017-09-29 PROCEDURE — 83876 ASSAY MYELOPEROXIDASE: CPT | Performed by: INTERNAL MEDICINE

## 2017-09-29 PROCEDURE — 85025 COMPLETE CBC W/AUTO DIFF WBC: CPT | Performed by: INTERNAL MEDICINE

## 2017-09-29 PROCEDURE — 80069 RENAL FUNCTION PANEL: CPT | Performed by: INTERNAL MEDICINE

## 2017-10-02 LAB — MYELOPEROXIDASE AB SER-ACNC: 4.9 AI (ref 0–0.9)

## 2017-10-10 ENCOUNTER — HOSPITAL ENCOUNTER (OUTPATIENT)
Dept: LAB | Facility: CLINIC | Age: 72
Discharge: HOME | End: 2017-10-10
Attending: INTERNAL MEDICINE | Admitting: INTERNAL MEDICINE
Payer: MEDICARE

## 2017-10-10 DIAGNOSIS — E55.9 VITAMIN D INSUFFICIENCY: ICD-10-CM

## 2017-10-10 DIAGNOSIS — I77.82 ANCA-ASSOCIATED VASCULITIS (H): Primary | ICD-10-CM

## 2017-10-10 LAB
ALBUMIN SERPL-MCNC: 3.2 G/DL (ref 3.4–5)
ALBUMIN UR-MCNC: 30 MG/DL
ANION GAP SERPL CALCULATED.3IONS-SCNC: 7 MMOL/L (ref 3–14)
APPEARANCE UR: CLEAR
BASOPHILS # BLD AUTO: 0 10E9/L (ref 0–0.2)
BASOPHILS NFR BLD AUTO: 0.7 %
BILIRUB UR QL STRIP: NEGATIVE
BUN SERPL-MCNC: 20 MG/DL (ref 7–30)
CALCIUM SERPL-MCNC: 8.7 MG/DL (ref 8.5–10.1)
CHLORIDE SERPL-SCNC: 102 MMOL/L (ref 94–109)
CO2 SERPL-SCNC: 24 MMOL/L (ref 20–32)
COLOR UR AUTO: YELLOW
CREAT SERPL-MCNC: 1.43 MG/DL (ref 0.52–1.04)
CREAT UR-MCNC: 55 MG/DL
CRP SERPL-MCNC: 3 MG/L (ref 0–8)
DIFFERENTIAL METHOD BLD: NORMAL
EOSINOPHIL # BLD AUTO: 0.1 10E9/L (ref 0–0.7)
EOSINOPHIL NFR BLD AUTO: 1.9 %
ERYTHROCYTE [DISTWIDTH] IN BLOOD BY AUTOMATED COUNT: 13.9 % (ref 10–15)
GFR SERPL CREATININE-BSD FRML MDRD: 36 ML/MIN/1.7M2
GLUCOSE SERPL-MCNC: 129 MG/DL (ref 70–99)
GLUCOSE UR STRIP-MCNC: NEGATIVE MG/DL
HCT VFR BLD AUTO: 36.5 % (ref 35–47)
HGB BLD-MCNC: 12.5 G/DL (ref 11.7–15.7)
HGB UR QL STRIP: ABNORMAL
IMM GRANULOCYTES # BLD: 0 10E9/L (ref 0–0.4)
IMM GRANULOCYTES NFR BLD: 0.7 %
KETONES UR STRIP-MCNC: NEGATIVE MG/DL
LEUKOCYTE ESTERASE UR QL STRIP: NEGATIVE
LYMPHOCYTES # BLD AUTO: 1.1 10E9/L (ref 0.8–5.3)
LYMPHOCYTES NFR BLD AUTO: 18.3 %
MCH RBC QN AUTO: 31.4 PG (ref 26.5–33)
MCHC RBC AUTO-ENTMCNC: 34.2 G/DL (ref 31.5–36.5)
MCV RBC AUTO: 92 FL (ref 78–100)
MONOCYTES # BLD AUTO: 0.6 10E9/L (ref 0–1.3)
MONOCYTES NFR BLD AUTO: 9.8 %
NEUTROPHILS # BLD AUTO: 3.9 10E9/L (ref 1.6–8.3)
NEUTROPHILS NFR BLD AUTO: 68.6 %
NITRATE UR QL: NEGATIVE
NRBC # BLD AUTO: 0 10*3/UL
NRBC BLD AUTO-RTO: 0 /100
PH UR STRIP: 5.5 PH (ref 5–7)
PHOSPHATE SERPL-MCNC: 3.3 MG/DL (ref 2.5–4.5)
PLATELET # BLD AUTO: 210 10E9/L (ref 150–450)
POTASSIUM SERPL-SCNC: 4.3 MMOL/L (ref 3.4–5.3)
PROT UR-MCNC: 0.34 G/L
PROT/CREAT 24H UR: 0.61 G/G CR (ref 0–0.2)
PTH-INTACT SERPL-MCNC: 64 PG/ML (ref 12–72)
RBC # BLD AUTO: 3.98 10E12/L (ref 3.8–5.2)
RBC #/AREA URNS AUTO: 1 /HPF (ref 0–2)
SODIUM SERPL-SCNC: 133 MMOL/L (ref 133–144)
SOURCE: ABNORMAL
SP GR UR STRIP: 1.01 (ref 1–1.03)
SQUAMOUS #/AREA URNS AUTO: <1 /HPF (ref 0–1)
TRANS CELLS #/AREA URNS HPF: <1 /HPF (ref 0–1)
UROBILINOGEN UR STRIP-MCNC: NORMAL MG/DL (ref 0–2)
WBC # BLD AUTO: 5.7 10E9/L (ref 4–11)
WBC #/AREA URNS AUTO: 1 /HPF (ref 0–2)

## 2017-10-10 PROCEDURE — 82306 VITAMIN D 25 HYDROXY: CPT | Performed by: INTERNAL MEDICINE

## 2017-10-10 PROCEDURE — 83970 ASSAY OF PARATHORMONE: CPT | Performed by: INTERNAL MEDICINE

## 2017-10-10 PROCEDURE — 80069 RENAL FUNCTION PANEL: CPT | Performed by: INTERNAL MEDICINE

## 2017-10-10 PROCEDURE — 81001 URINALYSIS AUTO W/SCOPE: CPT | Performed by: INTERNAL MEDICINE

## 2017-10-10 PROCEDURE — 86140 C-REACTIVE PROTEIN: CPT | Performed by: INTERNAL MEDICINE

## 2017-10-10 PROCEDURE — 84156 ASSAY OF PROTEIN URINE: CPT | Performed by: INTERNAL MEDICINE

## 2017-10-10 PROCEDURE — 83876 ASSAY MYELOPEROXIDASE: CPT | Performed by: INTERNAL MEDICINE

## 2017-10-10 PROCEDURE — 36415 COLL VENOUS BLD VENIPUNCTURE: CPT | Performed by: INTERNAL MEDICINE

## 2017-10-10 PROCEDURE — 85025 COMPLETE CBC W/AUTO DIFF WBC: CPT | Performed by: INTERNAL MEDICINE

## 2017-10-11 LAB
DEPRECATED CALCIDIOL+CALCIFEROL SERPL-MC: 49 UG/L (ref 20–75)
MYELOPEROXIDASE AB SER-ACNC: 4.7 AI (ref 0–0.9)

## 2017-10-18 ENCOUNTER — MEDICAL CORRESPONDENCE (OUTPATIENT)
Dept: HEALTH INFORMATION MANAGEMENT | Facility: CLINIC | Age: 72
End: 2017-10-18

## 2017-10-19 DIAGNOSIS — N08 SICKLE CELL NEPHROPATHY (H): Primary | ICD-10-CM

## 2017-10-19 DIAGNOSIS — D64.9 ABSOLUTE ANEMIA: ICD-10-CM

## 2017-10-19 DIAGNOSIS — R80.9 PROTEINURIA: ICD-10-CM

## 2017-10-19 DIAGNOSIS — D57.1 SICKLE CELL NEPHROPATHY (H): Primary | ICD-10-CM

## 2017-10-19 DIAGNOSIS — N17.9 ACUTE RENAL FAILURE SYNDROME (H): ICD-10-CM

## 2017-11-02 ENCOUNTER — TRANSFERRED RECORDS (OUTPATIENT)
Dept: HEALTH INFORMATION MANAGEMENT | Facility: CLINIC | Age: 72
End: 2017-11-02

## 2017-11-21 DIAGNOSIS — R80.9 PROTEINURIA: ICD-10-CM

## 2017-11-21 DIAGNOSIS — D64.9 ABSOLUTE ANEMIA: ICD-10-CM

## 2017-11-21 DIAGNOSIS — D57.1 SICKLE CELL NEPHROPATHY (H): ICD-10-CM

## 2017-11-21 DIAGNOSIS — N17.9 ACUTE RENAL FAILURE SYNDROME (H): ICD-10-CM

## 2017-11-21 DIAGNOSIS — N08 SICKLE CELL NEPHROPATHY (H): ICD-10-CM

## 2017-11-21 LAB
ALBUMIN UR-MCNC: 30 MG/DL
APPEARANCE UR: CLEAR
BACTERIA #/AREA URNS HPF: ABNORMAL /HPF
BASOPHILS # BLD AUTO: 0.1 10E9/L (ref 0–0.2)
BASOPHILS NFR BLD AUTO: 0.5 %
BILIRUB UR QL STRIP: NEGATIVE
COLOR UR AUTO: YELLOW
DIFFERENTIAL METHOD BLD: NORMAL
EOSINOPHIL # BLD AUTO: 0.2 10E9/L (ref 0–0.7)
EOSINOPHIL NFR BLD AUTO: 2.4 %
ERYTHROCYTE [DISTWIDTH] IN BLOOD BY AUTOMATED COUNT: 13.8 % (ref 10–15)
GLUCOSE UR STRIP-MCNC: NEGATIVE MG/DL
HCT VFR BLD AUTO: 38.2 % (ref 35–47)
HGB BLD-MCNC: 12.6 G/DL (ref 11.7–15.7)
HGB UR QL STRIP: ABNORMAL
KETONES UR STRIP-MCNC: NEGATIVE MG/DL
LEUKOCYTE ESTERASE UR QL STRIP: ABNORMAL
LYMPHOCYTES # BLD AUTO: 1.7 10E9/L (ref 0.8–5.3)
LYMPHOCYTES NFR BLD AUTO: 17.5 %
MCH RBC QN AUTO: 30.9 PG (ref 26.5–33)
MCHC RBC AUTO-ENTMCNC: 33 G/DL (ref 31.5–36.5)
MCV RBC AUTO: 94 FL (ref 78–100)
MONOCYTES # BLD AUTO: 1 10E9/L (ref 0–1.3)
MONOCYTES NFR BLD AUTO: 11 %
NEUTROPHILS # BLD AUTO: 6.5 10E9/L (ref 1.6–8.3)
NEUTROPHILS NFR BLD AUTO: 68.6 %
NITRATE UR QL: NEGATIVE
NON-SQ EPI CELLS #/AREA URNS LPF: ABNORMAL /LPF
PH UR STRIP: 5.5 PH (ref 5–7)
PLATELET # BLD AUTO: 308 10E9/L (ref 150–450)
PROT UR-MCNC: 0.23 G/L
PROT/CREAT 24H UR: 0.43 G/G CR (ref 0–0.2)
RBC # BLD AUTO: 4.08 10E12/L (ref 3.8–5.2)
RBC #/AREA URNS AUTO: ABNORMAL /HPF
SOURCE: ABNORMAL
SP GR UR STRIP: <=1.005 (ref 1–1.03)
UROBILINOGEN UR STRIP-ACNC: 0.2 EU/DL (ref 0.2–1)
WBC # BLD AUTO: 9.4 10E9/L (ref 4–11)
WBC #/AREA URNS AUTO: ABNORMAL /HPF

## 2017-11-21 PROCEDURE — 81001 URINALYSIS AUTO W/SCOPE: CPT | Performed by: INTERNAL MEDICINE

## 2017-11-21 PROCEDURE — 80069 RENAL FUNCTION PANEL: CPT | Performed by: INTERNAL MEDICINE

## 2017-11-21 PROCEDURE — 84156 ASSAY OF PROTEIN URINE: CPT | Performed by: INTERNAL MEDICINE

## 2017-11-21 PROCEDURE — 36415 COLL VENOUS BLD VENIPUNCTURE: CPT | Performed by: INTERNAL MEDICINE

## 2017-11-21 PROCEDURE — 85025 COMPLETE CBC W/AUTO DIFF WBC: CPT | Performed by: INTERNAL MEDICINE

## 2017-11-22 LAB
ALBUMIN SERPL-MCNC: 3.4 G/DL (ref 3.4–5)
ANION GAP SERPL CALCULATED.3IONS-SCNC: 5 MMOL/L (ref 3–14)
BUN SERPL-MCNC: 21 MG/DL (ref 7–30)
CALCIUM SERPL-MCNC: 9.2 MG/DL (ref 8.5–10.1)
CHLORIDE SERPL-SCNC: 105 MMOL/L (ref 94–109)
CO2 SERPL-SCNC: 28 MMOL/L (ref 20–32)
CREAT SERPL-MCNC: 1.43 MG/DL (ref 0.52–1.04)
GFR SERPL CREATININE-BSD FRML MDRD: 36 ML/MIN/1.7M2
GLUCOSE SERPL-MCNC: 114 MG/DL (ref 70–99)
PHOSPHATE SERPL-MCNC: 3.4 MG/DL (ref 2.5–4.5)
POTASSIUM SERPL-SCNC: 3.8 MMOL/L (ref 3.4–5.3)
SODIUM SERPL-SCNC: 138 MMOL/L (ref 133–144)

## 2017-11-27 ENCOUNTER — RADIANT APPOINTMENT (OUTPATIENT)
Dept: GENERAL RADIOLOGY | Facility: CLINIC | Age: 72
End: 2017-11-27
Attending: FAMILY MEDICINE
Payer: COMMERCIAL

## 2017-11-27 ENCOUNTER — OFFICE VISIT (OUTPATIENT)
Dept: URGENT CARE | Facility: URGENT CARE | Age: 72
End: 2017-11-27
Payer: COMMERCIAL

## 2017-11-27 VITALS
OXYGEN SATURATION: 98 % | SYSTOLIC BLOOD PRESSURE: 109 MMHG | DIASTOLIC BLOOD PRESSURE: 61 MMHG | HEART RATE: 92 BPM | TEMPERATURE: 101.4 F

## 2017-11-27 DIAGNOSIS — R50.9 FEVER, UNSPECIFIED FEVER CAUSE: Primary | ICD-10-CM

## 2017-11-27 DIAGNOSIS — R50.9 FEVER, UNSPECIFIED FEVER CAUSE: ICD-10-CM

## 2017-11-27 PROCEDURE — 71020 XR CHEST 2 VW: CPT

## 2017-11-27 PROCEDURE — 99214 OFFICE O/P EST MOD 30 MIN: CPT | Performed by: FAMILY MEDICINE

## 2017-11-27 PROCEDURE — 70210 X-RAY EXAM OF SINUSES: CPT

## 2017-11-27 RX ORDER — LEVOFLOXACIN 500 MG/1
500 TABLET, FILM COATED ORAL DAILY
Qty: 7 TABLET | Refills: 0 | Status: ON HOLD | OUTPATIENT
Start: 2017-11-27 | End: 2017-12-04

## 2017-11-27 NOTE — MR AVS SNAPSHOT
"              After Visit Summary   2017    Elizabeth Galicia    MRN: [id number]           Patient Information     Date Of Birth          1945        Visit Information        Provider Department      2017 5:15 PM Maximo Boyd MD [facility]        Today's Diagnoses     Fever, unspecified fever cause    -  1       Follow-ups after your visit        Who to contact     If you have questions or need follow up information about today's clinic visit or your schedule please contact [facility]RGENT [facility] directly at [phone].  Normal or non-critical lab and imaging results will be communicated to you by Pixie Technologyhart, letter or phone within 4 business days after the clinic has received the results. If you do not hear from us within 7 days, please contact the clinic through Pixie Technologyhart or phone. If you have a critical or abnormal lab result, we will notify you by phone as soon as possible.  Submit refill requests through Aviir or call your pharmacy and they will forward the refill request to us. Please allow 3 business days for your refill to be completed.          Additional Information About Your Visit        MyChart Information     Aviir lets you send messages to your doctor, view your test results, renew your prescriptions, schedule appointments and more. To sign up, go to [email or web address] . Click on \"Log in\" on the left side of the screen, which will take you to the Welcome page. Then click on \"Sign up Now\" on the right side of the page.     You will be asked to enter the access code listed below, as well as some personal information. Please follow the directions to create your username and password.     Your access code is: [email or web address]  Expires: 12/10/2017  4:23 PM     Your access code will  in 90 days. If you need help or a new code, please call your [facility] or [phone].        Care EveryWhere ID     This is your Care EveryWhere ID. " This could be used by other organizations to access your [place] medical records  [id number]        Your Vitals Were     Pulse Temperature Pulse Oximetry             92 101.4  F (38.6  C) (Oral) 98%          Blood Pressure from Last 3 Encounters:   11/27/17 109/61   09/11/17 116/75   08/07/17 150/80    Weight from Last 3 Encounters:   09/11/17 235 lb 6.4 oz (106.8 kg)   08/07/17 233 lb 4 oz (105.8 kg)   06/12/17 212 lb 12.8 oz (96.5 kg)                 Today's Medication Changes          These changes are accurate as of: 11/27/17  6:14 PM.  If you have any questions, ask your nurse or doctor.               Start taking these medicines.        Dose/Directions    levofloxacin 500 MG tablet   Commonly known as:  LEVAQUIN   Used for:  Fever, unspecified fever cause   Started by:  Maximo Boyd MD        Dose:  500 mg   Take 1 tablet (500 mg) by mouth daily   Quantity:  7 tablet   Refills:  0            Where to get your medicines      These medications were sent to [facility]/pharmacy #[id number] - [place], [place] - [address]  [address] [facility] [address]     Phone:  [phone]     levofloxacin 500 MG tablet                Primary Care Provider Office Phone # Fax #    Daquan Barkley [phone]       [facility] [address] [address]        Equal Access to Services     [facility] AH: Hadii wally ku hadasho Soomaali, waaxda luqadaha, qaybta kaalmada kim, georgette gonzalez. So [facility] [phone].    ATENCIÓN: Si habla español, tiene a norman disposición servicios gratuitos de asistencia lingüística. Llame al [phone].    We comply with applicable federal civil rights laws and [place] laws. We do not discriminate on the basis of race, color, national origin, age, disability, sex, sexual orientation, or gender identity.            Thank you!     Thank you for choosing [facility] URGENT CARE  for your care. Our goal is always to  provide you with excellent care. Hearing back from our patients is one way we can continue to improve our services. Please take a few minutes to complete the written survey that you may receive in the mail after your visit with us. Thank you!             Your Updated Medication List - Protect others around you: Learn how to safely use, store and throw away your medicines at www.disposemymeds.org.          This list is accurate as of: 11/27/17  6:14 PM.  Always use your most recent med list.                   Brand Name Dispense Instructions for use Diagnosis    acetaminophen 500 MG tablet    TYLENOL     Take 2 tablets (1,000 mg) by mouth every 8 hours    Generalized pain       CALCIUM LACTATE PO      Take 4 tablets by mouth daily        cyanocobalamin 1000 MCG/ML injection    VITAMIN B12    1 mL    Inject 1 mL (1,000 mcg) into the muscle once as needed    Anemia, unspecified type       guaiFENesin-codeine 100-10 MG/5ML Soln solution    ROBITUSSIN AC    420 mL    Take 5 mLs by mouth every 4 hours as needed for cough    Cough       levofloxacin 500 MG tablet    LEVAQUIN    7 tablet    Take 1 tablet (500 mg) by mouth daily    Fever, unspecified fever cause       Levothyroxine Sodium 112 MCG Caps     90 capsule    Take 112 mcg by mouth every morning    Postoperative hypothyroidism       liothyronine 5 MCG tablet    CYTOMEL    540 tablet    Take 3 tablets (15 mcg) by mouth 2 times daily    Postoperative hypothyroidism       MAGNESIUM LACTATE PO      Take 2 tablets by mouth daily        omeprazole 40 MG capsule    priLOSEC    30 capsule    Take 1 capsule (40 mg) by mouth every morning    Prophylactic measure       ONDANSETRON PO      Take 4 mg by mouth every 8 hours as needed for nausea        senna-docusate 8.6-50 MG per tablet    SENOKOT-S;PERICOLACE    60 tablet    Take 1-2 tablets by mouth 2 times daily    Constipation, unspecified constipation type       UNKNOWN TO PATIENT           VITAMIN D3 PO      Take 1,000  Units by mouth daily

## 2017-11-27 NOTE — PROGRESS NOTES
SUBJECTIVE:  Elizabeth Galicia is a 72 year old female here with concerns about sinus infection.  She states onset of symptoms were 2 week(s) ago.  She has had maxillary, frontal pressure. Course of illness is worsening. Severity moderate  Current and Associated symptoms: fever, nasal congestion and cough   Predisposing factors include recent illness and and vasculitis. Recent treatment has included: OTC meds    Past Medical History:   Diagnosis Date     ANCA-associated vasculitis (H)      Anxiety      Gastro-oesophageal reflux disease      Herniated lumbar intervertebral disc      Herpes      Heterozygous for MTHFR gene mutation (H)     Per CareEverywhere     Hyperlipidemia      Lesion of upper eyelid LEFT     Obesity (BMI 30-39.9)      Postoperative hypothyroidism      Seasonal allergies      Type 2 diabetes mellitus without complication (H) 5/24/2017     Uterine prolapse      Social History   Substance Use Topics     Smoking status: Never Smoker     Smokeless tobacco: Never Used     Alcohol use Yes      Comment: rarely       ROS:  INTEGUMENTARY/SKIN: NEGATIVE for worrisome rashes, moles or lesions  EYES: NEGATIVE for vision changes or irritation    OBJECTIVE:  /61  Pulse 92  Temp 101.4  F (38.6  C) (Oral)  SpO2 98%     Exam:GENERAL APPEARANCE: mild distress and over weight  EYES: EOMI,  PERRL, conjunctiva clear  HENT: ear canals and TM's normal.  Nose and mouth without ulcers, erythema or lesions  NECK: supple, nontender, no lymphadenopathy  RESP: lungs clear to auscultation - no rales, rhonchi or wheezes  CV: regular rates and rhythm, normal S1 S2, no murmur noted  NEURO: Normal strength and tone, sensory exam grossly normal,  normal speech and mentation  SKIN: no suspicious lesions or rashes    ASSESSMENT:  1. Fever, unspecified fever cause  cxr with ? Infiltrate R base reviewed by me.   Sinus xrays negative to my review.   Radiology to review xrays and I will communicate new findings .   Cover for  infection.   Symptomatic cares were discussed in detail.   Pt instructed to come back to the clinic for worsening sx      - XR Chest 2 Views; Future  - XR Sinus 1/2 Views; Future  - levofloxacin (LEVAQUIN) 500 MG tablet; Take 1 tablet (500 mg) by mouth daily  Dispense: 7 tablet; Refill: 0

## 2017-11-27 NOTE — NURSING NOTE
"Chief Complaint   Patient presents with     Urgent Care     URI     coughing,fever,no energy,chills,sneezing,rash on legs and torso,remission from vasculitis       Initial /61  Pulse 92  Temp 101.4  F (38.6  C) (Oral)  SpO2 98% Estimated body mass index is 39.17 kg/(m^2) as calculated from the following:    Height as of 9/11/17: 5' 5\" (1.651 m).    Weight as of 9/11/17: 235 lb 6.4 oz (106.8 kg).  Medication Reconciliation: complete   Madeleine MERCHANT MA       "

## 2017-11-29 ENCOUNTER — NURSE TRIAGE (OUTPATIENT)
Dept: NURSING | Facility: CLINIC | Age: 72
End: 2017-11-29

## 2017-11-30 ENCOUNTER — RADIANT APPOINTMENT (OUTPATIENT)
Dept: GENERAL RADIOLOGY | Facility: CLINIC | Age: 72
End: 2017-11-30
Attending: NURSE PRACTITIONER
Payer: COMMERCIAL

## 2017-11-30 ENCOUNTER — HOSPITAL ENCOUNTER (INPATIENT)
Facility: CLINIC | Age: 72
LOS: 4 days | Discharge: HOME | DRG: 683 | End: 2017-12-04
Attending: EMERGENCY MEDICINE | Admitting: INTERNAL MEDICINE
Payer: MEDICARE

## 2017-11-30 ENCOUNTER — OFFICE VISIT (OUTPATIENT)
Dept: FAMILY MEDICINE | Facility: CLINIC | Age: 72
End: 2017-11-30
Payer: COMMERCIAL

## 2017-11-30 VITALS
SYSTOLIC BLOOD PRESSURE: 110 MMHG | HEIGHT: 65 IN | OXYGEN SATURATION: 100 % | DIASTOLIC BLOOD PRESSURE: 56 MMHG | TEMPERATURE: 97.9 F | BODY MASS INDEX: 39.15 KG/M2 | HEART RATE: 86 BPM | RESPIRATION RATE: 15 BRPM | WEIGHT: 235 LBS

## 2017-11-30 DIAGNOSIS — R21 RASH: ICD-10-CM

## 2017-11-30 DIAGNOSIS — N30.01 ACUTE CYSTITIS WITH HEMATURIA: ICD-10-CM

## 2017-11-30 DIAGNOSIS — E11.9 TYPE 2 DIABETES MELLITUS WITHOUT COMPLICATION, WITHOUT LONG-TERM CURRENT USE OF INSULIN (H): Chronic | ICD-10-CM

## 2017-11-30 DIAGNOSIS — J98.9 RESPIRATORY ILLNESS: ICD-10-CM

## 2017-11-30 DIAGNOSIS — N39.0 URINARY TRACT INFECTION WITHOUT HEMATURIA, SITE UNSPECIFIED: ICD-10-CM

## 2017-11-30 DIAGNOSIS — N17.9 AKI (ACUTE KIDNEY INJURY) (H): ICD-10-CM

## 2017-11-30 DIAGNOSIS — N25.89 RTA (RENAL TUBULAR ACIDOSIS): Primary | ICD-10-CM

## 2017-11-30 DIAGNOSIS — R53.83 OTHER FATIGUE: ICD-10-CM

## 2017-11-30 DIAGNOSIS — N10 ACUTE PYELONEPHRITIS: ICD-10-CM

## 2017-11-30 DIAGNOSIS — N17.8 ACUTE RENAL FAILURE WITH OTHER SPECIFIED PATHOLOGICAL LESION IN KIDNEY (H): Primary | ICD-10-CM

## 2017-11-30 DIAGNOSIS — Z79.2 PROPHYLACTIC ANTIBIOTIC: ICD-10-CM

## 2017-11-30 DIAGNOSIS — D72.829 LEUKOCYTOSIS, UNSPECIFIED TYPE: ICD-10-CM

## 2017-11-30 PROBLEM — N18.9 RENAL FAILURE (ARF), ACUTE ON CHRONIC (H): Status: ACTIVE | Noted: 2017-11-30

## 2017-11-30 LAB
ALBUMIN SERPL-MCNC: 2.5 G/DL (ref 3.4–5)
ALBUMIN UR-MCNC: NEGATIVE MG/DL
ALP SERPL-CCNC: 113 U/L (ref 40–150)
ALT SERPL W P-5'-P-CCNC: 18 U/L (ref 0–50)
ANION GAP SERPL CALCULATED.3IONS-SCNC: 10 MMOL/L (ref 3–14)
APPEARANCE UR: ABNORMAL
AST SERPL W P-5'-P-CCNC: 14 U/L (ref 0–45)
BACTERIA #/AREA URNS HPF: ABNORMAL /HPF
BASOPHILS # BLD AUTO: 0 10E9/L (ref 0–0.2)
BASOPHILS NFR BLD AUTO: 0.1 %
BILIRUB SERPL-MCNC: 0.3 MG/DL (ref 0.2–1.3)
BILIRUB UR QL STRIP: NEGATIVE
BUN SERPL-MCNC: 36 MG/DL (ref 7–30)
CALCIUM SERPL-MCNC: 9 MG/DL (ref 8.5–10.1)
CHLORIDE SERPL-SCNC: 100 MMOL/L (ref 94–109)
CO2 SERPL-SCNC: 23 MMOL/L (ref 20–32)
COLOR UR AUTO: YELLOW
CREAT SERPL-MCNC: 2.88 MG/DL (ref 0.52–1.04)
CRP SERPL-MCNC: 227 MG/L (ref 0–8)
DIFFERENTIAL METHOD BLD: ABNORMAL
EOSINOPHIL # BLD AUTO: 0.2 10E9/L (ref 0–0.7)
EOSINOPHIL NFR BLD AUTO: 1.1 %
ERYTHROCYTE [DISTWIDTH] IN BLOOD BY AUTOMATED COUNT: 14.5 % (ref 10–15)
ERYTHROCYTE [SEDIMENTATION RATE] IN BLOOD BY WESTERGREN METHOD: 72 MM/H (ref 0–30)
GFR SERPL CREATININE-BSD FRML MDRD: 16 ML/MIN/1.7M2
GLUCOSE BLDC GLUCOMTR-MCNC: 158 MG/DL (ref 70–99)
GLUCOSE BLDC GLUCOMTR-MCNC: 98 MG/DL (ref 70–99)
GLUCOSE SERPL-MCNC: 101 MG/DL (ref 70–99)
GLUCOSE UR STRIP-MCNC: NEGATIVE MG/DL
HCT VFR BLD AUTO: 32.2 % (ref 35–47)
HGB BLD-MCNC: 10.4 G/DL (ref 11.7–15.7)
HGB UR QL STRIP: ABNORMAL
KETONES UR STRIP-MCNC: NEGATIVE MG/DL
LEUKOCYTE ESTERASE UR QL STRIP: ABNORMAL
LYMPHOCYTES # BLD AUTO: 0.8 10E9/L (ref 0.8–5.3)
LYMPHOCYTES NFR BLD AUTO: 4.1 %
MCH RBC QN AUTO: 30.5 PG (ref 26.5–33)
MCHC RBC AUTO-ENTMCNC: 32.3 G/DL (ref 31.5–36.5)
MCV RBC AUTO: 94 FL (ref 78–100)
MONOCYTES # BLD AUTO: 1.1 10E9/L (ref 0–1.3)
MONOCYTES NFR BLD AUTO: 5.4 %
NEUTROPHILS # BLD AUTO: 18.4 10E9/L (ref 1.6–8.3)
NEUTROPHILS NFR BLD AUTO: 89.3 %
NITRATE UR QL: NEGATIVE
NON-SQ EPI CELLS #/AREA URNS LPF: ABNORMAL /LPF
PH UR STRIP: 5 PH (ref 5–7)
PLATELET # BLD AUTO: 512 10E9/L (ref 150–450)
POTASSIUM SERPL-SCNC: 4.5 MMOL/L (ref 3.4–5.3)
PROT SERPL-MCNC: 6.8 G/DL (ref 6.8–8.8)
RBC # BLD AUTO: 3.41 10E12/L (ref 3.8–5.2)
RBC #/AREA URNS AUTO: ABNORMAL /HPF
SODIUM SERPL-SCNC: 133 MMOL/L (ref 133–144)
SOURCE: ABNORMAL
SP GR UR STRIP: 1.01 (ref 1–1.03)
UROBILINOGEN UR STRIP-ACNC: 0.2 EU/DL (ref 0.2–1)
WBC # BLD AUTO: 20.6 10E9/L (ref 4–11)
WBC #/AREA URNS AUTO: ABNORMAL /HPF

## 2017-11-30 PROCEDURE — 25000128 H RX IP 250 OP 636: Performed by: EMERGENCY MEDICINE

## 2017-11-30 PROCEDURE — 87086 URINE CULTURE/COLONY COUNT: CPT | Performed by: NURSE PRACTITIONER

## 2017-11-30 PROCEDURE — A9270 NON-COVERED ITEM OR SERVICE: HCPCS | Mod: GY | Performed by: INTERNAL MEDICINE

## 2017-11-30 PROCEDURE — 99285 EMERGENCY DEPT VISIT HI MDM: CPT | Mod: 25

## 2017-11-30 PROCEDURE — 99223 1ST HOSP IP/OBS HIGH 75: CPT | Mod: AI | Performed by: INTERNAL MEDICINE

## 2017-11-30 PROCEDURE — 99207 ZZC OFFICE-HOSPITAL ADMIT: CPT | Performed by: NURSE PRACTITIONER

## 2017-11-30 PROCEDURE — 81001 URINALYSIS AUTO W/SCOPE: CPT | Performed by: NURSE PRACTITIONER

## 2017-11-30 PROCEDURE — 87186 SC STD MICRODIL/AGAR DIL: CPT | Performed by: NURSE PRACTITIONER

## 2017-11-30 PROCEDURE — 87088 URINE BACTERIA CULTURE: CPT | Performed by: NURSE PRACTITIONER

## 2017-11-30 PROCEDURE — 80053 COMPREHEN METABOLIC PANEL: CPT | Performed by: NURSE PRACTITIONER

## 2017-11-30 PROCEDURE — 71020 XR CHEST 2 VW: CPT

## 2017-11-30 PROCEDURE — 85025 COMPLETE CBC W/AUTO DIFF WBC: CPT | Performed by: NURSE PRACTITIONER

## 2017-11-30 PROCEDURE — 96374 THER/PROPH/DIAG INJ IV PUSH: CPT

## 2017-11-30 PROCEDURE — 12000000 ZZH R&B MED SURG/OB

## 2017-11-30 PROCEDURE — 85652 RBC SED RATE AUTOMATED: CPT | Performed by: NURSE PRACTITIONER

## 2017-11-30 PROCEDURE — 25000132 ZZH RX MED GY IP 250 OP 250 PS 637: Mod: GY | Performed by: INTERNAL MEDICINE

## 2017-11-30 PROCEDURE — 86140 C-REACTIVE PROTEIN: CPT | Performed by: NURSE PRACTITIONER

## 2017-11-30 PROCEDURE — 25000125 ZZHC RX 250: Performed by: INTERNAL MEDICINE

## 2017-11-30 PROCEDURE — 87040 BLOOD CULTURE FOR BACTERIA: CPT | Performed by: EMERGENCY MEDICINE

## 2017-11-30 PROCEDURE — 82043 UR ALBUMIN QUANTITATIVE: CPT | Performed by: NURSE PRACTITIONER

## 2017-11-30 PROCEDURE — 25000128 H RX IP 250 OP 636: Performed by: INTERNAL MEDICINE

## 2017-11-30 PROCEDURE — 36415 COLL VENOUS BLD VENIPUNCTURE: CPT | Performed by: NURSE PRACTITIONER

## 2017-11-30 PROCEDURE — 00000146 ZZHCL STATISTIC GLUCOSE BY METER IP

## 2017-11-30 PROCEDURE — 96372 THER/PROPH/DIAG INJ SC/IM: CPT | Performed by: NURSE PRACTITIONER

## 2017-11-30 RX ORDER — ONDANSETRON 2 MG/ML
4 INJECTION INTRAMUSCULAR; INTRAVENOUS EVERY 6 HOURS PRN
Status: DISCONTINUED | OUTPATIENT
Start: 2017-11-30 | End: 2017-12-04 | Stop reason: HOSPADM

## 2017-11-30 RX ORDER — PREDNISONE 20 MG/1
40 TABLET ORAL DAILY
Status: DISCONTINUED | OUTPATIENT
Start: 2017-11-30 | End: 2017-12-01

## 2017-11-30 RX ORDER — ACETAMINOPHEN 650 MG/1
650 SUPPOSITORY RECTAL EVERY 4 HOURS PRN
Status: DISCONTINUED | OUTPATIENT
Start: 2017-11-30 | End: 2017-12-04 | Stop reason: HOSPADM

## 2017-11-30 RX ORDER — CODEINE PHOSPHATE AND GUAIFENESIN 10; 100 MG/5ML; MG/5ML
5 SOLUTION ORAL EVERY 4 HOURS PRN
Status: DISCONTINUED | OUTPATIENT
Start: 2017-11-30 | End: 2017-12-04 | Stop reason: HOSPADM

## 2017-11-30 RX ORDER — DEXTROSE MONOHYDRATE 25 G/50ML
25-50 INJECTION, SOLUTION INTRAVENOUS
Status: DISCONTINUED | OUTPATIENT
Start: 2017-11-30 | End: 2017-12-04 | Stop reason: HOSPADM

## 2017-11-30 RX ORDER — AMOXICILLIN 250 MG
1 CAPSULE ORAL 2 TIMES DAILY PRN
Status: DISCONTINUED | OUTPATIENT
Start: 2017-11-30 | End: 2017-12-04 | Stop reason: HOSPADM

## 2017-11-30 RX ORDER — CEFTRIAXONE 1 G/1
1000 INJECTION, POWDER, FOR SOLUTION INTRAMUSCULAR; INTRAVENOUS ONCE
Qty: 10 ML | Refills: 0 | Status: ON HOLD | OUTPATIENT
Start: 2017-11-30 | End: 2017-12-04

## 2017-11-30 RX ORDER — GUAIFENESIN 600 MG/1
600 TABLET, EXTENDED RELEASE ORAL 2 TIMES DAILY
Status: DISCONTINUED | OUTPATIENT
Start: 2017-11-30 | End: 2017-12-04 | Stop reason: HOSPADM

## 2017-11-30 RX ORDER — ACETAMINOPHEN 325 MG/1
650 TABLET ORAL EVERY 4 HOURS PRN
Status: DISCONTINUED | OUTPATIENT
Start: 2017-11-30 | End: 2017-12-04 | Stop reason: HOSPADM

## 2017-11-30 RX ORDER — SULFAMETHOXAZOLE AND TRIMETHOPRIM 400; 80 MG/1; MG/1
1 TABLET ORAL DAILY
Status: ON HOLD | COMMUNITY
End: 2017-12-04

## 2017-11-30 RX ORDER — LEVOTHYROXINE SODIUM 112 UG/1
112 TABLET ORAL EVERY MORNING
Status: DISCONTINUED | OUTPATIENT
Start: 2017-12-01 | End: 2017-12-04 | Stop reason: HOSPADM

## 2017-11-30 RX ORDER — PREDNISONE 5 MG/1
5 TABLET ORAL DAILY
Status: CANCELLED | OUTPATIENT
Start: 2017-11-30

## 2017-11-30 RX ORDER — AMOXICILLIN 250 MG
2 CAPSULE ORAL 2 TIMES DAILY PRN
Status: DISCONTINUED | OUTPATIENT
Start: 2017-11-30 | End: 2017-12-04 | Stop reason: HOSPADM

## 2017-11-30 RX ORDER — LEVOFLOXACIN 750 MG/1
750 TABLET, FILM COATED ORAL DAILY
Qty: 7 TABLET | Refills: 0 | Status: SHIPPED | OUTPATIENT
Start: 2017-11-30 | End: 2017-11-30

## 2017-11-30 RX ORDER — NALOXONE HYDROCHLORIDE 0.4 MG/ML
.1-.4 INJECTION, SOLUTION INTRAMUSCULAR; INTRAVENOUS; SUBCUTANEOUS
Status: DISCONTINUED | OUTPATIENT
Start: 2017-11-30 | End: 2017-12-04 | Stop reason: HOSPADM

## 2017-11-30 RX ORDER — ONDANSETRON 4 MG/1
4 TABLET, ORALLY DISINTEGRATING ORAL EVERY 6 HOURS PRN
Status: DISCONTINUED | OUTPATIENT
Start: 2017-11-30 | End: 2017-12-04 | Stop reason: HOSPADM

## 2017-11-30 RX ORDER — SODIUM CHLORIDE 9 MG/ML
INJECTION, SOLUTION INTRAVENOUS CONTINUOUS
Status: DISCONTINUED | OUTPATIENT
Start: 2017-11-30 | End: 2017-12-02

## 2017-11-30 RX ORDER — NICOTINE POLACRILEX 4 MG
15-30 LOZENGE BUCCAL
Status: DISCONTINUED | OUTPATIENT
Start: 2017-11-30 | End: 2017-12-04 | Stop reason: HOSPADM

## 2017-11-30 RX ORDER — AZATHIOPRINE 50 MG/1
50 TABLET ORAL DAILY
Status: CANCELLED | OUTPATIENT
Start: 2017-11-30

## 2017-11-30 RX ORDER — LIOTHYRONINE SODIUM 5 UG/1
15 TABLET ORAL 2 TIMES DAILY
Status: DISCONTINUED | OUTPATIENT
Start: 2017-11-30 | End: 2017-12-04 | Stop reason: HOSPADM

## 2017-11-30 RX ADMIN — CEFTRIAXONE 1 G: 1 INJECTION, SOLUTION INTRAVENOUS at 16:44

## 2017-11-30 RX ADMIN — SODIUM CHLORIDE: 9 INJECTION, SOLUTION INTRAVENOUS at 18:03

## 2017-11-30 RX ADMIN — PREDNISONE 40 MG: 20 TABLET ORAL at 19:00

## 2017-11-30 RX ADMIN — LIOTHYRONINE SODIUM 15 MCG: 5 TABLET ORAL at 21:12

## 2017-11-30 RX ADMIN — GUAIFENESIN AND CODEINE PHOSPHATE 5 ML: 100; 10 SOLUTION ORAL at 23:11

## 2017-11-30 RX ADMIN — VITAMIN D, TAB 1000IU (100/BT) 1000 UNITS: 25 TAB at 19:00

## 2017-11-30 RX ADMIN — GUAIFENESIN 600 MG: 600 TABLET, EXTENDED RELEASE ORAL at 20:55

## 2017-11-30 RX ADMIN — GUAIFENESIN AND CODEINE PHOSPHATE 5 ML: 100; 10 SOLUTION ORAL at 19:01

## 2017-11-30 RX ADMIN — ACETAMINOPHEN 650 MG: 325 TABLET, FILM COATED ORAL at 20:55

## 2017-11-30 ASSESSMENT — ACTIVITIES OF DAILY LIVING (ADL)
RETIRED_EATING: 0-->INDEPENDENT
TRANSFERRING: 0-->INDEPENDENT
FALL_HISTORY_WITHIN_LAST_SIX_MONTHS: NO
DRESS: 0-->INDEPENDENT
TOILETING: 0-->INDEPENDENT
SWALLOWING: 0-->SWALLOWS FOODS/LIQUIDS WITHOUT DIFFICULTY
RETIRED_COMMUNICATION: 0-->UNDERSTANDS/COMMUNICATES WITHOUT DIFFICULTY
AMBULATION: 0-->INDEPENDENT
COGNITION: 0 - NO COGNITION ISSUES REPORTED
BATHING: 0-->INDEPENDENT

## 2017-11-30 ASSESSMENT — ENCOUNTER SYMPTOMS
DIFFICULTY URINATING: 0
FEVER: 1

## 2017-11-30 NOTE — IP AVS SNAPSHOT
MRN:[id number]                      After Visit Summary   11/30/2017    Elizabeth Galicia    MRN: [id number]           Thank you!     Thank you for choosing [facility] for your care. Our goal is always to provide you with excellent care. Hearing back from our patients is one way we can continue to improve our services. Please take a few minutes to complete the written survey that you may receive in the mail after you visit with us. Thank you!        Patient Information     Date Of Birth          1945        Designated Caregiver       Most Recent Value    Caregiver    Will someone help with your care after discharge? yes    Name of designated caregiver Ashleigh Arambula, spouse    Phone number of caregiver [phone]    Caregiver address same as patient      About your hospital stay     You were admitted on:  November 30, 2017 You last received care in the:  [place] [facility] [address] [facility]    You were discharged on:  December 4, 2017        Reason for your hospital stay       Evaluation of your abnormal renal function and possible UTI.                  Who to Call     For medical emergencies, please call 911.  For non-urgent questions about your medical care, please call your primary care provider or clinic, [phone]          Attending Provider     Provider Specialty    Demetri Guevara DO Emergency Medicine    Denis Cox MD Internal Medicine       Primary Care Provider Office Phone # Fax #    Nati Temitope Littlejohn [phone]      After Care Instructions     Activity       Your activity upon discharge: activity as tolerated            Diet       Follow this diet upon discharge: Regular                  Follow-up Appointments     Follow-up and recommended labs and tests        1. [facility] labs [facility] on Thursday 12/7/17 -- BMP, CRP and UA.  2. Follow up with your PCP in the next 7-10 days.  3. Follow up with  "Dr. Arnold in clinic in the next 1-2 weeks.                  Your next 10 appointments already scheduled     Dec 05, 2017 10:00 AM CST   LAB with [facility]   [facility]    [address] [id number] [phone]           Please do not eat 10-12 hours before your appointment if you are coming in fasting for labs on lipids, cholesterol, or glucose (sugar). This does not apply to pregnant women. Water, hot tea and black coffee (with nothing added) are okay. Do not drink other fluids, diet soda or chew gum.            Dec 11, 2017  2:30 PM CST   Office Visit with Nati Littlejohn,    [facility]    [address] [place] [id number] [phone]           Bring a current list of meds and any records pertaining to this visit. For Physicals, please bring immunization records and any forms needing to be filled out. Please arrive 10 minutes early to complete paperwork.              Pending Results     Date and Time Order Name Status Description    12/2/2017 0000 Antineutrophil cytoplasmic Jahaira IgG In process     11/30/2017 1622 Blood culture Preliminary     11/30/2017 1622 Blood culture Preliminary             Statement of Approval     Ordered          12/04/17 1328  I have reviewed and agree with all the recommendations and orders detailed in this document.  EFFECTIVE NOW     Approved and electronically signed by:  Shaneka Laguerre DO             Admission Information     Date & Time Provider Department Dept. Phone    11/30/2017 Denis Cox MD [place] [address] [facility] [phone]      Your Vitals Were     Blood Pressure Pulse Temperature Respirations Height Weight    147/76 (BP Location: Left arm) 97 97.5  F (36.4  C) (Oral) 16 1.651 m (5' 5\") 110.9 kg (244 lb 7.8 oz)    Pulse Oximetry BMI (Body Mass Index)                97% 40.69 kg/m2          MyChart Information     MyChart " "lets you send messages to your doctor, view your test results, renew your prescriptions, schedule appointments and more. To sign up, go to [email or web address] . Click on \"Log in\" on the left side of the screen, which will take you to the Welcome page. Then click on \"Sign up Now\" on the right side of the page.     You will be asked to enter the access code listed below, as well as some personal information. Please follow the directions to create your username and password.     Your access code is: [email or web address]  Expires: 12/10/2017  4:23 PM     Your access code will  in 90 days. If you need help or a new code, please call your [facility] clinic or [phone].        Care EveryWhere ID     This is your Care EveryWhere ID. This could be used by other organizations to access your [facility] medical records  [id number]        Equal Access to Services     LORENZA RICHARDS : Mandeep Pennington, shubham juarez, valerio alvarez, georgette bates . So [facility] [phone].    ATENCIÓN: Si habla español, tiene a norman disposición servicios gratuitos de asistencia lingüística. Abiodun al [phone].    We comply with applicable federal civil rights laws and [place] laws. We do not discriminate on the basis of race, color, national origin, age, disability, sex, sexual orientation, or gender identity.               Review of your medicines      START taking        Dose / Directions    sodium bicarbonate 650 MG tablet   Used for:  RTA (renal tubular acidosis)        Dose:  650 mg   Take 1 tablet (650 mg) by mouth 2 times daily   Refills:  0         CONTINUE these medicines which may have CHANGED, or have new prescriptions. If we are uncertain of the size of tablets/capsules you have at home, strength may be listed as something that might have changed.        Dose / Directions    acetaminophen 500 MG tablet   Commonly known as:  TYLENOL   This may have changed:    - how much to take  - " when to take this  - reasons to take this   Used for:  Generalized pain        Dose:  1000 mg   Take 2 tablets (1,000 mg) by mouth every 8 hours   Refills:  0       sulfamethoxazole-trimethoprim 400-80 MG per tablet   Commonly known as:  BACTRIM/SEPTRA   This may have changed:  when to take this   Used for:  Prophylactic antibiotic        Dose:  1 tablet   Take 1 tablet by mouth every other day   Refills:  0         CONTINUE these medicines which have NOT CHANGED        Dose / Directions    AZATHIOPRINE PO        Dose:  50 mg   Take 50 mg by mouth daily   Refills:  0       CALCIUM LACTATE PO        Dose:  4 tablet   Take 4 tablets by mouth daily   Refills:  0       guaiFENesin-codeine 100-10 MG/5ML Soln solution   Commonly known as:  ROBITUSSIN AC   Used for:  Cough        Dose:  5 mL   Take 5 mLs by mouth every 4 hours as needed for cough   Quantity:  420 mL   Refills:  0       Levothyroxine Sodium 112 MCG Caps   Used for:  Postoperative hypothyroidism        Dose:  112 mcg   Take 112 mcg by mouth every morning   Quantity:  90 capsule   Refills:  3       liothyronine 5 MCG tablet   Commonly known as:  CYTOMEL   Used for:  Postoperative hypothyroidism        Dose:  15 mcg   Take 3 tablets (15 mcg) by mouth 2 times daily   Quantity:  540 tablet   Refills:  3       MAGNESIUM LACTATE PO        Dose:  2 tablet   Take 2 tablets by mouth daily   Refills:  0       omeprazole 40 MG capsule   Commonly known as:  priLOSEC   Used for:  Prophylactic measure        Dose:  40 mg   Take 1 capsule (40 mg) by mouth every morning   Quantity:  30 capsule   Refills:  0       PREDNISONE PO        Dose:  5 mg   Take 5 mg by mouth daily 1/2 of 10mg tablet daily   Refills:  0       VITAMIN D3 PO        Dose:  1000 Units   Take 1,000 Units by mouth daily   Refills:  0         STOP taking     cefTRIAXone 1 GM vial   Commonly known as:  ROCEPHIN           levofloxacin 500 MG tablet   Commonly known as:  LEVAQUIN                Where to get  your medicines      Some of these will need a paper prescription and others can be bought over the counter. Ask your nurse if you have questions.     You don't need a prescription for these medications     sodium bicarbonate 650 MG tablet    sulfamethoxazole-trimethoprim 400-80 MG per tablet               ANTIBIOTIC INSTRUCTION     You've Been Prescribed an Antibiotic - Now What?  Your healthcare team thinks that you or your loved one might have an infection. Some infections can be treated with antibiotics, which are powerful, life-saving drugs. Like all medications, antibiotics have side effects and should only be used when necessary. There are some important things you should know about your antibiotic treatment.      Your healthcare team may run tests before you start taking an antibiotic.    Your team may take samples (e.g., from your blood, urine or other areas) to run tests to look for bacteria. These test can be important to determine if you need an antibiotic at all and, if you do, which antibiotic will work best.      Within a few days, your healthcare team might change or even stop your antibiotic.    Your team may start you on an antibiotic while they are working to find out what is making you sick.    Your team might change your antibiotic because test results show that a different antibiotic would be better to treat your infection.    In some cases, once your team has more information, they learn that you do not need an antibiotic at all. They may find out that you don't have an infection, or that the antibiotic you're taking won't work against your infection. For example, an infection caused by a virus can't be treated with antibiotics. Staying on an antibiotic when you don't need it is more likely to be harmful than helpful.      You may experience side effects from your antibiotic.    Like all medications, antibiotics have side effects. Some of these can be serious.    Let you healthcare team know if  you have any known allergies when you are admitted to the hospital.    One significant side effect of nearly all antibiotics is the risk of severe and sometimes deadly diarrhea caused by Clostridium difficile (C. Difficile). This occurs when a person takes antibiotics because some good germs are destroyed. Antibiotic use allows C. diificile to take over, putting patients at high risk for this serious infection.    As a patient or caregiver, it is important to understand your or your loved one's antibiotic treatment. It is especially important for caregivers to speak up when patients can't speak for themselves. Here are some important questions to ask your healthcare team.    What infection is this antibiotic treating and how do you know I have that infection?    What side effects might occur from this antibiotic?    How long will I need to take this antibiotic?    Is it safe to take this antibiotic with other medications or supplements (e.g., vitamins) that I am taking?     Are there any special directions I need to know about taking this antibiotic? For example, should I take it with food?    How will I be monitored to know whether my infection is responding to the antibiotic?    What tests may help to make sure the right antibiotic is prescribed for me?      Information provided by:  www.cdc.gov/getsmart  U.S. Department of Health and Human Services  Centers for disease Control and Prevention  National Center for Emerging and Zoonotic Infectious Diseases  Division of Healthcare Quality Promotion         Protect others around you: Learn how to safely use, store and throw away your medicines at www.disposemymeds.org.             Medication List: This is a list of all your medications and when to take them. Check marks below indicate your daily home schedule. Keep this list as a reference.      Medications           Morning Afternoon Evening Bedtime As Needed    acetaminophen 500 MG tablet   Commonly known as:   TYLENOL   Take 2 tablets (1,000 mg) by mouth every 8 hours   Last time this was given:  650 mg on 11/30/2017  8:55 PM                                AZATHIOPRINE PO   Take 50 mg by mouth daily   Last time this was given:  50 mg on 12/4/2017  9:21 AM                                CALCIUM LACTATE PO   Take 4 tablets by mouth daily                                guaiFENesin-codeine 100-10 MG/5ML Soln solution   Commonly known as:  ROBITUSSIN AC   Take 5 mLs by mouth every 4 hours as needed for cough   Last time this was given:  5 mLs on 12/2/2017  9:44 PM                                Levothyroxine Sodium 112 MCG Caps   Take 112 mcg by mouth every morning                                liothyronine 5 MCG tablet   Commonly known as:  CYTOMEL   Take 3 tablets (15 mcg) by mouth 2 times daily   Last time this was given:  15 mcg on 12/4/2017  9:21 AM                                MAGNESIUM LACTATE PO   Take 2 tablets by mouth daily                                omeprazole 40 MG capsule   Commonly known as:  priLOSEC   Take 1 capsule (40 mg) by mouth every morning   Last time this was given:  40 mg on 12/4/2017  9:21 AM                                PREDNISONE PO   Take 5 mg by mouth daily 1/2 of 10mg tablet daily   Last time this was given:  5 mg on 12/4/2017  9:21 AM                                sodium bicarbonate 650 MG tablet   Take 1 tablet (650 mg) by mouth 2 times daily   Last time this was given:  650 mg on 12/4/2017  9:21 AM                                sulfamethoxazole-trimethoprim 400-80 MG per tablet   Commonly known as:  BACTRIM/SEPTRA   Take 1 tablet by mouth every other day   Last time this was given:  1 tablet on 12/3/2017  8:39 AM                                VITAMIN D3 PO   Take 1,000 Units by mouth daily   Last time this was given:  1,000 Units on 12/4/2017  9:21 AM

## 2017-11-30 NOTE — MR AVS SNAPSHOT
"              After Visit Summary   11/30/2017    Elizabeth Galicia    MRN: [id number]           Patient Information     Date Of Birth          1945        Visit Information        Provider Department      11/30/2017 10:30 AM Alondra Prado APRN CNP [facility]        Today's Diagnoses     Other fatigue    -  1    Respiratory illness          Care Instructions    Keep up with your fluid intake  Begin plain mucinex 600mg twice a day  Make sure you eat routinely throughout the day small amounts if you need to  I recommend soups, eggs , cottage cheese          Follow-ups after your visit        Who to contact     If you have questions or need follow up information about today's clinic visit or your schedule please contact [facility] directly at [phone].  Normal or non-critical lab and imaging results will be communicated to you by [employer], letter or phone within 4 business days after the clinic has received the results. If you do not hear from us within 7 days, please contact the clinic through [employer] or phone. If you have a critical or abnormal lab result, we will notify you by phone as soon as possible.  Submit refill requests through [employer] or call your pharmacy and they will forward the refill request to us. Please allow 3 business days for your refill to be completed.          Additional Information About Your Visit        [employer] Information     [employer] lets you send messages to your doctor, view your test results, renew your prescriptions, schedule appointments and more. To sign up, go to [email or web address] . Click on \"Log in\" on the left side of the screen, which will take you to the Welcome page. Then click on \"Sign up Now\" on the right side of the page.     You will be asked to enter the access code listed below, as well as some personal information. Please follow the directions to create your username and password.     Your access code is: [email or web address]  Expires: " "12/10/2017  4:23 PM     Your access code will  in 90 days. If you need help or a new code, please call your [facility] clinic or [phone].        Care EveryWhere ID     This is your Care EveryWhere ID. This could be used by other organizations to access your [facility] medical records  [id number]        Your Vitals Were     Pulse Temperature Respirations Height Pulse Oximetry Breastfeeding?    86 97.9  F (36.6  C) (Oral) 15 5' 5\" (1.651 m) 100% No    BMI (Body Mass Index)                   39.11 kg/m2            Blood Pressure from Last 3 Encounters:   17 110/56   17 109/61   17 116/75    Weight from Last 3 Encounters:   17 235 lb (106.6 kg)   17 235 lb 6.4 oz (106.8 kg)   17 233 lb 4 oz (105.8 kg)              We Performed the Following     CBC with platelets and differential        Primary Care Provider Office Phone # Fax #    Daquan Barkley [phone]       [employer] [facility] [address] [place] [id number]        Equal Access to Services     [facility]: Hadii aad matilde haditaloo Sozhangali, waaxda luqadaha, qaybta kaalmada adeegyada, georgette bates . So [facility] [phone].    ATENCIÓN: Si habla español, tiene a norman disposición servicios gratuitos de asistencia lingüística. Llame al [phone].    We comply with applicable federal civil rights laws and [place] laws. We do not discriminate on the basis of race, color, national origin, age, disability, sex, sexual orientation, or gender identity.            Thank you!     Thank you for choosing [facility]  for your care. Our goal is always to provide you with excellent care. Hearing back from our patients is one way we can continue to improve our services. Please take a few minutes to complete the written survey that you may receive in the mail after your visit with us. Thank you!             Your Updated Medication List - Protect others around you: " Learn how to safely use, store and throw away your medicines at www.disposemymeds.org.          This list is accurate as of: 11/30/17 12:23 PM.  Always use your most recent med list.                   Brand Name Dispense Instructions for use Diagnosis    acetaminophen 500 MG tablet    TYLENOL     Take 2 tablets (1,000 mg) by mouth every 8 hours    Generalized pain       BACTRIM PO           CALCIUM LACTATE PO      Take 4 tablets by mouth daily        cyanocobalamin 1000 MCG/ML injection    VITAMIN B12    1 mL    Inject 1 mL (1,000 mcg) into the muscle once as needed    Anemia, unspecified type       guaiFENesin-codeine 100-10 MG/5ML Soln solution    ROBITUSSIN AC    420 mL    Take 5 mLs by mouth every 4 hours as needed for cough    Cough       levofloxacin 500 MG tablet    LEVAQUIN    7 tablet    Take 1 tablet (500 mg) by mouth daily    Fever, unspecified fever cause       Levothyroxine Sodium 112 MCG Caps     90 capsule    Take 112 mcg by mouth every morning    Postoperative hypothyroidism       liothyronine 5 MCG tablet    CYTOMEL    540 tablet    Take 3 tablets (15 mcg) by mouth 2 times daily    Postoperative hypothyroidism       MAGNESIUM LACTATE PO      Take 2 tablets by mouth daily        omeprazole 40 MG capsule    priLOSEC    30 capsule    Take 1 capsule (40 mg) by mouth every morning    Prophylactic measure       ONDANSETRON PO      Take 4 mg by mouth every 8 hours as needed for nausea        senna-docusate 8.6-50 MG per tablet    SENOKOT-S;PERICOLACE    60 tablet    Take 1-2 tablets by mouth 2 times daily    Constipation, unspecified constipation type       UNKNOWN TO PATIENT           VITAMIN D3 PO      Take 1,000 Units by mouth daily

## 2017-11-30 NOTE — TELEPHONE ENCOUNTER
Reason for Disposition    [1] MILD difficulty breathing  (e.g., minimal/no SOB at rest, SOB with walking, pulse <100) AND [2] worse than when discharged from hospital    Additional Information    Negative: MODERATE difficulty breathing (e.g., speaks in phrases, SOB even at rest, pulse 100-120)    Negative: Fever > 103 F (39.4 C)    Negative: [1] Coughed up blood AND [2] large amount or blood clots (Exception: blood-tinged sputum)    Negative: Patient sounds very sick or weak to the triager    Protocols used: PNEUMONIA ON ANTIBIOTIC POST-HOSPITALIZATION FOLLOW-UP CALL[facility]

## 2017-11-30 NOTE — ED PROVIDER NOTES
History     Chief Complaint:  UTI    HPI   Elizabeth Galicia is a 72 year old female who presents with UTI. The patient reports she initially began feeling unwell 8 days ago. She developed a cough at this time, and had a fever that reached 100.5. A few days later, she noticed some red dots on both legs, her arms, and her torso. They do not hurt or itch. She initially thought it may be related to her vasculitis, which she was hospitalized for in May. However, her rheumatologist did not think so, and had her go to urgent care, where she was discharged on Levaquin. However, she has continued to feel sick, and went to her PCP today. She was diagnosed with a UTI, and her metabolic panel showed acute renal failure, prompting her visit to the ED. She has not had urinary symptoms. She does note that she has felt more bloated than normal today.     11/30/2017 Clinic Labs:  CBC: WBC 20.6, HGB 10.4 (L),  (H)  CMP: Glucose 101 (H), Urea nitrogen 36 (H), GFR 16 (L), Albumin 2.5 (L), Creatinine 2.88 (H), o/w WNL  CRP inflammation: 227.0 (H)  SED Rate: 72 (H)  UA: Slightly cloudy, yellow urine: Small blood (A), Moderate leukocyte esterase (A), Moderate squamous epithelial / HPF (A), Moderate bacteria (A), WBC  (A), RBC 5-10 (A), o/w WNL    Allergies:  The patient has no known drug allergies.     Medications:    Ondansetron  Levothyroxine sodium  Cytomel  Senna-docusate  Prilosec  Magnesium lactate     Past Medical History:    ANCA-associated vasculitis  GERD  HLD  HTN  Hypothyroidism  Type II DM    Past Surgical History:    Colonoscopy  Dilation & curettage  Thyroidectomy  Tonsillectomy  Eyelid excision  Hysterectomy  Orthopedic hand surgery    Family History:    Unknown/adopted    Social History:  Relationship status:   Tobacco use: Negative  Alcohol use: Positive  The patient presents with her wife.     Review of Systems   Constitutional: Positive for fever.   Respiratory: Negative for shortness of  "breath.    Cardiovascular: Negative for chest pain.   Gastrointestinal: Negative for abdominal pain.   Genitourinary: Negative for difficulty urinating.   Skin: Positive for rash.   All other systems reviewed and are negative.      Physical Exam   First Vitals:  BP: 107/41  Heart Rate: 89  Temp: 98.5  F (36.9  C)  Resp: 16  Height: 165.1 cm (5' 5\")  SpO2: 97 %    Physical Exam  General: Alert and cooperative with exam. Patient in mild distress. Normal mentation.  Head:  Scalp is NC/AT  Eyes:  No scleral icterus, PERRL  ENT:  The external nose and ears are normal. The oropharynx is normal and without erythema; mucus membranes are moist. Uvula midline, no evidence of deep space infection.  Neck:  Normal range of motion without rigidity.  CV:  Regular rate and rhythm    No pathologic murmur   Resp:  Breath sounds are clear bilaterally    Non-labored, no retractions or accessory muscle use  GI:  Abdomen is soft, no distension, no tenderness. No peritoneal signs. Obese.   MS:  No lower extremity edema   Skin:  Warm and dry, mild diffuse papular rash to all four extremities and trunk.    Neuro: Oriented x 3. No gross motor deficits.      Emergency Department Course     Laboratory:  Blood culture 1: Pending  Blood culture 2: Pending    Interventions:  1644: Rocephin, 1 g, IV injection     Emergency Department Course:  Nursing notes and vitals reviewed.  I performed an exam of the patient as documented above.  The above workup was undertaken.   I rechecked the patient and discussed results.  1635: I discussed the patient with Dr. Cox of the hospitalist service.     Findings and plan explained to the Patient who consents to admission. Discussed the patient with Dr. Cox, who will admit the patient to a [facility] bed for further monitoring, evaluation, and treatment.      Impression & Plan      Medical Decision Making:  Elizabeth Galicia is a 72 year old female who presents from clinic with abnormal labs earlier today " demonstrating leukocytosis and MELANIA, with significant CRP elevation, as well as UA showing evidence of infection. Medical history and prior records reviewed. Initial consideration for, but not limited to, vasculitis, UTI/infectious process, medication side effect, among others. Blood cultures obtained, and given patient's evidence of UTI and recent UA as well as failure of outpatient antibiotic therapy (Levaquin), transitioned patient to ceftriaxone after blood cultures obtained. Vital signs were within the normal range. She will be admitted to the hospitalist service for further evaluation and care. Presentation concerning for MELANIA, with evidence of UTI, etiology of rash is undetermined though underlying vasculitis not excluded given patient's history of microscopic polyangiitis.     Diagnosis:    ICD-10-CM    1. MELANIA (acute kidney injury) (H) N17.9 Blood culture     Blood culture     Glucose by meter     Glucose by meter     Glucose by meter     Glucose by meter   2. Urinary tract infection without hematuria, site unspecified N39.0    3. Rash R21      Disposition:  Admit to a [facility] bed under the care of Dr. Cox.    I, Robbin Machado, am serving as a scribe on 11/30/2017 at 4:01 PM to personally document services performed by Demetri Guevara DO, based on my observations and the provider's statements to me.     EMERGENCY DEPARTMENT       Demetri Guevara DO  12/01/17 0049

## 2017-11-30 NOTE — ED NOTES
"[facility]  ED Nurse Handoff Report    ED Chief complaint: URI (sent by primary for acute renal failure. has lung infection and wasn't getting better on meds)      ED Diagnosis:   Final diagnoses:   MELANIA (acute kidney injury) (H)   Urinary tract infection without hematuria, site unspecified   Rash       Code Status: Full Code    Allergies:   Allergies   Allergen Reactions     Cranberries [Cranberry Extract]      Causes herpes flair-up     Dairy [Milk Products]      Perfume Other (See Comments)     Stuffy in nose, HA     Seasonal Allergies        Activity level - Baseline/Home:  Independent    Activity Level - Current:   Independent     Needed?: No    Isolation: No  Infection: Not Applicable    Bariatric?: No    Vital Signs:   Vitals:    11/30/17 1511   BP: 107/41   Resp: 16   Temp: 98.5  F (36.9  C)   TempSrc: Oral   SpO2: 97%   Height: 1.651 m (5' 5\")       Cardiac Rhythm: ,   Cardiac  Cardiac Rhythm: Normal sinus rhythm    Pain level:      Is this patient confused?: No    Patient Report: Initial Complaint: Patient sent from PCP due to MELANIA and continued UTI failing outpatient antibiotics.   Focused Assessment:   Neuro: WDL   Cards: WDL   Pulm: Room air, cough   Gi: WDL   : UTI  Skin: WDL   Tests Performed: Labs  Abnormal Results: UA  Treatments provided: Fluids/ abx    Family Comments: At bedside    OBS brochure/video discussed/provided to patient: N/A    ED Medications:   Medications   cefTRIAXone (ROCEPHIN) intermittent infusion 1 g (not administered)       Drips infusing?:  No      ED NURSE PHONE NUMBER: [phone] Marley JERRY          "

## 2017-11-30 NOTE — LETTER
[facility]  [address]  [place]  [address]  Tel: [phone]    December 1, 2017    Elizabeth Galicia  [address] [address]        Dear MsCasandra Grayson,    These were your pre admission lab results yesterday , [place].  Hope you feel better very soon.    If you have any further questions or problems, please contact our office.      Sincerely,    Alondra Prado, NP/silver    Results for orders placed or performed in visit on 11/30/17   CBC with platelets and differential   Result Value Ref Range    WBC 20.6 (H) 4.0 - 11.0 10e9/L    RBC Count 3.41 (L) 3.8 - 5.2 10e12/L    Hemoglobin 10.4 (L) 11.7 - 15.7 g/dL    Hematocrit 32.2 (L) 35.0 - 47.0 %    MCV 94 78 - 100 fl    MCH 30.5 26.5 - 33.0 pg    MCHC 32.3 31.5 - 36.5 g/dL    RDW 14.5 10.0 - 15.0 %    Platelet Count 512 (H) 150 - 450 10e9/L    Diff Method Automated Method     % Neutrophils 89.3 %    % Lymphocytes 4.1 %    % Monocytes 5.4 %    % Eosinophils 1.1 %    % Basophils 0.1 %    Absolute Neutrophil 18.4 (H) 1.6 - 8.3 10e9/L    Absolute Lymphocytes 0.8 0.8 - 5.3 10e9/L    Absolute Monocytes 1.1 0.0 - 1.3 10e9/L    Absolute Eosinophils 0.2 0.0 - 0.7 10e9/L    Absolute Basophils 0.0 0.0 - 0.2 10e9/L   *UA reflex to Microscopic and Culture ([place] and [facility] (except [place] and [place])   Result Value Ref Range    Color Urine Yellow     Appearance Urine Slightly Cloudy     Glucose Urine Negative NEG^Negative mg/dL    Bilirubin Urine Negative NEG^Negative    Ketones Urine Negative NEG^Negative mg/dL    Specific Gravity Urine 1.010 1.003 - 1.035    Blood Urine Small (A) NEG^Negative    pH Urine 5.0 5.0 - 7.0 pH    Protein Albumin Urine Negative NEG^Negative mg/dL    Urobilinogen Urine 0.2 0.2 - 1.0 EU/dL    Nitrite Urine Negative NEG^Negative    Leukocyte Esterase Urine Moderate (A) NEG^Negative    Source Midstream Urine    CRP, inflammation   Result Value Ref Range    CRP Inflammation 227.0 (H) 0.0 -  8.0 mg/L   ESR: Erythrocyte sedimentation rate   Result Value Ref Range    Sed Rate 72 (H) 0 - 30 mm/h   Comprehensive metabolic panel (BMP + Alb, Alk Phos, ALT, AST, Total. Bili, TP)   Result Value Ref Range    Sodium 133 133 - 144 mmol/L    Potassium 4.5 3.4 - 5.3 mmol/L    Chloride 100 94 - 109 mmol/L    Carbon Dioxide 23 20 - 32 mmol/L    Anion Gap 10 3 - 14 mmol/L    Glucose 101 (H) 70 - 99 mg/dL    Urea Nitrogen 36 (H) 7 - 30 mg/dL    Creatinine 2.88 (H) 0.52 - 1.04 mg/dL    GFR Estimate 16 (L) >60 mL/min/1.7m2    GFR Estimate If Black 19 (L) >60 mL/min/1.7m2    Calcium 9.0 8.5 - 10.1 mg/dL    Bilirubin Total 0.3 0.2 - 1.3 mg/dL    Albumin 2.5 (L) 3.4 - 5.0 g/dL    Protein Total 6.8 6.8 - 8.8 g/dL    Alkaline Phosphatase 113 40 - 150 U/L    ALT 18 0 - 50 U/L    AST 14 0 - 45 U/L   Albumin Random Urine Quantitative with Creat Ratio   Result Value Ref Range    Creatinine Urine 121 mg/dL    Albumin Urine mg/L 37 mg/L    Albumin Urine mg/g Cr 30.66 (H) 0 - 25 mg/g Cr   Urine Microscopic   Result Value Ref Range    WBC Urine  (A) OTO2^O - 2 /HPF    RBC Urine 5-10 (A) OTO2^O - 2 /HPF    Squamous Epithelial /LPF Urine Moderate (A) FEW^Few /LPF    Bacteria Urine Moderate (A) NEG^Negative /HPF           Enclosure: Lab Results

## 2017-11-30 NOTE — IP AVS SNAPSHOT
[facility] [address] [facility]    [address] [place] [address]    Phone:  [phone]                                       After Visit Summary   11/30/2017    Elizabeth Galicia    MRN: [id number]           After Visit Summary Signature Page     I have received my discharge instructions, and my questions have been answered. I have discussed any challenges I see with this plan with the nurse or doctor.    ..........................................................................................................................................  Patient/Patient Representative Signature      ..........................................................................................................................................  Patient Representative Print Name and Relationship to Patient    ..................................................               ................................................  Date                                            Time    ..........................................................................................................................................  Reviewed by Signature/Title    ...................................................              ..............................................  Date                                                            Time

## 2017-11-30 NOTE — PATIENT INSTRUCTIONS
Keep up with your fluid intake  Begin plain mucinex 600mg twice a day  Make sure you eat routinely throughout the day small amounts if you need to  I recommend soups, eggs , cottage cheese

## 2017-11-30 NOTE — NURSING NOTE
"Chief Complaint   Patient presents with     Breathing Problem       Initial Ht 5' 5\" (1.651 m)  Wt 235 lb (106.6 kg)  Breastfeeding? No  BMI 39.11 kg/m2 Estimated body mass index is 39.11 kg/(m^2) as calculated from the following:    Height as of this encounter: 5' 5\" (1.651 m).    Weight as of this encounter: 235 lb (106.6 kg).  Medication Reconciliation: complete  "

## 2017-11-30 NOTE — PROGRESS NOTES
SUBJECTIVE:   Elizabeth Galicia is a 72 year old female who presents to clinic today for the following health issues:      ED/UC Followup:    Facility:  [facility]  Date of visit: 11/27/17  Reason for visit: ill and shortness of breath  Current Status: not doing well, extremely fatigued  Also reporting small red bumps on left knee since symtptoms started last week but pre dates levaquin and rheum does not think related to vasculitis   Vasculitis affects kidneys and lungs currrently weaning pred-      Is currently on day 3 of 7 day course of levaquin 500mg.  Is taking pred 5mg for vasculitis and weaning.  Imuran was dcd this week by rheum when she saw him on Monday 11/ 27/17  CHest xray from  on 11/27 was negative; no labs done    Problem list and histories reviewed & adjusted, as indicated.  Additional history: as documented    Patient Active Problem List   Diagnosis     Anxiety     HTN (hypertension)     Joint pain     RYAN on CPAP     Obesity (BMI 30-39.9)     Type 2 diabetes mellitus without complication (H)     ANCA-associated vasculitis (Microscopic polyangiitis)     Postoperative hypothyroidism     Anemia, unspecified type     Hyperplasia of renal artery (H)     Heterozygous for MTHFR gene mutation (H)     Past Surgical History:   Procedure Laterality Date     COLONOSCOPY       DILATION AND CURETTAGE       ENT SURGERY      partial thyroidectomy; tonsillectomy     EXCISE LESION EYELID Left 3/10/2016    Procedure: EXCISE LESION EYELID;  Surgeon: Rg Nazario MD;  Location: [facility]     HAND SURGERY       HYSTERECTOMY VAGINAL, COLPORRHAPHY ANTERIOR, POSTERIOR, COMBINED N/A 9/9/2014    Procedure: COMBINED HYSTERECTOMY VAGINAL, COLPORRHAPHY ANTERIOR, POSTERIOR;  Surgeon: Shay Winkler MD;  Location: [facility]     HYSTERECTOMY, PAP NO LONGER INDICATED       LAPAROSCOPIC SALPINGO-OOPHORECTOMY  6/27/2011    Procedure:LAPAROSCOPIC SALPINGO-OOPHORECTOMY; resection of left pelvic mass. ; Surgeon:MAYRA OLEARY;  Location:[facility]     ORTHOPEDIC SURGERY       SALPINGO OOPHORECTOMY,R/L/PEDRO LUIS      Salpingo Oophorectomy for torsion in past     THYROIDECTOMY      Partial       Social History   Substance Use Topics     Smoking status: Never Smoker     Smokeless tobacco: Never Used     Alcohol use Yes      Comment: rarely     Family History   Problem Relation Age of Onset     Unknown/Adopted Mother      HEART DISEASE Other          Current Outpatient Prescriptions   Medication Sig Dispense Refill     cefTRIAXone (ROCEPHIN) 1 GM vial Inject 1 g (1,000 mg) into the muscle once for 1 dose 10 mL 0     levofloxacin (LEVAQUIN) 500 MG tablet Take 1 tablet (500 mg) by mouth daily 7 tablet 0     cyanocobalamin (VITAMIN B12) 1000 MCG/ML injection Inject 1 mL (1,000 mcg) into the muscle once as needed 1 mL 0     Levothyroxine Sodium 112 MCG CAPS Take 112 mcg by mouth every morning 90 capsule 3     liothyronine (CYTOMEL) 5 MCG tablet Take 3 tablets (15 mcg) by mouth 2 times daily 540 tablet 3     acetaminophen (TYLENOL) 500 MG tablet Take 2 tablets (1,000 mg) by mouth every 8 hours       senna-docusate (SENOKOT-S;PERICOLACE) 8.6-50 MG per tablet Take 1-2 tablets by mouth 2 times daily 60 tablet 0     omeprazole (PRILOSEC) 40 MG capsule Take 1 capsule (40 mg) by mouth every morning 30 capsule 0     guaiFENesin-codeine (ROBITUSSIN AC) 100-10 MG/5ML SOLN solution Take 5 mLs by mouth every 4 hours as needed for cough 420 mL 0     MAGNESIUM LACTATE PO Take 2 tablets by mouth daily       Cholecalciferol (VITAMIN D3 PO) Take 1,000 Units by mouth daily       CALCIUM LACTATE PO Take 4 tablets by mouth daily        ONDANSETRON PO Take 4 mg by mouth every 8 hours as needed for nausea       [DISCONTINUED] mesna (MESNEX) 400 MG TABS tablet Take 2 tablets (800 mg) by mouth every 3 hours for 2 doses Starting 3 hours after cyclophosphamide. 4 tablet 0     UNKNOWN TO PATIENT        Allergies   Allergen Reactions     Cranberries [Cranberry Extract]      Causes  "herpes flair-up     Dairy [Milk Products]      Perfume Other (See Comments)     Stuffy in nose, HA     Seasonal Allergies          Reviewed and updated as needed this visit by clinical staffTobacco  Allergies       Reviewed and updated as needed this visit by Provider         ROS:  Constitutional, HEENT, cardiovascular, pulmonary, gi and gu systems are negative, except as otherwise noted.  CONSTI:  Rigors , weakness, fatigue  GI: normal bowel movements, no N/V but no appetite and not eating much;  Very mild LLQ discomfort - not really pain, no blood in stools   no pain or change in urination, urine appears darker      OBJECTIVE:   /56  Pulse 86  Temp 97.9  F (36.6  C) (Oral)  Resp 15  Ht 5' 5\" (1.651 m)  Wt 235 lb (106.6 kg)  SpO2 100%  Breastfeeding? No  BMI 39.11 kg/m2  Body mass index is 39.11 kg/(m^2).  GENERAL: morbid obesity, alert , fatigue does not appear toxic  EYES: Eyes grossly normal to inspection, PERRL and conjunctivae and sclerae normal  HENT: ear canals and TM's normal, nose and mouth without ulcers or lesions  NECK: no adenopathy, no asymmetry, masses, or scars   RESP: lungs clear to auscultation - no rales, rhonchi or wheezes  CV: regular rate and rhythm, normal S1 S2, no S3 or S4, no murmur, click or rub,   SKIN: Half a dozen small bright red slightly raised lesion of left knee, non tender, non purulent or fluctant,  ABD: soft , no guarding, LLQ discomfort with deep palpation , no rebound or referred pain  BACK: no CVAT  Diagnostic Test Results:  Results for orders placed or performed in visit on 11/30/17 (from the past 24 hour(s))   CBC with platelets and differential   Result Value Ref Range    WBC 20.6 (H) 4.0 - 11.0 10e9/L    RBC Count 3.41 (L) 3.8 - 5.2 10e12/L    Hemoglobin 10.4 (L) 11.7 - 15.7 g/dL    Hematocrit 32.2 (L) 35.0 - 47.0 %    MCV 94 78 - 100 fl    MCH 30.5 26.5 - 33.0 pg    MCHC 32.3 31.5 - 36.5 g/dL    RDW 14.5 10.0 - 15.0 %    Platelet Count 512 (H) 150 - 450 " 10e9/L    Diff Method PENDING    *UA reflex to Microscopic and Culture ([place] and [facility] (except [place] and [place])   Result Value Ref Range    Color Urine Yellow     Appearance Urine Slightly Cloudy     Glucose Urine Negative NEG^Negative mg/dL    Bilirubin Urine Negative NEG^Negative    Ketones Urine Negative NEG^Negative mg/dL    Specific Gravity Urine 1.010 1.003 - 1.035    Blood Urine Small (A) NEG^Negative    pH Urine 5.0 5.0 - 7.0 pH    Protein Albumin Urine Negative NEG^Negative mg/dL    Urobilinogen Urine 0.2 0.2 - 1.0 EU/dL    Nitrite Urine Negative NEG^Negative    Leukocyte Esterase Urine Moderate (A) NEG^Negative    Source Midstream Urine    CRP, inflammation   Result Value Ref Range    CRP Inflammation 227.0 (H) 0.0 - 8.0 mg/L   ESR: Erythrocyte sedimentation rate   Result Value Ref Range    Sed Rate 72 (H) 0 - 30 mm/h   Comprehensive metabolic panel (BMP + Alb, Alk Phos, ALT, AST, Total. Bili, TP)   Result Value Ref Range    Sodium 133 133 - 144 mmol/L    Potassium 4.5 3.4 - 5.3 mmol/L    Chloride 100 94 - 109 mmol/L    Carbon Dioxide 23 20 - 32 mmol/L    Anion Gap 10 3 - 14 mmol/L    Glucose 101 (H) 70 - 99 mg/dL    Urea Nitrogen 36 (H) 7 - 30 mg/dL    Creatinine 2.88 (H) 0.52 - 1.04 mg/dL    GFR Estimate 16 (L) >60 mL/min/1.7m2    GFR Estimate If Black 19 (L) >60 mL/min/1.7m2    Calcium 9.0 8.5 - 10.1 mg/dL    Bilirubin Total 0.3 0.2 - 1.3 mg/dL    Albumin 2.5 (L) 3.4 - 5.0 g/dL    Protein Total 6.8 6.8 - 8.8 g/dL    Alkaline Phosphatase 113 40 - 150 U/L    ALT 18 0 - 50 U/L    AST 14 0 - 45 U/L   Urine Microscopic   Result Value Ref Range    WBC Urine  (A) OTO2^O - 2 /HPF    RBC Urine 5-10 (A) OTO2^O - 2 /HPF    Squamous Epithelial /LPF Urine Moderate (A) FEW^Few /LPF    Bacteria Urine Moderate (A) NEG^Negative /HPF       ASSESSMENT/PLAN:         ICD-10-CM    1. Acute renal failure with other specified pathological lesion in kidney (H) N17.8    2. Acute cystitis with hematuria  N30.01    3. Leukocytosis, unspecified type D72.829 *UA reflex to Microscopic and Culture ([place] [facility] (except [place] and [place])     Comprehensive metabolic panel (BMP + Alb, Alk Phos, ALT, AST, Total. Bili, TP)        4. Other fatigue R53.83 CRP, inflammation     ESR: Erythrocyte sedimentation rate   5. Respiratory illness J98.9 CBC with platelets and differential     XR Chest 2 Views   6. Type 2 diabetes mellitus without complication, without long-term current use of insulin (H) E11.9 Albumin Random Urine Quantitative with Creat Ratio   Did receive 1gm rocephin in clinic prior to disposition to ED  Report called to [facility] ED  Pt escorted by wheelchair to ED    TT: 40min   CT: 35 min  ROSA MARIA [facility]

## 2017-11-30 NOTE — PHARMACY-ADMISSION MEDICATION HISTORY
Admission medication history interview status for the 11/30/2017  admission is complete. See EPIC admission navigator for prior to admission medications     Medication history source reliability:Good    Actions taken by pharmacist (provider contacted, etc): Called [facility] to verify Bactrim, prednisone, Imuran.     Additional medication history information not noted on PTA med list :  -She had been taking Zyrtec-D but stopped about 1 month ago.    Medication reconciliation/reorder completed by provider prior to medication history? No    Time spent in this activity: 20 min    Prior to Admission medications    Medication Sig Last Dose Taking? Auth Provider   sulfamethoxazole-trimethoprim (BACTRIM/SEPTRA) 400-80 MG per tablet Take 1 tablet by mouth daily 11/29/2017 at Unknown time Yes Unknown, Entered By History   PREDNISONE PO Take 5 mg by mouth daily 1/2 of 10mg tablet daily 11/29/2017 at Unknown time Yes Unknown, Entered By History   AZATHIOPRINE PO Take 50 mg by mouth daily Past Week at Unknown time Yes Unknown, Entered By History   levofloxacin (LEVAQUIN) 500 MG tablet Take 1 tablet (500 mg) by mouth daily 11/29/2017 at Unknown time Yes Maximo Boyd MD   Levothyroxine Sodium 112 MCG CAPS Take 112 mcg by mouth every morning 11/29/2017 at Unknown time Yes Nati Littlejohn,    liothyronine (CYTOMEL) 5 MCG tablet Take 3 tablets (15 mcg) by mouth 2 times daily 11/29/2017 at Unknown time Yes Nati Littlejohn DO   acetaminophen (TYLENOL) 500 MG tablet Take 2 tablets (1,000 mg) by mouth every 8 hours  Patient taking differently: Take 500-1,000 mg by mouth every 6 hours as needed  Past Week at Unknown time Yes Shaneka Laguerre DO   omeprazole (PRILOSEC) 40 MG capsule Take 1 capsule (40 mg) by mouth every morning 11/29/2017 at Unknown time Yes Shaneka Laguerre DO   guaiFENesin-codeine (ROBITUSSIN AC) 100-10 MG/5ML SOLN solution Take 5 mLs by mouth every 4 hours as needed for cough 11/29/2017 at pm  Yes Shaneka Laguerre,    MAGNESIUM LACTATE PO Take 2 tablets by mouth daily Past Week at Unknown time Yes Reported, Patient   Cholecalciferol (VITAMIN D3 PO) Take 1,000 Units by mouth daily Past Week at Unknown time Yes Reported, Patient   CALCIUM LACTATE PO Take 4 tablets by mouth daily  Past Week at Unknown time Yes Reported, Patient   cefTRIAXone (ROCEPHIN) 1 GM vial Inject 1 g (1,000 mg) into the muscle once for 1 dose   Alondra Prado APRN CNP

## 2017-12-01 ENCOUNTER — MEDICAL CORRESPONDENCE (OUTPATIENT)
Dept: HEALTH INFORMATION MANAGEMENT | Facility: CLINIC | Age: 72
End: 2017-12-01

## 2017-12-01 LAB
ANION GAP SERPL CALCULATED.3IONS-SCNC: 11 MMOL/L (ref 3–14)
BASOPHILS # BLD AUTO: 0 10E9/L (ref 0–0.2)
BASOPHILS NFR BLD AUTO: 0.1 %
BUN SERPL-MCNC: 35 MG/DL (ref 7–30)
CALCIUM SERPL-MCNC: 8.9 MG/DL (ref 8.5–10.1)
CHLORIDE SERPL-SCNC: 101 MMOL/L (ref 94–109)
CHLORIDE UR-SCNC: <10 MMOL/L
CO2 SERPL-SCNC: 19 MMOL/L (ref 20–32)
CREAT SERPL-MCNC: 2.56 MG/DL (ref 0.52–1.04)
CREAT UR-MCNC: 121 MG/DL
DIFFERENTIAL METHOD BLD: ABNORMAL
EOSINOPHIL # BLD AUTO: 0 10E9/L (ref 0–0.7)
EOSINOPHIL NFR BLD AUTO: 0.1 %
ERYTHROCYTE [DISTWIDTH] IN BLOOD BY AUTOMATED COUNT: 14.1 % (ref 10–15)
GFR SERPL CREATININE-BSD FRML MDRD: 18 ML/MIN/1.7M2
GLUCOSE BLDC GLUCOMTR-MCNC: 144 MG/DL (ref 70–99)
GLUCOSE BLDC GLUCOMTR-MCNC: 181 MG/DL (ref 70–99)
GLUCOSE BLDC GLUCOMTR-MCNC: 182 MG/DL (ref 70–99)
GLUCOSE BLDC GLUCOMTR-MCNC: 185 MG/DL (ref 70–99)
GLUCOSE SERPL-MCNC: 134 MG/DL (ref 70–99)
HCT VFR BLD AUTO: 32.3 % (ref 35–47)
HGB BLD-MCNC: 10.7 G/DL (ref 11.7–15.7)
IMM GRANULOCYTES # BLD: 0.3 10E9/L (ref 0–0.4)
IMM GRANULOCYTES NFR BLD: 1.4 %
LYMPHOCYTES # BLD AUTO: 0.8 10E9/L (ref 0.8–5.3)
LYMPHOCYTES NFR BLD AUTO: 4.5 %
MCH RBC QN AUTO: 30.1 PG (ref 26.5–33)
MCHC RBC AUTO-ENTMCNC: 33.1 G/DL (ref 31.5–36.5)
MCV RBC AUTO: 91 FL (ref 78–100)
MICROALBUMIN UR-MCNC: 37 MG/L
MICROALBUMIN/CREAT UR: 30.66 MG/G CR (ref 0–25)
MONOCYTES # BLD AUTO: 0.3 10E9/L (ref 0–1.3)
MONOCYTES NFR BLD AUTO: 1.4 %
NEUTROPHILS # BLD AUTO: 16.4 10E9/L (ref 1.6–8.3)
NEUTROPHILS NFR BLD AUTO: 92.5 %
NRBC # BLD AUTO: 0 10*3/UL
NRBC BLD AUTO-RTO: 0 /100
OSMOLALITY UR: 197 MMOL/KG (ref 100–1200)
PLATELET # BLD AUTO: 508 10E9/L (ref 150–450)
POTASSIUM SERPL-SCNC: 4.6 MMOL/L (ref 3.4–5.3)
POTASSIUM UR-SCNC: 12 MMOL/L
RBC # BLD AUTO: 3.56 10E12/L (ref 3.8–5.2)
SODIUM SERPL-SCNC: 131 MMOL/L (ref 133–144)
SODIUM UR-SCNC: 15 MMOL/L
WBC # BLD AUTO: 17.7 10E9/L (ref 4–11)

## 2017-12-01 PROCEDURE — 25000132 ZZH RX MED GY IP 250 OP 250 PS 637: Mod: GY | Performed by: INTERNAL MEDICINE

## 2017-12-01 PROCEDURE — A9270 NON-COVERED ITEM OR SERVICE: HCPCS | Mod: GY | Performed by: INTERNAL MEDICINE

## 2017-12-01 PROCEDURE — 84300 ASSAY OF URINE SODIUM: CPT | Performed by: INTERNAL MEDICINE

## 2017-12-01 PROCEDURE — 25000128 H RX IP 250 OP 636: Performed by: INTERNAL MEDICINE

## 2017-12-01 PROCEDURE — 84133 ASSAY OF URINE POTASSIUM: CPT | Performed by: INTERNAL MEDICINE

## 2017-12-01 PROCEDURE — 83935 ASSAY OF URINE OSMOLALITY: CPT | Performed by: INTERNAL MEDICINE

## 2017-12-01 PROCEDURE — 25000131 ZZH RX MED GY IP 250 OP 636 PS 637: Mod: GY | Performed by: INTERNAL MEDICINE

## 2017-12-01 PROCEDURE — 36415 COLL VENOUS BLD VENIPUNCTURE: CPT | Performed by: INTERNAL MEDICINE

## 2017-12-01 PROCEDURE — 25000125 ZZHC RX 250: Performed by: INTERNAL MEDICINE

## 2017-12-01 PROCEDURE — 00000146 ZZHCL STATISTIC GLUCOSE BY METER IP

## 2017-12-01 PROCEDURE — 85025 COMPLETE CBC W/AUTO DIFF WBC: CPT | Performed by: INTERNAL MEDICINE

## 2017-12-01 PROCEDURE — 99207 ZZC CDG-MDM COMPONENT: MEETS LOW - DOWN CODED: CPT | Performed by: INTERNAL MEDICINE

## 2017-12-01 PROCEDURE — 80048 BASIC METABOLIC PNL TOTAL CA: CPT | Performed by: INTERNAL MEDICINE

## 2017-12-01 PROCEDURE — 82436 ASSAY OF URINE CHLORIDE: CPT | Performed by: INTERNAL MEDICINE

## 2017-12-01 PROCEDURE — 12000000 ZZH R&B MED SURG/OB

## 2017-12-01 PROCEDURE — 99232 SBSQ HOSP IP/OBS MODERATE 35: CPT | Performed by: INTERNAL MEDICINE

## 2017-12-01 RX ORDER — PREDNISONE 5 MG/1
5 TABLET ORAL DAILY
Status: DISCONTINUED | OUTPATIENT
Start: 2017-12-02 | End: 2017-12-04 | Stop reason: HOSPADM

## 2017-12-01 RX ADMIN — PREDNISONE 40 MG: 20 TABLET ORAL at 08:09

## 2017-12-01 RX ADMIN — GUAIFENESIN AND CODEINE PHOSPHATE 5 ML: 100; 10 SOLUTION ORAL at 13:04

## 2017-12-01 RX ADMIN — GUAIFENESIN 600 MG: 600 TABLET, EXTENDED RELEASE ORAL at 21:49

## 2017-12-01 RX ADMIN — GUAIFENESIN AND CODEINE PHOSPHATE 5 ML: 100; 10 SOLUTION ORAL at 09:05

## 2017-12-01 RX ADMIN — GUAIFENESIN AND CODEINE PHOSPHATE 5 ML: 100; 10 SOLUTION ORAL at 22:47

## 2017-12-01 RX ADMIN — INSULIN ASPART 1 UNITS: 100 INJECTION, SOLUTION INTRAVENOUS; SUBCUTANEOUS at 08:16

## 2017-12-01 RX ADMIN — LIOTHYRONINE SODIUM 15 MCG: 5 TABLET ORAL at 08:09

## 2017-12-01 RX ADMIN — VITAMIN D, TAB 1000IU (100/BT) 1000 UNITS: 25 TAB at 08:09

## 2017-12-01 RX ADMIN — CEFTRIAXONE 1 G: 1 INJECTION, SOLUTION INTRAVENOUS at 16:12

## 2017-12-01 RX ADMIN — SALINE NASAL SPRAY 2 SPRAY: 1.5 SOLUTION NASAL at 21:50

## 2017-12-01 RX ADMIN — INSULIN ASPART 1 UNITS: 100 INJECTION, SOLUTION INTRAVENOUS; SUBCUTANEOUS at 13:38

## 2017-12-01 RX ADMIN — GUAIFENESIN AND CODEINE PHOSPHATE 5 ML: 100; 10 SOLUTION ORAL at 04:38

## 2017-12-01 RX ADMIN — SENNOSIDES AND DOCUSATE SODIUM 2 TABLET: 8.6; 5 TABLET ORAL at 18:14

## 2017-12-01 RX ADMIN — DEXTRAN 70, AND HYPROMELLOSE 2910 2 DROP: 1; 3 SOLUTION/ DROPS OPHTHALMIC at 04:49

## 2017-12-01 RX ADMIN — INSULIN ASPART 1 UNITS: 100 INJECTION, SOLUTION INTRAVENOUS; SUBCUTANEOUS at 19:20

## 2017-12-01 RX ADMIN — GUAIFENESIN 600 MG: 600 TABLET, EXTENDED RELEASE ORAL at 08:09

## 2017-12-01 RX ADMIN — SODIUM CHLORIDE: 9 INJECTION, SOLUTION INTRAVENOUS at 14:55

## 2017-12-01 RX ADMIN — LEVOTHYROXINE SODIUM 112 MCG: 112 TABLET ORAL at 08:09

## 2017-12-01 RX ADMIN — OMEPRAZOLE 40 MG: 20 CAPSULE, DELAYED RELEASE ORAL at 08:09

## 2017-12-01 RX ADMIN — SODIUM CHLORIDE: 9 INJECTION, SOLUTION INTRAVENOUS at 03:46

## 2017-12-01 RX ADMIN — LIOTHYRONINE SODIUM 15 MCG: 5 TABLET ORAL at 21:49

## 2017-12-01 RX ADMIN — DEXTRAN 70, AND HYPROMELLOSE 2910 2 DROP: 1; 3 SOLUTION/ DROPS OPHTHALMIC at 21:49

## 2017-12-01 ASSESSMENT — ENCOUNTER SYMPTOMS
SHORTNESS OF BREATH: 0
ABDOMINAL PAIN: 0

## 2017-12-01 NOTE — CONSULTS
NEPHROLOGY CONSULTATION      IDENTIFICATION:  Elizabeth Galicia is a 72-year-old female admitted through the Emergency Room, dated 11/30/2017, in the setting of a fever and apparent urinary tract infection.      REASON FOR CONSULTATION:  Evaluation and assessment with respect to elevated serum creatinine in a patient with previously biopsy-proven acute crescentic glomerulonephritis, pauci-immune type.      IMPRESSION:   1.  Baseline chronic kidney disease on the basis of historical review with serum creatinine typically in the range of 1.5 mg/dL.  Estimated GFR on this basis is roughly 30-40 mL per minute, representing stage III chronic kidney disease.   2.  Previous acute kidney injury associated with proteinuria and an elevated MPO titer, suggestive of renal vasculitis.  Percutaneous renal biopsy performed in 05/2017 demonstrated acute crescentic GN of the pauci-immune type.   3.  Status post therapy with 5 courses of IV Cytoxan and subsequent transition to a maintenance therapy with Imuran.  The patient has previously seen Dr. Daquan Barkley from our group as well as Dr. Nelson Arce in Nephrology at the [facility].   4.  Acute kidney injury with presenting creatinine of 2.8 in the setting of urinary tract infection, as well as intermittent hypotension by report.  Blood pressure in the Emergency Room was 107/41.  The patient is nonoliguric.  Urinalysis demonstrates absence of proteinuria with an albumin-to-creatinine ratio of approximately 30.  Based on presentation, history and absence of significant proteinuria, it seems likely this does not represent a flare of her renal vasculitis.  Her creatinine has improved overnight from 2.88 to 2.56 with saline infusion and 1 dose of high-dose steroids.      PAST MEDICAL HISTORY:   1.  Urinary tract infection with concern for sepsis.   2.  History of hypertension.   3.  Diet-controlled diabetes.   4.  Purpuric skin rash of unclear etiology.     5.  Nonanion gap metabolic  acidosis without a history of diarrhea, suggesting renal tubular acidosis.     6.  Modest hyponatremia.      DISCUSSION:  Older female with recent diagnosis of acute crescentic GN on the basis of renal biopsy, thought consistent with MPO vasculitis.  Therapy has included acute treatment with 5 courses of IV Cytoxan and subsequent transition to maintenance therapy with Imuran.  She presents now with acute on chronic renal insufficiency in the setting of urinary tract infection and concern for possible sepsis.  I suspect she has acute renal failure secondary to either prerenal etiology or ATN.  Initial evaluation for markers associated with vasculitis, including her albumin-to-creatinine ratio, was not significantly elevated and in line with previous values.  We do note a decreasing proteinuria trend since her diagnosis in May.  She should be treated with ongoing saline infusion, continue high-dose steroids and I would consider reinstituting her maintenance therapy.  This is being directed by Dr. Arnold of Rheumatology.      PLAN:   1.  Continue saline.   2.  Document MPO titer.   3.  Document urine sodium.   4.  Follow laboratory studies.   5.  Avoid nephrotoxic agents.   6.  Consideration for reinstitution of maintenance therapy.   7.  Urine anion gap to evaluate for non-anion gap metabolic acidosis.       HISTORY:  Elizabeth Galicia is a 72-year-old female with a complicated past medical history from a renal standpoint.  She has biopsy-proven acute crescentic GN, pauci-immune type, secondary to MPO vasculitis.  This is characterized as microscopic polyangiitis.  Therapy, as mentioned above, has included Cytoxan and Imuran.  She now presents with acute kidney injury, modest hyponatremia and modest metabolic acidosis in the setting of urinary tract infection and concern for low blood pressure.      Evaluation from a renal standpoint has included urinalysis, which does not show dipstick positive albuminuria and  albumin-to-creatinine ratio noted to be 30.  No repeat ANCA titer noted.  No urine sodium performed.      I would continue her saline as we noted above and monitor her laboratory studies.      She is awake, alert, appropriate and interactive.  She provides a detailed history of the events that have occurred over the past several weeks.  She saw her rheumatologist on Monday, at which time the rash was present.  He did not think it was consistent with vasculitis, but he decided it was not related to her vasculitis.  Her Imuran was held on the basis of uncertainty of her clinical situation.  She denies headache or chest pain.  She has lower extremity edema that is roughly the same.  Her urine was dark yesterday, but improving today.  I's and O's were reviewed in detail.      PAST MEDICAL HISTORY:   1.  Microscopic polyangiitis.   2.  Uterine prolapse.   3.  Diabetes.   4.  Hypothyroidism.   5.  Obesity.   6.  Hyperlipidemia.   7.  Herpes.   8.  Herniated disk.   9.  Gastroesophageal reflux disease.   10.  Anxiety.       ADMISSION MEDICATIONS:   1.  Prednisone 5.   2.  Bactrim single strength.   3.  Levaquin 500.   4.  Levothyroxine.   5.  Cytomel.      6.  Tylenol.   7.  Prilosec.   8.  Robitussin.   9.  Vitamin D3.      SOCIAL HISTORY:  No alcohol or tobacco of significance.  She is currently employed.  She is .      FAMILY HISTORY:  Noncontributory.      REVIEW OF SYSTEMS:  A complete 10-point review of systems is undertaken.  Pertinent positives and negatives are noted above.      PHYSICAL EXAMINATION:   VITAL SIGNS:  Her blood pressure in the range of 100-110 systolic.  Respiratory rate is in the mid teens.  Heart rate is 86.  She is afebrile.  Her room air sats are adequate.   GENERAL:  No acute distress.  She is appropriate.   HEENT:  Unremarkable.   NECK:  Supple.   CHEST:  Symmetric and clear.   CARDIOVASCULAR:  Regular.   ABDOMEN:  Obese.   EXTREMITIES:  Bilateral 1+ lower extremity edema.   NEUROLOGIC:   Grossly nonfocal.   PSYCHIATRIC:  Normal affect.  Pleasant mood.   SKIN:  Papular rash on anterior thighs.      LABORATORY DATA:  Current and historic were reviewed in detail.         JESE COELHO MD             D: 2017 12:12   T: 2017 13:30   MT: [id number]      Name:     PAUL ALANIZ   MRN:      [id number]        Account:       [id number]   :      1945           Consult Date:  2017      Document: [id number]

## 2017-12-01 NOTE — PLAN OF CARE
Problem: Patient Care Overview  Goal: Plan of Care/Patient Progress Review  Outcome: Improving  A&O x 4, SBA to the BR, vss, no c/o of pain, on RA, frequent  non productive cough, given cough syrup x 2 and jean..mucinex, Cr improving, seen by nephrology, sent urine sample to lab, IVf at 100 cc/ hr, good appetite, rash to arms, torso and legs, 1-2 + LE edema, had shower, BGMs 144 and 181, d/c pending progress.

## 2017-12-01 NOTE — H&P
PRIMARY CARE PHYSICIAN:  Nati Littlejohn DO        RHEUMATOLOGIST:  Derrick Arnold MD      CODE STATUS:  Full code.      CHIEF COMPLAINT:  Fever and diffuse rash.      HISTORY OF PRESENT ILLNESS:  Ms. Elizabeth Galicia is a 72-year-old female.  She has a past medical history of hypertension, hypothyroidism, type 2 diabetes, diet-controlled.  This patient also has a diagnosis of microscopic polyangiitis.  This was diagnosed in May of this year, and she was treated at this facility for complications related to this.  She sees Dr. Derrick Arnold from Rheumatology Clinic.  She had been doing okay up until about 8-9 days ago, started having just this generalized feeling of unwellness, a mild fever at home.  She recorded temps about 100.5, a slight nonproductive cough, not really feeling too short of breath, and then she started developing a rash this past Friday, which was 6 days ago.  She went to [facility]gent [facility] on this past Monday, and they thought maybe she had a pneumonia, put her on Robitussin and Levaquin.  She also saw Dr. Arnold about the rash because she was worried it was a recurrent flare of vasculitis.  He did not think it was related to that.  In fact, he told her to stop taking her Imuran, and she is still on a daily prednisone about 5 mg.  She was referred to our ED today because some urine studies came back from the Urgent Care Clinic showing a suggestion of a urinary tract infection.  In our ED, her metabolic panel shows that her creatinine has gone up from about 1.48 nine days ago to 2.8 today.  Her ESR and CRP are also elevated.  Her ESR is 27.  Her CRP is 227.  I look, and she has a diffuse papular rash and each one of the papules is pretty spaced out from the others and it covers her arms, torso and it goes down to her ankles. These papules are about 1 mm in diameter with a very small zone of erythema around them.  They do not itch.  They are not painful.  I did talk to Dr. Catalina hugo, and we are  going to admit her.  We think it is probably more likely a urinary tract infection with dehydration.  The patient's blood pressure is a little low, but the rest of her vitals are stable.  Her white count is significantly elevated though to 20.6.  Her differential shows a left shift, but her eosinophils are within normal numbers, and her urinalysis tonight shows  whites with a moderate amount of leukocyte esterase.      ALLERGIES:  Cranberry extract causes a rash; dairy products, unknown; perfume causes congestion; seasonal allergies cause upper airway congestion.      HOME MEDICATION LIST:   1.  Prednisone 5 mg daily.   2.  Bactrim 1 tab daily.   3.  Levaquin 500 mg daily.   4.  Levothyroxine 112 mcg daily.   5.  Cytomel 15 mcg 2 times a day.   6.  Tylenol 1 gm q.6 hours p.r.n.   7.  Prilosec 40 mg every morning.   8.  Robitussin-AC 5 mL q.4 p.r.n.   9.  Magnesium lactate 2 tabs daily.   10.  Vitamin D 3, 1000 units daily.   11.  Calcium lactate 4 tabs daily.      PAST MEDICAL HISTORY:   1.  Microscopic polyangiitis.   2.  Uterine prolapse.   3.  Type 2 diabetes, diet-controlled.   4.  Hypothyroidism.   5.  Morbid obesity.   6.  Hyperlipidemia.   7.  Herpes.   8.  Herniated lumbar intervertebral disk.   9.  GERD.   10.  Anxiety.      PAST SURGICAL HISTORY:   1.  Thyroidectomy.   2.  Salpingo-oophorectomy, bilateral.   3.  Hysterectomy.   4.  Hand surgery.   5.  Excision, left eyelid.   6.  Dilatation and curettage.      FAMILY HISTORY:  I reviewed with the patient.  She is adopted and does not really know what her family medical history is.      SOCIAL HISTORY:  She has never smoked, only drinks alcohol on holidays and usually only 1-2 drinks.  No illicit drug history.  She is self-employed as an organizational psychologist.  She is  and lives independently.      REVIEW OF SYSTEMS:  A 10-system review conducted with the patient.  Other than those systems listed in the history of present illness,  negative.      PHYSICAL EXAMINATION:   VITAL SIGNS:  Blood pressure 107/41, respiratory rate 16, heart rate 86, temperature is 98.5 degrees Fahrenheit taken orally, O2 sats 97% on room air.   GENERAL:  No apparent distress, alert and oriented x4, afebrile.   HEENT:  Normocephalic, atraumatic.  No oropharyngeal erythema.   NECK:  No cervical lymphadenopathy.  No thyromegaly.   RESPIRATORY:  Lungs clear to auscultation bilaterally.  Normal work of breathing.  No wheezing, rales or rhonchi.   CARDIOVASCULAR:  Normal S1, S2, no S3, S4, regular rate and rhythm, no murmurs, rubs or gallops, 2+ pulses palpable in all 4 extremities.   ABDOMEN:  Normal bowel sounds.  Abdomen is soft, nontender, nondistended.  No hepatosplenomegaly or mass palpable.   EXTREMITIES:  No clubbing, cyanosis or edema.   NEUROLOGIC:  Cranial nerves II-XII are intact, 5/5 strength in 4 extremities, sensation intact diffusely, normal coordination by bilateral finger-nose test.     SKIN:  She has a diffuse papular rash with a small zone of erythema around it, and it covers her arms, legs and torso.  Each papule is about 1 mm with a less than 1 mm zone of erythema, and these papules are widely scattered.  They are not tender.      LABORATORY EVALUATION:  Complete metabolic profile:  BUN is high at 36, creatinine is 2.88, GFR is down at 16, albumin is low at 2.5.  All other values are normal.  Other labs here:  CRP is 227, which is high.  Sed rate is 72.  CBC:  White count is up at 20.6.  Hemoglobin is low at 10.4.  Her baseline hemoglobin runs in the 12s.  Her ANC is elevated at 18.4.  Urinalysis: Pertinent positives are a small amount of blood, moderate amount of leukocyte esterase,  white cells, 5-10 rbc's, moderate amount of bacteria.      PENDING STUDIES:  Blood cultures x2, urine culture.      IMAGING STUDIES:  Chest x-ray, 2-view:  Impression:  No acute cardiopulmonary findings.      ASSESSMENT:  This is days 72-year-old female with history  of hypothyroidism, diet-controlled diabetes, hypertension and microscopic polyangiitis.  She is coming in today after about 8-9 days of general unwellness, mild fever and a diffuse papular rash.  She has acute renal failure and a urinary tract infection with suggestion of sepsis.  She will be admitted for IV antibiotics, a burst of steroids and intravenous fluids as well as a Nephrology consult.      PLAN:   1.  Urinary tract infection with sepsis.  She is hypotensive but stable on maintenance IV fluids.  She has had mild fever, which has decreased since presentation, and she has a significant leukocytosis with a left shift.  She has some signs of end-organ damage as well with creatinine doubling from 1.4-2.8 in the past week.  I spoke with her rheumatologist [place], who is Dr. Derrick Arnold, and I agree with him that this is probably not vasculitis.  I just wanted to know if he had any suggestions.  Given her history, he recommended a burst of the prednisone.  She is normally on 5 mg a day, and we will increase it to 40 mg today, and he said see how she is doing tomorrow.  If she has a significant improvement, take it back down to a regular dose, but if not, give her another 40 tomorrow.  I am going to keep her on IV Rocephin that was started in the Emergency Department.  We await blood and urine cultures.  I think she is stable enough for [facility] admission tonight as she is not requiring pressors.    2.  History of microscopic polyangiitis.  As I stated above, I talked to Dr. Catalina hugo, and he wants any concerns to go straight to him instead of any of his partners.  I did not consult him tonight because we both think that it probably is more likely a urinary tract infection and dehydration more than vasculitis, but if things worsen or start to become more suggestive of a vasculitic process, he wants a call.  His cell number is [phone].  I have also put a sticky note in that regard up.  As I stated  above, we are going to increase her steroids from 5 mg to 40, and we will do at least 1 day today.  If she is not improved tomorrow, recommendation is for another day at 40 mg before taking it down to her normal dose of 5.   3.  Renal failure.  It looks like she has some chronic renal insufficiency.  Baseline creatinine is about 1.4-1.5.  In the past week it has gone up to 2.8.  This could just be prerenal from dehydration and a urinary tract infection, but I am going to ask Nephrology to follow along because of her history of her vasculitis.  I want to see if they have any particular concerns.  Tonight we will treat the UTI with antibiotics, and I am going to hydrate her with normal saline at 100 mL an hour.   4.  Diet-controlled diabetes mellitus.  Her hemoglobin A1c is 6.7.  That was taken in May of this year.  She is probably due for a recheck, but right now I know that we are going to have some glucose fluctuations, 1) because she is ill and, 2) because we are increasing her steroids, so I am putting her on sliding scale insulin, and I think if she is going to be here for an extended period of time, we need to consider doing a basal bolus regimen.   5.  History of hypertension.  She is a little low tonight but doing well with fluids.  She is not normally on any medications for this.  We will monitor her blood pressure carefully.  If she starts to spike, then we can always put in p.r.n. labetalol or hydralazine.   6.  Hyperlipidemia.  The patient was not on any medication prior to admission.  This is a pretty stable issue.  She can take this up with her PCP.   7.  History of hypothyroidism.  Her thyroid levels are normal, just checked recently.  We will continue her levothyroxine and Cytomel that she takes prior to admission.    8.  History of gastroesophageal reflux disease.  Continue her prior to admission Prilosec.   9.  Deep venous thrombosis prophylaxis:  PCDs.   10.  Code status:  The patient endorses full  code.      I suspect it is going to take at least 3 days, if not longer, for her creatinine to stabilize and for the infection to stabilize.  Would like to see her blood pressures are a little higher than they are right now before discharge.  Will also hopefully see some improvement in the rash.         JOANA BO MD             D: 2017 17:42   T: 2017 18:33   MT:       Name:     PAUL ALANIZ   MRN:      [id number]        Account:      [id number]   :      1945           Admitted:     [id number]      Document: [id number]       cc: Nati Littlejohn DO

## 2017-12-01 NOTE — PROGRESS NOTES
"[facility]    Hospitalist Progress Note    Assessment & Plan   Elizabeth Galicia is a 72 year old female with complex PMHx including microscopic polyangiitis, DM II (diet controlled), hyperlipidemia, GERD, anxiety and obesity who was admitted on 11/30/2017 with acute renal failure and UTI with suggestion of sepsis.     UTI with possible sepsis:  Presented with complaints of \"feeling unwell\" in the preceding 8-9 days with mild fever and diffuse papular rash. Was seen in Urgent Care on 11/27 for evaluation of fever (T 101.4) and it was thought that she may have had pneumonia -- she was started on Levaquin and Robitussin. Saw her PCP in clinic on the day of admission, UA was grossly abnl ( WBCs, 5-10 RBCs, mod leuk esterase and neg nitrites) and Cr increased to 2.8. She was referred to the ED for further evaluation. Afebrile on presentation, hypotensive but BPs stable after IVFs. WBC 20.6 on admission. Admitting MD (Dr. Cox) spoke with patient's rheumatologist, felt presentation unlikely related to vasculitis. Started on IV Rocephin.   -- remains afebrile, leukocytosis improving (20.6 -- 17.7)  -- urine culture pending (sent from clinic to micro lab today), blood cultures neg thus far  -- cont Rocephin while awaiting culture results   -- BPs stable    Acute Renal Failure:  Baseline Cr 1.4 - 1.5. Cr 1.43 on 11/21 -- up to 2.88 on the day of admission. Unclear if prerenal dt dehydration with  UTI. IVFs started on admission. Per rheumatology, low suspicion for flare of vasculitis. Nephrology consulted.   -- Cr trending down: 2.88 -- 2.56 today  -- cont IVFs  -- per nephrology, clinical picture unlikely dt vasculitis flare    Non AG metabolic acidosis:  Bicarb 19 today in context of improving renal function. Nephrology following, evaluating with urine AG.    Microscopic Polyangiitis  Follows with Dr. Derrick Arnold and typically manages with Imuran and prednisone (5mg daily). Saw her " rheumatologist earlier this week after she developed a papular rash, he did not feel this was related to a flare of her vasculitis and told her to stop taking her Imuran. Per discussions with Dr. Arnold on admission, prednisone increased from 5mg daily to 40mg daily.  -- received an additional dose of 40mg daily today  -- decrease prednisone to 5mg daily starting tomorrow (unless nephrology feels she would benefit from ongoing high dose steroids)  -- will need to follow up with rheumatology after discharge to discuss resuming maintenance therapy  -- to contact Dr. Arnold directly if questions arise regarding treatment or if she decompensates (cell phone # in chart)     Papular Rash:  Noted on trunk/extremities. Non-pruritic. Saw rheumatologist on 11/27, felt to be unrelated to vasculitis.   -- monitor for now, per patient seems to be improving w/o specific intervention    DM II:  A1C 6.7 in 5/2017. Diet controlled.   Given acute illness and increase in steroids, was placed on SSI on admission    Recent Labs  Lab 12/01/17  1312 12/01/17  0809 12/01/17  0800 12/01/17  0208 11/30/17  2058 11/30/17  1811 11/30/17  1239   GLC  --  134*  --   --   --   --  101*   *  --  144* 185* 158* 98  --      -- cont med dose SSI    Hypothyroidism:  Chronic and stable on levothyroxine (112 mcg daily) and liothyronine (15mcg BID) which have been continued.     GERD:  Chronic and stable on PPI    Normocytic Anemia:  Baseline hgb seems to be around 12, hgb down to 10 this stay in context of IVFs.   -- monitor labs periodically, no addition workup needed at this time    FEN: NS @100ml/h, NA mildy low at 131 today but lytes otherwise stable, regular diet as tolerated  DVT Prophylaxis: PCDs  Code Status: Full Code    Disposition: Discharge pending clinical improvement, likely few more day still.     Shaneka Laguerre    Interval History   Seen this afternoon. Resting comfortably. No specific complaints at present. Denies  cp/sob, occ cough, minimally productive of sputum. Appetite okay, no abd pain/n/v. Feels a little bloated. No fevers.     -Data reviewed today: I reviewed all new labs and imaging results over the last 24 hours. I personally reviewed no images or EKG's today.    Physical Exam   Temp: 97.4  F (36.3  C) Temp src: Oral BP: 111/59   Heart Rate: 67 Resp: 18 SpO2: 92 % O2 Device: None (Room air)    Vitals:    12/01/17 0545   Weight: 108.1 kg (238 lb 5.1 oz)     Vital Signs with Ranges  Temp:  [97.4  F (36.3  C)-98.5  F (36.9  C)] 97.4  F (36.3  C)  Heart Rate:  [67-89] 67  Resp:  [16-18] 18  BP: (107-114)/(41-59) 111/59  SpO2:  [92 %-97 %] 92 %  I/O last 3 completed shifts:  In: 400 [P.O.:400]  Out: 1550 [Urine:1550]    Constitutional: Resting comfortably, alert and conversing appropriately, NAD  Respiratory: CTAB, no wheeze/rales/rhonchi, no increased work of breathing  Cardiovascular: HRRR, no MGR, no LE edema  GI: S, NT, ND, +BS  Skin/Integumen: warm/dry, few scattered papular lesions on extremities   Other:      Medications     NaCl 100 mL/hr at 12/01/17 0346       cholecalciferol (vitamin D3) tablet 1,000 Units  1,000 Units Oral Daily     levothyroxine  112 mcg Oral QAM     liothyronine  15 mcg Oral BID     omeprazole  40 mg Oral QAM     predniSONE  40 mg Oral Daily     insulin aspart  1-7 Units Subcutaneous TID AC     insulin aspart  1-5 Units Subcutaneous At Bedtime     guaiFENesin  600 mg Oral BID     cefTRIAXone  1 g Intravenous Q24H       Data     Recent Labs  Lab 12/01/17  0809 11/30/17  1239 11/30/17  1129   WBC 17.7*  --  20.6*   HGB 10.7*  --  10.4*   MCV 91  --  94   *  --  512*   * 133  --    POTASSIUM 4.6 4.5  --    CHLORIDE 101 100  --    CO2 19* 23  --    BUN 35* 36*  --    CR 2.56* 2.88*  --    ANIONGAP 11 10  --    ARABELLA 8.9 9.0  --    * 101*  --    ALBUMIN  --  2.5*  --    PROTTOTAL  --  6.8  --    BILITOTAL  --  0.3  --    ALKPHOS  --  113  --    ALT  --  18  --    AST  --  14  --         No results found for this or any previous visit (from the past 24 hour(s)).

## 2017-12-01 NOTE — PLAN OF CARE
Problem: Patient Care Overview  Goal: Plan of Care/Patient Progress Review  A&Ox4.  VSS, on RA.  C/o generalized pain, achy 1/10, PRN Tylenol x1.  Freq nonproductive cough, jean mucinex and PRN cough syrup x2.  Up Ind/SBA, calls appropriate.  IVF at 100/hr.  Creat 2.88.  Rash to arms, torso, legs.  2+ BLE edema.  Nephology consulted.  Bg 158 and 185.  D/C pending, nursing will continue to monitor.

## 2017-12-02 LAB
ANION GAP SERPL CALCULATED.3IONS-SCNC: 10 MMOL/L (ref 3–14)
BUN SERPL-MCNC: 41 MG/DL (ref 7–30)
CALCIUM SERPL-MCNC: 8.8 MG/DL (ref 8.5–10.1)
CHLORIDE SERPL-SCNC: 105 MMOL/L (ref 94–109)
CO2 SERPL-SCNC: 19 MMOL/L (ref 20–32)
CREAT SERPL-MCNC: 2.15 MG/DL (ref 0.52–1.04)
ERYTHROCYTE [DISTWIDTH] IN BLOOD BY AUTOMATED COUNT: 14.1 % (ref 10–15)
GFR SERPL CREATININE-BSD FRML MDRD: 23 ML/MIN/1.7M2
GLUCOSE BLDC GLUCOMTR-MCNC: 116 MG/DL (ref 70–99)
GLUCOSE BLDC GLUCOMTR-MCNC: 122 MG/DL (ref 70–99)
GLUCOSE BLDC GLUCOMTR-MCNC: 129 MG/DL (ref 70–99)
GLUCOSE BLDC GLUCOMTR-MCNC: 136 MG/DL (ref 70–99)
GLUCOSE BLDC GLUCOMTR-MCNC: 156 MG/DL (ref 70–99)
GLUCOSE SERPL-MCNC: 131 MG/DL (ref 70–99)
HCT VFR BLD AUTO: 28.5 % (ref 35–47)
HGB BLD-MCNC: 9.5 G/DL (ref 11.7–15.7)
LACTATE BLD-SCNC: 2.4 MMOL/L (ref 0.7–2)
MCH RBC QN AUTO: 30.1 PG (ref 26.5–33)
MCHC RBC AUTO-ENTMCNC: 33.3 G/DL (ref 31.5–36.5)
MCV RBC AUTO: 90 FL (ref 78–100)
PHOSPHATE SERPL-MCNC: 3.9 MG/DL (ref 2.5–4.5)
PLATELET # BLD AUTO: 524 10E9/L (ref 150–450)
POTASSIUM SERPL-SCNC: 4.7 MMOL/L (ref 3.4–5.3)
RBC # BLD AUTO: 3.16 10E12/L (ref 3.8–5.2)
SODIUM SERPL-SCNC: 134 MMOL/L (ref 133–144)
WBC # BLD AUTO: 20.1 10E9/L (ref 4–11)

## 2017-12-02 PROCEDURE — 12000000 ZZH R&B MED SURG/OB

## 2017-12-02 PROCEDURE — A9270 NON-COVERED ITEM OR SERVICE: HCPCS | Mod: GY | Performed by: INTERNAL MEDICINE

## 2017-12-02 PROCEDURE — 99207 ZZC CDG-MDM COMPONENT: MEETS LOW - DOWN CODED: CPT | Performed by: INTERNAL MEDICINE

## 2017-12-02 PROCEDURE — 83605 ASSAY OF LACTIC ACID: CPT | Performed by: INTERNAL MEDICINE

## 2017-12-02 PROCEDURE — 80048 BASIC METABOLIC PNL TOTAL CA: CPT | Performed by: INTERNAL MEDICINE

## 2017-12-02 PROCEDURE — 25000132 ZZH RX MED GY IP 250 OP 250 PS 637: Mod: GY | Performed by: INTERNAL MEDICINE

## 2017-12-02 PROCEDURE — 00000146 ZZHCL STATISTIC GLUCOSE BY METER IP

## 2017-12-02 PROCEDURE — 25000131 ZZH RX MED GY IP 250 OP 636 PS 637: Mod: GY | Performed by: INTERNAL MEDICINE

## 2017-12-02 PROCEDURE — 25000128 H RX IP 250 OP 636: Performed by: INTERNAL MEDICINE

## 2017-12-02 PROCEDURE — 84100 ASSAY OF PHOSPHORUS: CPT | Performed by: INTERNAL MEDICINE

## 2017-12-02 PROCEDURE — 36415 COLL VENOUS BLD VENIPUNCTURE: CPT | Performed by: INTERNAL MEDICINE

## 2017-12-02 PROCEDURE — 85027 COMPLETE CBC AUTOMATED: CPT | Performed by: INTERNAL MEDICINE

## 2017-12-02 PROCEDURE — 99232 SBSQ HOSP IP/OBS MODERATE 35: CPT | Performed by: INTERNAL MEDICINE

## 2017-12-02 PROCEDURE — 86255 FLUORESCENT ANTIBODY SCREEN: CPT | Performed by: INTERNAL MEDICINE

## 2017-12-02 PROCEDURE — 25000125 ZZHC RX 250: Performed by: INTERNAL MEDICINE

## 2017-12-02 RX ORDER — AZATHIOPRINE 50 MG/1
50 TABLET ORAL DAILY
Status: DISCONTINUED | OUTPATIENT
Start: 2017-12-02 | End: 2017-12-04 | Stop reason: HOSPADM

## 2017-12-02 RX ORDER — CALCIUM CARBONATE 500 MG/1
500-1000 TABLET, CHEWABLE ORAL
Status: DISCONTINUED | OUTPATIENT
Start: 2017-12-02 | End: 2017-12-04 | Stop reason: HOSPADM

## 2017-12-02 RX ORDER — SULFAMETHOXAZOLE AND TRIMETHOPRIM 400; 80 MG/1; MG/1
1 TABLET ORAL EVERY OTHER DAY
Status: DISCONTINUED | OUTPATIENT
Start: 2017-12-03 | End: 2017-12-04 | Stop reason: HOSPADM

## 2017-12-02 RX ADMIN — LEVOTHYROXINE SODIUM 112 MCG: 112 TABLET ORAL at 08:35

## 2017-12-02 RX ADMIN — GUAIFENESIN AND CODEINE PHOSPHATE 5 ML: 100; 10 SOLUTION ORAL at 01:56

## 2017-12-02 RX ADMIN — GUAIFENESIN 600 MG: 600 TABLET, EXTENDED RELEASE ORAL at 08:35

## 2017-12-02 RX ADMIN — OMEPRAZOLE 40 MG: 20 CAPSULE, DELAYED RELEASE ORAL at 08:35

## 2017-12-02 RX ADMIN — AZATHIOPRINE 50 MG: 50 TABLET ORAL at 12:44

## 2017-12-02 RX ADMIN — GUAIFENESIN AND CODEINE PHOSPHATE 5 ML: 100; 10 SOLUTION ORAL at 16:55

## 2017-12-02 RX ADMIN — GUAIFENESIN AND CODEINE PHOSPHATE 5 ML: 100; 10 SOLUTION ORAL at 11:54

## 2017-12-02 RX ADMIN — GUAIFENESIN AND CODEINE PHOSPHATE 5 ML: 100; 10 SOLUTION ORAL at 05:44

## 2017-12-02 RX ADMIN — VITAMIN D, TAB 1000IU (100/BT) 1000 UNITS: 25 TAB at 08:35

## 2017-12-02 RX ADMIN — GUAIFENESIN AND CODEINE PHOSPHATE 5 ML: 100; 10 SOLUTION ORAL at 21:44

## 2017-12-02 RX ADMIN — SODIUM CHLORIDE: 9 INJECTION, SOLUTION INTRAVENOUS at 01:37

## 2017-12-02 RX ADMIN — PREDNISONE 5 MG: 5 TABLET ORAL at 08:35

## 2017-12-02 RX ADMIN — LIOTHYRONINE SODIUM 15 MCG: 5 TABLET ORAL at 08:35

## 2017-12-02 RX ADMIN — GUAIFENESIN 600 MG: 600 TABLET, EXTENDED RELEASE ORAL at 21:44

## 2017-12-02 RX ADMIN — LIOTHYRONINE SODIUM 15 MCG: 5 TABLET ORAL at 21:44

## 2017-12-02 RX ADMIN — SENNOSIDES AND DOCUSATE SODIUM 2 TABLET: 8.6; 5 TABLET ORAL at 09:48

## 2017-12-02 RX ADMIN — CALCIUM CARBONATE (ANTACID) CHEW TAB 500 MG 500 MG: 500 CHEW TAB at 19:36

## 2017-12-02 RX ADMIN — CEFTRIAXONE 1 G: 1 INJECTION, SOLUTION INTRAVENOUS at 16:55

## 2017-12-02 NOTE — PLAN OF CARE
Problem: Patient Care Overview  Goal: Plan of Care/Patient Progress Review  Outcome: Improving  Pt A/O#4, cooperative, VSS on RA, independent, denies pain, infrequent dry cough, LS-clear, BLEs 2+ edema, creat-2.56/improving, good po, voiding adequately, no BM, Senokot given, IVF infusing, rash on terrie ext/torso improving, Bg 181/182 this shift, continues on Rocephin, d/c pending progress.

## 2017-12-02 NOTE — PLAN OF CARE
Problem: Patient Care Overview  Goal: Plan of Care/Patient Progress Review  Outcome: No Change  Pt A/O x4. Up independently in room. VSS on RA. Crackles heard in LLL. Bilateral LE edema, left worse than right. Denies pain but c/o frequent, productive cough this shift. PRN Robitussin given x2 with short-term relief. IVF running. Creatinine trending down but still elevated. Nurse will continue to monitor.

## 2017-12-02 NOTE — PLAN OF CARE
Problem: Patient Care Overview  Goal: Plan of Care/Patient Progress Review  Outcome: Improving  A+Ox4 up independently in room. LS clear non-productive cough. Cr 2.15, trending down. Regular diet, /129. IVF discontinued. Rash to bilateral knees, upper arms clean, dry, intact. +2 edema to bilateral LE. Nephrology following. VSS, on RA, denies pain, will continue to monitor. Anticipate discharge 12/3/17.

## 2017-12-03 LAB
ANION GAP SERPL CALCULATED.3IONS-SCNC: 9 MMOL/L (ref 3–14)
BACTERIA SPEC CULT: ABNORMAL
BUN SERPL-MCNC: 42 MG/DL (ref 7–30)
CALCIUM SERPL-MCNC: 8.8 MG/DL (ref 8.5–10.1)
CHLORIDE SERPL-SCNC: 107 MMOL/L (ref 94–109)
CO2 SERPL-SCNC: 19 MMOL/L (ref 20–32)
CREAT SERPL-MCNC: 2.29 MG/DL (ref 0.52–1.04)
GFR SERPL CREATININE-BSD FRML MDRD: 21 ML/MIN/1.7M2
GLUCOSE BLDC GLUCOMTR-MCNC: 100 MG/DL (ref 70–99)
GLUCOSE BLDC GLUCOMTR-MCNC: 113 MG/DL (ref 70–99)
GLUCOSE BLDC GLUCOMTR-MCNC: 120 MG/DL (ref 70–99)
GLUCOSE BLDC GLUCOMTR-MCNC: 121 MG/DL (ref 70–99)
GLUCOSE BLDC GLUCOMTR-MCNC: 144 MG/DL (ref 70–99)
GLUCOSE SERPL-MCNC: 122 MG/DL (ref 70–99)
POTASSIUM SERPL-SCNC: 4.5 MMOL/L (ref 3.4–5.3)
SODIUM SERPL-SCNC: 135 MMOL/L (ref 133–144)
SPECIMEN SOURCE: ABNORMAL

## 2017-12-03 PROCEDURE — 25000131 ZZH RX MED GY IP 250 OP 636 PS 637: Mod: GY | Performed by: INTERNAL MEDICINE

## 2017-12-03 PROCEDURE — 25000132 ZZH RX MED GY IP 250 OP 250 PS 637: Mod: GY | Performed by: INTERNAL MEDICINE

## 2017-12-03 PROCEDURE — 99232 SBSQ HOSP IP/OBS MODERATE 35: CPT | Performed by: INTERNAL MEDICINE

## 2017-12-03 PROCEDURE — A9270 NON-COVERED ITEM OR SERVICE: HCPCS | Mod: GY | Performed by: INTERNAL MEDICINE

## 2017-12-03 PROCEDURE — 36415 COLL VENOUS BLD VENIPUNCTURE: CPT | Performed by: INTERNAL MEDICINE

## 2017-12-03 PROCEDURE — 25000125 ZZHC RX 250: Performed by: INTERNAL MEDICINE

## 2017-12-03 PROCEDURE — 25000128 H RX IP 250 OP 636: Performed by: INTERNAL MEDICINE

## 2017-12-03 PROCEDURE — 80048 BASIC METABOLIC PNL TOTAL CA: CPT | Performed by: INTERNAL MEDICINE

## 2017-12-03 PROCEDURE — 12000000 ZZH R&B MED SURG/OB

## 2017-12-03 PROCEDURE — 00000146 ZZHCL STATISTIC GLUCOSE BY METER IP

## 2017-12-03 RX ORDER — SODIUM CHLORIDE 9 MG/ML
INJECTION, SOLUTION INTRAVENOUS CONTINUOUS
Status: DISCONTINUED | OUTPATIENT
Start: 2017-12-03 | End: 2017-12-04 | Stop reason: HOSPADM

## 2017-12-03 RX ORDER — SODIUM BICARBONATE 650 MG/1
650 TABLET ORAL 2 TIMES DAILY
Status: DISCONTINUED | OUTPATIENT
Start: 2017-12-03 | End: 2017-12-04 | Stop reason: HOSPADM

## 2017-12-03 RX ADMIN — LIOTHYRONINE SODIUM 15 MCG: 5 TABLET ORAL at 08:39

## 2017-12-03 RX ADMIN — VITAMIN D, TAB 1000IU (100/BT) 1000 UNITS: 25 TAB at 08:39

## 2017-12-03 RX ADMIN — SULFAMETHOXAZOLE AND TRIMETHOPRIM 1 TABLET: 400; 80 TABLET ORAL at 08:39

## 2017-12-03 RX ADMIN — LIOTHYRONINE SODIUM 15 MCG: 5 TABLET ORAL at 20:23

## 2017-12-03 RX ADMIN — SODIUM BICARBONATE 650 MG TABLET 650 MG: at 13:01

## 2017-12-03 RX ADMIN — CEFTRIAXONE 1 G: 1 INJECTION, SOLUTION INTRAVENOUS at 16:31

## 2017-12-03 RX ADMIN — PREDNISONE 5 MG: 5 TABLET ORAL at 08:39

## 2017-12-03 RX ADMIN — AZATHIOPRINE 50 MG: 50 TABLET ORAL at 08:39

## 2017-12-03 RX ADMIN — LEVOTHYROXINE SODIUM 112 MCG: 112 TABLET ORAL at 08:39

## 2017-12-03 RX ADMIN — OMEPRAZOLE 40 MG: 20 CAPSULE, DELAYED RELEASE ORAL at 08:39

## 2017-12-03 RX ADMIN — GUAIFENESIN 600 MG: 600 TABLET, EXTENDED RELEASE ORAL at 08:39

## 2017-12-03 RX ADMIN — GUAIFENESIN 600 MG: 600 TABLET, EXTENDED RELEASE ORAL at 20:23

## 2017-12-03 RX ADMIN — SODIUM BICARBONATE 650 MG TABLET 650 MG: at 20:23

## 2017-12-03 RX ADMIN — SODIUM CHLORIDE 1000 ML: 9 INJECTION, SOLUTION INTRAVENOUS at 11:30

## 2017-12-03 NOTE — PROVIDER NOTIFICATION
MD Notification    Notified Person:  MD    Notified Persons Name: Dr Lorenzana    Notification Date/Time: 2230    Notification Interaction:  Talked with Physician    Purpose of Notification: Lactic acid 2.4    Orders Received: Continue to monitor. PRN tylenol.    Comments: Pt declined PRN tylenol.

## 2017-12-03 NOTE — PLAN OF CARE
Problem: Patient Care Overview  Goal: Plan of Care/Patient Progress Review  A+Ox4 up independently in room. LS clear non-productive cough. Cr 2.15, trending down. Regular diet, /122.SL, IV abx. Rash to bilateral knees, upper arms clean, dry, intact. +2 edema to bilateral LE. Nephrology following. BPA fired, /87, Resp 25, 96 bpm, temp 97.6. Reassessed 136/65, 112 bpm, resp 24, 99.6 temp. Lactic acid 2.4, provider notified, pending orders.

## 2017-12-03 NOTE — PLAN OF CARE
Problem: Patient Care Overview  Goal: Plan of Care/Patient Progress Review  Outcome: No Change  Pt A/O x4. On regular diet. Up independently in room. Reports feeling unwell overall. MD did see on eves and vitals have been stable this shift on RA. Frequent productive cough persists, pt declined PRN Robitussin. LS clear on auscultation. Bilateral LE edema. Cr trending down. IV saline locked. Nurse will continue to monitor.

## 2017-12-03 NOTE — PROGRESS NOTES
[facility]    Sepsis Evaluation Progress Note    Date of Service: 12/02/2017    I was called to see Elizabeth Galicia due to abnormal vital signs triggering the Sepsis SIRS screening alert. She is known to have an infection.     Physical Exam    Vital Signs:  Temp: 99.6  F (37.6  C) Temp src: Oral BP: 136/65 Pulse: 112 Heart Rate: 66 Resp: 24 SpO2: 99 % O2 Device: None (Room air)      Lab:  Lactic Acid   Date Value Ref Range Status   12/02/2017 2.4 (H) 0.7 - 2.0 mmol/L Final       The patient is at baseline mental status.    The rest of their physical exam is significant for tachycardia but regular, lungs clear, abdomen benign.    Assessment and Plan    The SIRS and exam findings are likely due to underlying infection, there is no sign of sepsis at this time.    Disposition: The patient will remain on the current unit. We will continue to monitor this patient closely.    Frankie Lorenzana MD

## 2017-12-03 NOTE — PROGRESS NOTES
Renal Medicine Progress Note                                Elizabeth Galicia MRN# [id number]   Age: 72 year old YOB: 1945   Date of Admission: 11/30/2017 Hospital LOS: 3                  Assessment/Plan:     72-year-old female admitted through the ER, dated 11/30/2017, in the setting of a fever   and apparent urinary tract infection.   Found to have acute on chronic renal failure    Evaluated with respect to elevated serum creatinine in a patient with biopsy-proven   acute crescentic glomerulonephritis, pauci-immune type       1.   Microscopic polyangiitis   -crescentic GN by renal biopsy    -elevated MPO titer   -completed 5 cycles IV cyclophosphamide    -maintenance therapy with imuran  2.  CKD   -baseline creatinine 1.5 mg/dl range  3.  Acute Kidney Injury   -U albumin baseline range   -Richard on low side   -unlikely recurrent vasculitis  4.  Metabolic acidosis   -positive urine anion gap suggest renal HCO3- loss   -RTA  5.  ? UTI  6.  Papular rash      Start PO HCO3-  IVF today      Interval History:     Up at bedside.  Edema same.  No urinary tract symptoms.  Good UO.  Creatinine   up slightly.  Maybe on dry side.  Chills last evening     ROS:     GENERAL: NAD, No fever,chills  E/M: NEGATIVE for ear, mouth and throat problems  R: NEGATIVE for significant cough or SOB  CV: NEGATIVE for chest pain, palpitations  GI: NEGATIVE for abdominal pain, heartburn, nausea, vomiting or change in bowel pattern  EXT: no change edema  ROS otherwise negative    Medications and Allergies:     Reviewed    Physical Exam:     Vitals were reviewed  Patient Vitals for the past 8 hrs:   BP Temp Temp src Heart Rate Resp SpO2 Weight   12/03/17 0803 99/42 97.9  F (36.6  C) Oral 83 18 93 % -   12/03/17 0700 - - - - - - 110 kg (242 lb 8.1 oz)     I/O last 3 completed shifts:  In: 2834 [P.O.:1700; I.V.:1134]  Out: 3800 [Urine:3800]    Vitals:    12/01/17 0545 12/02/17 0700 12/03/17 0700   Weight: 108.1 kg (238 lb 5.1 oz)  110.9 kg (244 lb 7.8 oz) 110 kg (242 lb 8.1 oz)     GENERAL: awake, alert, follows  HEENT: NC/AT, PERRLA, EOMI, non icteric, pharynx moist without lesion  NECK: supple, no masses or adenopathy  RESP:  clear anteriorly  CV: RRR, normal S1 S2  ABDOMEN: soft, nontender, no HSM or masses and bowel sounds normal  MS: no clubbing, cyanosis   SKIN: clear without significant rashes or lesions  NEURO: speech normal and cranial nerves 2-12 intact  PSYCH: affect normal/bright  EXT: trace to 1 plus edema    Data:       Recent Labs  Lab 12/03/17  0800 12/02/17  0745 12/01/17  0809 11/30/17  1239    134 131* 133   POTASSIUM 4.5 4.7 4.6 4.5   CHLORIDE 107 105 101 100   CO2 19* 19* 19* 23   ANIONGAP 9 10 11 10   * 131* 134* 101*   BUN 42* 41* 35* 36*   CR 2.29* 2.15* 2.56* 2.88*   GFRESTIMATED 21* 23* 18* 16*   GFRESTBLACK 25* 27* 22* 19*   ARABELLA 8.8 8.8 8.9 9.0         Recent Labs   Lab Test  12/03/17   0800  12/02/17   0745  12/01/17   0809  11/30/17   1239  11/21/17   1324  10/10/17   1234  09/29/17   1650  09/11/17   1322  08/17/17   1310  08/07/17   1052   CR  2.29*  2.15*  2.56*  2.88*  1.43*  1.43*  1.45*  1.41*  1.40*  1.23*       Recent Labs  Lab 11/30/17  1239   ALBUMIN 2.5*       Recent Labs  Lab 12/02/17  0745 12/01/17  0809 11/30/17  1129   PHOS 3.9  --   --    HGB 9.5* 10.7* 10.4*       Recent Labs  Lab 11/30/17  1239   UMALCR 30.66*       Recent Labs  Lab 12/01/17  1240   UNAR 15   UKR 12   UCLR <10       G Vito Collins    [facility] - Nephrology  [phone]

## 2017-12-03 NOTE — PROGRESS NOTES
"[facility]    Hospitalist Progress Note    Assessment & Plan   Elizabeth Galicia is a 72 year old female with complex PMHx including microscopic polyangiitis, DM II (diet controlled), hyperlipidemia, GERD, anxiety and obesity who was admitted on 11/30/2017 with acute renal failure and UTI with suggestion of sepsis.     Abnormal UA with concern for possible sepsis on admission: Resolved.  Presented with complaints of \"feeling unwell\" in the preceding 8-9 days with mild fever and diffuse papular rash. Was seen in Urgent Care on 11/27 for evaluation of fever (T 101.4) and it was thought that she may have had pneumonia -- she was started on Levaquin and Robitussin. Saw her PCP in clinic on the day of admission, UA was grossly abnl ( WBCs, 5-10 RBCs, mod leuk esterase and neg nitrites) and Cr increased to 2.8. She was referred to the ED for further evaluation. Afebrile on presentation, hypotensive but BPs stable after IVFs. WBC 20.6 on admission. Admitting MD (Dr. Cox) spoke with patient's rheumatologist, felt presentation unlikely related to vasculitis. Started on IV Rocephin.     Urine culture ultimately grew 10-50k enterococcus. Discussed with nephrology, not inclined to treat given lack of urinary sx. Rocephin dc'd 12/2. Ongoing leukocytosis likely dt increased steroids on admission -- remains afebrile, blood cultures neg. Remains hemodynamically stable.     Acute Renal Failure:  Baseline Cr 1.4 - 1.5. Cr 1.43 on 11/21 -- up to 2.88 on the day of admission. Unclear if prerenal dt dehydration with  UTI. IVFs started on admission. Per rheumatology, low suspicion for flare of vasculitis. Nephrology consulted, no proteinuria, felt clinical picture unlikely dt vasculitis flare.   -- Cr trending down: 2.88 -- 2.56 -- 2.15 -- 2.29 today after IVFs stopped yesterday, though taking po and has had good urine output  -- discussed with nephrology -- will resume NS @50ml/h for an additional 1L " today    Non AG metabolic acidosis:  Bicarb low in context of improving renal function. Nephrology following, suspect RTA. Had previously been on bicarb, unclear as to why this was stopped.   -- will resume sched bicarb today    Microscopic Polyangiitis  Follows with Dr. Derrick Arnold and typically manages with Imuran and prednisone (5mg daily). Saw her rheumatologist earlier this week after she developed a papular rash, he did not feel this was related to a flare of her vasculitis and told her to stop taking her Imuran. Per discussions with Dr. Arnold on admission, prednisone increased from 5mg daily to 40mg daily. Given 40mg daily on 11/30-12/1.   -- prednisone decreased back to 5mg daily and Imuran resumed (50mg daily) on 12/2   -- patient says she takes Bactrim for PCP ppx (was initially prescribed while on cyclophosphamide earlier this year) -- discussed with nephrology and curbsided ID -- can resume Bactrim SS i tab every other day  -- follow up with rheumatology and nephrology after discharge    Papular Rash:  Noted on trunk/extremities. Non-pruritic. Saw rheumatologist on 11/27, felt to be unrelated to vasculitis.   -- monitor for now, per patient seems to be improving w/o specific intervention    DM II:  A1C 6.7 in 5/2017. Diet controlled.   Given acute illness and increase in steroids, was placed on SSI on admission    Recent Labs  Lab 12/03/17  0838 12/03/17  0800 12/03/17  0218 12/02/17  2100 12/02/17  1846 12/02/17  1301 12/02/17  0847 12/02/17  0745  12/01/17  0809  11/30/17  1239   GLC  --  122*  --   --   --   --   --  131*  --  134*  --  101*   *  --  144* 122* 136* 129* 116*  --   < >  --   < >  --    < > = values in this interval not displayed.    -- cont med dose SSI, none needed thus far    Hypothyroidism:  Chronic and stable on levothyroxine (112 mcg daily) and liothyronine (15mcg BID) which have been continued.     GERD:  Chronic and stable on PPI    Normocytic Anemia:  Baseline hgb  seems to be around 12, hgb down to 10 this stay in context of IVFs.   -- monitor labs periodically, no addition workup needed at this time    FEN: resume IVFs today as above, lytes stable, regular diet as tolerated  DVT Prophylaxis: PCDs  Code Status: Full Code    Disposition: Anticipate discharge home tomorrow pending improved renal function and tolerating po.     Shaneka Laguerre    Interval History   Seen this morning. Reportedly had a rough night. BPA fired last evening and lactate mildy elevated at 2.4. Afebrile, no other s/sx to suggest infection. Endorses feeling chilled overnight and fatigued. No ongoing chills today but still feels tired. Taking po and drinking water. No other localized s/sx. Patient wondering if Imuran could be contributing -- began taking in early November and has had similar feelings of fatigue and malaise since then    -Data reviewed today: I reviewed all new labs and imaging results over the last 24 hours. I personally reviewed no images or EKG's today.    Physical Exam   Temp: 97.9  F (36.6  C) Temp src: Oral BP: 99/42 Pulse: 91 Heart Rate: 83 Resp: 18 SpO2: 93 % O2 Device: None (Room air)    Vitals:    12/01/17 0545 12/02/17 0700 12/03/17 0700   Weight: 108.1 kg (238 lb 5.1 oz) 110.9 kg (244 lb 7.8 oz) 110 kg (242 lb 8.1 oz)     Vital Signs with Ranges  Temp:  [97.3  F (36.3  C)-99.6  F (37.6  C)] 97.9  F (36.6  C)  Pulse:  [] 91  Heart Rate:  [83] 83  Resp:  [16-25] 18  BP: ()/(42-87) 99/42  SpO2:  [93 %-99 %] 93 %  I/O last 3 completed shifts:  In: 2834 [P.O.:1700; I.V.:1134]  Out: 3800 [Urine:3800]    Constitutional: Resting comfortably, alert and conversing appropriately, NAD  Respiratory: CTAB, no wheeze/rales/rhonchi, no increased work of breathing  Cardiovascular: HRRR, no MGR, +bilateal LE edema to mid shins  GI: S, NT, ND, +BS  Skin/Integumen: warm/dry, few scattered papular lesions on extremities which have crusted over, no new lesions  Other:       Medications        azaTHIOprine (IMURAN) tablet 50 mg  50 mg Oral Daily     sulfamethoxazole-trimethoprim  1 tablet Oral Every Other Day     predniSONE  5 mg Oral Daily     cholecalciferol (vitamin D3) tablet 1,000 Units  1,000 Units Oral Daily     levothyroxine  112 mcg Oral QAM     liothyronine  15 mcg Oral BID     omeprazole  40 mg Oral QAM     insulin aspart  1-7 Units Subcutaneous TID AC     insulin aspart  1-5 Units Subcutaneous At Bedtime     guaiFENesin  600 mg Oral BID     cefTRIAXone  1 g Intravenous Q24H       Data     Recent Labs  Lab 12/03/17  0800 12/02/17  0745 12/01/17  0809 11/30/17  1239  11/30/17  1129   WBC  --  20.1* 17.7*  --   --  20.6*   HGB  --  9.5* 10.7*  --   --  10.4*   MCV  --  90 91  --   --  94   PLT  --  524* 508*  --   --  512*    134 131* 133  < >  --    POTASSIUM 4.5 4.7 4.6 4.5  < >  --    CHLORIDE 107 105 101 100  < >  --    CO2 19* 19* 19* 23  < >  --    BUN 42* 41* 35* 36*  < >  --    CR 2.29* 2.15* 2.56* 2.88*  < >  --    ANIONGAP 9 10 11 10  < >  --    ARABELLA 8.8 8.8 8.9 9.0  < >  --    * 131* 134* 101*  < >  --    ALBUMIN  --   --   --  2.5*  --   --    PROTTOTAL  --   --   --  6.8  --   --    BILITOTAL  --   --   --  0.3  --   --    ALKPHOS  --   --   --  113  --   --    ALT  --   --   --  18  --   --    AST  --   --   --  14  --   --    < > = values in this interval not displayed.    No results found for this or any previous visit (from the past 24 hour(s)).

## 2017-12-04 VITALS
WEIGHT: 244.49 LBS | RESPIRATION RATE: 16 BRPM | DIASTOLIC BLOOD PRESSURE: 73 MMHG | OXYGEN SATURATION: 98 % | TEMPERATURE: 97.7 F | BODY MASS INDEX: 40.73 KG/M2 | SYSTOLIC BLOOD PRESSURE: 141 MMHG | HEIGHT: 65 IN | HEART RATE: 97 BPM

## 2017-12-04 LAB
ANCA IGG TITR SER IF: NORMAL {TITER}
ANION GAP SERPL CALCULATED.3IONS-SCNC: 12 MMOL/L (ref 3–14)
BUN SERPL-MCNC: 34 MG/DL (ref 7–30)
CALCIUM SERPL-MCNC: 9.4 MG/DL (ref 8.5–10.1)
CHLORIDE SERPL-SCNC: 107 MMOL/L (ref 94–109)
CO2 SERPL-SCNC: 19 MMOL/L (ref 20–32)
CREAT SERPL-MCNC: 2.12 MG/DL (ref 0.52–1.04)
ERYTHROCYTE [DISTWIDTH] IN BLOOD BY AUTOMATED COUNT: 14.7 % (ref 10–15)
GFR SERPL CREATININE-BSD FRML MDRD: 23 ML/MIN/1.7M2
GLUCOSE BLDC GLUCOMTR-MCNC: 115 MG/DL (ref 70–99)
GLUCOSE BLDC GLUCOMTR-MCNC: 117 MG/DL (ref 70–99)
GLUCOSE BLDC GLUCOMTR-MCNC: 164 MG/DL (ref 70–99)
GLUCOSE SERPL-MCNC: 150 MG/DL (ref 70–99)
HCT VFR BLD AUTO: 31.4 % (ref 35–47)
HGB BLD-MCNC: 10.3 G/DL (ref 11.7–15.7)
MCH RBC QN AUTO: 30.1 PG (ref 26.5–33)
MCHC RBC AUTO-ENTMCNC: 32.8 G/DL (ref 31.5–36.5)
MCV RBC AUTO: 92 FL (ref 78–100)
PLATELET # BLD AUTO: 615 10E9/L (ref 150–450)
POTASSIUM SERPL-SCNC: 4 MMOL/L (ref 3.4–5.3)
RBC # BLD AUTO: 3.42 10E12/L (ref 3.8–5.2)
SODIUM SERPL-SCNC: 138 MMOL/L (ref 133–144)
WBC # BLD AUTO: 14.4 10E9/L (ref 4–11)

## 2017-12-04 PROCEDURE — 00000146 ZZHCL STATISTIC GLUCOSE BY METER IP

## 2017-12-04 PROCEDURE — 25000125 ZZHC RX 250: Performed by: INTERNAL MEDICINE

## 2017-12-04 PROCEDURE — 25000131 ZZH RX MED GY IP 250 OP 636 PS 637: Mod: GY | Performed by: INTERNAL MEDICINE

## 2017-12-04 PROCEDURE — 85027 COMPLETE CBC AUTOMATED: CPT | Performed by: INTERNAL MEDICINE

## 2017-12-04 PROCEDURE — A9270 NON-COVERED ITEM OR SERVICE: HCPCS | Mod: GY | Performed by: INTERNAL MEDICINE

## 2017-12-04 PROCEDURE — 25000132 ZZH RX MED GY IP 250 OP 250 PS 637: Mod: GY | Performed by: INTERNAL MEDICINE

## 2017-12-04 PROCEDURE — 99239 HOSP IP/OBS DSCHRG MGMT >30: CPT | Performed by: INTERNAL MEDICINE

## 2017-12-04 PROCEDURE — 80048 BASIC METABOLIC PNL TOTAL CA: CPT | Performed by: INTERNAL MEDICINE

## 2017-12-04 PROCEDURE — 36415 COLL VENOUS BLD VENIPUNCTURE: CPT | Performed by: INTERNAL MEDICINE

## 2017-12-04 RX ORDER — SULFAMETHOXAZOLE AND TRIMETHOPRIM 400; 80 MG/1; MG/1
1 TABLET ORAL EVERY OTHER DAY
Refills: 0
Start: 2017-12-04 | End: 2017-12-20

## 2017-12-04 RX ORDER — SODIUM BICARBONATE 650 MG/1
650 TABLET ORAL 2 TIMES DAILY
Start: 2017-12-04 | End: 2017-12-14

## 2017-12-04 RX ADMIN — LIOTHYRONINE SODIUM 15 MCG: 5 TABLET ORAL at 09:21

## 2017-12-04 RX ADMIN — OMEPRAZOLE 40 MG: 20 CAPSULE, DELAYED RELEASE ORAL at 09:21

## 2017-12-04 RX ADMIN — LEVOTHYROXINE SODIUM 112 MCG: 112 TABLET ORAL at 09:21

## 2017-12-04 RX ADMIN — PREDNISONE 5 MG: 5 TABLET ORAL at 09:21

## 2017-12-04 RX ADMIN — VITAMIN D, TAB 1000IU (100/BT) 1000 UNITS: 25 TAB at 09:21

## 2017-12-04 RX ADMIN — SODIUM BICARBONATE 650 MG TABLET 650 MG: at 09:21

## 2017-12-04 RX ADMIN — GUAIFENESIN 600 MG: 600 TABLET, EXTENDED RELEASE ORAL at 09:21

## 2017-12-04 RX ADMIN — AZATHIOPRINE 50 MG: 50 TABLET ORAL at 09:21

## 2017-12-04 NOTE — DISCHARGE SUMMARY
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     Hospital Course   Elizabeth Galicia was admitted on 11/30/2017.  The following problems were addressed during her hospitalization:    Abnormal UA with concern for possible sepsis dt UTI on admission: Resolved, sepsis ruled out.  Presented with complaints of \"feeling unwell\" in the preceding 8-9 days with mild fever and diffuse papular rash. Was seen in Urgent Care on 11/27 for evaluation of fever (T 101.4) and it was thought that she may have had pneumonia -- she was started on Levaquin and Robitussin. Saw her PCP in clinic on the day of admission, UA was grossly abnl ( WBCs, 5-10 RBCs, mod leuk esterase and neg nitrites) and Cr increased to 2.8. She was referred to the ED for further evaluation. Afebrile on presentation, hypotensive but BPs stable after IVFs. WBC 20.6 on admission. Admitting MD (Dr. Cox) spoke with patient's rheumatologist, felt presentation unlikely related to vasculitis. Started on IV Rocephin.      Urine culture ultimately grew 10-50k enterococcus. Discussed with nephrology, not inclined to treat given lack of urinary sx. Rocephin dc'd 12/2. Ongoing leukocytosis likely dt increased steroids on admission -- remains afebrile, blood " cultures neg. Remains hemodynamically stable.      MELANIA stage II on stage III CKD: Improved  Baseline Cr 1.4 - 1.5. Cr 1.43 on 11/21 -- up to 2.88 on the day of admission. Unclear if prerenal dt dehydration with UTI, possibly ATN. IVFs started on admission. Per rheumatology, low suspicion for flare of vasculitis. Nephrology consulted, no proteinuria, felt clinical picture unlikely dt vasculitis flare. Cr trended down with IVFs this stay, had good urine output during stay. Cr 2.1 on day of discharge.     Will have repeat BMP, CRP and UA in nephrology clinic later this week (12/7/17).     Non AG metabolic acidosis:  Bicarb low in context of improving renal function. Nephrology following, suspect RTA. Had previously been on bicarb, unclear as to why this was stopped. Bicarb resumed this stay (650mg po BID).      Microscopic Polyangiitis  Follows with Dr. Derrick Arnold and typically manages with Imuran and prednisone (5mg daily). Saw her rheumatologist earlier this week after she developed a papular rash, he did not feel this was related to a flare of her vasculitis and told her to stop taking her Imuran. Per discussions with Dr. Arnold on day of admission, prednisone increased from 5mg daily to 40mg daily. Given 40mg daily on 11/30-12/1.     Prednisone decreased back to 5mg daily and Imuran resumed (50mg daily) on 12/2. Takes Bactrim SS for PCP ppx (was initially prescribed while on cyclophosphamide earlier this year) -- discussed with nephrology and curbsided ID --  decreased to every other day dosing (had been taking daily prior to admission).     Will have repeat labs per nephrology on 12/7 and follow up with Dr. Arnold after discharge.      Papular Rash:  Noted on trunk/extremities. Non-pruritic. Saw rheumatologist on 11/27, felt to be unrelated to vasculitis.   Rash improved during hospital stay without specific intervention.      DM II:  A1C 6.7 in 5/2017. Diet controlled.   Given acute illness and increase in  steroids, was placed on SSI on admission, minimal need this stay.     Hypothyroidism:  Chronic and stable on levothyroxine (112 mcg daily) and liothyronine (15mcg BID) which have been continued.      GERD:  Chronic and stable on PPI     Normocytic Anemia:  Baseline hgb seems to be around 12, hgb down to 10 this stay in context of IVFs. Remained stable, no concerns for blood loss this stay.     Shaneka Laguerre    Pending Results   These results will be followed up by PCP/nephrology  Unresulted Labs Ordered in the Past 30 Days of this Admission     Date and Time Order Name Status Description    12/2/2017 0000 Antineutrophil cytoplasmic Jahaira IgG In process     11/30/2017 1622 Blood culture Preliminary     11/30/2017 1622 Blood culture Preliminary           Code Status   Full Code       Primary Care Physician   Nati Littlejohn    Physical Exam   Temp: 97.5  F (36.4  C) Temp src: Oral BP: 147/76 Pulse: 97 Heart Rate: 90 Resp: 16 SpO2: 97 % O2 Device: None (Room air)    Vitals:    12/02/17 0700 12/03/17 0700 12/04/17 0635   Weight: 110.9 kg (244 lb 7.8 oz) 110 kg (242 lb 8.1 oz) 110.9 kg (244 lb 7.8 oz)     Vital Signs with Ranges  Temp:  [97.4  F (36.3  C)-98.3  F (36.8  C)] 97.5  F (36.4  C)  Pulse:  [90-97] 97  Heart Rate:  [89-97] 90  Resp:  [16-18] 16  BP: (118-147)/(57-76) 147/76  SpO2:  [97 %-100 %] 97 %  I/O last 3 completed shifts:  In: 4753 [P.O.:3420; I.V.:1333]  Out: 2550 [Urine:2550]    General: Resting comfortably, alert, conversive, NAD  CVS: HRRR, no MGR, +bilateral LE edema  Respiratory: CTAB, no wheeze/rales/rhonchi  GI: S, NT, ND, +BS  Skin: Warm/dry    Discharge Disposition   Discharged to home  Condition at discharge: Stable    Consultations This Hospital Stay   NEPHROLOGY IP CONSULT    Time Spent on this Encounter   IShaneka, personally saw the patient today and spent greater than 30 minutes discharging this patient.    Discharge Orders     Reason for your hospital stay    Evaluation of your abnormal renal function and possible UTI.     Follow-up and recommended labs and tests    1. Repeat labs through neph[facility] ([facility]) on Thursday 12/7/17  2. Follow up with your PCP in the next 7-10 days.  3. Follow up with Dr. Arnold in clinic in the next 1-2 weeks.     Activity   Your activity upon discharge: activity as tolerated     Diet   Follow this diet upon discharge: Regular       Discharge Medications   Current Discharge Medication List      START taking these medications    Details   sodium bicarbonate 650 MG tablet Take 1 tablet (650 mg) by mouth 2 times daily    Associated Diagnoses: RTA (renal tubular acidosis)         CONTINUE these medications which have CHANGED    Details   sulfamethoxazole-trimethoprim (BACTRIM/SEPTRA) 400-80 MG per tablet Take 1 tablet by mouth every other day  Refills: 0    Associated Diagnoses: Prophylactic antibiotic         CONTINUE these medications which have NOT CHANGED    Details   PREDNISONE PO Take 5 mg by mouth daily 1/2 of 10mg tablet daily      AZATHIOPRINE PO Take 50 mg by mouth daily      Levothyroxine Sodium 112 MCG CAPS Take 112 mcg by mouth every morning  Qty: 90 capsule, Refills: 3    Associated Diagnoses: Postoperative hypothyroidism      liothyronine (CYTOMEL) 5 MCG tablet Take 3 tablets (15 mcg) by mouth 2 times daily  Qty: 540 tablet, Refills: 3    Associated Diagnoses: Postoperative hypothyroidism      acetaminophen (TYLENOL) 500 MG tablet Take 2 tablets (1,000 mg) by mouth every 8 hours    Associated Diagnoses: Generalized pain      omeprazole (PRILOSEC) 40 MG capsule Take 1 capsule (40 mg) by mouth every morning  Qty: 30 capsule, Refills: 0    Associated Diagnoses: Prophylactic measure      guaiFENesin-codeine (ROBITUSSIN AC) 100-10 MG/5ML SOLN solution Take 5 mLs by mouth every 4 hours as needed for cough  Qty: 420 mL, Refills: 0    Associated Diagnoses: Cough      MAGNESIUM LACTATE PO Take 2 tablets by mouth  daily      Cholecalciferol (VITAMIN D3 PO) Take 1,000 Units by mouth daily      CALCIUM LACTATE PO Take 4 tablets by mouth daily          STOP taking these medications       cefTRIAXone (ROCEPHIN) 1 GM vial Comments:   Reason for Stopping:         levofloxacin (LEVAQUIN) 500 MG tablet Comments:   Reason for Stopping:             Allergies   Allergies   Allergen Reactions     Cranberries [Cranberry Extract]      Causes herpes flair-up     Dairy [Milk Products]      Perfume Other (See Comments)     Stuffy in nose, HA     Seasonal Allergies      Data   Most Recent 3 CBC's:  Recent Labs   Lab Test  12/04/17   1145  12/02/17   0745  12/01/17   0809   WBC  14.4*  20.1*  17.7*   HGB  10.3*  9.5*  10.7*   MCV  92  90  91   PLT  615*  524*  508*      Most Recent 3 BMP's:  Recent Labs   Lab Test  12/04/17   1145  12/03/17   0800  12/02/17   0745   NA  138  135  134   POTASSIUM  4.0  4.5  4.7   CHLORIDE  107  107  105   CO2  19*  19*  19*   BUN  34*  42*  41*   CR  2.12*  2.29*  2.15*   ANIONGAP  12  9  10   ARABELLA  9.4  8.8  8.8   GLC  150*  122*  131*     Most Recent 2 LFT's:  Recent Labs   Lab Test  11/30/17   1239  09/11/17   1322  08/17/17   1310   AST  14  12  14   ALT  18  26  26   ALKPHOS  113   --   51   BILITOTAL  0.3   --   0.2     Most Recent 6 Bacteria Isolates From Any Culture (See EPIC Reports for Culture Details):  Recent Labs   Lab Test  11/30/17   1626  11/30/17   1308  05/13/17   0825  08/10/13   2240   CULT  No growth after 4 days  No growth after 4 days  10,000 to 50,000 colonies/mL  Enterococcus faecalis  *  Moderate growth Staphylococcus aureus  Moderate growth Strain 2 Staphylococcus aureus  Moderate growth Normal dari  *  50,000 to 100,000 colonies/mL Escherichia coli     Results for orders placed or performed in visit on 11/30/17   XR Chest 2 Views    Narrative    CHEST TWO VIEWS  11/30/2017 12:01 PM    HISTORY:  Respiratory illness.    COMPARISON:  11/27/2017      Impression    IMPRESSION:  Negative.      RODOLFO LOPEZ MD

## 2017-12-04 NOTE — PLAN OF CARE
Problem: Patient Care Overview  Goal: Plan of Care/Patient Progress Review  Outcome: Improving  Pt A/O x4. Regular diet. Up independently. VSS on RA. Denies pain. Cough and papular rash improving. LS clear, saline locked. Creatinine at 2.29, up from 2.15. Overnight . Possible d/c home today. Nurse will continue to monitor.

## 2017-12-04 NOTE — PLAN OF CARE
Problem: Patient Care Overview  Goal: Plan of Care/Patient Progress Review  Outcome: Adequate for Discharge Date Met: 12/04/17  VSS, O2 wnl RA. A&Ox4. Up independently. Denies CP/SOB. Tolerating diet, good appetite. Denies pain. Went through AVS, pt verbalized understanding. Belongings packed and sent with the pt, no valuables locked up with our security. Family provided transportation. Discharged home.

## 2017-12-04 NOTE — PLAN OF CARE
Problem: Patient Care Overview  Goal: Plan of Care/Patient Progress Review  Pt A/Ox4. Up ind. VSS on RA. Denies pain. Regular diet. Dribbling/frequency voiding. /100. D/C pending decreased creat. IV fluids at 75ml/hr. IV abx. Rash to bilat knees and upper arms improving.

## 2017-12-04 NOTE — PROGRESS NOTES
[facility]    Nephrology Progress Note     Assessment & Plan     72-year-old female admitted through the ER, dated 11/30/2017, in the setting of a fever   and respiratory symptoms.  Found to have acute on chronic renal failure.       Evaluated with respect to elevated serum creatinine in a patient with biopsy-proven   acute crescentic glomerulonephritis, pauci-immune type.          1.   Microscopic polyangiitis                        -crescentic GN by renal biopsy                         -elevated MPO titer                        -completed 5 cycles IV cyclophosphamide with achievement of remission.                          -maintenance therapy with imuran/pred.    2.  CKD                        -baseline creatinine 1.5 mg/dl range  3.  Acute Kidney Injury                        -U albumin baseline range                        -Richard on low side                        -unlikely recurrent vasculitis especially as it is improving without escalation of her immune suppression.  Given polyuria now, this may be ATN with a post ATN diuresis.    4.  Metabolic acidosis                        -positive urine anion gap suggest renal HCO3- loss                        -RTA  5.  ? UTI -  Doubt at this point.  UA likely not a clean catch initially.    6.  Papular rash - cause unclear.    Plan:    Discussed at length with the patient and also Dr. Laguerre.  I think given improvement, she could be discharged today  She will need follow up labs later this week - BMP, UA and CRP.  She does not wish to follow up with Dr. Barkley of our group  She will be following up with Dr. Arnold of rheumatology.         Jesus Hartley MD  [facility] - Nephrology  [phone]    Interval History     Feels better but not 100% yet.  Less fatigue.  Has polyuria.  She cannot control the urine at the pace she is producing it.  She needs a pad to collect urine while asleep.    Rash is still present, though a little better and  certainly not advancing.    Less congested in nose/upper airway.  No hemoptysis.    UC 10-50K enterococcus.        Physical Exam   Temp: 97.5  F (36.4  C) Temp src: Oral BP: 147/76 Pulse: 97 Heart Rate: 90 Resp: 16 SpO2: 97 % O2 Device: None (Room air)    Vitals:    12/02/17 0700 12/03/17 0700 12/04/17 0635   Weight: 110.9 kg (244 lb 7.8 oz) 110 kg (242 lb 8.1 oz) 110.9 kg (244 lb 7.8 oz)     Vital Signs with Ranges  Temp:  [97.4  F (36.3  C)-98.3  F (36.8  C)] 97.5  F (36.4  C)  Pulse:  [90-97] 97  Heart Rate:  [89-97] 90  Resp:  [16-18] 16  BP: (118-147)/(57-76) 147/76  SpO2:  [97 %-100 %] 97 %  I/O last 3 completed shifts:  In: 4753 [P.O.:3420; I.V.:1333]  Out: 2550 [Urine:2550]    GENERAL APPEARANCE: pleasant, NAD, a & o  HEENT:  Eyes/ears/nose/neck grossly normal  RESP: lungs few crackles at bases  CV: RRR, nl S1/S2, no m/r/g   ABDOMEN: o/s/nt/nd, bs present  EXTREMITIES/SKIN: no rashes/lesions; 2+ BLE edema    Medications     NaCl Stopped (12/04/17 0522)       sodium bicarbonate  650 mg Oral BID     azaTHIOprine (IMURAN) tablet 50 mg  50 mg Oral Daily     sulfamethoxazole-trimethoprim  1 tablet Oral Every Other Day     predniSONE  5 mg Oral Daily     cholecalciferol (vitamin D3) tablet 1,000 Units  1,000 Units Oral Daily     levothyroxine  112 mcg Oral QAM     liothyronine  15 mcg Oral BID     omeprazole  40 mg Oral QAM     insulin aspart  1-7 Units Subcutaneous TID AC     insulin aspart  1-5 Units Subcutaneous At Bedtime     guaiFENesin  600 mg Oral BID     cefTRIAXone  1 g Intravenous Q24H       Data   BMP  Recent Labs  Lab 12/04/17  1145 12/03/17  0800 12/02/17  0745 12/01/17  0809    135 134 131*   POTASSIUM 4.0 4.5 4.7 4.6   CHLORIDE 107 107 105 101   ARABELLA 9.4 8.8 8.8 8.9   CO2 19* 19* 19* 19*   BUN 34* 42* 41* 35*   CR 2.12* 2.29* 2.15* 2.56*   * 122* 131* 134*     Phos@LABRCNTIPR(phos:4)  CBC)  Recent Labs  Lab 12/04/17  1145 12/02/17  0745 12/01/17  0809 11/30/17  1129   WBC 14.4* 20.1*  17.7* 20.6*   HGB 10.3* 9.5* 10.7* 10.4*   HCT 31.4* 28.5* 32.3* 32.2*   MCV 92 90 91 94   * 524* 508* 512*       Recent Labs  Lab 11/30/17  1239   AST 14   ALT 18   ALKPHOS 113   BILITOTAL 0.3     No lab results found in last 7 days.  No results found for: D2VIT, D3VIT, DTOT    Recent Labs  Lab 12/04/17  1145   HGB 10.3*   HCT 31.4*   MCV 92     No results for input(s): PTHI in the last 168 hours.    Attestation:   I have reviewed today's relevant vital signs, notes, medications, labs and imaging.

## 2017-12-04 NOTE — PLAN OF CARE
Problem: Patient Care Overview  Goal: Plan of Care/Patient Progress Review  Outcome: Improving  A+Ox4 up independently in room and halls. Tolerates regular diet. . Creat trending down. LS clear. Rash to bilateral knee caps intact. Adequate for discharge today 12/4/17. May be at change of shift.

## 2017-12-06 LAB
BACTERIA SPEC CULT: NO GROWTH
BACTERIA SPEC CULT: NO GROWTH
Lab: NORMAL
Lab: NORMAL
SPECIMEN SOURCE: NORMAL
SPECIMEN SOURCE: NORMAL

## 2017-12-07 ENCOUNTER — TRANSFERRED RECORDS (OUTPATIENT)
Dept: HEALTH INFORMATION MANAGEMENT | Facility: CLINIC | Age: 72
End: 2017-12-07

## 2017-12-07 LAB
ALT SERPL-CCNC: 50 IU/L (ref 5–35)
AST SERPL-CCNC: 43 U/L (ref 5–34)
CREAT SERPL-MCNC: 1.99 MG/DL (ref 0.6–0.93)
GFR SERPL CREATININE-BSD FRML MDRD: 25 ML/MIN/1.73M2
GLUCOSE SERPL-MCNC: 143 MG/DL (ref 65–99)
POTASSIUM SERPL-SCNC: 4.4 MMOL/L (ref 3.5–5.3)

## 2017-12-13 ENCOUNTER — TELEPHONE (OUTPATIENT)
Dept: FAMILY MEDICINE | Facility: CLINIC | Age: 72
End: 2017-12-13

## 2017-12-13 DIAGNOSIS — N25.89 RTA (RENAL TUBULAR ACIDOSIS): ICD-10-CM

## 2017-12-13 DIAGNOSIS — R11.0 NAUSEA: Primary | ICD-10-CM

## 2017-12-13 NOTE — TELEPHONE ENCOUNTER
Reason for Call: call back    Detailed comments: Elizabeth calling in wanting to discuss some issues with vomiting recently (concerned that she is throwing up medication), pt thinks she knows why and would like to discuss with Alondra. She states that she spoke with RN at rheumatologist clinic and they advised her to call Primary clinic to discuss this issue. Please call pt to advise.     Phone Number Patient can be reached at: Cell number on file:    Telephone Information:   Mobile [phone]       Best Time: asap    Can we leave a detailed message on this number? YES    Call taken on 12/13/2017 at 4:39 PM by Eboni Mondragon

## 2017-12-14 RX ORDER — SODIUM BICARBONATE 650 MG/1
650 TABLET ORAL 3 TIMES DAILY
Qty: 270 TABLET | Refills: 0 | Status: SHIPPED | OUTPATIENT
Start: 2017-12-14 | End: 2018-03-21

## 2017-12-14 RX ORDER — OMEPRAZOLE 40 MG/1
40 CAPSULE, DELAYED RELEASE ORAL EVERY MORNING
Qty: 90 CAPSULE | Refills: 0 | Status: SHIPPED | OUTPATIENT
Start: 2017-12-14 | End: 2018-02-07

## 2017-12-14 NOTE — TELEPHONE ENCOUNTER
Please contact  [place] to make a hospital discharge follow up appointment with her PCP Dr Littlejohn

## 2017-12-14 NOTE — TELEPHONE ENCOUNTER
"Patient Rep - Pt should be calling back to verify Omeprazole dosing, please gather rx and dose and route to Triage basket.  If further questions, can lyn for triage.    D/w Pt on GERD concerns.  Scheduled pt with PCP next Wed, 12/20/17. Pt did report she thought previous appt was a mistake, and was supposed to be with Rheum    Pt has been on sodium bicarb bid since May.  Reports was taken off by Rheum, then had nausea/vomiting. Was put back on by Hospital, then was doing better, and reports this had to do with low level of carbon in blood.  Was taken off again by Rheum.  PT states this is because she was told she doesn't need it, but also to get from PCP.  Stopped taking Sodium bicarb, then threw up Saturday, and felt bad for a day or two, with some intermittent stomach upset\" However unable to recollect/tie this to food/heart burn sx, and reports feeling better now.   \"I think this med is keeping whatever in balance that is causing nausea.\"    Pt unable to verify if currently taking Omeprazole or not, and will call back to verify omeprazole dosing.   After information received, route to PCP in regards to if pt ok to take sodium bicarb as pended.     Kailyn Sapp RN    "

## 2017-12-14 NOTE — TELEPHONE ENCOUNTER
Pt returning a call, tried to verify dosing but pt states she has further questions pt is requesting a call back at   [phone] VM is okay

## 2017-12-14 NOTE — TELEPHONE ENCOUNTER
May need to offer team if urgent? I'm on admin today and have 19 complex patients tomorrow already including one added in at 5 pm at end of the day tomorrow. She had an appointment scheduled with me on the 11th but cancelled. Triage please assess the severity of her nausea.     Thank you!!

## 2017-12-14 NOTE — TELEPHONE ENCOUNTER
See further triage from writer below. Last emesis was on Sat 12/10/17.   PT has been on  sodium bicarb and Omeprazole since May 2017.   Per pt, stomach issues started with stopping Sodium bicab.    Pt has been taking 650 mg (10 grains) tid.  Pt asking for rx for this, and rx for omeprazole from PCP, as will not be seeing nephrologist anymore.    Pended 90 day supply of each to maintain current RX, Omeprazole and Sodium bicarb, and pt will be in office next week to discuss further plan.  Please review/sign if appropriate or advise if change to plan needed.    Kailyn Sapp RN

## 2017-12-20 ENCOUNTER — OFFICE VISIT (OUTPATIENT)
Dept: FAMILY MEDICINE | Facility: CLINIC | Age: 72
End: 2017-12-20
Payer: COMMERCIAL

## 2017-12-20 ENCOUNTER — TRANSFERRED RECORDS (OUTPATIENT)
Dept: HEALTH INFORMATION MANAGEMENT | Facility: CLINIC | Age: 72
End: 2017-12-20

## 2017-12-20 VITALS
WEIGHT: 229 LBS | HEART RATE: 80 BPM | HEIGHT: 65 IN | SYSTOLIC BLOOD PRESSURE: 122 MMHG | BODY MASS INDEX: 38.15 KG/M2 | DIASTOLIC BLOOD PRESSURE: 84 MMHG

## 2017-12-20 DIAGNOSIS — N18.3 ACUTE RENAL FAILURE SUPERIMPOSED ON STAGE 3 CHRONIC KIDNEY DISEASE, UNSPECIFIED ACUTE RENAL FAILURE TYPE: ICD-10-CM

## 2017-12-20 DIAGNOSIS — E89.0 POSTOPERATIVE HYPOTHYROIDISM: Chronic | ICD-10-CM

## 2017-12-20 DIAGNOSIS — N17.9 ACUTE RENAL FAILURE SUPERIMPOSED ON STAGE 3 CHRONIC KIDNEY DISEASE, UNSPECIFIED ACUTE RENAL FAILURE TYPE: ICD-10-CM

## 2017-12-20 DIAGNOSIS — Z79.2 PROPHYLACTIC ANTIBIOTIC: ICD-10-CM

## 2017-12-20 DIAGNOSIS — I77.82 ANCA-ASSOCIATED VASCULITIS (H): Chronic | ICD-10-CM

## 2017-12-20 DIAGNOSIS — Z09 HOSPITAL DISCHARGE FOLLOW-UP: Primary | ICD-10-CM

## 2017-12-20 DIAGNOSIS — E11.9 TYPE 2 DIABETES MELLITUS WITHOUT COMPLICATION, WITHOUT LONG-TERM CURRENT USE OF INSULIN (H): Chronic | ICD-10-CM

## 2017-12-20 LAB
AST SERPL-CCNC: 19 U/L (ref 5–34)
CREAT SERPL-MCNC: 1.55 MG/DL (ref 0.6–0.93)
GFR SERPL CREATININE-BSD FRML MDRD: 33 ML/MIN/1.73M2
GLUCOSE SERPL-MCNC: 130 MG/DL (ref 65–99)
POTASSIUM SERPL-SCNC: 4.2 MMOL/L (ref 3.5–5.3)

## 2017-12-20 PROCEDURE — 99214 OFFICE O/P EST MOD 30 MIN: CPT | Performed by: INTERNAL MEDICINE

## 2017-12-20 RX ORDER — SULFAMETHOXAZOLE AND TRIMETHOPRIM 400; 80 MG/1; MG/1
1 TABLET ORAL DAILY
COMMUNITY
Start: 2017-12-20 | End: 2018-10-01

## 2017-12-20 NOTE — PROGRESS NOTES
SUBJECTIVE:   Elizabeth Galicia is a 72 year old female who presents to clinic today for the following health issues:    Hospital Follow-up Visit:    Hospital/Nursing Home/IP Rehab Facility: [facility]  Date of Admission: 11/30/17  Date of Discharge: 12/4/17  Reason(s) for Admission:   Abnormal UA with concern for possible sepsis dt UTI on admission: Resolved, sepsis ruled out  MELANIA stage II on stage III CKD: Improved  Non AG metabolic acidosis, presumed RTA  Microscopic Polyangiitis  Papular Rash: Improved  DM II  Hypothyroidism  GERD  Normocytic Anemia            Problems taking medications regularly:  None       Medication changes since discharge: increased prednisone to 20 mg/day temporarily       Problems adhering to non-medication therapy:  none    Summary of hospitalization:  [facility] discharge summary reviewed  Diagnostic Tests/Treatments reviewed.  Follow up needed: labs  Other Healthcare Providers Involved in Patient s Care:         Specialist appointment - rheumatology  Update since discharge: improved.     Post Discharge Medication Reconciliation: discharge medications reconciled and changed, per note/orders (see AVS).  Plan of care communicated with patient     Coding guidelines for this visit:  Type of Medical   Decision Making Face-to-Face Visit       within 7 Days of discharge Face-to-Face Visit        within 14 days of discharge   Moderate Complexity [id number]   High Complexity [id number]          Elizabeth has been following with her rheumatologist Dr. Arnold since hospitalization and is much improved  Hospitalized with a fever d/t possible UTI and also had MELANIA on CKD with h/o vasculitis   Just had rheumatology appointment and doesn't think this triggered her vasculitis out of remission so she is thankful for that; MPO markers were nice and low a couple weeks ago and are in process today  Swelling going down  Off of Imuran now a few days which she thinks led to some  "mild splotchy rash on legs and arms that is fading - so will hope that it continues to fade  N/V gone after d/c Imuran too and is still on bicarb she thinks until on lower doses of prednisone   Bactrim daily now too  Not following with kidney specialist anymore per her preference - she would be happy to see Dr. Arce at [place] for nephrology if needed it  No dysuria or hematuria now (nor prior to hospitalization)    Problem list and histories reviewed & adjusted, as indicated.    ROS:  Detailed as above     OBJECTIVE:     /84  Pulse 80  Ht 5' 5\" (1.651 m)  Wt 229 lb (103.9 kg)  Breastfeeding? No  BMI 38.11 kg/m2  Body mass index is 38.11 kg/(m^2).  Alert, pleasant, NAD, healthy appearing    ASSESSMENT/PLAN:     1. Hospital discharge follow-up UTI with sepsis and MELANIA on CKD3  Much improved and had UA today at Dr. Arnold appointment which is pending, along with other labs  Thankfully presentation thought to be unlikely related to her vasculitis     2. ANCA-associated vasculitis (Microscopic polyangiitis)  Will continue to follow with rheumatologist Dr. Arnold but she doesn't wish to follow with nephrology  Labs regularly checked by rheum  If we struggle with electrolyte imbalance may need to encourage her to f/u with nephrology again b/c there was concern for RTA; she has seen a Dr. Arce of [place] nephrology in the past  Continue bicarb for now  She feels n/v was also related to Imuran which may have also caused rash and she is off of that now    3. Type 2 diabetes mellitus without complication, without long-term current use of insulin (H)  Diet controlled - diagnosed earlier this year during first hospitalization when prednisone was initiated  She has some denial about this but I tried to discuss diabetes diagnostic criteria to which she was receptive to  - Hemoglobin A1c; Future  - Glucose; Future  - Lipid panel reflex to direct LDL Fasting; Future    4. Postoperative hypothyroidism  On Levothyroxine and " Cytomel  S/p partial thyroidectomy d/t goiter. No hx/o hyperthyroidism.   - TSH; Future  - T4, free; Future  - T3, total; Future    5. Prophylactic antibiotic  Reasonable to continue SS Bactrim given she is on prednisone to help prevent PJP pneumonia  ID was curbsided while she was hospitalized and dose reduced to every-other-day (also probably b/c of her renal function) but as her renal function is improving she is back to daily which can be a guideline dosing in non-HIV patients  H/o past intolerance so is on SS rather than DS  - sulfamethoxazole-trimethoprim (BACTRIM/SEPTRA) 400-80 MG per tablet; Take 1 tablet by mouth daily    Patient Instructions   Schedule future fasting labs here in the new year  Let me know when things calm down and you'd be ready for colonoscopy  I'm sure I'll be kept in the loop about your labs  As long as you continue to not have nausea/vomiting, eventually we should cut down the bicarb especially as you cut back on prednisone  Bactrim for pneumocystis prophylaxis based on your immunosuppressant therapy  Follow up at least every 6 months    MDM: >25 minutes spent with patient, over 50% time counseling, coordinating care and explaining about nature of the patient's conditions (outside of the 14 day hospital follow-up window).    Nati Littlejohn,   [facility]

## 2017-12-20 NOTE — PATIENT INSTRUCTIONS
Schedule future fasting labs here in the new year  Let me know when things calm down and you'd be ready for colonoscopy  I'm sure I'll be kept in the loop about your labs  As long as you continue to not have nausea/vomiting, eventually we should cut down the bicarb especially as you cut back on prednisone  Bactrim for pneumocystis prophylaxis based on your immunosuppressant therapy  Follow up at least every 6 months

## 2017-12-20 NOTE — MR AVS SNAPSHOT
After Visit Summary   12/20/2017    Elizabeth Galicia    MRN: [id number]           Patient Information     Date Of Birth          1945        Visit Information        Provider Department      12/20/2017 11:00 AM Nati Littlejohn,  [place] [facility]        Today's Diagnoses     Hospital discharge follow-up    -  1    ANCA-associated vasculitis (Microscopic polyangiitis)        Type 2 diabetes mellitus without complication, without long-term current use of insulin (H)        Postoperative hypothyroidism        Prophylactic antibiotic          Care Instructions    Schedule future fasting labs here in the new year  Let me know when things calm down and you'd be ready for colonoscopy  I'm sure I'll be kept in the loop about your labs  As long as you continue to not have nausea/vomiting, eventually we should cut down the bicarb especially as you cut back on prednisone  Bactrim for pneumocystis prophylaxis based on your immunosuppressant therapy  Follow up at least every 6 months          Follow-ups after your visit        Future tests that were ordered for you today     Open Future Orders        Priority Expected Expires Ordered    TSH Routine 12/21/2017 6/20/2018 12/20/2017    T4, free Routine 12/21/2017 6/20/2018 12/20/2017    Hemoglobin A1c Routine 12/21/2017 6/20/2018 12/20/2017    Glucose Routine 12/21/2017 6/20/2018 12/20/2017    Lipid panel reflex to direct LDL Fasting Routine 12/21/2017 6/20/2018 12/20/2017    T3, total Routine 12/21/2017 6/20/2018 12/20/2017            Who to contact     If you have questions or need follow up information about today's clinic visit or your schedule please contact [facility] directly at [phone].  Normal or non-critical lab and imaging results will be communicated to you by MyChart, letter or phone within 4 business days after the clinic has received the results. If you do not hear from us within 7 days, please contact the clinic  "through [employer] or phone. If you have a critical or abnormal lab result, we will notify you by phone as soon as possible.  Submit refill requests through [employer] or call your pharmacy and they will forward the refill request to us. Please allow 3 business days for your refill to be completed.          Additional Information About Your Visit        [employer] Information     [employer] lets you send messages to your doctor, view your test results, renew your prescriptions, schedule appointments and more. To sign up, go to [email or web address] . Click on \"Log in\" on the left side of the screen, which will take you to the Welcome page. Then click on \"Sign up Now\" on the right side of the page.     You will be asked to enter the access code listed below, as well as some personal information. Please follow the directions to create your username and password.     Your access code is: [email or web address]  Expires: 3/20/2018 11:58 AM     Your access code will  in 90 days. If you need help or a new code, please call your [place] clinic or [phone].        Care EveryWhere ID     This is your Care EveryWhere ID. This could be used by other organizations to access your [place] medical records  [id number]        Your Vitals Were     Pulse Height Breastfeeding? BMI (Body Mass Index)          80 5' 5\" (1.651 m) No 38.11 kg/m2         Blood Pressure from Last 3 Encounters:   17 122/84   17 141/73   17 110/56    Weight from Last 3 Encounters:   17 229 lb (103.9 kg)   17 244 lb 7.8 oz (110.9 kg)   17 235 lb (106.6 kg)                 Today's Medication Changes          These changes are accurate as of: 17 11:58 AM.  If you have any questions, ask your nurse or doctor.               These medicines have changed or have updated prescriptions.        Dose/Directions    acetaminophen 500 MG tablet   Commonly known as:  TYLENOL   This may have changed:    - how much to take  - when to take " this  - reasons to take this   Used for:  Generalized pain        Dose:  1000 mg   Take 2 tablets (1,000 mg) by mouth every 8 hours   Refills:  0       sulfamethoxazole-trimethoprim 400-80 MG per tablet   Commonly known as:  BACTRIM/SEPTRA   This may have changed:  when to take this   Used for:  Prophylactic antibiotic   Changed by:  Nati Littlejohn DO        Dose:  1 tablet   Take 1 tablet by mouth daily   Refills:  0         Stop taking these medicines if you haven't already. Please contact your care team if you have questions.     AZATHIOPRINE PO   Stopped by:  Nati Littlejohn DO           guaiFENesin-codeine 100-10 MG/5ML Soln solution   Commonly known as:  ROBITUSSIN AC   Stopped by:  Nati Littlejohn DO                    Primary Care Provider Office Phone # Fax #    Nati Littlejohn [phone] [address] [facility] [address]  [facility] [address]        Equal Access to Services     [facility] AH: Hadii aad ku hadasho Soomaali, waaxda luqadaha, qaybta kaalmada adeegyada, waxay idiin hayjeremyn jerome bates . So [facility] [phone].    ATENCIÓN: Si santola espjamey, tiene a norman disposición servicios gratuitos de asistencia lingüística. Llame al [phone].    We comply with applicable federal civil rights laws and [place] laws. We do not discriminate on the basis of race, color, national origin, age, disability, sex, sexual orientation, or gender identity.            Thank you!     Thank you for choosing [facility]  for your care. Our goal is always to provide you with excellent care. Hearing back from our patients is one way we can continue to improve our services. Please take a few minutes to complete the written survey that you may receive in the mail after your visit with us. Thank you!             Your Updated Medication List - Protect others around you: Learn how to safely use, store and throw away your medicines at www.disposemymeds.org.          This list is accurate  as of: 12/20/17 11:58 AM.  Always use your most recent med list.                   Brand Name Dispense Instructions for use Diagnosis    acetaminophen 500 MG tablet    TYLENOL     Take 2 tablets (1,000 mg) by mouth every 8 hours    Generalized pain       CALCIUM LACTATE PO      Take 4 tablets by mouth daily        [employer] B-12 PO           Levothyroxine Sodium 112 MCG Caps     90 capsule    Take 112 mcg by mouth every morning    Postoperative hypothyroidism       liothyronine 5 MCG tablet    CYTOMEL    540 tablet    Take 3 tablets (15 mcg) by mouth 2 times daily    Postoperative hypothyroidism       MAGNESIUM LACTATE PO      Take 2 tablets by mouth daily        omeprazole 40 MG capsule    priLOSEC    90 capsule    Take 1 capsule (40 mg) by mouth every morning    Nausea       PREDNISONE PO      Take 20 mg by mouth daily 1/2 of 10mg tablet daily        sodium bicarbonate 650 MG tablet     270 tablet    Take 1 tablet (650 mg) by mouth 3 times daily    RTA (renal tubular acidosis)       sulfamethoxazole-trimethoprim 400-80 MG per tablet    BACTRIM/SEPTRA     Take 1 tablet by mouth daily    Prophylactic antibiotic       VITAMIN D3 PO      Take 1,000 Units by mouth daily

## 2017-12-31 PROBLEM — Z79.2 PROPHYLACTIC ANTIBIOTIC: Status: ACTIVE | Noted: 2017-12-31

## 2017-12-31 PROBLEM — N17.9 RENAL FAILURE (ARF), ACUTE ON CHRONIC (H): Status: RESOLVED | Noted: 2017-11-30 | Resolved: 2017-12-31

## 2017-12-31 PROBLEM — N18.9 RENAL FAILURE (ARF), ACUTE ON CHRONIC (H): Status: RESOLVED | Noted: 2017-11-30 | Resolved: 2017-12-31

## 2018-01-17 ENCOUNTER — TRANSFERRED RECORDS (OUTPATIENT)
Dept: HEALTH INFORMATION MANAGEMENT | Facility: CLINIC | Age: 73
End: 2018-01-17

## 2018-02-07 DIAGNOSIS — R11.0 NAUSEA: ICD-10-CM

## 2018-02-07 RX ORDER — OMEPRAZOLE 40 MG/1
CAPSULE, DELAYED RELEASE ORAL
Qty: 90 CAPSULE | Refills: 1 | Status: SHIPPED | OUTPATIENT
Start: 2018-02-07 | End: 2018-08-03

## 2018-02-07 NOTE — TELEPHONE ENCOUNTER
"Requested Prescriptions   Pending Prescriptions Disp Refills     omeprazole (PRILOSEC) 40 MG capsule [Pharmacy Med Name: OMEPRAZOLE DR 40 MG CAPSULE] 90 capsule 0     Sig: TAKE 1 CAPSULE (40 MG) BY MOUTH EVERY MORNING    PPI Protocol Passed    2/7/2018  3:19 PM       Passed - Not on Clopidogrel (unless Pantoprazole ordered)       Passed - No diagnosis of osteoporosis on record       Passed - Recent or future visit with authorizing provider's specialty    Patient had office visit in the last year or has a visit in the next 30 days with authorizing provider.  See \"Patient Info\" tab in inbasket, or \"Choose Columns\" in Meds & Orders section of the refill encounter.            Passed - Patient is age 18 or older       Passed - No active pregnacy on record       Passed - No positive pregnancy test in past 12 months        Last Written Prescription Date:  12/14/2017  Last Fill Quantity: 90,  # refills: 0   Last Office Visit with [facility] provider:  12/20/2017  Future Office Visit:       "

## 2018-02-07 NOTE — TELEPHONE ENCOUNTER
Prescription approved per [facility] Refill Protocol.  Radha Perez RN

## 2018-02-15 ENCOUNTER — TRANSFERRED RECORDS (OUTPATIENT)
Dept: HEALTH INFORMATION MANAGEMENT | Facility: CLINIC | Age: 73
End: 2018-02-15

## 2018-02-15 LAB
AST SERPL-CCNC: 19 U/L (ref 5–34)
CREAT SERPL-MCNC: 1.52 MG/DL (ref 0.6–0.93)
GFR SERPL CREATININE-BSD FRML MDRD: 34 ML/MIN/1.73M2
GLUCOSE SERPL-MCNC: 112 MG/DL (ref 65–99)
POTASSIUM SERPL-SCNC: 4.3 MMOL/L (ref 3.5–5.3)

## 2018-02-16 DIAGNOSIS — E11.9 TYPE 2 DIABETES MELLITUS WITHOUT COMPLICATION, WITHOUT LONG-TERM CURRENT USE OF INSULIN (H): Chronic | ICD-10-CM

## 2018-02-16 DIAGNOSIS — E89.0 POSTOPERATIVE HYPOTHYROIDISM: Chronic | ICD-10-CM

## 2018-02-16 LAB
CHOLEST SERPL-MCNC: 273 MG/DL
GLUCOSE SERPL-MCNC: 91 MG/DL (ref 70–99)
HBA1C MFR BLD: 6.3 % (ref 4.3–6)
HDLC SERPL-MCNC: 50 MG/DL
LDLC SERPL CALC-MCNC: 160 MG/DL
NONHDLC SERPL-MCNC: 223 MG/DL
T3 SERPL-MCNC: 103 NG/DL (ref 60–181)
T4 FREE SERPL-MCNC: 0.66 NG/DL (ref 0.76–1.46)
TRIGL SERPL-MCNC: 313 MG/DL
TSH SERPL DL<=0.005 MIU/L-ACNC: 0.91 MU/L (ref 0.4–4)

## 2018-02-16 PROCEDURE — 36415 COLL VENOUS BLD VENIPUNCTURE: CPT | Performed by: INTERNAL MEDICINE

## 2018-02-16 PROCEDURE — 80061 LIPID PANEL: CPT | Performed by: INTERNAL MEDICINE

## 2018-02-16 PROCEDURE — 83036 HEMOGLOBIN GLYCOSYLATED A1C: CPT | Performed by: INTERNAL MEDICINE

## 2018-02-16 PROCEDURE — 84443 ASSAY THYROID STIM HORMONE: CPT | Performed by: INTERNAL MEDICINE

## 2018-02-16 PROCEDURE — 84439 ASSAY OF FREE THYROXINE: CPT | Performed by: INTERNAL MEDICINE

## 2018-02-16 PROCEDURE — 82947 ASSAY GLUCOSE BLOOD QUANT: CPT | Performed by: INTERNAL MEDICINE

## 2018-02-16 PROCEDURE — 84480 ASSAY TRIIODOTHYRONINE (T3): CPT | Performed by: INTERNAL MEDICINE

## 2018-02-27 ENCOUNTER — TELEPHONE (OUTPATIENT)
Dept: FAMILY MEDICINE | Facility: CLINIC | Age: 73
End: 2018-02-27

## 2018-02-27 DIAGNOSIS — E89.0 POSTOPERATIVE HYPOTHYROIDISM: ICD-10-CM

## 2018-02-27 DIAGNOSIS — E11.9 TYPE 2 DIABETES MELLITUS WITHOUT COMPLICATION, WITHOUT LONG-TERM CURRENT USE OF INSULIN (H): Primary | Chronic | ICD-10-CM

## 2018-02-27 RX ORDER — LIOTHYRONINE SODIUM 5 UG/1
10 TABLET ORAL 2 TIMES DAILY
Qty: 360 TABLET | Refills: 1 | Status: SHIPPED | OUTPATIENT
Start: 2018-02-27 | End: 2019-01-03

## 2018-02-27 RX ORDER — LEVOTHYROXINE SODIUM 125 UG/1
125 TABLET ORAL
Qty: 90 TABLET | Refills: 1 | Status: SHIPPED | OUTPATIENT
Start: 2018-02-27 | End: 2018-08-23

## 2018-02-28 NOTE — TELEPHONE ENCOUNTER
Spoke with Elizabeth on the phone regarding her lab results. She had a prior internist adjust her medications and at one point suggested higher cytomel and lower levothyroxine. She now shows signs that she'd benefit from the opposite and she is in agreement.  Curb-sided endo: suggested reduce Cytomel from 15 mcg BID to 10 mcg BID and increase Levothyroxine from 112 mcg to 125 mcg daily and f/u labs in 2-3 months with Free T3, Free T4 and TSH.    Also discussed her lipids which may improve with this thyroid dose adjustment as well as dietary changes. She personally wanted to look into her past supplements and consider on her own restarting them as she brought her cholesterol down in the past with that. She will look into what those were and let me know.    She also remains on low dose prednisone and as that comes down her glucose and lipids will likely improve.

## 2018-03-15 ENCOUNTER — TRANSFERRED RECORDS (OUTPATIENT)
Dept: HEALTH INFORMATION MANAGEMENT | Facility: CLINIC | Age: 73
End: 2018-03-15

## 2018-03-15 LAB
AST SERPL-CCNC: 19 U/L (ref 5–34)
CREAT SERPL-MCNC: 1.1 MG/DL (ref 0.5–1.3)
GFR SERPL CREATININE-BSD FRML MDRD: 52 ML/MIN/1.73

## 2018-03-21 ENCOUNTER — TELEPHONE (OUTPATIENT)
Dept: FAMILY MEDICINE | Facility: CLINIC | Age: 73
End: 2018-03-21

## 2018-03-21 DIAGNOSIS — N25.89 RTA (RENAL TUBULAR ACIDOSIS): ICD-10-CM

## 2018-03-21 RX ORDER — SODIUM BICARBONATE 650 MG/1
TABLET ORAL
Qty: 270 TABLET | Refills: 0 | Status: SHIPPED | OUTPATIENT
Start: 2018-03-21 | End: 2018-10-01

## 2018-03-21 NOTE — TELEPHONE ENCOUNTER
Rx sent in. Please call though to see if she started backing down on this dose as we thought she may based on rheumatology follow up. Thanks!

## 2018-03-21 NOTE — TELEPHONE ENCOUNTER
Left VM for patient to call clinic back to see if she started backing down on the dose of sodium bicarb.    Alberta Joyner RN

## 2018-03-21 NOTE — TELEPHONE ENCOUNTER
Requested Prescriptions   Pending Prescriptions Disp Refills     sodium bicarbonate 650 MG tablet [Pharmacy Med Name: SODIUM BICARB 10 GRAIN TABLET] 270 tablet 0     Sig: TAKE 1 TABLET (650 MG) BY MOUTH 3 TIMES DAILY    There is no refill protocol information for this order        Last Written Prescription Date:  12/14/2017  Last Fill Quantity: 270,  # refills: 0   Last office visit: 12/20/2017 with prescribing provider:  Dr.Emily Littlejohn   Future Office Visit:        Routing refill request to provider for review/approval because:  Drug not on the [facility] refill protocol       Quiana Chino, DELIO, RN, PHN

## 2018-04-12 ENCOUNTER — TRANSFERRED RECORDS (OUTPATIENT)
Dept: HEALTH INFORMATION MANAGEMENT | Facility: CLINIC | Age: 73
End: 2018-04-12

## 2018-04-12 LAB
AST SERPL-CCNC: 19 U/L (ref 5–34)
CREAT SERPL-MCNC: 1.13 MG/DL (ref 0.5–1.3)
GFR SERPL CREATININE-BSD FRML MDRD: 50.4 ML/MIN/1.73M2

## 2018-04-30 DIAGNOSIS — E89.0 POSTOPERATIVE HYPOTHYROIDISM: ICD-10-CM

## 2018-04-30 NOTE — TELEPHONE ENCOUNTER
"Last Written Prescription Date:  2/27/18,  Last Fill Quantity:360   # refills: 1   Last office visit: 12/20/2017 with prescribing provider:  12/20/17   Future Office Visit:    Requested Prescriptions   Pending Prescriptions Disp Refills     liothyronine (CYTOMEL) 5 MCG tablet [Pharmacy Med Name: LIOTHYRONINE SOD 5 MCG TAB] 540 tablet 3     Sig: TAKE 3 TABLETS BY MOUTH TWICE A DAY *6-15-17*    Thyroid Protocol Passed    4/30/2018  2:10 AM       Passed - Patient is 12 years or older       Passed - Recent (12 mo) or future (30 days) visit within the authorizing provider's specialty    Patient had office visit in the last 12 months or has a visit in the next 30 days with authorizing provider or within the authorizing provider's specialty.  See \"Patient Info\" tab in inbasket, or \"Choose Columns\" in Meds & Orders section of the refill encounter.           Passed - Normal TSH on file in past 12 months    Recent Labs   Lab Test  02/16/18   1203   TSH  0.91             Passed - No active pregnancy on record    If patient is pregnant or has had a positive pregnancy test, please check TSH.         Passed - No positive pregnancy test in past 12 months    If patient is pregnant or has had a positive pregnancy test, please check TSH.            "

## 2018-05-01 RX ORDER — LIOTHYRONINE SODIUM 5 UG/1
TABLET ORAL
Start: 2018-05-01

## 2018-05-01 NOTE — TELEPHONE ENCOUNTER
Was ordered #360 R1 2/27/18, should have a refill. Also due for fasting labs-sent message to pharm -please let pt know  Caryl Noel RN

## 2018-05-17 ENCOUNTER — TRANSFERRED RECORDS (OUTPATIENT)
Dept: HEALTH INFORMATION MANAGEMENT | Facility: CLINIC | Age: 73
End: 2018-05-17

## 2018-05-17 LAB
AST SERPL-CCNC: 22 U/L (ref 5–34)
CREAT SERPL-MCNC: 1.1 MG/DL (ref 0.5–1.3)
GFR SERPL CREATININE-BSD FRML MDRD: 52 ML/MIN/1.73M2

## 2018-06-13 ENCOUNTER — TRANSFERRED RECORDS (OUTPATIENT)
Dept: HEALTH INFORMATION MANAGEMENT | Facility: CLINIC | Age: 73
End: 2018-06-13

## 2018-06-13 LAB
AST SERPL-CCNC: 21 U/L (ref 5–34)
CREAT SERPL-MCNC: 1.32 MG/DL (ref 0.5–1.3)
GFR SERPL CREATININE-BSD FRML MDRD: 42.2 ML/MIN/1.73M2

## 2018-07-20 ENCOUNTER — TRANSFERRED RECORDS (OUTPATIENT)
Dept: HEALTH INFORMATION MANAGEMENT | Facility: CLINIC | Age: 73
End: 2018-07-20

## 2018-07-20 LAB
AST SERPL-CCNC: 22 U/L (ref 5–34)
CREAT SERPL-MCNC: 1.37 MG/DL (ref 0.5–1.3)

## 2018-08-03 DIAGNOSIS — R11.0 NAUSEA: ICD-10-CM

## 2018-08-03 NOTE — TELEPHONE ENCOUNTER
"Last Written Prescription Date:  2/07/18  Last Fill Quantity: 90 capsule,  # refills: 1   Last office visit: 12/20/2017 with prescribing provider:  Eron   Future Office Visit:      Requested Prescriptions   Pending Prescriptions Disp Refills     omeprazole (PRILOSEC) 40 MG capsule [Pharmacy Med Name: OMEPRAZOLE DR 40 MG CAPSULE] 90 capsule 1     Sig: TAKE 1 CAPSULE (40 MG) BY MOUTH EVERY MORNING    PPI Protocol Passed    8/3/2018  1:38 AM       Passed - Not on Clopidogrel (unless Pantoprazole ordered)       Passed - No diagnosis of osteoporosis on record       Passed - Recent (12 mo) or future (30 days) visit within the authorizing provider's specialty    Patient had office visit in the last 12 months or has a visit in the next 30 days with authorizing provider or within the authorizing provider's specialty.  See \"Patient Info\" tab in inbasket, or \"Choose Columns\" in Meds & Orders section of the refill encounter.           Passed - Patient is age 18 or older       Passed - No active pregnacy on record       Passed - No positive pregnancy test in past 12 months          "

## 2018-08-06 RX ORDER — OMEPRAZOLE 40 MG/1
CAPSULE, DELAYED RELEASE ORAL
Qty: 90 CAPSULE | Refills: 1 | Status: SHIPPED | OUTPATIENT
Start: 2018-08-06 | End: 2018-10-01

## 2018-08-23 DIAGNOSIS — E89.0 POSTOPERATIVE HYPOTHYROIDISM: ICD-10-CM

## 2018-08-23 NOTE — TELEPHONE ENCOUNTER
"Last Written Prescription Date:  2/27/18  Last Fill Quantity: 90,  # refills: 1   Last office visit: 12/20/2017 with prescribing provider:     Future Office Visit:    Requested Prescriptions   Pending Prescriptions Disp Refills     levothyroxine (SYNTHROID/LEVOTHROID) 125 MCG tablet [Pharmacy Med Name: LEVOTHYROXINE 125 MCG TABLET] 90 tablet 1     Sig: TAKE 1 TABLET (125 MCG) BY MOUTH EVERY MORNING (BEFORE BREAKFAST)    Thyroid Protocol Passed    8/23/2018  1:56 AM       Passed - Patient is 12 years or older       Passed - Recent (12 mo) or future (30 days) visit within the authorizing provider's specialty    Patient had office visit in the last 12 months or has a visit in the next 30 days with authorizing provider or within the authorizing provider's specialty.  See \"Patient Info\" tab in inbasket, or \"Choose Columns\" in Meds & Orders section of the refill encounter.           Passed - Normal TSH on file in past 12 months    Recent Labs   Lab Test  02/16/18   1203   TSH  0.91             Passed - No active pregnancy on record    If patient is pregnant or has had a positive pregnancy test, please check TSH.         Passed - No positive pregnancy test in past 12 months    If patient is pregnant or has had a positive pregnancy test, please check TSH.            "

## 2018-08-24 RX ORDER — LEVOTHYROXINE SODIUM 125 UG/1
TABLET ORAL
Qty: 90 TABLET | Refills: 0 | Status: SHIPPED | OUTPATIENT
Start: 2018-08-24 | End: 2018-10-04

## 2018-08-24 NOTE — TELEPHONE ENCOUNTER
Prescription approved per [facility] Refill Protocol.  Ashleigh Causey RN- Triage [employer]

## 2018-08-29 ENCOUNTER — TRANSFERRED RECORDS (OUTPATIENT)
Dept: HEALTH INFORMATION MANAGEMENT | Facility: CLINIC | Age: 73
End: 2018-08-29

## 2018-10-01 ENCOUNTER — OFFICE VISIT (OUTPATIENT)
Dept: FAMILY MEDICINE | Facility: CLINIC | Age: 73
End: 2018-10-01
Payer: COMMERCIAL

## 2018-10-01 VITALS
WEIGHT: 238 LBS | DIASTOLIC BLOOD PRESSURE: 76 MMHG | OXYGEN SATURATION: 98 % | SYSTOLIC BLOOD PRESSURE: 136 MMHG | HEART RATE: 76 BPM | BODY MASS INDEX: 39.65 KG/M2 | HEIGHT: 65 IN | TEMPERATURE: 98.6 F

## 2018-10-01 DIAGNOSIS — J20.9 ACUTE BRONCHITIS, UNSPECIFIED ORGANISM: Primary | ICD-10-CM

## 2018-10-01 DIAGNOSIS — I77.82 ANCA-ASSOCIATED VASCULITIS (H): Chronic | ICD-10-CM

## 2018-10-01 DIAGNOSIS — I10 ESSENTIAL HYPERTENSION: Chronic | ICD-10-CM

## 2018-10-01 DIAGNOSIS — E89.0 POSTOPERATIVE HYPOTHYROIDISM: Chronic | ICD-10-CM

## 2018-10-01 DIAGNOSIS — I77.89 HYPERPLASIA OF RENAL ARTERY (H): Chronic | ICD-10-CM

## 2018-10-01 DIAGNOSIS — Z15.89 HETEROZYGOUS FOR MTHFR GENE MUTATION: Chronic | ICD-10-CM

## 2018-10-01 DIAGNOSIS — R30.0 DYSURIA: ICD-10-CM

## 2018-10-01 DIAGNOSIS — E11.9 TYPE 2 DIABETES MELLITUS WITHOUT COMPLICATION, WITHOUT LONG-TERM CURRENT USE OF INSULIN (H): Chronic | ICD-10-CM

## 2018-10-01 LAB
ALBUMIN UR-MCNC: NEGATIVE MG/DL
APPEARANCE UR: CLEAR
BILIRUB UR QL STRIP: NEGATIVE
COLOR UR AUTO: YELLOW
ERYTHROCYTE [DISTWIDTH] IN BLOOD BY AUTOMATED COUNT: 16.1 % (ref 10–15)
GLUCOSE UR STRIP-MCNC: NEGATIVE MG/DL
HCT VFR BLD AUTO: 42.2 % (ref 35–47)
HGB BLD-MCNC: 13.4 G/DL (ref 11.7–15.7)
HGB UR QL STRIP: NEGATIVE
KETONES UR STRIP-MCNC: NEGATIVE MG/DL
LEUKOCYTE ESTERASE UR QL STRIP: ABNORMAL
MCH RBC QN AUTO: 29.3 PG (ref 26.5–33)
MCHC RBC AUTO-ENTMCNC: 31.8 G/DL (ref 31.5–36.5)
MCV RBC AUTO: 92 FL (ref 78–100)
NITRATE UR QL: NEGATIVE
PH UR STRIP: 7 PH (ref 5–7)
PLATELET # BLD AUTO: 292 10E9/L (ref 150–450)
RBC # BLD AUTO: 4.58 10E12/L (ref 3.8–5.2)
RBC #/AREA URNS AUTO: NORMAL /HPF
SOURCE: ABNORMAL
SP GR UR STRIP: 1.01 (ref 1–1.03)
UROBILINOGEN UR STRIP-ACNC: 0.2 EU/DL (ref 0.2–1)
WBC # BLD AUTO: 7.5 10E9/L (ref 4–11)
WBC #/AREA URNS AUTO: NORMAL /HPF

## 2018-10-01 PROCEDURE — 81001 URINALYSIS AUTO W/SCOPE: CPT | Performed by: INTERNAL MEDICINE

## 2018-10-01 PROCEDURE — 80048 BASIC METABOLIC PNL TOTAL CA: CPT | Performed by: INTERNAL MEDICINE

## 2018-10-01 PROCEDURE — 82565 ASSAY OF CREATININE: CPT | Performed by: INTERNAL MEDICINE

## 2018-10-01 PROCEDURE — 99214 OFFICE O/P EST MOD 30 MIN: CPT | Performed by: INTERNAL MEDICINE

## 2018-10-01 PROCEDURE — 36415 COLL VENOUS BLD VENIPUNCTURE: CPT | Performed by: INTERNAL MEDICINE

## 2018-10-01 PROCEDURE — 85027 COMPLETE CBC AUTOMATED: CPT | Performed by: INTERNAL MEDICINE

## 2018-10-01 RX ORDER — METHYLPREDNISOLONE 4 MG
TABLET, DOSE PACK ORAL
Qty: 21 TABLET | Refills: 0 | Status: SHIPPED | OUTPATIENT
Start: 2018-10-01 | End: 2019-01-10

## 2018-10-01 NOTE — MR AVS SNAPSHOT
"              After Visit Summary   10/1/2018    Elizabeth Galicia    MRN: [id number]           Patient Information     Date Of Birth          1945        Visit Information        Provider Department      10/1/2018 12:30 PM Severino Rai MD [facility]        Today's Diagnoses     Acute bronchitis, unspecified organism    -  1    Essential hypertension        Type 2 diabetes mellitus without complication, without long-term current use of insulin (H)        ANCA-associated vasculitis (Microscopic polyangiitis)        Postoperative hypothyroidism        Hyperplasia of renal artery (H)        Heterozygous for MTHFR gene mutation (H)        Dysuria           Follow-ups after your visit        Follow-up notes from your care team     Return in about 1 week (around 10/8/2018) for Follow-up.      Who to contact     If you have questions or need follow up information about today's clinic visit or your schedule please contact [facility] directly at [phone].  Normal or non-critical lab and imaging results will be communicated to you by MyChart, letter or phone within 4 business days after the clinic has received the results. If you do not hear from us within 7 days, please contact the clinic through [employer] or phone. If you have a critical or abnormal lab result, we will notify you by phone as soon as possible.  Submit refill requests through [employer] or call your pharmacy and they will forward the refill request to us. Please allow 3 business days for your refill to be completed.          Additional Information About Your Visit        MyChart Information     [employer] lets you send messages to your doctor, view your test results, renew your prescriptions, schedule appointments and more. To sign up, go to [email or web address] . Click on \"Log in\" on the left side of the screen, which will take you to the Welcome page. Then click on \"Sign up Now\" on the right side of the page.     You will " "be asked to enter the access code listed below, as well as some personal information. Please follow the directions to create your username and password.     Your access code is: [email or web address]  Expires: 2018  4:28 AM     Your access code will  in 90 days. If you need help or a new code, please call your [facility] clinic or [phone].        Care EveryWhere ID     This is your Care EveryWhere ID. This could be used by other organizations to access your [facility] medical records  [id number]        Your Vitals Were     Pulse Temperature Height Pulse Oximetry Breastfeeding? BMI (Body Mass Index)    76 98.6  F (37  C) (Oral) 5' 5\" (1.651 m) 98% No 39.61 kg/m2       Blood Pressure from Last 3 Encounters:   10/01/18 136/76   17 122/84   17 141/73    Weight from Last 3 Encounters:   10/01/18 238 lb (108 kg)   17 229 lb (103.9 kg)   17 244 lb 7.8 oz (110.9 kg)              We Performed the Following     Basic metabolic panel     CBC with platelets     Creatinine     UA reflex to Microscopic and Culture     Urine Microscopic          Today's Medication Changes          These changes are accurate as of 10/1/18 11:59 PM.  If you have any questions, ask your nurse or doctor.               Start taking these medicines.        Dose/Directions    ACE/ARB/ARNI NOT PRESCRIBED (INTENTIONAL)   Used for:  Hyperplasia of renal artery (H)   Started by:  Severino Rai MD        Please choose reason not prescribed, below   Refills:  0       methylPREDNISolone 4 MG tablet   Commonly known as:  MEDROL DOSEPAK   Used for:  Acute bronchitis, unspecified organism   Started by:  Severino Rai MD        Follow package instructions   Quantity:  21 tablet   Refills:  0         These medicines have changed or have updated prescriptions.        Dose/Directions    acetaminophen 500 MG tablet   Commonly known as:  TYLENOL   This may have changed:    - how much to take  - when to take this  - reasons to take this "   Used for:  Generalized pain        Dose:  1000 mg   Take 2 tablets (1,000 mg) by mouth every 8 hours   Refills:  0         Stop taking these medicines if you haven't already. Please contact your care team if you have questions.     omeprazole 40 MG capsule   Commonly known as:  priLOSEC   Stopped by:  Severino Rai MD           sodium bicarbonate 650 MG tablet   Stopped by:  Severino Rai MD           sulfamethoxazole-trimethoprim 400-80 MG per tablet   Commonly known as:  BACTRIM/SEPTRA   Stopped by:  Severino Rai MD                Where to get your medicines      These medications were sent to [facility]/pharmacy #[id number] - [place], [place] - [address]  [address] [address]     Phone:  [phone]     methylPREDNISolone 4 MG tablet         Some of these will need a paper prescription and others can be bought over the counter.  Ask your nurse if you have questions.     You don't need a prescription for these medications     ACE/ARB/ARNI NOT PRESCRIBED (INTENTIONAL)                Primary Care Provider Office Phone # Fax #    Nati Linn DO Eron [phone] [address] [address]        Equal Access to Services     MARIAH RICHARDS AH: Hadii wally clayton hadasho Soomaali, waaxda luqadaha, qaybta kaalmada adeegyada, georgette gonzalez. So [facility] [phone].    ATENCIÓN: Si habla español, tiene a norman disposición servicios gratuitos de asistencia lingüística. Llame al [phone].    We comply with applicable federal civil rights laws and [place] laws. We do not discriminate on the basis of race, color, national origin, age, disability, sex, sexual orientation, or gender identity.            Thank you!     Thank you for choosing [facility]  for your care. Our goal is always to provide you with excellent care. Hearing back from our patients is one way we can continue to improve our services. Please take a few minutes to  complete the written survey that you may receive in the mail after your visit with us. Thank you!             Your Updated Medication List - Protect others around you: Learn how to safely use, store and throw away your medicines at www.disposemymeds.org.          This list is accurate as of 10/1/18 11:59 PM.  Always use your most recent med list.                   Brand Name Dispense Instructions for use Diagnosis    ACE/ARB/ARNI NOT PRESCRIBED (INTENTIONAL)      Please choose reason not prescribed, below    Hyperplasia of renal artery (H)       acetaminophen 500 MG tablet    TYLENOL     Take 2 tablets (1,000 mg) by mouth every 8 hours    Generalized pain       CALCIUM LACTATE PO      Take 4 tablets by mouth daily        [facility] B-12 PO           levothyroxine 125 MCG tablet    SYNTHROID/LEVOTHROID    90 tablet    TAKE 1 TABLET (125 MCG) BY MOUTH EVERY MORNING (BEFORE BREAKFAST)    Postoperative hypothyroidism       liothyronine 5 MCG tablet    CYTOMEL    360 tablet    Take 2 tablets (10 mcg) by mouth 2 times daily    Postoperative hypothyroidism       MAGNESIUM LACTATE PO      Take 2 tablets by mouth daily        methylPREDNISolone 4 MG tablet    MEDROL DOSEPAK    21 tablet    Follow package instructions    Acute bronchitis, unspecified organism       PREDNISONE PO      Take 5 mg by mouth daily 1/2 of 10mg tablet daily        VITAMIN D3 PO      Take 1,000 Units by mouth daily

## 2018-10-01 NOTE — PROGRESS NOTES
"  SUBJECTIVE:   Elizabeth Galicia is a 73 year old female who presents to clinic today for the following health issues:    URI & Cough  Elizabeth reports coughing due to allergies but has evolved into a more serious productive cough with nasal drip and an left ear \"blockage\", which she suspects may be a case of bronchitis. She confirms having wheezing and musical sounds from her massage therapist as they heard the symptoms when she was resting. She has attempted to remedy her symptoms with cough syrup with mixed results. Her antihistamine medications include: Zyrtec and Benadryl. She states that she has bad allergies at this time of year.      Problem list and histories reviewed & adjusted, as indicated.  Additional history: as documented    Patient Active Problem List   Diagnosis     HTN (hypertension)     Obesity (BMI 30-39.9)     Type 2 diabetes mellitus without complication (H)     ANCA-associated vasculitis (Microscopic polyangiitis)     Postoperative hypothyroidism     Anemia, unspecified type     Hyperplasia of renal artery (H)     Heterozygous for MTHFR gene mutation (H)     CKD (chronic kidney disease) stage 3, GFR 30-59 ml/min     Prophylactic antibiotic     Past Surgical History:   Procedure Laterality Date     COLONOSCOPY       DILATION AND CURETTAGE       ENT SURGERY      partial thyroidectomy; tonsillectomy     EXCISE LESION EYELID Left 3/10/2016    Procedure: EXCISE LESION EYELID;  Surgeon: Rg Nazario MD;  Location: [facility]     HAND SURGERY       HYSTERECTOMY VAGINAL, COLPORRHAPHY ANTERIOR, POSTERIOR, COMBINED N/A 9/9/2014    Procedure: COMBINED HYSTERECTOMY VAGINAL, COLPORRHAPHY ANTERIOR, POSTERIOR;  Surgeon: Shay Winkler MD;  Location: [facility]     HYSTERECTOMY, PAP NO LONGER INDICATED       LAPAROSCOPIC SALPINGO-OOPHORECTOMY  6/27/2011    Procedure:LAPAROSCOPIC SALPINGO-OOPHORECTOMY; resection of left pelvic mass. ; Surgeon:MAYRA OLEARY; Location:[facility]     ORTHOPEDIC SURGERY       SALPINGO " OOPHORECTOMY,R/L/PEDRO LUIS      Salpingo Oophorectomy for torsion in past     THYROIDECTOMY      Partial       Social History   Substance Use Topics     Smoking status: Never Smoker     Smokeless tobacco: Never Used     Alcohol use Yes      Comment: rarely     Family History   Problem Relation Age of Onset     Unknown/Adopted Mother      HEART DISEASE Other          Current Outpatient Prescriptions   Medication Sig Dispense Refill     ACE/ARB/ARNI NOT PRESCRIBED, INTENTIONAL, Please choose reason not prescribed, below       acetaminophen (TYLENOL) 500 MG tablet Take 2 tablets (1,000 mg) by mouth every 8 hours (Patient taking differently: Take 500-1,000 mg by mouth every 6 hours as needed )       CALCIUM LACTATE PO Take 4 tablets by mouth daily        Cholecalciferol (VITAMIN D3 PO) Take 1,000 Units by mouth daily       Cyanocobalamin (CVS B-12 PO)        levothyroxine (SYNTHROID/LEVOTHROID) 125 MCG tablet TAKE 1 TABLET (125 MCG) BY MOUTH EVERY MORNING (BEFORE BREAKFAST) 90 tablet 0     liothyronine (CYTOMEL) 5 MCG tablet Take 2 tablets (10 mcg) by mouth 2 times daily 360 tablet 1     MAGNESIUM LACTATE PO Take 2 tablets by mouth daily       PREDNISONE PO Take 5 mg by mouth daily 1/2 of 10mg tablet daily        [DISCONTINUED] mesna (MESNEX) 400 MG TABS tablet Take 2 tablets (800 mg) by mouth every 3 hours for 2 doses Starting 3 hours after cyclophosphamide. 4 tablet 0     Reviewed and updated as needed this visit by clinical staff  Tobacco  Allergies  Meds  Problems  Med Hx  Surg Hx  Fam Hx  Soc Hx        Reviewed and updated as needed this visit by Provider       ROS:  CONSTITUTIONAL: NEGATIVE for fever, chills, change in weight  INTEGUMENTARY/SKIN: NEGATIVE for worrisome rashes, moles or lesions  EYES: NEGATIVE for vision changes or irritation  ENT/MOUTH: NEGATIVE for ear, mouth and throat problems, POSITIVE for cough, left ear plugged, sinus congestion  RESP: NEGATIVE for significant cough or SOB  CV: NEGATIVE for  "chest pain, palpitations or peripheral edema  GI: NEGATIVE for nausea, abdominal pain, heartburn, or change in bowel habits  : NEGATIVE for frequency, dysuria, or hematuria  MUSCULOSKELETAL: NEGATIVE for significant arthralgias or myalgia  NEURO: NEGATIVE for weakness, dizziness or paresthesias  ENDOCRINE: NEGATIVE for temperature intolerance, skin/hair changes  HEME: NEGATIVE for bleeding problems  PSYCHIATRIC: NEGATIVE for changes in mood or affect    This document serves as a record of the services and decisions personally performed and made by Severino Rai MD. It was created on his behalf by Garth Jasso, a trained medical scribe. The creation of this document is based the provider's statements to the medical scribe.  Scribe Garth Jasso 12:58 PM, October 1, 2018    OBJECTIVE:   /76 (BP Location: Left arm, Patient Position: Chair, Cuff Size: Adult Large)  Pulse 76  Temp 98.6  F (37  C) (Oral)  Ht 1.651 m (5' 5\")  Wt 108 kg (238 lb)  SpO2 98%  Breastfeeding? No  BMI 39.61 kg/m2  Body mass index is 39.61 kg/(m^2).  Exam  HEENT tympanic membranes: light reflex were present bilaterally, however no movement of the left eardrum, normal nose and throat were clear; david nasi erythematous and congested & slight tenderness of the right maxillary sinus  Neck was supple without adenopathy thyromegaly or masses  Chest clear to auscultation and percussion without any forced expiratory wheezing   Cardiovascular S1 and S2 are physiologic without murmurs or gallop rhythm was regular  Abdomen bowel sounds are normal there are no palpable masses organomegaly or tenderness  Extremities were nontender with 1+ pre-tibial edema  Deep tendon reflexes were intact  Skin was clear  Pulses were two over 4+ bilaterally symmetrical throughout      Diagnostic Test Results:  Results for orders placed or performed in visit on 10/01/18 (from the past 24 hour(s))   CBC with platelets   Result Value Ref Range    WBC 7.5 4.0 - 11.0 10e9/L    " RBC Count 4.58 3.8 - 5.2 10e12/L    Hemoglobin 13.4 11.7 - 15.7 g/dL    Hematocrit 42.2 35.0 - 47.0 %    MCV 92 78 - 100 fl    MCH 29.3 26.5 - 33.0 pg    MCHC 31.8 31.5 - 36.5 g/dL    RDW 16.1 (H) 10.0 - 15.0 %    Platelet Count 292 150 - 450 10e9/L   UA reflex to Microscopic and Culture   Result Value Ref Range    Color Urine Yellow     Appearance Urine Clear     Glucose Urine Negative NEG^Negative mg/dL    Bilirubin Urine Negative NEG^Negative    Ketones Urine Negative NEG^Negative mg/dL    Specific Gravity Urine 1.015 1.003 - 1.035    Blood Urine Negative NEG^Negative    pH Urine 7.0 5.0 - 7.0 pH    Protein Albumin Urine Negative NEG^Negative mg/dL    Urobilinogen Urine 0.2 0.2 - 1.0 EU/dL    Nitrite Urine Negative NEG^Negative    Leukocyte Esterase Urine Small (A) NEG^Negative    Source Urine    Urine Microscopic   Result Value Ref Range    WBC Urine 0 - 5 OTO5^0 - 5 /HPF    RBC Urine O - 2 OTO2^O - 2 /HPF       ASSESSMENT/PLAN:   1. Acute bronchitis, unspecified organism  Begin Medrol Dosepak for a week, then taper off; continue Zyrtec, monitor symptoms   - CBC with platelets  - Urine Microscopic  - methylPREDNISolone (MEDROL DOSEPAK) 4 MG tablet; Follow package instructions  Dispense: 21 tablet; Refill: 0    2. Essential hypertension  Controlled, continue medication  - Creatinine    3. Type 2 diabetes mellitus without complication, without long-term current use of insulin (H)  Stable, continue medication    4. ANCA-associated vasculitis (Microscopic polyangiitis)    5. Postoperative hypothyroidism    6. Hyperplasia of renal artery (H)  - ACE/ARB/ARNI NOT PRESCRIBED, INTENTIONAL,; Please choose reason not prescribed, below  - Basic metabolic panel    7. Heterozygous for MTHFR gene mutation (H)    8. Dysuria  - UA reflex to Microscopic and Culture      Severino Rai MD  [facility]    The information in this document, created by the medical scribe for me, accurately reflects the services I  personally performed and the decisions made by me. I have reviewed and approved this document for accuracy prior to leaving the patient care area.  1:31 PM, 10/01/18

## 2018-10-02 LAB
ANION GAP SERPL CALCULATED.3IONS-SCNC: 7 MMOL/L (ref 3–14)
BUN SERPL-MCNC: 21 MG/DL (ref 7–30)
CALCIUM SERPL-MCNC: 9.4 MG/DL (ref 8.5–10.1)
CHLORIDE SERPL-SCNC: 105 MMOL/L (ref 94–109)
CO2 SERPL-SCNC: 26 MMOL/L (ref 20–32)
CREAT SERPL-MCNC: 1.24 MG/DL (ref 0.52–1.04)
CREAT SERPL-MCNC: 1.24 MG/DL (ref 0.52–1.04)
GFR SERPL CREATININE-BSD FRML MDRD: 42 ML/MIN/1.7M2
GFR SERPL CREATININE-BSD FRML MDRD: 42 ML/MIN/1.7M2
GLUCOSE SERPL-MCNC: 102 MG/DL (ref 70–99)
POTASSIUM SERPL-SCNC: 4.4 MMOL/L (ref 3.4–5.3)
SODIUM SERPL-SCNC: 138 MMOL/L (ref 133–144)

## 2018-10-04 DIAGNOSIS — E89.0 POSTOPERATIVE HYPOTHYROIDISM: ICD-10-CM

## 2018-10-04 NOTE — TELEPHONE ENCOUNTER
"Last Written Prescription Date:  8/24/18  Last Fill Quantity: 90 tablet,  # refills: 0   Last office visit: 12/20/2017 with prescribing provider:  Eron   Future Office Visit:   Next 5 appointments (look out 90 days)     Oct 10, 2018  3:00 PM CDT   [facility] ROSA MARIA [facility] C[facility]    [address]  [address]  [facility] [address] [phone]                 Requested Prescriptions   Pending Prescriptions Disp Refills     levothyroxine (SYNTHROID/LEVOTHROID) 125 MCG tablet [Pharmacy Med Name: LEVOTHYROXINE 125 MCG TABLET] 90 tablet 0     Sig: TAKE 1 TABLET (125 MCG) BY MOUTH EVERY MORNING (BEFORE BREAKFAST)    Thyroid Protocol Passed    10/4/2018  3:51 PM       Passed - Patient is 12 years or older       Passed - Recent (12 mo) or future (30 days) visit within the authorizing provider's specialty    Patient had office visit in the last 12 months or has a visit in the next 30 days with authorizing provider or within the authorizing provider's specialty.  See \"Patient Info\" tab in inbasket, or \"Choose Columns\" in Meds & Orders section of the refill encounter.           Passed - Normal TSH on file in past 12 months    Recent Labs   Lab Test  02/16/18   1203   TSH  0.91             Passed - No active pregnancy on record    If patient is pregnant or has had a positive pregnancy test, please check TSH.         Passed - No positive pregnancy test in past 12 months    If patient is pregnant or has had a positive pregnancy test, please check TSH.            "

## 2018-10-05 RX ORDER — LEVOTHYROXINE SODIUM 125 UG/1
TABLET ORAL
Qty: 90 TABLET | Refills: 1 | Status: SHIPPED | OUTPATIENT
Start: 2018-10-05 | End: 2019-05-17

## 2018-10-10 ENCOUNTER — OFFICE VISIT (OUTPATIENT)
Dept: FAMILY MEDICINE | Facility: CLINIC | Age: 73
End: 2018-10-10
Payer: COMMERCIAL

## 2018-10-10 VITALS
DIASTOLIC BLOOD PRESSURE: 74 MMHG | HEART RATE: 70 BPM | TEMPERATURE: 97.6 F | RESPIRATION RATE: 18 BRPM | OXYGEN SATURATION: 98 % | SYSTOLIC BLOOD PRESSURE: 125 MMHG

## 2018-10-10 DIAGNOSIS — Z23 NEED FOR VACCINATION: Primary | ICD-10-CM

## 2018-10-10 DIAGNOSIS — F41.9 ANXIETY: ICD-10-CM

## 2018-10-10 DIAGNOSIS — B00.9 HERPES SIMPLEX VIRUS INFECTION: ICD-10-CM

## 2018-10-10 PROCEDURE — 90632 HEPA VACCINE ADULT IM: CPT | Mod: GA | Performed by: NURSE PRACTITIONER

## 2018-10-10 PROCEDURE — 90471 IMMUNIZATION ADMIN: CPT | Mod: GA | Performed by: NURSE PRACTITIONER

## 2018-10-10 PROCEDURE — 99402 PREV MED CNSL INDIV APPRX 30: CPT | Mod: 25 | Performed by: NURSE PRACTITIONER

## 2018-10-10 PROCEDURE — 90472 IMMUNIZATION ADMIN EACH ADD: CPT | Mod: GA | Performed by: NURSE PRACTITIONER

## 2018-10-10 PROCEDURE — 90750 HZV VACC RECOMBINANT IM: CPT | Mod: GA | Performed by: NURSE PRACTITIONER

## 2018-10-10 RX ORDER — VALACYCLOVIR HYDROCHLORIDE 1 G/1
2000 TABLET, FILM COATED ORAL 2 TIMES DAILY
Qty: 4 TABLET | Refills: 0 | Status: SHIPPED | OUTPATIENT
Start: 2018-10-10 | End: 2018-10-12

## 2018-10-10 RX ORDER — DIAZEPAM 10 MG
5 TABLET ORAL EVERY 12 HOURS PRN
Qty: 10 TABLET | Refills: 0 | Status: SHIPPED | OUTPATIENT
Start: 2018-10-10 | End: 2019-01-10

## 2018-10-10 NOTE — PROGRESS NOTES
Nurse Note      Itinerary:  [facility]       Departure Date: 10/24/2018      Return Date: 11/22/2018      Length of Trip 1 month       Reason for Travel: Tourism           Urban or rural: both      Accommodations: Hotel    Hostel        IMMUNIZATION HISTORY  Have you received any immunizations within the past 4 weeks?  No  Have you ever fainted from having your blood drawn or from an injection?  No  Have you ever had a fever reaction to vaccination?  No  Have you ever had any bad reaction or side effect from any vaccination?  No  Have you ever had hepatitis A or B vaccine?  Yes  Do you live (or work closely) with anyone who has AIDS, an AIDS-like condition, any other immune disorder or who is on chemotherapy for cancer?  No  Do you have a family history of immunodeficiency?  No  Have you received any injection of immune globulin or any blood products during the past 12 months?  No    Patient roomed by YUNIER Castaneda  Elizabeth Galicia is a 73 year old female seen today alone for counsultation for international travel to [facility] for Tourism.  Patient will be departing in  2 week(s) and staying for   1 month(s) and  traveling with spouse  with organized tour group.      Patient itinerary :  will be in the urban and rural  region of [facility] which presents risk for Japanese Encephalitis. exposure.      Patient's activities will include sightseeing.    Patient's country [place] birth is [place]    Special medical concerns: allergies, Type 2 diabetes, h/o vascultitis/kidney complication, UTI's  Pre-travel questionnaire was completed by patient and reviewed by provider.     Vitals: /74  Pulse 70  Temp 97.6  F (36.4  C) (Oral)  Resp 18  SpO2 98%  BMI= There is no height or weight on file to calculate BMI.    EXAM:  General:  Well-nourished, well-developed in no acute distress.  Appears to be stated age, interacts appropriately and expresses understanding of information given to patient.    Current Outpatient  Prescriptions   Medication Sig Dispense Refill     ACE/ARB/ARNI NOT PRESCRIBED, INTENTIONAL, Please choose reason not prescribed, below       acetaminophen (TYLENOL) 500 MG tablet Take 2 tablets (1,000 mg) by mouth every 8 hours (Patient taking differently: Take 500-1,000 mg by mouth every 6 hours as needed )       CALCIUM LACTATE PO Take 4 tablets by mouth daily        Cholecalciferol (VITAMIN D3 PO) Take 1,000 Units by mouth daily       Cyanocobalamin ([facility] B-12 PO)        diazepam (VALIUM) 10 MG tablet Take 0.5 tablets (5 mg) by mouth every 12 hours as needed for anxiety 10 tablet 0     levothyroxine (SYNTHROID/LEVOTHROID) 125 MCG tablet TAKE 1 TABLET (125 MCG) BY MOUTH EVERY MORNING (BEFORE BREAKFAST) 90 tablet 1     liothyronine (CYTOMEL) 5 MCG tablet Take 2 tablets (10 mcg) by mouth 2 times daily 360 tablet 1     MAGNESIUM LACTATE PO Take 2 tablets by mouth daily       methylPREDNISolone (MEDROL DOSEPAK) 4 MG tablet Follow package instructions 21 tablet 0     PREDNISONE PO Take 5 mg by mouth daily 1/2 of 10mg tablet daily        valACYclovir (VALTREX) 1000 mg tablet Take 2 tablets (2,000 mg) by mouth 2 times daily 12 tablet 3     [DISCONTINUED] mesna (MESNEX) 400 MG TABS tablet Take 2 tablets (800 mg) by mouth every 3 hours for 2 doses Starting 3 hours after cyclophosphamide. 4 tablet 0     Patient Active Problem List   Diagnosis     HTN (hypertension)     Obesity (BMI 30-39.9)     Type 2 diabetes mellitus without complication (H)     ANCA-associated vasculitis (Microscopic polyangiitis)     Postoperative hypothyroidism     Anemia, unspecified type     Hyperplasia of renal artery (H)     Heterozygous for MTHFR gene mutation (H)     CKD (chronic kidney disease) stage 3, GFR 30-59 ml/min (H)     Prophylactic antibiotic     Allergies   Allergen Reactions     Cranberries [Cranberry Extract]      Causes herpes flair-up     Dairy [Milk Products]      Perfume Other (See Comments)     Stuffy in nose, HA     Seasonal  Allergies          Immunizations discussed include:   Hepatitis A:  Ordered/given today, risks, benefits and side effects reviewed  Hepatitis B: Declined  Not concerned about risk of disease  Influenza: deferred  Typhoid: Not indicated  Rabies: Not indicated  Yellow Fever: Not indicated  Japanese Encephalitis: Not indicated - risk of disease is minimal low risk travel, off season, limited rural   Meningococcus: Not indicated  Tetanus/Diphtheria: Up to date  Measles/Mumps/Rubella: Immune by disease history per patient report  Cholera: Not needed  Polio: Up to date  Pneumococcal: deferred for PCP  Varicella: Immune by disease history per patient report  Zostavax:  Not indicated  Shingrix: Ordered/given today, risks, benefits and side effects reviewed  HPV:  Not indicated  TB:  Low risk     Altitude Exposure on this trip: no  Past tolerance to Altitude: na    ASSESSMENT/PLAN:    ICD-10-CM    1. Need for vaccination Z23 HEPA VACCINE ADULT IM     HC ZOSTER VACCINE RECOMBINANT ADJUVANTED IM NJX     PNEUMOCOCCAL CONJ VACCINE 13 VALENT IM     FLU VACCINE, INCREASED ANTIGEN, PRESV FREE     VACCINE ADMINISTRATION, INITIAL     VACCINE ADMINISTRATION, EACH ADDITIONAL     CANCELED: FLU VACCINE, INCREASED ANTIGEN, PRESV FREE     CANCELED: PNEUMOCOCCAL CONJ VACCINE 13 VALENT IM     CANCELED: ZOSTER VACCINE RECOMBINANT ADJUVANTED IM NJX   2. Anxiety F41.9 diazepam (VALIUM) 10 MG tablet   3. Herpes simplex virus infection B00.9 DISCONTINUED: valACYclovir (VALTREX) 1000 mg tablet     I have reviewed general recommendations for safe travel   including: food/water precautions, insect precautions, safer sex   practices given high prevalence of Zika, HIV and other STDs,   roadway safety. Educational materials and [employer] report provided.    Malaraia prophylaxis recommended: none  Symptomatic treatment for traveler's diarrhea: not indicated  Altitude illness prevention and treatment: no      Evacuation insurance advised and resources were  provided to patient.    Total visit time 30 minutes  with over 50% of time spent counseling patient as detailed above.    Rupa Watson CNP

## 2018-10-10 NOTE — PATIENT INSTRUCTIONS
Today October 10, 2018 you received the    Hepatitis A Vaccine - Please return on 4/8/19 or later for your 2nd and final dose.    Shingrix.  Second shot in 2-4 months    These appointments can be made as a NURSE ONLY visit.    **It is very important for the vaccinations to be given on the scheduled day(s), this helps ensure you receive the full effectiveness of the vaccine.**    Please call [facility] with any questions [phone]    Thank you for visiting [facility]

## 2018-10-10 NOTE — NURSING NOTE
"Chief Complaint   Patient presents with     Travel Clinic     [facility]      /74  Pulse 70  Temp 97.6  F (36.4  C) (Oral)  Resp 18  SpO2 98% Estimated body mass index is 39.61 kg/(m^2) as calculated from the following:    Height as of 10/1/18: 5' 5\" (1.651 m).    Weight as of 10/1/18: 238 lb (108 kg).  bp completed using cuff size: large       Health Maintenance addressed:  NONE    n/a    Aminah Armijo MA     "

## 2018-10-10 NOTE — NURSING NOTE
Screening Questionnaire for Adult Immunization    Are you sick today?   No   Do you have allergies to medications, food, a vaccine component or latex?   No   Have you ever had a serious reaction after receiving a vaccination?   No   Do you have a long-term health problem with heart disease, lung disease, asthma, kidney disease, metabolic disease (e.g. diabetes), anemia, or other blood disorder?   No   Do you have cancer, leukemia, HIV/AIDS, or any other immune system problem?   No   In the past 3 months, have you taken medications that affect  your immune system, such as prednisone, other steroids, or anticancer drugs; drugs for the treatment of rheumatoid arthritis, Crohn s disease, or psoriasis; or have you had radiation treatments?   No   Have you had a seizure, or a brain or other nervous system problem?   No   During the past year, have you received a transfusion of blood or blood     products, or been given immune (gamma) globulin or antiviral drug?   No   For women: Are you pregnant or is there a chance you could become        pregnant during the next month?   No   Have you received any vaccinations in the past 4 weeks?   No     Immunization questionnaire answers were all negative.      Prior to injection verified patient identity using patient's name and date of birth.  Due to injection administration, patient instructed to remain in clinic for 15 minutes  afterwards, and to report any adverse reaction to me immediately.      Per orders of TIMUR Watson, injection of Shingrix and Hep A given by Aminah Armijo. Patient instructed to remain in clinic for 15 minutes afterwards, and to report any adverse reaction to me immediately.       Screening performed by Aminah Armijo on 10/10/2018 at 4:57 PM.

## 2018-10-10 NOTE — MR AVS SNAPSHOT
After Visit Summary   10/10/2018    Elizabeth Galicia    MRN: [id number]           Patient Information     Date Of Birth          1945        Visit Information        Provider Department      10/10/2018 3:00 PM Rupa Watson APRN CNP [facility]        Today's Diagnoses     Need for vaccination    -  1    Anxiety        Herpes simplex virus infection          Care Instructions    Today October 10, 2018 you received the    Hepatitis A Vaccine - Please return on 4/8/19 or later for your 2nd and final dose.    Shingrix.  Second shot in 2-4 months    These appointments can be made as a NURSE ONLY visit.    **It is very important for the vaccinations to be given on the scheduled day(s), this helps ensure you receive the full effectiveness of the vaccine.**    Please call [facility] with any questions [phone]    Thank you for visiting [place]              Follow-ups after your visit        Future tests that were ordered for you today     Open Future Orders        Priority Expected Expires Ordered    PNEUMOCOCCAL CONJ VACCINE 13 VALENT IM Routine  10/10/2019 10/10/2018    FLU VACCINE, INCREASED ANTIGEN, PRESV FREE Routine  10/10/2019 10/10/2018            Who to contact     If you have questions or need follow up information about today's clinic visit or your schedule please contact [facility] directly at [phone].  Normal or non-critical lab and imaging results will be communicated to you by MyChart, letter or phone within 4 business days after the clinic has received the results. If you do not hear from us within 7 days, please contact the clinic through MyChart or phone. If you have a critical or abnormal lab result, we will notify you by phone as soon as possible.  Submit refill requests through ViaBill or call your pharmacy and they will forward the refill request to us. Please allow 3 business days for your refill  "to be completed.          Additional Information About Your Visit        [employer] Information     [employer] lets you send messages to your doctor, view your test results, renew your prescriptions, schedule appointments and more. To sign up, go to [email or web address] . Click on \"Log in\" on the left side of the screen, which will take you to the Welcome page. Then click on \"Sign up Now\" on the right side of the page.     You will be asked to enter the access code listed below, as well as some personal information. Please follow the directions to create your username and password.     Your access code is: [email or web address]  Expires: 2018  4:28 AM     Your access code will  in 90 days. If you need help or a new code, please call your [place] clinic or [phone].        Care EveryWhere ID     This is your Care EveryWhere ID. This could be used by other organizations to access your [place] medical records  [id number]        Your Vitals Were     Pulse Temperature Respirations Pulse Oximetry          70 97.6  F (36.4  C) (Oral) 18 98%         Blood Pressure from Last 3 Encounters:   10/10/18 125/74   10/01/18 136/76   17 122/84    Weight from Last 3 Encounters:   10/01/18 238 lb (108 kg)   17 229 lb (103.9 kg)   17 244 lb 7.8 oz (110.9 kg)              We Performed the Following     HC ZOSTER VACCINE RECOMBINANT ADJUVANTED IM NJX     HEPA VACCINE ADULT IM          Today's Medication Changes          These changes are accurate as of 10/10/18  4:03 PM.  If you have any questions, ask your nurse or doctor.               Start taking these medicines.        Dose/Directions    diazepam 10 MG tablet   Commonly known as:  VALIUM   Used for:  Anxiety   Started by:  Rupa Watson APRN CNP        Dose:  5 mg   Take 0.5 tablets (5 mg) by mouth every 12 hours as needed for anxiety   Quantity:  10 tablet   Refills:  0       valACYclovir 1000 mg tablet   Commonly known as:  VALTREX   Used for:  " Herpes simplex virus infection   Started by:  Rupa Watson APRN CNP        Dose:  2000 mg   Take 2 tablets (2,000 mg) by mouth 2 times daily   Quantity:  4 tablet   Refills:  0         These medicines have changed or have updated prescriptions.        Dose/Directions    acetaminophen 500 MG tablet   Commonly known as:  TYLENOL   This may have changed:    - how much to take  - when to take this  - reasons to take this   Used for:  Generalized pain        Dose:  1000 mg   Take 2 tablets (1,000 mg) by mouth every 8 hours   Refills:  0            Where to get your medicines      These medications were sent to [facility]/pharmacy #[id number] - [place], [place] - [address]  [address] [facility] [address]     Phone:  [phone]     valACYclovir 1000 mg tablet         Some of these will need a paper prescription and others can be bought over the counter.  Ask your nurse if you have questions.     Bring a paper prescription for each of these medications     diazepam 10 MG tablet                Primary Care Provider Office Phone # Fax #    Nati Lopezgisele Littlejohn -[phone] [address] [facility] [address]  [facility] [address]        Equal Access to Services     [facility] : Hadii wally ku hadasho Sozhangali, waaxda luqadaha, qaybta kaalmada adesorayayamoe, georgette bates . So [facility] [phone].    ATENCIÓN: Si habla español, tiene a norman disposición servicios gratuitos de asistencia lingüística. [facility] [phone].    We comply with applicable federal civil rights laws and [place] laws. We do not discriminate on the basis of race, color, national origin, age, disability, sex, sexual orientation, or gender identity.            Thank you!     Thank you for choosing [facility]  for your care. Our goal is always to provide you with excellent care. Hearing back from our patients is one way we can continue to improve our services. Please take a few minutes to complete the  written survey that you may receive in the mail after your visit with us. Thank you!             Your Updated Medication List - Protect others around you: Learn how to safely use, store and throw away your medicines at www.disposemymeds.org.          This list is accurate as of 10/10/18  4:03 PM.  Always use your most recent med list.                   Brand Name Dispense Instructions for use Diagnosis    ACE/ARB/ARNI NOT PRESCRIBED (INTENTIONAL)      Please choose reason not prescribed, below    Hyperplasia of renal artery (H)       acetaminophen 500 MG tablet    TYLENOL     Take 2 tablets (1,000 mg) by mouth every 8 hours    Generalized pain       CALCIUM LACTATE PO      Take 4 tablets by mouth daily        [facility] B-12 PO           diazepam 10 MG tablet    VALIUM    10 tablet    Take 0.5 tablets (5 mg) by mouth every 12 hours as needed for anxiety    Anxiety       levothyroxine 125 MCG tablet    SYNTHROID/LEVOTHROID    90 tablet    TAKE 1 TABLET (125 MCG) BY MOUTH EVERY MORNING (BEFORE BREAKFAST)    Postoperative hypothyroidism       liothyronine 5 MCG tablet    CYTOMEL    360 tablet    Take 2 tablets (10 mcg) by mouth 2 times daily    Postoperative hypothyroidism       MAGNESIUM LACTATE PO      Take 2 tablets by mouth daily        methylPREDNISolone 4 MG tablet    MEDROL DOSEPAK    21 tablet    Follow package instructions    Acute bronchitis, unspecified organism       PREDNISONE PO      Take 5 mg by mouth daily 1/2 of 10mg tablet daily        valACYclovir 1000 mg tablet    VALTREX    4 tablet    Take 2 tablets (2,000 mg) by mouth 2 times daily    Herpes simplex virus infection       VITAMIN D3 PO      Take 1,000 Units by mouth daily

## 2018-10-11 ENCOUNTER — TRANSFERRED RECORDS (OUTPATIENT)
Dept: HEALTH INFORMATION MANAGEMENT | Facility: CLINIC | Age: 73
End: 2018-10-11

## 2018-10-12 ENCOUNTER — TELEPHONE (OUTPATIENT)
Dept: FAMILY MEDICINE | Facility: CLINIC | Age: 73
End: 2018-10-12

## 2018-10-12 DIAGNOSIS — B00.9 HERPES SIMPLEX VIRUS INFECTION: ICD-10-CM

## 2018-10-12 RX ORDER — VALACYCLOVIR HYDROCHLORIDE 1 G/1
2000 TABLET, FILM COATED ORAL 2 TIMES DAILY
Qty: 12 TABLET | Refills: 3 | Status: SHIPPED | OUTPATIENT
Start: 2018-10-12 | End: 2020-10-08

## 2018-10-12 NOTE — TELEPHONE ENCOUNTER
Pt requests 90 day supply of Valacyclovir HCL 1 gram tablet.  If appropriate, please send a [facility] to [facility] [address] ph# [phone]

## 2018-10-12 NOTE — TELEPHONE ENCOUNTER
Left message for patient to callback.  Per Rx, doesn't look like pt takes daily  Unsure if pt wants 90 day    Left message for patient to callback.  Jazmin TAVARES RN

## 2018-10-12 NOTE — TELEPHONE ENCOUNTER
I called patient and she doesn't take daily.  I will put through 12 tablets with 3 refills    Ashleigh Causey RN- Triage [employer]

## 2018-10-19 ENCOUNTER — ALLIED HEALTH/NURSE VISIT (OUTPATIENT)
Dept: NURSING | Facility: CLINIC | Age: 73
End: 2018-10-19
Payer: COMMERCIAL

## 2018-10-19 DIAGNOSIS — Z23 NEED FOR PROPHYLACTIC VACCINATION AND INOCULATION AGAINST INFLUENZA: Primary | ICD-10-CM

## 2018-10-19 PROCEDURE — G0009 ADMIN PNEUMOCOCCAL VACCINE: HCPCS

## 2018-10-19 PROCEDURE — 90670 PCV13 VACCINE IM: CPT

## 2018-10-19 PROCEDURE — G0008 ADMIN INFLUENZA VIRUS VAC: HCPCS

## 2018-10-19 PROCEDURE — 90662 IIV NO PRSV INCREASED AG IM: CPT

## 2018-10-19 NOTE — PROGRESS NOTES

## 2018-10-19 NOTE — MR AVS SNAPSHOT
"              After Visit Summary   10/19/2018    Elizabeth Galicia    MRN: [id number]           Patient Information     Date Of Birth          1945        Visit Information        Provider Department      10/19/2018 11:15 AM [facility] [place] NURSE [facility]        Today's Diagnoses     Need for prophylactic vaccination and inoculation against influenza    -  1       Follow-ups after your visit        Who to contact     If you have questions or need follow up information about today's clinic visit or your schedule please contact [facility] directly at [phone].  Normal or non-critical lab and imaging results will be communicated to you by IPWirelesshart, letter or phone within 4 business days after the clinic has received the results. If you do not hear from us within 7 days, please contact the clinic through [employer] or phone. If you have a critical or abnormal lab result, we will notify you by phone as soon as possible.  Submit refill requests through [email or web address] or call your pharmacy and they will forward the refill request to us. Please allow 3 business days for your refill to be completed.          Additional Information About Your Visit        MyChart Information     [email or web address] lets you send messages to your doctor, view your test results, renew your prescriptions, schedule appointments and more. To sign up, go to [email or web address] . Click on \"Log in\" on the left side of the screen, which will take you to the Welcome page. Then click on \"Sign up Now\" on the right side of the page.     You will be asked to enter the access code listed below, as well as some personal information. Please follow the directions to create your username and password.     Your access code is: [email or web address]  Expires: 2018  4:28 AM     Your access code will  in 90 days. If you need help or a new code, please call your [facility] or [phone].        Care EveryWhere ID     This is your Care EveryWhere " ID. This could be used by other organizations to access your [place] medical records  [id number]         Blood Pressure from Last 3 Encounters:   10/10/18 125/74   10/01/18 136/76   12/20/17 122/84    Weight from Last 3 Encounters:   10/01/18 238 lb (108 kg)   12/20/17 229 lb (103.9 kg)   12/04/17 244 lb 7.8 oz (110.9 kg)              We Performed the Following     FLU VACCINE, INCREASED ANTIGEN, PRESV FREE, AGE 65+ [39091]     Pneumococcal vaccine 13 valent PCV13 IM (Prevnar) [05988]     Vaccine Administration, Initial [72490]          Today's Medication Changes          These changes are accurate as of 10/19/18 12:16 PM.  If you have any questions, ask your nurse or doctor.               These medicines have changed or have updated prescriptions.        Dose/Directions    acetaminophen 500 MG tablet   Commonly known as:  TYLENOL   This may have changed:    - how much to take  - when to take this  - reasons to take this   Used for:  Generalized pain        Dose:  1000 mg   Take 2 tablets (1,000 mg) by mouth every 8 hours   Refills:  0                Primary Care Provider Office Phone # Fax #    Nati Linn Littlejohn,  [phone] [address] [address]        Equal Access to Services     LORENZA RICHARDS AH: Hadii wally kinseyo Sozhangali, waaxda luqadaha, qaybta kaalmada adeegyada, georgette bates . So [facility] [phone].    ATENCIÓN: Si habla español, tiene a norman disposición servicios gratuitos de asistencia lingüística. Llame al [phone].    We comply with applicable federal civil rights laws and [place] laws. We do not discriminate on the basis of race, color, national origin, age, disability, sex, sexual orientation, or gender identity.            Thank you!     Thank you for choosing [facility]  for your care. Our goal is always to provide you with excellent care. Hearing back from our patients is one way we can continue to improve our  services. Please take a few minutes to complete the written survey that you may receive in the mail after your visit with us. Thank you!             Your Updated Medication List - Protect others around you: Learn how to safely use, store and throw away your medicines at www.disposemymeds.org.          This list is accurate as of 10/19/18 12:16 PM.  Always use your most recent med list.                   Brand Name Dispense Instructions for use Diagnosis    ACE/ARB/ARNI NOT PRESCRIBED (INTENTIONAL)      Please choose reason not prescribed, below    Hyperplasia of renal artery (H)       acetaminophen 500 MG tablet    TYLENOL     Take 2 tablets (1,000 mg) by mouth every 8 hours    Generalized pain       CALCIUM LACTATE PO      Take 4 tablets by mouth daily        [facility] B-12 PO           diazepam 10 MG tablet    VALIUM    10 tablet    Take 0.5 tablets (5 mg) by mouth every 12 hours as needed for anxiety    Anxiety       levothyroxine 125 MCG tablet    SYNTHROID/LEVOTHROID    90 tablet    TAKE 1 TABLET (125 MCG) BY MOUTH EVERY MORNING (BEFORE BREAKFAST)    Postoperative hypothyroidism       liothyronine 5 MCG tablet    CYTOMEL    360 tablet    Take 2 tablets (10 mcg) by mouth 2 times daily    Postoperative hypothyroidism       MAGNESIUM LACTATE PO      Take 2 tablets by mouth daily        methylPREDNISolone 4 MG tablet    MEDROL DOSEPAK    21 tablet    Follow package instructions    Acute bronchitis, unspecified organism       PREDNISONE PO      Take 5 mg by mouth daily 1/2 of 10mg tablet daily        valACYclovir 1000 mg tablet    VALTREX    12 tablet    Take 2 tablets (2,000 mg) by mouth 2 times daily    Herpes simplex virus infection       VITAMIN D3 PO      Take 1,000 Units by mouth daily

## 2019-01-02 DIAGNOSIS — E89.0 POSTOPERATIVE HYPOTHYROIDISM: ICD-10-CM

## 2019-01-03 RX ORDER — LIOTHYRONINE SODIUM 5 UG/1
10 TABLET ORAL 2 TIMES DAILY
Qty: 120 TABLET | Refills: 0 | Status: SHIPPED | OUTPATIENT
Start: 2019-01-03 | End: 2019-02-06

## 2019-01-03 NOTE — TELEPHONE ENCOUNTER
"liothyronine (CYTOMEL) 5 MCG tablet 360 tablet 1 2/27/2018  No   Sig - Route: Take 2 tablets (10 mcg) by mouth 2 times daily        Last Written Prescription Date:  02/27/2018  Last Fill Quantity: 360,  # refills: 1   Last office visit: 10/10/2018 with prescribing provider:     Future Office Visit:      Requested Prescriptions   Pending Prescriptions Disp Refills     liothyronine (CYTOMEL) 5 MCG tablet 360 tablet 1     Sig: Take 2 tablets (10 mcg) by mouth 2 times daily    Thyroid Protocol Failed - 1/3/2019 12:09 PM       Failed - Recent (12 mo) or future (30 days) visit within the authorizing provider's specialty    Patient had office visit in the last 12 months or has a visit in the next 30 days with authorizing provider or within the authorizing provider's specialty.  See \"Patient Info\" tab in inbasket, or \"Choose Columns\" in Meds & Orders section of the refill encounter.             Passed - Patient is 12 years or older       Passed - Normal TSH on file in past 12 months    Recent Labs   Lab Test 02/16/18  1203   TSH 0.91             Passed - No active pregnancy on record    If patient is pregnant or has had a positive pregnancy test, please check TSH.         Passed - No positive pregnancy test in past 12 months    If patient is pregnant or has had a positive pregnancy test, please check TSH.            "

## 2019-01-09 ENCOUNTER — TELEPHONE (OUTPATIENT)
Dept: FAMILY MEDICINE | Facility: CLINIC | Age: 74
End: 2019-01-09

## 2019-01-09 NOTE — TELEPHONE ENCOUNTER
"Returned call to patient.  Patient started having a coughing attack.  Unable to lay down in bed and past 2 nights have had to sleep in a chair.  Patient states she    Patient states she has also been wheezing for about 4 days.  Patient has been exposed to \"same virus\", massive drainage down back of throat, then went to head. Chest started feeling \"heavy\" like different.  Patient states she is prone to bronchitis.      Denies fever, chills, SOB, difficulty breathing, CP, dizzy, lightheaded.    Patient does traditional chinese medicine, allergic to dairy, advised to drink warm liquids such as tea with honey and steam room.  Patient has appointment scheduled for tomorrow with a provider.    DELIO HamiltonN, RN  [facility]    "

## 2019-01-09 NOTE — TELEPHONE ENCOUNTER
Reason for Call:  Other appointment    Detailed comments: patient wanting to be seen today for a Bad cough, [place]ts [place] or [place] and does Not want Urgent care.. Cough keeps her up at night, pkease advise    Phone Number Patient can be reached at: Home number on file [phone] (home)    Best Time: today    Can we leave a detailed message on this number? YES    Call taken on 1/9/2019 at 11:53 AM by Juliana Ho

## 2019-01-10 ENCOUNTER — OFFICE VISIT (OUTPATIENT)
Dept: FAMILY MEDICINE | Facility: CLINIC | Age: 74
End: 2019-01-10
Payer: MEDICARE

## 2019-01-10 VITALS
WEIGHT: 248 LBS | DIASTOLIC BLOOD PRESSURE: 78 MMHG | SYSTOLIC BLOOD PRESSURE: 154 MMHG | TEMPERATURE: 97.6 F | BODY MASS INDEX: 41.32 KG/M2 | OXYGEN SATURATION: 93 % | HEART RATE: 78 BPM | HEIGHT: 65 IN

## 2019-01-10 DIAGNOSIS — J06.9 VIRAL URI WITH COUGH: Primary | ICD-10-CM

## 2019-01-10 PROCEDURE — 99213 OFFICE O/P EST LOW 20 MIN: CPT | Performed by: NURSE PRACTITIONER

## 2019-01-10 RX ORDER — ALBUTEROL SULFATE 90 UG/1
2 AEROSOL, METERED RESPIRATORY (INHALATION) EVERY 4 HOURS PRN
Qty: 1 INHALER | Refills: 0 | Status: SHIPPED | OUTPATIENT
Start: 2019-01-10 | End: 2019-05-03

## 2019-01-10 ASSESSMENT — MIFFLIN-ST. JEOR: SCORE: 1635.8

## 2019-01-10 NOTE — PROGRESS NOTES
HPI      SUBJECTIVE:   Elizabeth Galicia is a 73 year old female who presents to clinic today for the following health issues:      RESPIRATORY SYMPTOMS      Duration: 8 days    Description  nasal congestion, facial pain/pressure, cough, wheezing, headache, fatigue/malaise and hoarse voice    Severity: severe    Accompanying signs and symptoms: None    History (predisposing factors):  none    Precipitating or alleviating factors: None    Therapies tried and outcome:  none    Started with tons of postnasal drip   Has a lot of snow mold allergies   One day earlier on was really bad   No fevers   Coughing a lot. Not always productive   Blowing nose. Not runny     Past Medical History:   Diagnosis Date     ANCA-associated vasculitis (H)      Anxiety      Gastro-oesophageal reflux disease      Herniated lumbar intervertebral disc      Herpes      Heterozygous for MTHFR gene mutation (H)     Per CareEverywhere     Hyperlipidemia      Lesion of upper eyelid LEFT     Obesity (BMI 30-39.9)      Postoperative hypothyroidism      Seasonal allergies      Type 2 diabetes mellitus without complication (H) 5/24/2017     Uterine prolapse      Family History   Problem Relation Age of Onset     Unknown/Adopted Mother      Heart Disease Other      Past Surgical History:   Procedure Laterality Date     COLONOSCOPY       DILATION AND CURETTAGE       ENT SURGERY      partial thyroidectomy; tonsillectomy     EXCISE LESION EYELID Left 3/10/2016    Procedure: EXCISE LESION EYELID;  Surgeon: Rg Nazario MD;  Location: [facility]     HAND SURGERY       HYSTERECTOMY VAGINAL, COLPORRHAPHY ANTERIOR, POSTERIOR, COMBINED N/A 9/9/2014    Procedure: COMBINED HYSTERECTOMY VAGINAL, COLPORRHAPHY ANTERIOR, POSTERIOR;  Surgeon: Shay Winkler MD;  Location: [facility]     HYSTERECTOMY, PAP NO LONGER INDICATED       LAPAROSCOPIC SALPINGO-OOPHORECTOMY  6/27/2011    Procedure:LAPAROSCOPIC SALPINGO-OOPHORECTOMY; resection of left pelvic mass. ;  "Surgeon:MAYRA OLEARY; [facility]     ORTHOPEDIC SURGERY       SALPINGO OOPHORECTOMY,R/L/PEDRO LUIS      Salpingo Oophorectomy for torsion in past     THYROIDECTOMY      Partial     Social History     Tobacco Use     Smoking status: Never Smoker     Smokeless tobacco: Never Used   Substance Use Topics     Alcohol use: Yes     Comment: rarely     Current Outpatient Medications   Medication Sig Dispense Refill     acetaminophen (TYLENOL) 500 MG tablet Take 2 tablets (1,000 mg) by mouth every 8 hours (Patient taking differently: Take 500-1,000 mg by mouth every 6 hours as needed )       CALCIUM LACTATE PO Take 4 tablets by mouth daily        Cholecalciferol (VITAMIN D3 PO) Take 1,000 Units by mouth daily       Cyanocobalamin (CVS B-12 PO)        levothyroxine (SYNTHROID/LEVOTHROID) 125 MCG tablet TAKE 1 TABLET (125 MCG) BY MOUTH EVERY MORNING (BEFORE BREAKFAST) 90 tablet 1     liothyronine (CYTOMEL) 5 MCG tablet Take 2 tablets (10 mcg) by mouth 2 times daily 120 tablet 0     MAGNESIUM LACTATE PO Take 2 tablets by mouth daily       PREDNISONE PO Take 5 mg by mouth daily 1/2 of 10mg tablet daily        valACYclovir (VALTREX) 1000 mg tablet Take 2 tablets (2,000 mg) by mouth 2 times daily (Patient taking differently: Take 2,000 mg by mouth 2 times daily as needed ) 12 tablet 3     ACE/ARB/ARNI NOT PRESCRIBED, INTENTIONAL, Please choose reason not prescribed, below (Patient not taking: Reported on 1/10/2019)       Allergies   Allergen Reactions     Cranberries [Cranberry Extract]      Causes herpes flair-up     Dairy [Milk Products]      Perfume Other (See Comments)     Stuffy in nose, HA     Seasonal Allergies        Reviewed and updated as needed this visit by clinical staff and provider     ROS  Detailed as above       /78 (BP Location: Left arm, Patient Position: Sitting, Cuff Size: Adult Large)   Pulse 78   Temp 97.6  F (36.4  C) (Oral)   Ht 1.651 m (5' 5\")   Wt 112.5 kg (248 lb)   SpO2 93%   BMI 41.27 kg/m  "       Physical Exam   Constitutional: She is oriented to person, place, and time. She appears well-developed.   HENT:   Head: Normocephalic.   Right Ear: Tympanic membrane, external ear and ear canal normal.   Left Ear: Tympanic membrane, external ear and ear canal normal.   Nose: Mucosal edema (pale) present. Right sinus exhibits no maxillary sinus tenderness and no frontal sinus tenderness. Left sinus exhibits no maxillary sinus tenderness and no frontal sinus tenderness.   Mouth/Throat: Oropharynx is clear and moist. No oropharyngeal exudate.   Eyes: Conjunctivae are normal.   Neck: Normal range of motion.   Cardiovascular: Normal rate, regular rhythm and normal heart sounds.   No murmur heard.  Pulmonary/Chest: Effort normal and breath sounds normal. No respiratory distress.   Harsh cough   Lymphadenopathy:     She has no cervical adenopathy.   Neurological: She is alert and oriented to person, place, and time.   Skin: Skin is warm and dry.   Psychiatric: She has a normal mood and affect. Judgment normal.       Assessment and Plan:       ICD-10-CM    1. Viral URI with cough J06.9 albuterol (PROAIR HFA/PROVENTIL HFA/VENTOLIN HFA) 108 (90 Base) MCG/ACT inhaler    B97.89      Suspect viral with allergic response as well. Will send with inhaler. Recommend steroid nasal spray. Push fluids. Call or rtc prn       ROSA MARIA Ricks, CNP  [facility]

## 2019-01-10 NOTE — PATIENT INSTRUCTIONS
I recommend Flonase or Nasacort daily until symptoms resolve. These are 2 different steroid sprays that do the same thing to decrease swelling in the nose and reduce your symptoms. They are both over the counter

## 2019-01-17 ENCOUNTER — TRANSFERRED RECORDS (OUTPATIENT)
Dept: HEALTH INFORMATION MANAGEMENT | Facility: CLINIC | Age: 74
End: 2019-01-17

## 2019-02-06 DIAGNOSIS — E89.0 POSTOPERATIVE HYPOTHYROIDISM: ICD-10-CM

## 2019-02-06 NOTE — TELEPHONE ENCOUNTER
"Pending Prescriptions:                       Disp   Refills    liothyronine (CYTOMEL) 5 MCG tablet [Phar*120 ta*0            Sig: TAKE 2 TABLETS (10 MCG) BY MOUTH 2 TIMES DAILY    Last Written Prescription Date:  1/3/19  Last Fill Quantity: 120,  # refills: 0   Last office visit: 1/10/2019 with prescribing provider:     Future Office Visit:    Requested Prescriptions   Pending Prescriptions Disp Refills     liothyronine (CYTOMEL) 5 MCG tablet [Pharmacy Med Name: LIOTHYRONINE SOD 5 MCG TAB] 120 tablet 0     Sig: TAKE 2 TABLETS (10 MCG) BY MOUTH 2 TIMES DAILY    Thyroid Protocol Passed - 2/6/2019  2:47 AM       Passed - Patient is 12 years or older       Passed - Recent (12 mo) or future (30 days) visit within the authorizing provider's specialty    Patient had office visit in the last 12 months or has a visit in the next 30 days with authorizing provider or within the authorizing provider's specialty.  See \"Patient Info\" tab in inbasket, or \"Choose Columns\" in Meds & Orders section of the refill encounter.             Passed - Medication is active on med list       Passed - Normal TSH on file in past 12 months    Recent Labs   Lab Test 02/16/18  1203   TSH 0.91             Passed - No active pregnancy on record    If patient is pregnant or has had a positive pregnancy test, please check TSH.         Passed - No positive pregnancy test in past 12 months    If patient is pregnant or has had a positive pregnancy test, please check TSH.            "

## 2019-02-07 RX ORDER — LIOTHYRONINE SODIUM 5 UG/1
10 TABLET ORAL 2 TIMES DAILY
Qty: 120 TABLET | Refills: 0 | Status: SHIPPED | OUTPATIENT
Start: 2019-02-07 | End: 2019-03-18

## 2019-02-28 ENCOUNTER — TRANSFERRED RECORDS (OUTPATIENT)
Dept: HEALTH INFORMATION MANAGEMENT | Facility: CLINIC | Age: 74
End: 2019-02-28

## 2019-03-29 ENCOUNTER — OFFICE VISIT (OUTPATIENT)
Dept: FAMILY MEDICINE | Facility: CLINIC | Age: 74
End: 2019-03-29
Payer: MEDICARE

## 2019-03-29 ENCOUNTER — TELEPHONE (OUTPATIENT)
Dept: PALLIATIVE MEDICINE | Facility: CLINIC | Age: 74
End: 2019-03-29

## 2019-03-29 VITALS
TEMPERATURE: 96.7 F | DIASTOLIC BLOOD PRESSURE: 82 MMHG | HEART RATE: 75 BPM | WEIGHT: 250 LBS | OXYGEN SATURATION: 99 % | BODY MASS INDEX: 41.6 KG/M2 | SYSTOLIC BLOOD PRESSURE: 132 MMHG

## 2019-03-29 DIAGNOSIS — K21.9 GASTROESOPHAGEAL REFLUX DISEASE WITHOUT ESOPHAGITIS: Primary | ICD-10-CM

## 2019-03-29 DIAGNOSIS — R73.09 ELEVATED HEMOGLOBIN A1C: ICD-10-CM

## 2019-03-29 DIAGNOSIS — M70.61 TROCHANTERIC BURSITIS OF BOTH HIPS: ICD-10-CM

## 2019-03-29 DIAGNOSIS — E03.9 HYPOTHYROIDISM, UNSPECIFIED TYPE: ICD-10-CM

## 2019-03-29 DIAGNOSIS — Z13.1 SCREENING FOR DIABETES MELLITUS: ICD-10-CM

## 2019-03-29 DIAGNOSIS — Z13.220 SCREENING CHOLESTEROL LEVEL: ICD-10-CM

## 2019-03-29 DIAGNOSIS — M70.62 TROCHANTERIC BURSITIS OF BOTH HIPS: ICD-10-CM

## 2019-03-29 PROBLEM — E66.01 MORBID OBESITY (H): Status: ACTIVE | Noted: 2019-03-29

## 2019-03-29 LAB
CHOLEST SERPL-MCNC: 266 MG/DL
HBA1C MFR BLD: 6.6 % (ref 0–5.6)
HDLC SERPL-MCNC: 38 MG/DL
LDLC SERPL CALC-MCNC: 171 MG/DL
NONHDLC SERPL-MCNC: 228 MG/DL
T4 FREE SERPL-MCNC: 0.88 NG/DL (ref 0.76–1.46)
TRIGL SERPL-MCNC: 287 MG/DL
TSH SERPL DL<=0.005 MIU/L-ACNC: 1.34 MU/L (ref 0.4–4)

## 2019-03-29 PROCEDURE — 36415 COLL VENOUS BLD VENIPUNCTURE: CPT | Performed by: NURSE PRACTITIONER

## 2019-03-29 PROCEDURE — 84439 ASSAY OF FREE THYROXINE: CPT | Performed by: NURSE PRACTITIONER

## 2019-03-29 PROCEDURE — 84443 ASSAY THYROID STIM HORMONE: CPT | Performed by: NURSE PRACTITIONER

## 2019-03-29 PROCEDURE — 80061 LIPID PANEL: CPT | Performed by: NURSE PRACTITIONER

## 2019-03-29 PROCEDURE — 99214 OFFICE O/P EST MOD 30 MIN: CPT | Performed by: NURSE PRACTITIONER

## 2019-03-29 PROCEDURE — 83036 HEMOGLOBIN GLYCOSYLATED A1C: CPT | Performed by: NURSE PRACTITIONER

## 2019-03-29 RX ORDER — OMEPRAZOLE 20 MG/1
20 TABLET, DELAYED RELEASE ORAL DAILY
Qty: 90 TABLET | Refills: 1 | Status: SHIPPED | OUTPATIENT
Start: 2019-03-29 | End: 2019-09-13

## 2019-03-29 NOTE — LETTER
April 9, 2019    Elizabeth Galicia  [address] [address]    Dear Elizabeth,         Welcome to the [facility].  We are [place] at [facility] [place] [address]. Your appointment at the [facility] has been scheduled on Monday April 29, 2019 at 11:00a with Mahamed Banerjee MD.    At your first visit, you will meet your team of caregivers who will help you to develop pain management strategies that will last a lifetime. You will meet with our support staff to review your insurance information, and collect your co-payment if required by your insurance company. You will also meet with a medical pain specialist and care coordinator who will assess your pain and develop a plan of care for your successful pain rehabilitation. You should expect to spend 1-2 hours at your first visit with us. Usually, patients work with us for a period of 6-12 months, and eventually return to their primary doctor once their pain management has stabilized.      To help us make your visit go as smoothly as possible, please bring the following items with you on your visit:     Completed Pain Questionnaire enclosed in this packet.  If you do not bring the completed questionnaire, we may have to reschedule your appointment.  List of any medicines that you are currently taking or have been prescribed  Important NON-[place] medical information such as medical records or tests results (X-rays, or laboratory tests)  Your health insurance card  Financial resources to cover your co-payment or balance due at the time of service (cash, personal check, Visa, and MasterCard are acceptable methods of payment)     Due to the demand for new patient evaluations, you must notify the scheduling department 48 hours in advance if you are not able to keep this appointment. Failure to do so could affect your ability to reschedule with our clinic. Please be aware that we will not prescribe any  medications at your first visit.     Please call [phone] with any questions regarding your appointment. We look forward to meeting you and working to address your health care needs.     Sincerely,    [facility]

## 2019-03-29 NOTE — TELEPHONE ENCOUNTER
Referral received for evaluation and management. Routing to medical director to review:    Your provider has referred you to: [facility]: [facility] - Dr Banerjee    Do you have any specific questions for the pain specialist? Yes: Elizabeth is interested in trialing medical marijuana for management of bilateral hip pain that keeps her awake at night. She has been seeing rheum. Dr Arnold (see note in Media of 1019/18) who has dx her with bilateral trochanteric bursitis. And has an associate who has had successful treatment with Dr. Banerjee so would like to consult with him.         Caro Culver    [facility]

## 2019-03-29 NOTE — LETTER
[place] [address]  [address]  [place]  [address]  Tel: [phone]    April 1, 2019    Elizabeth Galicia  [address] [facility] [address]        Hi Poonam,       Your cholesterol is in a risk range for your developing cardiovascular disease and all that entails.  Please be sure to establish with a new provider to start working on this serious health concern.   Resulted Orders   TSH   Result Value Ref Range    TSH 1.34 0.40 - 4.00 mU/L   T4, free   Result Value Ref Range    T4 Free 0.88 0.76 - 1.46 ng/dL   Hemoglobin A1c   Result Value Ref Range    Hemoglobin A1C 6.6 (H) 0 - 5.6 %      Comment:      Normal <5.7% Prediabetes 5.7-6.4%  Diabetes 6.5% or higher - adopted from ADA   consensus guidelines.     Lipid panel reflex to direct LDL Non-fasting   Result Value Ref Range    Cholesterol 266 (H) <200 mg/dL      Comment:      Desirable:       <200 mg/dl    Triglycerides 287 (H) <150 mg/dL      Comment:      Borderline high:  150-199 mg/dl  High:             200-499 mg/dl  Very high:       >499 mg/dl      HDL Cholesterol 38 (L) >49 mg/dL    LDL Cholesterol Calculated 171 (H) <100 mg/dL      Comment:      Above desirable:  100-129 mg/dl  Borderline High:  130-159 mg/dL  High:             160-189 mg/dL  Very high:       >189 mg/dl      Non HDL Cholesterol 228 (H) <130 mg/dL      Comment:      Above Desirable:  130-159 mg/dl  Borderline high:  160-189 mg/dl  High:             190-219 mg/dl  Very high:       >219 mg/dl         If you have any further questions or problems, please contact our office.      Sincerely,    Alondra Prado NP / [email or web address]

## 2019-03-29 NOTE — PROGRESS NOTES
SUBJECTIVE:   Elizabeth Galicia is a 73 year old female who presents to clinic today for the following health issues:      Refill request on Prilosec  Thyroid labs needing to be updated  Sleep problem due to pain in legs, requesting referral to see Dr. Reilly with pain clinic-a friend of hers with same problem is now using medical marijuana with good relief and she would also like to see Dr Reilly also  Requesting all routine annual blood work be done today because she is fasting  Has not yet established with new PCP      Problem list and histories reviewed & adjusted, as indicated.  Additional history: as documented    Patient Active Problem List   Diagnosis     HTN (hypertension)     Obesity (BMI 30-39.9)     Type 2 diabetes mellitus without complication (H)     ANCA-associated vasculitis (Microscopic polyangiitis)     Postoperative hypothyroidism     Anemia, unspecified type     Hyperplasia of renal artery (H)     Heterozygous for MTHFR gene mutation (H)     CKD (chronic kidney disease) stage 3, GFR 30-59 ml/min (H)     Prophylactic antibiotic     Morbid obesity (H)     Past Surgical History:   Procedure Laterality Date     COLONOSCOPY       DILATION AND CURETTAGE       ENT SURGERY      partial thyroidectomy; tonsillectomy     EXCISE LESION EYELID Left 3/10/2016    Procedure: EXCISE LESION EYELID;  Surgeon: Rg Nazario MD;  Location: [facility]     HAND SURGERY       HYSTERECTOMY VAGINAL, COLPORRHAPHY ANTERIOR, POSTERIOR, COMBINED N/A 9/9/2014    Procedure: COMBINED HYSTERECTOMY VAGINAL, COLPORRHAPHY ANTERIOR, POSTERIOR;  Surgeon: Shay Winkler MD;  Location: [facility]     HYSTERECTOMY, PAP NO LONGER INDICATED       LAPAROSCOPIC SALPINGO-OOPHORECTOMY  6/27/2011    Procedure:LAPAROSCOPIC SALPINGO-OOPHORECTOMY; resection of left pelvic mass. ; Surgeon:MAYRA OLEARY; Location[facility]     ORTHOPEDIC SURGERY       SALPINGO OOPHORECTOMY,R/L/PEDRO LUIS      Salpingo Oophorectomy for torsion in past     THYROIDECTOMY       Partial       Social History     Tobacco Use     Smoking status: Never Smoker     Smokeless tobacco: Never Used   Substance Use Topics     Alcohol use: Yes     Comment: rarely     Family History   Problem Relation Age of Onset     Unknown/Adopted Mother      Heart Disease Other          Current Outpatient Medications   Medication Sig Dispense Refill     acetaminophen (TYLENOL) 500 MG tablet Take 2 tablets (1,000 mg) by mouth every 8 hours (Patient taking differently: Take 500-1,000 mg by mouth every 6 hours as needed )       albuterol (PROAIR HFA/PROVENTIL HFA/VENTOLIN HFA) 108 (90 Base) MCG/ACT inhaler Inhale 2 puffs into the lungs every 4 hours as needed 1 Inhaler 0     CALCIUM LACTATE PO Take 4 tablets by mouth daily        Cholecalciferol (VITAMIN D3 PO) Take 1,000 Units by mouth daily       Cyanocobalamin (CVS B-12 PO)        levothyroxine (SYNTHROID/LEVOTHROID) 125 MCG tablet TAKE 1 TABLET (125 MCG) BY MOUTH EVERY MORNING (BEFORE BREAKFAST) 90 tablet 1     liothyronine (CYTOMEL) 5 MCG tablet TAKE 2 TABLETS (10 MCG) BY MOUTH 2 TIMES DAILY, PT DUE FOR LABS 120 tablet 0     MAGNESIUM LACTATE PO Take 2 tablets by mouth daily       omeprazole (PRILOSEC OTC) 20 MG EC tablet Take 1 tablet (20 mg) by mouth daily 90 tablet 1     PREDNISONE PO Take 4 mg by mouth daily        valACYclovir (VALTREX) 1000 mg tablet Take 2 tablets (2,000 mg) by mouth 2 times daily (Patient taking differently: Take 2,000 mg by mouth 2 times daily as needed ) 12 tablet 3     ACE/ARB/ARNI NOT PRESCRIBED, INTENTIONAL, Please choose reason not prescribed, below (Patient not taking: Reported on 1/10/2019)       Allergies   Allergen Reactions     Cranberries [Cranberry Extract]      Causes herpes flair-up     Dairy [Milk Products]      Perfume Other (See Comments)     Stuffy in nose, HA     Seasonal Allergies        Reviewed and updated as needed this visit by clinical staff       Reviewed and updated as needed this visit by Provider          ROS:  Constitutional, HEENT, cardiovascular, pulmonary, gi and gu systems are negative, except as otherwise noted.    OBJECTIVE:     /82 (BP Location: Right arm, Patient Position: Sitting, Cuff Size: Adult Regular)   Pulse 75   Temp 96.7  F (35.9  C) (Oral)   Wt 113.4 kg (250 lb)   SpO2 99%   BMI 41.60 kg/m    Body mass index is 41.6 kg/m .  GENERAL: morbid obesity,  alert and no distress  EYES: Eyes grossly normal to inspection, PERRL and conjunctivae and sclerae normal  NECK: no adenopathy, no asymmetry, masses, or scars and thyroid normal to palpation  RESP: lungs clear to auscultation - no rales, rhonchi or wheezes  CV: regular rate and rhythm, normal S1 S2, no S3 or S4, no murmur, click or rub, no peripheral edema and peripheral pulses strong  MS: no gross musculoskeletal defects noted, no edema    Diagnostic Test Results:  pending    ASSESSMENT/PLAN:       ICD-10-CM    1. Gastroesophageal reflux disease without esophagitis K21.9 omeprazole (PRILOSEC OTC) 20 MG EC tablet   2. Hypothyroidism, unspecified type E03.9 TSH     T4, free   3. Trochanteric bursitis of both hips M70.61 PAIN MANAGEMENT REFERRAL    M70.62    4. Screening cholesterol level Z13.220 Lipid panel reflex to direct LDL Non-fasting   5. Screening for diabetes mellitus Z13.1    6. Elevated hemoglobin A1c R73.09 Hemoglobin A1c       TT30 min  CT 25 min    ROSA MARIA Philippe CNP  [facility]

## 2019-03-30 ENCOUNTER — TELEPHONE (OUTPATIENT)
Dept: FAMILY MEDICINE | Facility: CLINIC | Age: 74
End: 2019-03-30

## 2019-03-30 DIAGNOSIS — K21.9 GASTROESOPHAGEAL REFLUX DISEASE WITHOUT ESOPHAGITIS: ICD-10-CM

## 2019-03-30 NOTE — TELEPHONE ENCOUNTER
Fax received from [facility] stating Prilosec tablets are not covered by patient's insurance, pharmacy requesting capsules.    Disp Refills Start End ROBY    omeprazole (PRILOSEC OTC) 20 MG EC tablet 90 tablet 1 3/29/2019  --   Sig - Route: Take 1 tablet (20 mg) by mouth daily - Oral       Judith Layton RT(R)

## 2019-04-01 NOTE — TELEPHONE ENCOUNTER
Reviewed with Dr. Lee Ann larson to schedule  He will review multidisciplinary care with her.    Keely Perales MD  [facility]

## 2019-04-01 NOTE — RESULT ENCOUNTER NOTE
Bakari Levin,    Your cholesterol is in a risk range for your developing cardiovascular disease and all that entails.  Please be sure to establish with a new provider to start working on this serious health concern.

## 2019-04-09 NOTE — TELEPHONE ENCOUNTER
Pain Management Center Referral      1. Confirmed address with patient? Yes  2. Confirmed phone number with patient? Yes  3. Confirmed referring provider? Yes  4. Is the PCP the same as the referring provider? Yes  5. Has the patient been to any previous pain clinics? No  (If yes, send ADAM with welcome letter)  6. Which insurance are we to bill for this appointment?  BC/Medicare    7. Informed pt of cancellation (48 hour) policy? Yes    REGARDING OPIOID MEDICATIONS: We will always address appropriateness of opioid pain medications, but we generally will not automatically take on a prescribing role. When we do take on prescribing of opioids for chronic pain, it is in collaboration with the referring physician for an intermediate period of time (months), with an expectation that the primary physician or provider will assume the prescribing role if medications are effective at stable doses with demonstrated compliance. Therefore, please do not assume that your prescribing responsibilities end on the day of pain clinic consultation.  8. Informed pt of prescribing policy? Yes    9.Please be aware that once you are established with a pain provider and location, you will need to continue have all future visits with that provider and location. It is best to determine what location is the most convenient for you and schedule with that one.    ** PATIENT INFORMED OF THIS POLICY Yes      9. Referring Provider: No Ref-Primary, Physician     10. Criteria for Triage Eval:   -Missed/Failed 1st DUAL appointment? N/A   -Medication Focused? N/A   -Mental Health Concerns? (e.g. Recent psych hospitalization/snap shot)? N/A   -Active substance abuse? N/A   -Patient behaviors (e.g. Offensive language/raised voice)? N/A

## 2019-04-29 ENCOUNTER — OFFICE VISIT (OUTPATIENT)
Dept: PALLIATIVE MEDICINE | Facility: CLINIC | Age: 74
End: 2019-04-29
Attending: NURSE PRACTITIONER
Payer: MEDICARE

## 2019-04-29 VITALS
HEART RATE: 86 BPM | DIASTOLIC BLOOD PRESSURE: 80 MMHG | SYSTOLIC BLOOD PRESSURE: 142 MMHG | WEIGHT: 253.8 LBS | BODY MASS INDEX: 42.23 KG/M2 | OXYGEN SATURATION: 94 %

## 2019-04-29 DIAGNOSIS — M70.62 GREATER TROCHANTERIC BURSITIS OF BOTH HIPS: ICD-10-CM

## 2019-04-29 DIAGNOSIS — M70.61 GREATER TROCHANTERIC BURSITIS OF BOTH HIPS: ICD-10-CM

## 2019-04-29 DIAGNOSIS — G89.4 CHRONIC PAIN SYNDROME: Primary | ICD-10-CM

## 2019-04-29 PROCEDURE — 99205 OFFICE O/P NEW HI 60 MIN: CPT | Performed by: PAIN MEDICINE

## 2019-04-29 ASSESSMENT — PAIN SCALES - GENERAL: PAINLEVEL: NO PAIN (1)

## 2019-04-29 NOTE — PATIENT INSTRUCTIONS
- Further procedures recommended:    - consider bilateral greater trochanteric bursa injection- Would first recommend trial of medical cannabis. If interested please call.   - Medication Management:    - Will certify for medical cannabis [email or web address]   - Follow up:   3 months or sooner if interested in the procedure     ----------------------------------------------------------------  Clinic Number:  [phone]   Call this number with any questions about your care and for scheduling assistance. Calls are returned Monday through Friday between 8 AM and 4:30 PM. We usually get back to you within 2 business days depending on the issue/request.       Medication refills:    For non-opioid medications, call your pharmacy directly to request a refill. The pharmacy will contact the Pain Management Center for authorization. Please allow 3-4 days for these refills to be processed.     We believe regular attendance is key to your success in our program.    Any time you are unable to keep your appointment we ask that you call us at least 24 hours in advance to let us know. This will allow us to offer the appointment time to another patient.

## 2019-04-29 NOTE — PROGRESS NOTES
[facility] Consultation    Date of visit: 4/29/2019    Reason for consultation:    Primary Care Provider is No Ref-Primary, Physician.  Pain medications are being prescribed by n/a.    Please see the [facility] health questionnaire which the patient completed and reviewed with me in detail.    Chief Complaint:    Chief Complaint   Patient presents with     Pain     MME prescribed prior to seeing patient:  Current MME:    Pain history:     Patient is referred by PCP- interested in medical cannabis.  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 The pt is currently on pred 4mg. Of note when pt was on higher doses she did not have as sig pain levels.     Pt main concern is she cant sleep 2/2 to leg pain   Elizabeth Galicia is a 73 year old female w/ chronic bilateral lat leg/hip pain. The pain has gotten progressively worse over the last couple of years. In general she denies any specific inciting event.  The pain is currently worse on the L>R. The pain is intermittent. The pain is a deep aching pain. The pain is occasionally sharp.  The pain occasionally radiates down her lat leg. Neg numbness. Neg burning. Neg tingling. She denies any overt weakness. The pt did have a recent fall, but was not 2/2 to weakness, rather she tripped. The pain is worse when sleeping on her sides. The pain is worse with prolonged walking  The pain is slightly better when lying on her back. The pain is slightly better when changing positions. The pain is slightly bettter when sleeping her chair. The pt also does a lot of PHYSICAL THERAPY  And stretches for her IT band. The pt notes sig benefit, but in general the relief does not last.    Of note on occasion the pt will have more generalized pain. Specifically chest, shoulders, neg     The pt was using voltaren gel , but stopped 2/2 to benefit     The pt takes 2 tabs of 625mg at  night. Occasionally she will take an additional tablet when being more active.     Of note pt has a gfr of 54, currently being seen by a rheumatologist.     Again pt main concern is not sleeping and she feels it makes every aspect of her life worse.     The pt continues to approach her symptoms from multiple directions and is now looking for other options.         Pain rating: intensity  Averages 4/10 on a 0-10 scale.      Current treatments include:    Acetaminophen  Previous medication treatments included:  voltaren gel        Other treatments have included:  Elizabeth Galicia has not been seen at a pain clinic in the past.    PT: yes helped   Chiropractic: yes  Acupuncture: no  TENs Unit: yes  Injections: no    Past Medical History:  Past Medical History:   Diagnosis Date     ANCA-associated vasculitis (H)      Anxiety      Gastro-oesophageal reflux disease      Herniated lumbar intervertebral disc      Herpes      Heterozygous for MTHFR gene mutation (H)     Per CareEverywhere     Hyperlipidemia      Lesion of upper eyelid LEFT     Obesity (BMI 30-39.9)      Postoperative hypothyroidism      Seasonal allergies      Type 2 diabetes mellitus without complication (H) 5/24/2017     Uterine prolapse      Past Surgical History:  Past Surgical History:   Procedure Laterality Date     COLONOSCOPY       DILATION AND CURETTAGE       ENT SURGERY      partial thyroidectomy; tonsillectomy     EXCISE LESION EYELID Left 3/10/2016    Procedure: EXCISE LESION EYELID;  Surgeon: Rg Nazario MD;  Location: [facility]     HAND SURGERY       HYSTERECTOMY VAGINAL, COLPORRHAPHY ANTERIOR, POSTERIOR, COMBINED N/A 9/9/2014    Procedure: COMBINED HYSTERECTOMY VAGINAL, COLPORRHAPHY ANTERIOR, POSTERIOR;  Surgeon: Shay Winkler MD;  Location: [facility]     HYSTERECTOMY, PAP NO LONGER INDICATED       LAPAROSCOPIC SALPINGO-OOPHORECTOMY  6/27/2011    Procedure:LAPAROSCOPIC SALPINGO-OOPHORECTOMY; resection of left pelvic mass. ;  Surgeon:MAYRA OLEARY; Location[facility]     ORTHOPEDIC SURGERY       SALPINGO OOPHORECTOMY,R/L/PEDRO LUIS      Salpingo Oophorectomy for torsion in past     THYROIDECTOMY      Partial     Medications:  Current Outpatient Medications   Medication Sig Dispense Refill     ACE/ARB/ARNI NOT PRESCRIBED, INTENTIONAL, Please choose reason not prescribed, below       acetaminophen (TYLENOL) 500 MG tablet Take 2 tablets (1,000 mg) by mouth every 8 hours (Patient taking differently: Take 500-1,000 mg by mouth every 6 hours as needed )       albuterol (PROAIR HFA/PROVENTIL HFA/VENTOLIN HFA) 108 (90 Base) MCG/ACT inhaler Inhale 2 puffs into the lungs every 4 hours as needed 1 Inhaler 0     CALCIUM LACTATE PO Take 4 tablets by mouth daily        Cholecalciferol (VITAMIN D3 PO) Take 1,000 Units by mouth daily       Cyanocobalamin ([employer] B-12 PO)        levothyroxine (SYNTHROID/LEVOTHROID) 125 MCG tablet TAKE 1 TABLET (125 MCG) BY MOUTH EVERY MORNING (BEFORE BREAKFAST) 90 tablet 1     liothyronine (CYTOMEL) 5 MCG tablet Take 1 tablet (5 mcg) by mouth 2 times daily 60 tablet 0     MAGNESIUM LACTATE PO Take 2 tablets by mouth daily       omeprazole (PRILOSEC OTC) 20 MG EC tablet Take 1 tablet (20 mg) by mouth daily 90 tablet 1     omeprazole (PRILOSEC) 20 MG DR capsule Take 1 capsule (20 mg) by mouth daily 90 capsule 1     PREDNISONE PO Take 4 mg by mouth daily        valACYclovir (VALTREX) 1000 mg tablet Take 2 tablets (2,000 mg) by mouth 2 times daily (Patient taking differently: Take 2,000 mg by mouth 2 times daily as needed ) 12 tablet 3     Allergies:     Allergies   Allergen Reactions     Cranberries [Cranberry Extract]      Causes herpes flair-up     Dairy [Milk Products]      Perfume Other (See Comments)     Stuffy in nose, HA     Seasonal Allergies      Social History:    Occupation/Schooling: no  Tobacco use: no  Alcohol use: no  Drug use: no  History of chemical dependency treatment: no    Family history:  Family History   Problem  Relation Age of Onset     Unknown/Adopted Mother      Heart Disease Other      Family history of headaches: no    Review of Systems:  Skin: negative  Eyes: negative  Ears/Nose/Throat: negative  Respiratory: No shortness of breath, dyspnea on exertion, cough, or hemoptysis  Cardiovascular: negative  Gastrointestinal: negative  Genitourinary: negative  Musculoskeletal: negative  Neurologic: negative  Psychiatric: negative  Hematologic/Lymphatic/Immunologic: negative  Endocrine: negative    Physical Exam:  Vitals:    04/29/19 1107   BP: 142/80   BP Location: Right arm   Patient Position: Chair   Cuff Size: Adult Large   Pulse: 86   SpO2: 94%   Weight: 115.1 kg (253 lb 12.8 oz)     Exam:  Constitutional: healthy, alert and no distress  Head: normocephalic. Atraumatic.   Eyes: no redness or jaundice noted   ENT: oropharnx normal.  MMM.  Neck supple.    Cardiovascular: Negative JVD  Respiratory: Speaking in full sentences no accessory muscles use   gastrointestinal: soft, non-tender,   Skin: no suspicious lesions or rashes  Psychiatric: mentation appears normal and affect normal/bright    Musculoskeletal exam:  Gait wnl  Cervical spine: ROM       Thoracic spine:  Normal     Lumbar spine:     ROM: wnl   Myofascial tenderness:  neg                SI: neg   Greater trochanteric bursa: ++ bilat   + ttp along IT band  Neurologic exam:  CN:  Cranial nerves 2-12 are normal  Motor:  5/5 UE and LE strength  Reflexes:          Achilles:  +2    Sensory:  (upper and lower extremities):   Light touch: normal    Allodynia: absent    Dysethesia: absent    Hyperalgesia: absent     Diagnostic tests:      Assessment/Plan:  Elizabeth Galicia is a 73 year old female who presents with the complaints of bilateral lateral leg/hip pain.   Elizabeth was seen today for pain.    Diagnoses and all orders for this visit:    Chronic pain syndrome    Greater trochanteric bursitis of both hips         - Further procedures recommended:    - consider  bilateral greater trochanteric bursa injection- Would first recommend trial of medical cannabis. If interested please call.   - Medication Management:    Will certify for medical cannabis [email or web address]  - Physical Therapy: consider Pain PHYSICAL THERAPY       - Follow up:   3 months or sooner if interested in the procedure               Total time spent was 60 minutes, and more than 50% of face to face time was spent counseling and/or coordination of care regarding principles of multidisciplinary care and medication management chronic pain, mostly significant bilateral  Lat leg/hip    Deon Banerjee MD  [facility]  This note was created with voice recognition software, and while reviewed for accuracy, typos may remain.

## 2019-05-03 DIAGNOSIS — J06.9 VIRAL URI WITH COUGH: ICD-10-CM

## 2019-05-03 NOTE — TELEPHONE ENCOUNTER
"Pending Prescriptions:                       Disp   Refills    VENTOLIN  (90 Base) MCG/ACT inhale*       0            Sig: INHALE 2 PUFFS EVERY 4 HOURS AS NEEDED    Last Written Prescription Date:  1/10/19  Last Fill Quantity: 1,  # refills: 0   Last office visit: 3/29/2019 with prescribing provider:     Future Office Visit:    Requested Prescriptions   Pending Prescriptions Disp Refills     VENTOLIN  (90 Base) MCG/ACT inhaler [Pharmacy Med Name: VENTOLIN HFA 90 MCG INHALER]  0     Sig: INHALE 2 PUFFS EVERY 4 HOURS AS NEEDED       Asthma Maintenance Inhalers - Anticholinergics Passed - 5/3/2019 11:29 AM        Passed - Patient is age 12 years or older        Passed - Recent (12 mo) or future (30 days) visit within the authorizing provider's specialty     Patient had office visit in the last 12 months or has a visit in the next 30 days with authorizing provider or within the authorizing provider's specialty.  See \"Patient Info\" tab in inbasket, or \"Choose Columns\" in Meds & Orders section of the refill encounter.              Passed - Medication is active on med list          "

## 2019-05-06 RX ORDER — ALBUTEROL SULFATE 90 UG/1
AEROSOL, METERED RESPIRATORY (INHALATION)
Qty: 18 G | Refills: 0 | Status: SHIPPED | OUTPATIENT
Start: 2019-05-06 | End: 2019-07-03

## 2019-05-06 NOTE — TELEPHONE ENCOUNTER
Medication is being filled for 1 time refill only due to:  pt needs to est care with new PCP.     Patient notified.       Rachel AYON RN,BSN

## 2019-05-12 DIAGNOSIS — E89.0 POSTOPERATIVE HYPOTHYROIDISM: ICD-10-CM

## 2019-05-14 RX ORDER — LIOTHYRONINE SODIUM 5 UG/1
TABLET ORAL
Qty: 120 TABLET | Refills: 1 | Status: SHIPPED | OUTPATIENT
Start: 2019-05-14 | End: 2019-07-10

## 2019-05-14 RX ORDER — LIOTHYRONINE SODIUM 5 UG/1
5 TABLET ORAL 2 TIMES DAILY
Qty: 60 TABLET | Refills: 0 | OUTPATIENT
Start: 2019-05-14

## 2019-05-14 NOTE — TELEPHONE ENCOUNTER
"Last Written Prescription Date:  4/12/19  Last Fill Quantity: 60 tablet,  # refills: 0   Last office visit: 3/29/2019 with prescribing provider:  Eve   Future Office Visit:      Requested Prescriptions   Pending Prescriptions Disp Refills     liothyronine (CYTOMEL) 5 MCG tablet [Pharmacy Med Name: LIOTHYRONINE SOD 5 MCG TAB] 60 tablet 0     Sig: TAKE 1 TABLET (5 MCG) BY MOUTH 2 TIMES DAILY       Thyroid Protocol Passed - 5/12/2019 12:38 PM        Passed - Patient is 12 years or older        Passed - Recent (12 mo) or future (30 days) visit within the authorizing provider's specialty     Patient had office visit in the last 12 months or has a visit in the next 30 days with authorizing provider or within the authorizing provider's specialty.  See \"Patient Info\" tab in inbasket, or \"Choose Columns\" in Meds & Orders section of the refill encounter.              Passed - Medication is active on med list        Passed - Normal TSH on file in past 12 months     Recent Labs   Lab Test 03/29/19  1202   TSH 1.34              Passed - No active pregnancy on record     If patient is pregnant or has had a positive pregnancy test, please check TSH.          Passed - No positive pregnancy test in past 12 months     If patient is pregnant or has had a positive pregnancy test, please check TSH.            "

## 2019-05-14 NOTE — TELEPHONE ENCOUNTER
Pt needs to establish care with new provider- former Eron pt.    Left message to call back.    Caryl Jones last refill notes 4/12/19:  rx for 2 weeks dispensed.  She needs new PCP now

## 2019-05-14 NOTE — TELEPHONE ENCOUNTER
Last Rx refill was incorrectly sent as 5 mcg tablets: 1 tab bid.   Patient takes 2 tabs bid.  Confirmed this in medication history.  Labs up to date March 2019.   Gave names of female providers at [facility] per her request. She understands that she needs to establish care in near future.    Rosanne Monsivais RN on 5/14/2019 at 2:58 PM

## 2019-05-17 DIAGNOSIS — E89.0 POSTOPERATIVE HYPOTHYROIDISM: ICD-10-CM

## 2019-05-17 RX ORDER — LEVOTHYROXINE SODIUM 125 UG/1
TABLET ORAL
Qty: 90 TABLET | Refills: 2 | Status: SHIPPED | OUTPATIENT
Start: 2019-05-17 | End: 2020-02-14

## 2019-05-17 NOTE — TELEPHONE ENCOUNTER
"levothyroxine (SYNTHROID/LEVOTHROID) 125 MCG tablet  Last Written Prescription Date:  10/05/18  Last Fill Quantity: 90,  # refills: 1   Last office visit: 3/29/2019 with prescribing provider:  Eron    Future Office Visit:   Next 5 appointments (look out 90 days)    Jun 18, 2019  3:15 PM CDT  Office Visit with Ella Hernandez DO  [facility] [address] [facility] [address] [phone]             Requested Prescriptions   Pending Prescriptions Disp Refills     levothyroxine (SYNTHROID/LEVOTHROID) 125 MCG tablet [Pharmacy Med Name: LEVOTHYROXINE 125 MCG TABLET] 30 tablet 4     Sig: TAKE 1 TABLET (125 MCG) BY MOUTH EVERY MORNING (BEFORE BREAKFAST)       Thyroid Protocol Passed - 5/17/2019  8:27 AM        Passed - Patient is 12 years or older        Passed - Recent (12 mo) or future (30 days) visit within the authorizing provider's specialty     Patient had office visit in the last 12 months or has a visit in the next 30 days with authorizing provider or within the authorizing provider's specialty.  See \"Patient Info\" tab in inbasket, or \"Choose Columns\" in Meds & Orders section of the refill encounter.              Passed - Medication is active on med list        Passed - Normal TSH on file in past 12 months     Recent Labs   Lab Test 03/29/19  1202   TSH 1.34              Passed - No active pregnancy on record     If patient is pregnant or has had a positive pregnancy test, please check TSH.          Passed - No positive pregnancy test in past 12 months     If patient is pregnant or has had a positive pregnancy test, please check TSH.            "

## 2019-05-17 NOTE — TELEPHONE ENCOUNTER
Prescription approved per [facility] Refill Protocol.  Jazmin TAVARES RN

## 2019-06-11 DIAGNOSIS — E89.0 POSTOPERATIVE HYPOTHYROIDISM: ICD-10-CM

## 2019-06-11 NOTE — TELEPHONE ENCOUNTER
"liothyronine (CYTOMEL) 5 MCG tablet 120 tablet 1 5/14/2019  No   Sig: TAKE 2 TABLETS (10 MCG) BY MOUTH 2 TIMES DAILY     Last Written Prescription Date:  5-  Last Fill Quantity: 120,  # refills: 1   Last office visit: 3/29/2019 with prescribing provider:     Future Office Visit:   Next 5 appointments (look out 90 days)    Jun 18, 2019  3:15 PM CDT  Office Visit with Ella Hernandez DO  [facility] [address] [facility] [address] [phone]         Requested Prescriptions   Pending Prescriptions Disp Refills     liothyronine (CYTOMEL) 5 MCG tablet [Pharmacy Med Name: LIOTHYRONINE SOD 5 MCG TAB] 120 tablet 0     Sig: TAKE 2 TABLETS (10 MCG) BY MOUTH 2 TIMES DAILY       Thyroid Protocol Passed - 6/11/2019 10:31 AM        Passed - Patient is 12 years or older        Passed - Recent (12 mo) or future (30 days) visit within the authorizing provider's specialty     Patient had office visit in the last 12 months or has a visit in the next 30 days with authorizing provider or within the authorizing provider's specialty.  See \"Patient Info\" tab in inbasket, or \"Choose Columns\" in Meds & Orders section of the refill encounter.              Passed - Medication is active on med list        Passed - Normal TSH on file in past 12 months     Recent Labs   Lab Test 03/29/19  1202   TSH 1.34              Passed - No active pregnancy on record     If patient is pregnant or has had a positive pregnancy test, please check TSH.          Passed - No positive pregnancy test in past 12 months     If patient is pregnant or has had a positive pregnancy test, please check TSH.            "

## 2019-06-12 RX ORDER — LIOTHYRONINE SODIUM 5 UG/1
TABLET ORAL
Refills: 0
Start: 2019-06-12

## 2019-06-18 ENCOUNTER — OFFICE VISIT (OUTPATIENT)
Dept: FAMILY MEDICINE | Facility: CLINIC | Age: 74
End: 2019-06-18
Payer: MEDICARE

## 2019-06-18 VITALS
DIASTOLIC BLOOD PRESSURE: 79 MMHG | TEMPERATURE: 98.1 F | BODY MASS INDEX: 42.2 KG/M2 | HEIGHT: 65 IN | OXYGEN SATURATION: 94 % | SYSTOLIC BLOOD PRESSURE: 128 MMHG | HEART RATE: 79 BPM | WEIGHT: 253.3 LBS

## 2019-06-18 DIAGNOSIS — E89.0 POSTOPERATIVE HYPOTHYROIDISM: Chronic | ICD-10-CM

## 2019-06-18 DIAGNOSIS — J45.909 REACTIVE AIRWAY DISEASE WITHOUT COMPLICATION, UNSPECIFIED ASTHMA SEVERITY, UNSPECIFIED WHETHER PERSISTENT: ICD-10-CM

## 2019-06-18 DIAGNOSIS — J30.1 SEASONAL ALLERGIC RHINITIS DUE TO POLLEN: Primary | ICD-10-CM

## 2019-06-18 DIAGNOSIS — E11.9 TYPE 2 DIABETES MELLITUS WITHOUT COMPLICATION, WITHOUT LONG-TERM CURRENT USE OF INSULIN (H): Chronic | ICD-10-CM

## 2019-06-18 DIAGNOSIS — I77.82 ANCA-ASSOCIATED VASCULITIS (H): Chronic | ICD-10-CM

## 2019-06-18 PROCEDURE — 99214 OFFICE O/P EST MOD 30 MIN: CPT | Performed by: FAMILY MEDICINE

## 2019-06-18 RX ORDER — PREDNISONE 1 MG/1
4 TABLET ORAL DAILY
COMMUNITY
Start: 2019-06-18 | End: 2019-09-13

## 2019-06-18 RX ORDER — MONTELUKAST SODIUM 10 MG/1
10 TABLET ORAL AT BEDTIME
Qty: 30 TABLET | Refills: 1 | Status: SHIPPED | OUTPATIENT
Start: 2019-06-18 | End: 2019-08-03

## 2019-06-18 RX ORDER — FEXOFENADINE HCL 180 MG/1
180 TABLET ORAL DAILY
Qty: 30 TABLET | Refills: 1 | Status: SHIPPED | OUTPATIENT
Start: 2019-06-18 | End: 2019-08-03

## 2019-06-18 ASSESSMENT — MIFFLIN-ST. JEOR: SCORE: 1659.84

## 2019-06-18 NOTE — PROGRESS NOTES
Subjective     Elizabeth Galicia is a 73 year old female who presents to clinic today for the following health issues:    HPI   Allergy/cough      Duration: ongoing    Description (location/character/radiation): allergy irritation, cough, drainage    Intensity:  moderate    Accompanying signs and symptoms: as above    History (similar episodes/previous evaluation): None    Precipitating or alleviating factors: None    Therapies tried and outcome: flonase, inhaler, zyrtec, minimal improvement     Allergies - very bad symptoms currently   Has a cough that is intermittent but feels like it is coming from PND.   She admits to allergies to snow mold, pollen(grass,trees) cottonwood  Cough is occasional productive with clear phlegm -   Feels like sinuses are socked in - at least 2 weeks but not like an infection.    Currently using the - traditional chinese herbs  Uses flonase daily   Nose spray - Xlear  Zyrtec - not sure it is doing anything     Has ventolin inhaler - if doesn't use it will have cough -     Sinus infections - remote history   Notices wheezing at times  She denies any new symptoms similar to her vasculitis   Had formal pulmonary function testing at the end and things were good.     Feels like she could have MAST cell    Currently mild case of HSV - mild flare.  Uses valtrex prn.      Vasculitis -   For 3 months was diagnosed with viruses  Was having night sweats, low energy and bad cough  Diagnosed while inpatient -   Attacks lungs and kidneys - was in acute renal failure on admission.  Had bad edema  Dr. Arnold is rheumatologist  -   Currently at prednisone 4mg daily     Thyroid -   Had goiter in neck - had surgically removed - removed approx 60% thyroid gland tissue    Referred for medical cannabis - Dr. Banerjee   Using the medical cannabis and helping with the pain      -------------------------------------    Patient Active Problem List   Diagnosis     HTN (hypertension)     Obesity (BMI 30-39.9)      Type 2 diabetes mellitus without complication (H)     ANCA-associated vasculitis (Microscopic polyangiitis)     Postoperative hypothyroidism     Anemia, unspecified type     Hyperplasia of renal artery (H)     Heterozygous for MTHFR gene mutation (H)     CKD (chronic kidney disease) stage 3, GFR 30-59 ml/min (H)     Prophylactic antibiotic     Morbid obesity (H)     Past Surgical History:   Procedure Laterality Date     COLONOSCOPY       DILATION AND CURETTAGE       ENT SURGERY      partial thyroidectomy; tonsillectomy     EXCISE LESION EYELID Left 3/10/2016    Procedure: EXCISE LESION EYELID;  Surgeon: Rg Nazario MD;  Location: [facility]     HAND SURGERY       HYSTERECTOMY VAGINAL, COLPORRHAPHY ANTERIOR, POSTERIOR, COMBINED N/A 9/9/2014    Procedure: COMBINED HYSTERECTOMY VAGINAL, COLPORRHAPHY ANTERIOR, POSTERIOR;  Surgeon: Shay Winkler MD;  Location: [facility]     HYSTERECTOMY, PAP NO LONGER INDICATED       LAPAROSCOPIC SALPINGO-OOPHORECTOMY  6/27/2011    Procedure:LAPAROSCOPIC SALPINGO-OOPHORECTOMY; resection of left pelvic mass. ; Surgeon:MAYRA OLEARY; Location:[facility]     ORTHOPEDIC SURGERY       SALPINGO OOPHORECTOMY,R/L/PEDRO LUIS      Salpingo Oophorectomy for torsion in past     THYROIDECTOMY      Partial       Social History     Tobacco Use     Smoking status: Never Smoker     Smokeless tobacco: Never Used   Substance Use Topics     Alcohol use: Yes     Comment: rarely     Family History   Problem Relation Age of Onset     Unknown/Adopted Mother      Heart Disease Other            -------------------------------------  Reviewed and updated as needed this visit by Provider  Tobacco  Allergies  Meds  Problems  Med Hx  Surg Hx  Fam Hx         Review of Systems   ROS COMP: Constitutional, HEENT, cardiovascular, pulmonary, GI, , musculoskeletal, neuro, skin, endocrine and psych systems are negative, except as otherwise noted.      Objective    /79   Pulse 79   Temp 98.1  F (36.7  C) (Oral)   Ht  "1.651 m (5' 5\")   Wt 114.9 kg (253 lb 4.8 oz)   SpO2 94%   BMI 42.15 kg/m    Body mass index is 42.15 kg/m .  Physical Exam   GENERAL: alert and no distress  HENT: ear canals and TM's normal, nose and mouth without ulcers or lesions  NECK: no adenopathy, no asymmetry, masses, or scars and thyroid normal to palpation  RESP: decreased breath sounds throughout but no wheezes appreciated  CV: regular rate and rhythm, normal S1 S2, no S3 or S4, no murmur, click or rub, no peripheral edema and peripheral pulses strong    Diagnostic Test Results:  Labs reviewed in Epic        Assessment & Plan     1. Seasonal allergic rhinitis due to pollen  Seasonal allergies with possible trigger for asthma (but no formal diagnosis)  Since she had normal PFT in past 2 years will hold off and treat her allergies first  Will add Singulair to her meds and will see back in 4-6 weeks.  Added zantac (instead of omeprazole) and allegra (instead of zyrtec)  - montelukast (SINGULAIR) 10 MG tablet; Take 1 tablet (10 mg) by mouth At Bedtime  Dispense: 30 tablet; Refill: 1  - ranitidine (ZANTAC) 150 MG tablet; Take 1 tablet (150 mg) by mouth 2 times daily  Dispense: 60 tablet; Refill: 1  - fexofenadine (ALLEGRA) 180 MG tablet; Take 1 tablet (180 mg) by mouth daily  Dispense: 30 tablet; Refill: 1    2. Reactive airway disease without complication, unspecified asthma severity, unspecified whether persistent  As above  Pt will call or RTC if symptoms worsen or do not improve.   - montelukast (SINGULAIR) 10 MG tablet; Take 1 tablet (10 mg) by mouth At Bedtime  Dispense: 30 tablet; Refill: 1  - ranitidine (ZANTAC) 150 MG tablet; Take 1 tablet (150 mg) by mouth 2 times daily  Dispense: 60 tablet; Refill: 1  - fexofenadine (ALLEGRA) 180 MG tablet; Take 1 tablet (180 mg) by mouth daily  Dispense: 30 tablet; Refill: 1      (I77.6) ANCA-associated vasculitis (Microscopic polyangiitis)  Comment: follows with rheum  Plan: continue prednsione taper per " "rheum    (E89.0) Postoperative hypothyroidism  Comment: thyroid checked in 3/2019    Plan: WNL    (E11.9) Type 2 diabetes mellitus without complication, without long-term current use of insulin (H)  Comment: due to prednisone   Will monitor  Plan:          BMI:   Estimated body mass index is 42.15 kg/m  as calculated from the following:    Height as of this encounter: 1.651 m (5' 5\").    Weight as of this encounter: 114.9 kg (253 lb 4.8 oz).           Pt will call or RTC if symptoms worsen or do not improve.      Return in about 6 weeks (around 7/30/2019) for allergies/ breathing .    Ella Hernandez, DO  [facility]        "

## 2019-06-18 NOTE — NURSING NOTE
"Chief Complaint   Patient presents with     Allergies     Establish Care     /79   Pulse 79   Temp 98.1  F (36.7  C) (Oral)   Ht 1.651 m (5' 5\")   Wt 114.9 kg (253 lb 4.8 oz)   SpO2 94%   BMI 42.15 kg/m   Estimated body mass index is 42.15 kg/m  as calculated from the following:    Height as of this encounter: 1.651 m (5' 5\").    Weight as of this encounter: 114.9 kg (253 lb 4.8 oz).        Health Maintenance due pending provider review:  Mammogram and Colonoscopy/FIT    Aware due,     Tasneem Godfrey, CMA  "

## 2019-07-03 DIAGNOSIS — J06.9 VIRAL URI WITH COUGH: ICD-10-CM

## 2019-07-05 RX ORDER — ALBUTEROL SULFATE 90 UG/1
AEROSOL, METERED RESPIRATORY (INHALATION)
Qty: 18 INHALER | Refills: 0 | Status: SHIPPED | OUTPATIENT
Start: 2019-07-05 | End: 2019-09-03

## 2019-07-05 NOTE — TELEPHONE ENCOUNTER
JF,   Please advise on refill in PN's absence  Routing refill request to provider for review/approval because:  Last Rx from [place] provider (former clinic - now at [facility])  Jazmin TAVARES RN

## 2019-07-05 NOTE — TELEPHONE ENCOUNTER
"Last Written Prescription Date:  5/6/2019  Last Fill Quantity: 18g,  # refills: 0   Last office visit: 6/18/2019 with prescribing provider:  John   Future Office Visit:   Next 5 appointments (look out 90 days)    Aug 13, 2019  1:30 PM CDT  Office Visit with Ella Hernandez DO  [facility] ([facility]) [address] [address] [phone]           Requested Prescriptions   Pending Prescriptions Disp Refills     VENTOLIN  (90 Base) MCG/ACT inhaler [Pharmacy Med Name: VENTOLIN HFA 90 MCG INHALER] 18 Inhaler 0     Sig: TAKE 2 PUFFS BY MOUTH EVERY 4 HOURS AS NEEDED       Asthma Maintenance Inhalers - Anticholinergics Passed - 7/3/2019  7:22 PM        Passed - Patient is age 12 years or older        Passed - Recent (12 mo) or future (30 days) visit within the authorizing provider's specialty     Patient had office visit in the last 12 months or has a visit in the next 30 days with authorizing provider or within the authorizing provider's specialty.  See \"Patient Info\" tab in inbasket, or \"Choose Columns\" in Meds & Orders section of the refill encounter.              Passed - Medication is active on med list          "

## 2019-07-10 DIAGNOSIS — E89.0 POSTOPERATIVE HYPOTHYROIDISM: ICD-10-CM

## 2019-07-10 DIAGNOSIS — J30.1 SEASONAL ALLERGIC RHINITIS DUE TO POLLEN: ICD-10-CM

## 2019-07-10 DIAGNOSIS — J45.909 REACTIVE AIRWAY DISEASE WITHOUT COMPLICATION, UNSPECIFIED ASTHMA SEVERITY, UNSPECIFIED WHETHER PERSISTENT: ICD-10-CM

## 2019-07-10 RX ORDER — MONTELUKAST SODIUM 10 MG/1
TABLET ORAL
Start: 2019-07-10

## 2019-07-10 NOTE — TELEPHONE ENCOUNTER
"Singulair 10MG  Last Written Prescription Date:  06/18/2019  Last Fill Quantity: 30,  # refills: 1   Last office visit: 6/18/2019 with prescribing provider:  Yes    Future Office Visit:   Next 5 appointments (look out 90 days)    Aug 13, 2019  1:30 PM CDT  Office Visit with Ella Hernandez DO  [facility] ([facility]) [address] [address] [phone]         Requested Prescriptions   Pending Prescriptions Disp Refills     montelukast (SINGULAIR) 10 MG tablet [Pharmacy Med Name: MONTELUKAST SOD 10 MG TABLET] 30 tablet 1     Sig: TAKE 1 TABLET BY MOUTH EVERYDAY AT BEDTIME       Leukotriene Inhibitors Protocol Passed - 7/10/2019  9:35 AM        Passed - Patient is age 12 or older     If patient is under 16, ok to refill using age based dosing.           Passed - Recent (12 mo) or future (30 days) visit within the authorizing provider's specialty     Patient had office visit in the last 12 months or has a visit in the next 30 days with authorizing provider or within the authorizing provider's specialty.  See \"Patient Info\" tab in inbasket, or \"Choose Columns\" in Meds & Orders section of the refill encounter.              Passed - Medication is active on med list          "

## 2019-07-11 RX ORDER — LIOTHYRONINE SODIUM 5 UG/1
TABLET ORAL
Qty: 120 TABLET | Refills: 1 | Status: SHIPPED | OUTPATIENT
Start: 2019-07-11 | End: 2019-09-04

## 2019-07-11 NOTE — TELEPHONE ENCOUNTER
Prescription approved per [facility] Refill Protocol.    Lisandro PARR RN

## 2019-07-11 NOTE — TELEPHONE ENCOUNTER
"Last Written Prescription Date:  5/14/19  Last Fill Quantity: 120 tablet,  # refills: 1   Last office visit: 3/29/2019 with prescribing provider:  Eve Vega Office Visit:   Next 5 appointments (look out 90 days)    Aug 13, 2019  1:30 PM CDT  Office Visit with Ella Hernandez DO  [facility] ([facility]) [address] [facility] [address] [phone]         Requested Prescriptions   Pending Prescriptions Disp Refills     liothyronine (CYTOMEL) 5 MCG tablet [Pharmacy Med Name: LIOTHYRONINE SOD 5 MCG TAB] 120 tablet 1     Sig: TAKE 2 TABLETS (10 MCG) BY MOUTH 2 TIMES DAILY       Thyroid Protocol Passed - 7/10/2019 11:34 AM        Passed - Patient is 12 years or older        Passed - Recent (12 mo) or future (30 days) visit within the authorizing provider's specialty     Patient had office visit in the last 12 months or has a visit in the next 30 days with authorizing provider or within the authorizing provider's specialty.  See \"Patient Info\" tab in inbasket, or \"Choose Columns\" in Meds & Orders section of the refill encounter.              Passed - Medication is active on med list        Passed - Normal TSH on file in past 12 months     Recent Labs   Lab Test 03/29/19  1202   TSH 1.34              Passed - No active pregnancy on record     If patient is pregnant or has had a positive pregnancy test, please check TSH.          Passed - No positive pregnancy test in past 12 months     If patient is pregnant or has had a positive pregnancy test, please check TSH.            "

## 2019-08-03 DIAGNOSIS — J30.1 SEASONAL ALLERGIC RHINITIS DUE TO POLLEN: ICD-10-CM

## 2019-08-03 DIAGNOSIS — J45.909 REACTIVE AIRWAY DISEASE WITHOUT COMPLICATION, UNSPECIFIED ASTHMA SEVERITY, UNSPECIFIED WHETHER PERSISTENT: ICD-10-CM

## 2019-08-04 NOTE — TELEPHONE ENCOUNTER
"Allegra 180MG  Last Written Prescription Date:  06/18/2019  Last Fill Quantity: 30,  # refills: 1   Last office visit: 6/18/2019 with prescribing provider:  Yes    Future Office Visit:   Next 5 appointments (look out 90 days)    Aug 13, 2019  1:30 PM CDT  Office Visit with Ella Hernandez DO  [facility] ([facility]) [address] [place] [address]  [phone]         Zantac 150MG  Last Written Prescription Date:  06/18/2019  Last Fill Quantity: 60,  # refills: 1   Last office visit: 6/18/2019 with prescribing provider:  Yes    Future Office Visit:   Next 5 appointments (look out 90 days)    Aug 13, 2019  1:30 PM CDT  Office Visit with Ella Hernandez DO  [facility] [facility] [address]  [phone]         Singulair 10MG  Last Written Prescription Date:  06/18/2019  Last Fill Quantity: 30,  # refills: 1   Last office visit: 6/18/2019 with prescribing provider:  Yes    Future Office Visit:   Next 5 appointments (look out 90 days)    Aug 13, 2019  1:30 PM CDT  Office Visit with Ella Hernandez DO  [facility] ([facility]) [facility] [place]  [facility] [address]  [phone]           Requested Prescriptions   Pending Prescriptions Disp Refills     montelukast (SINGULAIR) 10 MG tablet [Pharmacy Med Name: MONTELUKAST SOD 10 MG TABLET] 30 tablet 1     Sig: TAKE 1 TABLET BY MOUTH EVERYDAY AT BEDTIME       Leukotriene Inhibitors Protocol Passed - 8/3/2019  9:48 AM        Passed - Patient is age 12 or older     If patient is under 16, ok to refill using age based dosing.           Passed - Recent (12 mo) or future (30 days) visit within the authorizing provider's specialty     Patient had office visit in the last 12 months or has a visit in the next 30 days with authorizing provider or within the authorizing provider's specialty.  See \"Patient Info\" tab in inbasket, or " "\"Choose Columns\" in Meds & Orders section of the refill encounter.              Passed - Medication is active on med list        ranitidine (ZANTAC) 150 MG tablet [Pharmacy Med Name: RANITIDINE 150 MG TABLET] 60 tablet 1     Sig: TAKE 1 TABLET (150 MG) BY MOUTH 2 TIMES DAILY       H2 Blockers Protocol Passed - 8/3/2019  9:48 AM        Passed - Patient is age 12 or older        Passed - Recent (12 mo) or future (30 days) visit within the authorizing provider's specialty     Patient had office visit in the last 12 months or has a visit in the next 30 days with authorizing provider or within the authorizing provider's specialty.  See \"Patient Info\" tab in inbasket, or \"Choose Columns\" in Meds & Orders section of the refill encounter.              Passed - Medication is active on med list        fexofenadine (ALLEGRA) 180 MG tablet [Pharmacy Med Name: FEXOFENADINE  MG TABLET] 30 tablet 1     Sig: TAKE 1 TABLET BY MOUTH EVERY DAY       Antihistamines Protocol Failed - 8/3/2019  9:48 AM        Failed - Patient is 3-64 years of age     Apply weight-based dosing for peds patients age 3 - 12 years of age.    Forward request to provider for patients under the age of 3 or over the age of 64.          Passed - Recent (12 mo) or future (30 days) visit within the authorizing provider's specialty     Patient had office visit in the last 12 months or has a visit in the next 30 days with authorizing provider or within the authorizing provider's specialty.  See \"Patient Info\" tab in inbasket, or \"Choose Columns\" in Meds & Orders section of the refill encounter.              Passed - Medication is active on med list          "

## 2019-08-05 RX ORDER — MONTELUKAST SODIUM 10 MG/1
TABLET ORAL
Qty: 30 TABLET | Refills: 0 | Status: SHIPPED | OUTPATIENT
Start: 2019-08-05 | End: 2019-09-04

## 2019-08-05 RX ORDER — FEXOFENADINE HCL 180 MG/1
TABLET ORAL
Qty: 30 TABLET | Refills: 0 | Status: SHIPPED | OUTPATIENT
Start: 2019-08-05 | End: 2019-09-13

## 2019-08-05 NOTE — TELEPHONE ENCOUNTER
Due for 6 wk f/u   Left non detailed VM for pt asking that they callback and schedule f/u appt  Jazmin TAVARES RN

## 2019-08-05 NOTE — TELEPHONE ENCOUNTER
Next 5 appointments (look out 90 days)    Aug 13, 2019  1:30 PM CDT  Office Visit with Ella Hernandez DO  [facility] ([facility]) [address] [facility] [address]  [phone]        Prescription approved per [facility] Refill Protocol.  Jazmin TAVARES RN

## 2019-08-06 DIAGNOSIS — E89.0 POSTOPERATIVE HYPOTHYROIDISM: ICD-10-CM

## 2019-08-07 RX ORDER — LIOTHYRONINE SODIUM 5 UG/1
TABLET ORAL
Qty: 120 TABLET | Refills: 1 | OUTPATIENT
Start: 2019-08-07

## 2019-08-07 NOTE — TELEPHONE ENCOUNTER
ERROR - please cancel. Patient had filled 7/11/2019 - still has one refill left. Verified with pharmacy.

## 2019-09-03 DIAGNOSIS — J06.9 VIRAL URI WITH COUGH: ICD-10-CM

## 2019-09-04 DIAGNOSIS — J30.1 SEASONAL ALLERGIC RHINITIS DUE TO POLLEN: ICD-10-CM

## 2019-09-04 DIAGNOSIS — E89.0 POSTOPERATIVE HYPOTHYROIDISM: ICD-10-CM

## 2019-09-04 DIAGNOSIS — J45.909 REACTIVE AIRWAY DISEASE WITHOUT COMPLICATION, UNSPECIFIED ASTHMA SEVERITY, UNSPECIFIED WHETHER PERSISTENT: ICD-10-CM

## 2019-09-04 RX ORDER — LIOTHYRONINE SODIUM 5 UG/1
TABLET ORAL
Qty: 360 TABLET | Refills: 1 | Status: SHIPPED | OUTPATIENT
Start: 2019-09-04 | End: 2020-02-28

## 2019-09-04 RX ORDER — ALBUTEROL SULFATE 90 UG/1
AEROSOL, METERED RESPIRATORY (INHALATION)
Qty: 18 INHALER | Refills: 0 | Status: SHIPPED | OUTPATIENT
Start: 2019-09-04 | End: 2019-09-13

## 2019-09-04 RX ORDER — ALBUTEROL SULFATE 90 UG/1
AEROSOL, METERED RESPIRATORY (INHALATION)
Start: 2019-09-04

## 2019-09-04 RX ORDER — MONTELUKAST SODIUM 10 MG/1
TABLET ORAL
Qty: 30 TABLET | Refills: 0 | Status: SHIPPED | OUTPATIENT
Start: 2019-09-04 | End: 2019-09-13

## 2019-09-04 NOTE — TELEPHONE ENCOUNTER
One month sent 8/5/19  Patient was No Show 8/13/19.  Future OV with PN 9/13/19    Refill sent for one month supply.  Taylor De Los Santos RN

## 2019-09-04 NOTE — TELEPHONE ENCOUNTER
Ventolin was ok'ed at other refill encounter on 9-4-19       VENTOLIN  (90 Base) MCG/ACT inhaler 18 Inhaler 0 9/4/2019  No   Sig: TAKE 2 PUFFS BY MOUTH EVERY 4 HOURS AS NEEDED

## 2019-09-04 NOTE — TELEPHONE ENCOUNTER
"Prescription approved per [facility] Refill Protocol.  Jazmin TAVARES RN    Last Written Prescription Date:  7/5/2019  Last Fill Quantity: 18,  # refills: 0   Last office visit: 6/18/2019 with prescribing provider:     Future Office Visit:   Next 5 appointments (look out 90 days)    Sep 13, 2019 10:45 AM CDT  Office Visit with Ella Hernandez DO  [facility] ([facility]) [address] [facility] [address] [facility] [address] [phone]         Requested Prescriptions   Pending Prescriptions Disp Refills     VENTOLIN  (90 Base) MCG/ACT inhaler [Pharmacy Med Name: VENTOLIN HFA 90 MCG INHALER] 18 Inhaler 0     Sig: TAKE 2 PUFFS BY MOUTH EVERY 4 HOURS AS NEEDED       Asthma Maintenance Inhalers - Anticholinergics Passed - 9/3/2019  5:18 PM        Passed - Patient is age 12 years or older        Passed - Recent (12 mo) or future (30 days) visit within the authorizing provider's specialty     Patient had office visit in the last 12 months or has a visit in the next 30 days with authorizing provider or within the authorizing provider's specialty.  See \"Patient Info\" tab in inbasket, or \"Choose Columns\" in Meds & Orders section of the refill encounter.              Passed - Medication is active on med list        "

## 2019-09-04 NOTE — TELEPHONE ENCOUNTER
"Last Written Prescription Date:  7/11/19  Last Fill Quantity: 120,  # refills: 1   Last office visit: 6/18/2019 with prescribing provider:  3/29/19   Future Office Visit:   Next 5 appointments (look out 90 days)    Sep 13, 2019 10:45 AM CDT  Office Visit with Ella Hernandez DO  [facility] ([facility]) [address] [facility] [address] [phone]          Requested Prescriptions   Pending Prescriptions Disp Refills     liothyronine (CYTOMEL) 5 MCG tablet [Pharmacy Med Name: LIOTHYRONINE SOD 5 MCG TAB] 120 tablet 1     Sig: TAKE 2 TABLETS (10 MCG) BY MOUTH 2 TIMES DAILY       Thyroid Protocol Passed - 9/4/2019 11:36 AM        Passed - Patient is 12 years or older        Passed - Recent (12 mo) or future (30 days) visit within the authorizing provider's specialty     Patient had office visit in the last 12 months or has a visit in the next 30 days with authorizing provider or within the authorizing provider's specialty.  See \"Patient Info\" tab in inbasket, or \"Choose Columns\" in Meds & Orders section of the refill encounter.              Passed - Medication is active on med list        Passed - Normal TSH on file in past 12 months     Recent Labs   Lab Test 03/29/19  1202   TSH 1.34              Passed - No active pregnancy on record     If patient is pregnant or has had a positive pregnancy test, please check TSH.          Passed - No positive pregnancy test in past 12 months     If patient is pregnant or has had a positive pregnancy test, please check TSH.            "

## 2019-09-09 DIAGNOSIS — J30.1 SEASONAL ALLERGIC RHINITIS DUE TO POLLEN: ICD-10-CM

## 2019-09-09 DIAGNOSIS — J45.909 REACTIVE AIRWAY DISEASE WITHOUT COMPLICATION, UNSPECIFIED ASTHMA SEVERITY, UNSPECIFIED WHETHER PERSISTENT: ICD-10-CM

## 2019-09-10 NOTE — TELEPHONE ENCOUNTER
"Requested Prescriptions   Pending Prescriptions Disp Refills     ranitidine (ZANTAC) 150 MG tablet [Pharmacy Med Name: RANITIDINE 150 MG TABLET] 60 tablet 0     Sig: TAKE 1 TABLET (150 MG) BY MOUTH 2 TIMES DAILY       H2 Blockers Protocol Passed - 9/9/2019 11:45 AM        Passed - Patient is age 12 or older        Passed - Recent (12 mo) or future (30 days) visit within the authorizing provider's specialty     Patient had office visit in the last 12 months or has a visit in the next 30 days with authorizing provider or within the authorizing provider's specialty.  See \"Patient Info\" tab in inbasket, or \"Choose Columns\" in Meds & Orders section of the refill encounter.              Passed - Medication is active on med list        Prescription approved per [facility] Refill Protocol.  "

## 2019-09-13 ENCOUNTER — OFFICE VISIT (OUTPATIENT)
Dept: FAMILY MEDICINE | Facility: CLINIC | Age: 74
End: 2019-09-13
Payer: MEDICARE

## 2019-09-13 VITALS
HEART RATE: 79 BPM | OXYGEN SATURATION: 100 % | WEIGHT: 243.1 LBS | DIASTOLIC BLOOD PRESSURE: 71 MMHG | BODY MASS INDEX: 40.5 KG/M2 | SYSTOLIC BLOOD PRESSURE: 131 MMHG | RESPIRATION RATE: 16 BRPM | HEIGHT: 65 IN | TEMPERATURE: 98.5 F

## 2019-09-13 DIAGNOSIS — K29.50 CHRONIC GASTRITIS WITHOUT BLEEDING, UNSPECIFIED GASTRITIS TYPE: ICD-10-CM

## 2019-09-13 DIAGNOSIS — K21.9 GASTROESOPHAGEAL REFLUX DISEASE WITHOUT ESOPHAGITIS: ICD-10-CM

## 2019-09-13 DIAGNOSIS — M31.7 MICROSCOPIC POLYANGIITIS (H): ICD-10-CM

## 2019-09-13 DIAGNOSIS — J30.1 SEASONAL ALLERGIC RHINITIS DUE TO POLLEN: ICD-10-CM

## 2019-09-13 DIAGNOSIS — J06.9 VIRAL URI WITH COUGH: ICD-10-CM

## 2019-09-13 DIAGNOSIS — J45.909 REACTIVE AIRWAY DISEASE WITHOUT COMPLICATION, UNSPECIFIED ASTHMA SEVERITY, UNSPECIFIED WHETHER PERSISTENT: ICD-10-CM

## 2019-09-13 DIAGNOSIS — R25.2 MUSCLE CRAMPS: Primary | ICD-10-CM

## 2019-09-13 PROBLEM — J30.2 SEASONAL ALLERGIC RHINITIS: Status: ACTIVE | Noted: 2019-09-13

## 2019-09-13 LAB
ALBUMIN SERPL-MCNC: 3.9 G/DL (ref 3.4–5)
ALP SERPL-CCNC: 77 U/L (ref 40–150)
ALT SERPL W P-5'-P-CCNC: 42 U/L (ref 0–50)
ANION GAP SERPL CALCULATED.3IONS-SCNC: 7 MMOL/L (ref 3–14)
AST SERPL W P-5'-P-CCNC: 19 U/L (ref 0–45)
BILIRUB SERPL-MCNC: 0.5 MG/DL (ref 0.2–1.3)
BUN SERPL-MCNC: 14 MG/DL (ref 7–30)
CALCIUM SERPL-MCNC: 10 MG/DL (ref 8.5–10.1)
CHLORIDE SERPL-SCNC: 107 MMOL/L (ref 94–109)
CO2 SERPL-SCNC: 27 MMOL/L (ref 20–32)
CREAT SERPL-MCNC: 1.28 MG/DL (ref 0.52–1.04)
FERRITIN SERPL-MCNC: 31 NG/ML (ref 8–252)
GFR SERPL CREATININE-BSD FRML MDRD: 41 ML/MIN/{1.73_M2}
GLUCOSE SERPL-MCNC: 113 MG/DL (ref 70–99)
MAGNESIUM SERPL-MCNC: 2.3 MG/DL (ref 1.6–2.3)
POTASSIUM SERPL-SCNC: 4.2 MMOL/L (ref 3.4–5.3)
PROT SERPL-MCNC: 7.4 G/DL (ref 6.8–8.8)
SODIUM SERPL-SCNC: 141 MMOL/L (ref 133–144)

## 2019-09-13 PROCEDURE — 83735 ASSAY OF MAGNESIUM: CPT | Performed by: FAMILY MEDICINE

## 2019-09-13 PROCEDURE — 36415 COLL VENOUS BLD VENIPUNCTURE: CPT | Performed by: FAMILY MEDICINE

## 2019-09-13 PROCEDURE — 82728 ASSAY OF FERRITIN: CPT | Performed by: FAMILY MEDICINE

## 2019-09-13 PROCEDURE — 99214 OFFICE O/P EST MOD 30 MIN: CPT | Performed by: FAMILY MEDICINE

## 2019-09-13 PROCEDURE — 80053 COMPREHEN METABOLIC PANEL: CPT | Performed by: FAMILY MEDICINE

## 2019-09-13 RX ORDER — MONTELUKAST SODIUM 10 MG/1
TABLET ORAL
Qty: 90 TABLET | Refills: 3 | Status: SHIPPED | OUTPATIENT
Start: 2019-09-13 | End: 2020-11-03

## 2019-09-13 RX ORDER — FLUTICASONE PROPIONATE 110 UG/1
1 AEROSOL, METERED RESPIRATORY (INHALATION) 2 TIMES DAILY
Qty: 12 G | Refills: 5 | Status: SHIPPED | OUTPATIENT
Start: 2019-09-13 | End: 2020-08-11

## 2019-09-13 RX ORDER — PREDNISONE 1 MG/1
3 TABLET ORAL DAILY
COMMUNITY
Start: 2019-09-13 | End: 2022-02-22

## 2019-09-13 RX ORDER — FEXOFENADINE HCL 180 MG/1
180 TABLET ORAL DAILY
Qty: 90 TABLET | Refills: 3 | Status: SHIPPED | OUTPATIENT
Start: 2019-09-13 | End: 2020-09-04

## 2019-09-13 RX ORDER — ALBUTEROL SULFATE 90 UG/1
AEROSOL, METERED RESPIRATORY (INHALATION)
Qty: 18 INHALER | Refills: 5 | Status: SHIPPED | OUTPATIENT
Start: 2019-09-13 | End: 2022-02-22

## 2019-09-13 ASSESSMENT — MIFFLIN-ST. JEOR: SCORE: 1608.57

## 2019-09-13 NOTE — PROGRESS NOTES
Subjective     Elizabeth Galicia is a 74 year old female who presents to clinic today for the following health issues:    HPI     Chief Complaint   Patient presents with     RECHECK     6 week follow up for Allergies/Breathing     Pt is here to follow-up on her allergies with reactive airway.  She wonders about MAST cell disorder based on all her symptoms.  Last visit we started ranitidine and switched her to allegra.  She is using nasal steroid and started singulair.   Overall feels good - missed a few doses of her inhaler and worsend but better than she was.   Prior hx of allergy testing - in the past  Trees, grass and ragweed, dust, barn dust, mold  Perfumes and scents  Dairy  Did desensitizing in past - not helpful      Uses medical marijuana -  Got some in [place] - was the most helpful   Tried to work with pharmacist here but was not able to reproduce it.    Diabetes -   Secondary to high dose steroid for her vasculitis    Glucose -   Has had high glucose and the prednisone has complicated the issue  Last A1C was 6.6      Memory concerns -   Feels like she has less short term memory and word trouble issues      Six Item Cognitive Impairment Test   (6CIT):      What year is it?                               Correct - 0 points    What month is it?                               Correct - 0 points      Give the patient an address to remember with five components:   Tariq Olivas ( first and last name - 2 components)   [address]  (number and name of street - 2 components)   [place] ( city - 1 component)      About what time is it (within the hour)? Correct - 0 points    Count backwards from 20 to 1:   Correct - 0 points    Say the months of the year in reverse: Correct - 0 points    Repeat the address phrase:   1 error - 2 points    Total 6CIT Score:      2/28    Interpretation: The 6CIT uses an inverse score and questions are weighted to produce a total out of 28. Scores of 0-7 are considered normal and 8  or more significant.    Advantages The test has high sensitivity without compromising specificity even in mild dementia. It is easy to translate linguistically and culturally.  Disadvantages The main disadvantage is in the scoring and weighting of the test, which is initially confusing, however computer models have simplified this greatly.    Probability Statistics: At the 7/8 cut off: Overall figures sensitivity 90% specificity 100%, in mild dementia sensitivity = 78% , specificity = 100%    Copyright 2000 [facility]. Courtesy of Dr. Bennett Gold      -------------------------------------    Patient Active Problem List   Diagnosis     HTN (hypertension)     Obesity (BMI 30-39.9)     Type 2 diabetes mellitus without complication (H)     ANCA-associated vasculitis (Microscopic polyangiitis)     Postoperative hypothyroidism     Anemia, unspecified type     Hyperplasia of renal artery (H)     Heterozygous for MTHFR gene mutation (H)     CKD (chronic kidney disease) stage 3, GFR 30-59 ml/min (H)     Prophylactic antibiotic     Morbid obesity (H)     Seasonal allergic rhinitis     Microscopic polyangiitis (H)     Past Surgical History:   Procedure Laterality Date     COLONOSCOPY       DILATION AND CURETTAGE       ENT SURGERY      partial thyroidectomy; tonsillectomy     EXCISE LESION EYELID Left 3/10/2016    Procedure: EXCISE LESION EYELID;  Surgeon: Rg Nazario MD;  Location: [facility]     HAND SURGERY       HYSTERECTOMY VAGINAL, COLPORRHAPHY ANTERIOR, POSTERIOR, COMBINED N/A 9/9/2014    Procedure: COMBINED HYSTERECTOMY VAGINAL, COLPORRHAPHY ANTERIOR, POSTERIOR;  Surgeon: Shay Winkler MD;  Location: [facility]     HYSTERECTOMY, PAP NO LONGER INDICATED       LAPAROSCOPIC SALPINGO-OOPHORECTOMY  6/27/2011    Procedure:LAPAROSCOPIC SALPINGO-OOPHORECTOMY; resection of left pelvic mass. ; Surgeon:MAYRA OLEARY; Location:[facility]     ORTHOPEDIC SURGERY       SALPINGO OOPHORECTOMY,R/L/PEDROL UIS       "Salpingo Oophorectomy for torsion in past     THYROIDECTOMY      Partial       Social History     Tobacco Use     Smoking status: Never Smoker     Smokeless tobacco: Never Used   Substance Use Topics     Alcohol use: Yes     Comment: rarely     Family History   Problem Relation Age of Onset     Unknown/Adopted Mother      Heart Disease Other            -------------------------------------  Reviewed and updated as needed this visit by Provider  Problems         Review of Systems   ROS COMP: Constitutional, HEENT, cardiovascular, pulmonary, GI, , musculoskeletal, neuro, skin, endocrine and psych systems are negative, except as otherwise noted.      Objective    /71   Pulse 79   Temp 98.5  F (36.9  C) (Oral)   Resp 16   Ht 1.651 m (5' 5\")   Wt 110.3 kg (243 lb 1.6 oz)   SpO2 100%   BMI 40.45 kg/m    Body mass index is 40.45 kg/m .  Physical Exam   GENERAL: alert and no distress  RESP: lungs clear to auscultation - no rales, rhonchi or wheezes  CV: regular rate and rhythm, normal S1 S2, no S3 or S4, no murmur, click or rub, no peripheral edema and peripheral pulses strong    Diagnostic Test Results:  Labs reviewed in Telepathy  Pending labs        Assessment & Plan     1. Seasonal allergic rhinitis due to pollen  Will refill meds -   Suspect some asthma or reactive airway due to her severe allergies  Will refill as needed  - montelukast (SINGULAIR) 10 MG tablet; TAKE 1 TABLET BY MOUTH EVERYDAY AT BEDTIME  Dispense: 90 tablet; Refill: 3  - fexofenadine (ALLEGRA) 180 MG tablet; Take 1 tablet (180 mg) by mouth daily  Dispense: 90 tablet; Refill: 3  - Ferritin    2. Reactive airway disease without complication, unspecified asthma severity, unspecified whether persistent     - fluticasone (FLOVENT HFA) 110 MCG/ACT inhaler; Inhale 1 puff into the lungs 2 times daily  Dispense: 12 g; Refill: 5  - montelukast (SINGULAIR) 10 MG tablet; TAKE 1 TABLET BY MOUTH EVERYDAY AT BEDTIME  Dispense: 90 tablet; Refill: 3  - " "fexofenadine (ALLEGRA) 180 MG tablet; Take 1 tablet (180 mg) by mouth daily  Dispense: 90 tablet; Refill: 3  - Ferritin    3. Muscle cramps  Having some diffuse muscle cramps - wonders if her electrolytes could be off  - Comprehensive metabolic panel (BMP + Alb, Alk Phos, ALT, AST, Total. Bili, TP)  - Magnesium  - Ferritin    4. Chronic gastritis without bleeding, unspecified gastritis type   Pending labs  - Ferritin    5. Viral URI with cough    - albuterol (VENTOLIN HFA) 108 (90 Base) MCG/ACT inhaler; TAKE 2 PUFFS BY MOUTH EVERY 4 HOURS AS NEEDED  Dispense: 18 Inhaler; Refill: 5    6. Gastroesophageal reflux disease without esophagitis     - omeprazole (PRILOSEC) 20 MG DR capsule; Take 1 capsule (20 mg) by mouth daily  Dispense: 90 capsule; Refill: 1    7. Microscopic polyangiitis (H)  Working with her rheumatologist -   Weaning off the prednisone - down to 3mg and may go lower    Memory - short term cognitive changes  6CIT normal today   Will continue to monitor    Pt will call or RTC if symptoms worsen or do not improve.        BMI:   Estimated body mass index is 40.45 kg/m  as calculated from the following:    Height as of this encounter: 1.651 m (5' 5\").    Weight as of this encounter: 110.3 kg (243 lb 1.6 oz).           Pt will call or RTC if symptoms worsen or do not improve.      No follow-ups on file.    Ella Hernandez, DO  [facility]      "

## 2019-09-14 ASSESSMENT — ASTHMA QUESTIONNAIRES: ACT_TOTALSCORE: 20

## 2019-09-17 NOTE — RESULT ENCOUNTER NOTE
Dear PAUL,   Your test results are all back -   -Liver and gallbladder tests (ALT,AST, Alk phos,bilirubin) are normal.  -Kidney function (GFR) is decreased.  ADVISE: it is stable - plan to recheck in 6 months  -Sodium is normal.  -Potassium is normal.  -Calcium is normal.  -Glucose is slight elevated and may be a sign of early diabetes (prediabetes). ADVISE:: eating a low carbohydrate diet, exercising, trying to lose weight (if necessary) and rechecking your glucose level in 12 months.  Magnesium is normal.  Ferritin (iron)  is normal.  Let us know if you have any questions.  -Ella Hernandez, DO

## 2019-09-19 ENCOUNTER — TRANSFERRED RECORDS (OUTPATIENT)
Dept: HEALTH INFORMATION MANAGEMENT | Facility: CLINIC | Age: 74
End: 2019-09-19

## 2019-11-08 DIAGNOSIS — J45.909 REACTIVE AIRWAY DISEASE WITHOUT COMPLICATION, UNSPECIFIED ASTHMA SEVERITY, UNSPECIFIED WHETHER PERSISTENT: ICD-10-CM

## 2019-11-08 NOTE — TELEPHONE ENCOUNTER
"Requested Prescriptions   Pending Prescriptions Disp Refills     FLOVENT  MCG/ACT inhaler [Pharmacy Med Name: FLOVENT  MCG INHALER] 12 Inhaler 5     Sig: INHALE 1 PUFF BY MOUTH IN TO THE LUNGS TWICE A DAY AS DIRECTED  Last Written Prescription Date:  11/8/19  Last Fill Quantity: 12 inhaler,  # refills: 5   Last office visit: 9/13/2019 with prescribing provider:  David   Future Office Visit:         Inhaled Steroids Protocol Passed - 11/8/2019  7:33 AM        Passed - Patient is age 12 or older        Passed - Recent (12 mo) or future (30 days) visit within the authorizing provider's specialty     Patient has had an office visit with the authorizing provider or a provider within the authorizing providers department within the previous 12 mos or has a future within next 30 days. See \"Patient Info\" tab in inbasket, or \"Choose Columns\" in Meds & Orders section of the refill encounter.              Passed - Medication is active on med list           "

## 2019-11-11 RX ORDER — DEXAMETHASONE 4 MG/1
TABLET ORAL
Start: 2019-11-11

## 2019-11-21 ENCOUNTER — TRANSFERRED RECORDS (OUTPATIENT)
Dept: HEALTH INFORMATION MANAGEMENT | Facility: CLINIC | Age: 74
End: 2019-11-21

## 2020-01-07 ENCOUNTER — TELEPHONE (OUTPATIENT)
Dept: FAMILY MEDICINE | Facility: CLINIC | Age: 75
End: 2020-01-07

## 2020-01-07 NOTE — TELEPHONE ENCOUNTER
PN,   Pt calling with URI and GI symptoms  Patient states her cough bothers her the most    States she was in [place] for part of New Years and prior to  Symptoms started New Years Day   [place]   Allergic to dust she said    Out of the blue ended up vomiting too   Still has some GI upset  Overall though cough/lungs/breathing worries her most  Didn't take Flovent with her to [place] - just had Albuterol to use  Albuterol wasn't too helpful    Returned home to [place] 1/1/2020  Starting using her Flovent  Gets some relief using her Flovent  Afebrile   Feels like there is some fluid when she breathes     Do you want to see pt today or tomorrow as DB?   Please advise  Thanks,  Jazmin TAVARES RN

## 2020-01-07 NOTE — TELEPHONE ENCOUNTER
Reason for call:  Patient reporting a symptom    Symptom or request: Started with vomiting, and she has respiratory chest symptoms, coughing, fatigue     Duration (how long have symptoms been present): 1/1/2020    Have you been treated for this before? No    Additional comments: She has an inhaler that somewhat helps.  She also has some GI upset    Phone Number patient can be reached at:  Cell number on file:    Telephone Information:   Mobile [phone]       Best Time:  any    Can we leave a detailed message on this number:  YES    Call taken on 1/7/2020 at 11:03 AM by Princess Davila

## 2020-01-08 ENCOUNTER — OFFICE VISIT (OUTPATIENT)
Dept: FAMILY MEDICINE | Facility: CLINIC | Age: 75
End: 2020-01-08
Payer: MEDICARE

## 2020-01-08 ENCOUNTER — ANCILLARY PROCEDURE (OUTPATIENT)
Dept: GENERAL RADIOLOGY | Facility: CLINIC | Age: 75
End: 2020-01-08
Attending: FAMILY MEDICINE
Payer: MEDICARE

## 2020-01-08 VITALS
DIASTOLIC BLOOD PRESSURE: 82 MMHG | RESPIRATION RATE: 16 BRPM | SYSTOLIC BLOOD PRESSURE: 132 MMHG | TEMPERATURE: 97.3 F | WEIGHT: 238.25 LBS | BODY MASS INDEX: 39.7 KG/M2 | HEART RATE: 79 BPM | HEIGHT: 65 IN | OXYGEN SATURATION: 96 %

## 2020-01-08 DIAGNOSIS — R05.9 COUGH: ICD-10-CM

## 2020-01-08 DIAGNOSIS — R05.9 COUGH: Primary | ICD-10-CM

## 2020-01-08 PROCEDURE — 90662 IIV NO PRSV INCREASED AG IM: CPT | Performed by: FAMILY MEDICINE

## 2020-01-08 PROCEDURE — 99213 OFFICE O/P EST LOW 20 MIN: CPT | Mod: 25 | Performed by: FAMILY MEDICINE

## 2020-01-08 PROCEDURE — 71046 X-RAY EXAM CHEST 2 VIEWS: CPT | Mod: FY

## 2020-01-08 PROCEDURE — G0008 ADMIN INFLUENZA VIRUS VAC: HCPCS | Performed by: FAMILY MEDICINE

## 2020-01-08 ASSESSMENT — PAIN SCALES - GENERAL: PAINLEVEL: NO PAIN (0)

## 2020-01-08 ASSESSMENT — MIFFLIN-ST. JEOR: SCORE: 1586.57

## 2020-01-08 NOTE — PROGRESS NOTES
Subjective     Elizabeth Galicia is a 74 year old female who presents to clinic today for the following health issues:    HPI   RESPIRATORY SYMPTOMS      Duration:     Description  nasal congestion, sore throat, cough, headache and fatigue/malaise    Severity: mild    Accompanying signs and symptoms: None    History (predisposing factors):  Traveled to [place]      Precipitating or alleviating factors: None    Therapies tried and outcome:         Went to [place] - stayed in a house that was [place] which is how all this started.       sick on new years day night - had episode of vomiting - multiple times but since that time it has resolve.     Has had low energy and cough is worse    Continued to worsen    No fever    Cough worsening and fatigue worsening    Cough productive-     Had a pain on the right upper abdominal pain    No more vomiting.  Some frontal left sinus pressure    -------------------------------------    Patient Active Problem List   Diagnosis     HTN (hypertension)     Obesity (BMI 30-39.9)     Type 2 diabetes mellitus without complication (H)     ANCA-associated vasculitis (Microscopic polyangiitis)     Postoperative hypothyroidism     Anemia, unspecified type     Hyperplasia of renal artery (H)     Heterozygous for MTHFR gene mutation (H)     CKD (chronic kidney disease) stage 3, GFR 30-59 ml/min (H)     Prophylactic antibiotic     Morbid obesity (H)     Seasonal allergic rhinitis     Microscopic polyangiitis (H)     Past Surgical History:   Procedure Laterality Date     COLONOSCOPY       DILATION AND CURETTAGE       ENT SURGERY      partial thyroidectomy; tonsillectomy     EXCISE LESION EYELID Left 3/10/2016    Procedure: EXCISE LESION EYELID;  Surgeon: Rg Nazario MD;  Location: [facility]     HAND SURGERY       HYSTERECTOMY VAGINAL, COLPORRHAPHY ANTERIOR, POSTERIOR, COMBINED N/A 9/9/2014    Procedure: COMBINED HYSTERECTOMY VAGINAL, COLPORRHAPHY ANTERIOR, POSTERIOR;  Surgeon: Cathi  Shay PARR MD;  Location: [facility]     HYSTERECTOMY, PAP NO LONGER INDICATED       LAPAROSCOPIC SALPINGO-OOPHORECTOMY  6/27/2011    Procedure:LAPAROSCOPIC SALPINGO-OOPHORECTOMY; resection of left pelvic mass. ; Surgeon:MAYRA OLEARY; Location: OR     ORTHOPEDIC SURGERY       SALPINGO OOPHORECTOMY,R/L/PEDRO LUIS      Salpingo Oophorectomy for torsion in past     THYROIDECTOMY      Partial       Social History     Tobacco Use     Smoking status: Never Smoker     Smokeless tobacco: Never Used   Substance Use Topics     Alcohol use: Yes     Comment: rarely     Family History   Problem Relation Age of Onset     Unknown/Adopted Mother      Heart Disease Other            -------------------------------------  Reviewed and updated as needed this visit by Provider         Review of Systems   ROS COMP: Constitutional, HEENT, cardiovascular, pulmonary, GI, , musculoskeletal, neuro, skin, endocrine and psych systems are negative, except as otherwise noted.      Objective    There were no vitals taken for this visit.  There is no height or weight on file to calculate BMI.  Physical Exam   GENERAL: alert and no distress  HENT: ear canals and TM's normal, nose and mouth without ulcers or lesions  NECK: no adenopathy, no asymmetry, masses, or scars and thyroid normal to palpation  RESP: lungs clear to auscultation - no rales, rhonchi or wheezes  CV: regular rate and rhythm, normal S1 S2, no S3 or S4, no murmur, click or rub, no peripheral edema and peripheral pulses strong    Diagnostic Test Results:  Labs reviewed in Epic  CXR - no infiltrates - WNL        Assessment & Plan     1. Cough  Cough   Etiology is unclear but suspect viral based on symptoms.  Pt does have hudson sinus pressure but doesn't feel like previous sinus infections so will hold off on abx.  Monitor for any new or changing symptoms or if symptoms persist  Pt will call or RTC if symptoms worsen or do not improve.   - XR Chest 2 Views; Future     BMI:   Estimated body mass  "index is 39.65 kg/m  as calculated from the following:    Height as of this encounter: 1.651 m (5' 5\").    Weight as of this encounter: 108.1 kg (238 lb 4 oz).         Pt will call or RTC if symptoms worsen or do not improve.      No follow-ups on file.    Ella Hernandez, DO  [facility]      "

## 2020-02-02 DIAGNOSIS — J30.1 SEASONAL ALLERGIC RHINITIS DUE TO POLLEN: ICD-10-CM

## 2020-02-02 DIAGNOSIS — J45.909 REACTIVE AIRWAY DISEASE WITHOUT COMPLICATION, UNSPECIFIED ASTHMA SEVERITY, UNSPECIFIED WHETHER PERSISTENT: ICD-10-CM

## 2020-02-03 NOTE — TELEPHONE ENCOUNTER
"Prescription approved per [facility] Refill Protocol.  Jazmin TAVARES RN    Last Written Prescription Date:  9/10/2019  Last Fill Quantity: 60,  # refills: 3   Last office visit: 1/8/2020 with prescribing provider:     Future Office Visit:    Requested Prescriptions   Pending Prescriptions Disp Refills     ranitidine (ZANTAC) 150 MG tablet [Pharmacy Med Name: RANITIDINE 150 MG TABLET] 60 tablet 3     Sig: TAKE 1 TABLET (150 MG) BY MOUTH 2 TIMES DAILY       H2 Blockers Protocol Passed - 2/2/2020  9:40 AM        Passed - Patient is age 12 or older        Passed - Recent (12 mo) or future (30 days) visit within the authorizing provider's specialty     Patient has had an office visit with the authorizing provider or a provider within the authorizing providers department within the previous 12 mos or has a future within next 30 days. See \"Patient Info\" tab in inbasket, or \"Choose Columns\" in Meds & Orders section of the refill encounter.              Passed - Medication is active on med list        "

## 2020-02-14 DIAGNOSIS — E89.0 POSTOPERATIVE HYPOTHYROIDISM: ICD-10-CM

## 2020-02-14 RX ORDER — LEVOTHYROXINE SODIUM 125 UG/1
TABLET ORAL
Qty: 90 TABLET | Refills: 0 | Status: SHIPPED | OUTPATIENT
Start: 2020-02-14 | End: 2020-05-13

## 2020-02-14 NOTE — TELEPHONE ENCOUNTER
"Pending Prescriptions:                       Disp   Refills    levothyroxine (SYNTHROID/LEVOTHROID) 125 *90 tab*2            Sig: TAKE 1 TABLET (125 MCG) BY MOUTH EVERY MORNING           (BEFORE BREAKFAST)    Last Written Prescription Date:  05/17/2019  Last Fill Quantity: 90,  # refills: 2   Last office visit: 1/8/2020 with prescribing provider:  Ella Hernandez  Future Office Visit:    Requested Prescriptions   Pending Prescriptions Disp Refills     levothyroxine (SYNTHROID/LEVOTHROID) 125 MCG tablet [Pharmacy Med Name: LEVOTHYROXINE 125 MCG TABLET] 90 tablet 2     Sig: TAKE 1 TABLET (125 MCG) BY MOUTH EVERY MORNING (BEFORE BREAKFAST)       Thyroid Protocol Passed - 2/14/2020  2:13 AM        Passed - Patient is 12 years or older        Passed - Recent (12 mo) or future (30 days) visit within the authorizing provider's specialty     Patient has had an office visit with the authorizing provider or a provider within the authorizing providers department within the previous 12 mos or has a future within next 30 days. See \"Patient Info\" tab in inbasket, or \"Choose Columns\" in Meds & Orders section of the refill encounter.              Passed - Medication is active on med list        Passed - Normal TSH on file in past 12 months     Recent Labs   Lab Test 03/29/19  1202   TSH 1.34              Passed - No active pregnancy on record     If patient is pregnant or has had a positive pregnancy test, please check TSH.          Passed - No positive pregnancy test in past 12 months     If patient is pregnant or has had a positive pregnancy test, please check TSH.            "

## 2020-02-14 NOTE — TELEPHONE ENCOUNTER
Routing refill request to provider for review/approval because:  Previously approved under Dr. Littlejohn - no longer at our clinic  Forwarding to new PCP     Rupa SALAS RN

## 2020-02-20 ENCOUNTER — TRANSFERRED RECORDS (OUTPATIENT)
Dept: HEALTH INFORMATION MANAGEMENT | Facility: CLINIC | Age: 75
End: 2020-02-20

## 2020-02-20 ENCOUNTER — TELEPHONE (OUTPATIENT)
Dept: FAMILY MEDICINE | Facility: CLINIC | Age: 75
End: 2020-02-20

## 2020-02-20 NOTE — TELEPHONE ENCOUNTER
PN,   Please see below message and advise  Last travel visit 10/10/2018 (trip to [place] then)  Pt coming Monday for pneumonia and shingles vaccines.  Leaves 3/9/2020  Please advise  Thanks,  Jazmin TAVARES RN

## 2020-02-20 NOTE — TELEPHONE ENCOUNTER
Reason for Call:  Other call back    Detailed comments: patient is traveling to \[facility]\"" om 3/9/20 and doesn't believe she needs anything has seen travel in the past. She is coming in on Monday 2/24/20 for a Nurse only and is wondering if she needs anything? Please advise    Phone Number Patient can be reached at: Home number on file [phone] (home) cell at [phone]    Best Time: today    Can we leave a detailed message on this number? YES    Call taken on 2/20/2020 at 4:45 PM by Juliana Ho

## 2020-02-21 NOTE — TELEPHONE ENCOUNTER
Please let her know - just her routine vaccines for Caty -   Nothing else needed -  Have fun!  Thanks  PN

## 2020-02-24 ENCOUNTER — ALLIED HEALTH/NURSE VISIT (OUTPATIENT)
Dept: NURSING | Facility: CLINIC | Age: 75
End: 2020-02-24
Payer: MEDICARE

## 2020-02-24 DIAGNOSIS — Z23 NEED FOR PROPHYLACTIC VACCINATION AND INOCULATION AGAINST COMBINATIONS OF DISEASE: Primary | ICD-10-CM

## 2020-02-24 PROCEDURE — G0009 ADMIN PNEUMOCOCCAL VACCINE: HCPCS | Mod: 59

## 2020-02-24 PROCEDURE — 90732 PPSV23 VACC 2 YRS+ SUBQ/IM: CPT

## 2020-02-24 PROCEDURE — 90750 HZV VACC RECOMBINANT IM: CPT

## 2020-02-24 PROCEDURE — 99207 ZZC NO CHARGE NURSE ONLY: CPT

## 2020-02-24 PROCEDURE — 90471 IMMUNIZATION ADMIN: CPT

## 2020-02-24 NOTE — NURSING NOTE
Chief Complaint   Patient presents with     Imm/Inj     Prior to immunization administration, verified patients identity using patient s name and date of birth. Please see Immunization Activity for additional information.     Screening Questionnaire for Adult Immunization    Are you sick today?   No   Do you have allergies to medications, food, a vaccine component or latex?   No   Have you ever had a serious reaction after receiving a vaccination?   No   Do you have a long-term health problem with heart, lung, kidney, or metabolic disease (e.g., diabetes), asthma, a blood disorder, no spleen, complement component deficiency, a cochlear implant, or a spinal fluid leak?  Are you on long-term aspirin therapy?   No   Do you have cancer, leukemia, HIV/AIDS, or any other immune system problem?   No   Do you have a parent, brother, or sister with an immune system problem?   No   In the past 3 months, have you taken medications that affect  your immune system, such as prednisone, other steroids, or anticancer drugs; drugs for the treatment of rheumatoid arthritis, Crohn s disease, or psoriasis; or have you had radiation treatments?   No   Have you had a seizure, or a brain or other nervous system problem?   No   During the past year, have you received a transfusion of blood or blood    products, or been given immune (gamma) globulin or antiviral drug?   No   For women: Are you pregnant or is there a chance you could become       pregnant during the next month?   No   Have you received any vaccinations in the past 4 weeks?   No     Immunization questionnaire answers were all negative.        Per orders of Dr. LEMUS, injection of PVC 23 AND SHINGRIX given by Joyce Moore CMA. Patient instructed to remain in clinic for 15 minutes afterwards, and to report any adverse reaction to me immediately.     Medicare ABN signed and sent to scanning             Screening performed by Joyce Moore CMA on 2/24/2020 at 12:19  PM.

## 2020-02-25 NOTE — TELEPHONE ENCOUNTER
Patient informed of below  States she feels pretty crumby from her injections yesterday   Had shingles vaccine before - no reaction to first one  Pt will push fluids and call us back if not improving  Jazmin TAVARES RN

## 2020-02-26 ENCOUNTER — TELEPHONE (OUTPATIENT)
Dept: FAMILY MEDICINE | Facility: CLINIC | Age: 75
End: 2020-02-26

## 2020-02-26 NOTE — TELEPHONE ENCOUNTER
Reason for call:  Patient reporting a symptom    Symptom or request: patient got PVC 23 AND SHINGRIX  vaccinations on Monday and now  sore and achy, right now fever of 101.5.  Friend Sindhu Arambula is calling.     Duration (how long have symptoms been present): since Monday evening    Have you been treated for this before? No    Additional comments:     Phone Number patient can be reached at:  Other phone number:  [phone]  Patient was right there with friend Sindhu    Best Time:  any    Can we leave a detailed message on this number:  YES    Call taken on 2/26/2020 at 9:49 AM by Daxa Barbosa

## 2020-02-26 NOTE — TELEPHONE ENCOUNTER
"Spoke with patient.  Initial call was for body feeling  sore and achy, temp 101.5.  States she had shingles and PVC 23 vaccinations on Monday 2/24.  Informed patient most likely from vaccinations (especially Shingles)    Patient also reporting that she is having problems \"gathering her thoughts lately\"  \"The last time I fel this way I have a severe urinary tract infection and I didn't even know it\"  Patient denies any urinary symptoms: burning, urgency, frequency.    Scheduled patient to see SN tomorrow at 1:30.  Taylor De Los Santos RN    "

## 2020-02-27 ENCOUNTER — OFFICE VISIT (OUTPATIENT)
Dept: FAMILY MEDICINE | Facility: CLINIC | Age: 75
End: 2020-02-27
Payer: MEDICARE

## 2020-02-27 VITALS
BODY MASS INDEX: 38.4 KG/M2 | WEIGHT: 230.5 LBS | TEMPERATURE: 97.9 F | HEART RATE: 89 BPM | DIASTOLIC BLOOD PRESSURE: 69 MMHG | HEIGHT: 65 IN | SYSTOLIC BLOOD PRESSURE: 103 MMHG | OXYGEN SATURATION: 97 %

## 2020-02-27 DIAGNOSIS — R30.0 DYSURIA: Primary | ICD-10-CM

## 2020-02-27 LAB
ALBUMIN UR-MCNC: 100 MG/DL
APPEARANCE UR: CLEAR
BASOPHILS # BLD AUTO: 0 10E9/L (ref 0–0.2)
BASOPHILS NFR BLD AUTO: 0.3 %
BILIRUB UR QL STRIP: ABNORMAL
CAOX CRY #/AREA URNS HPF: ABNORMAL /HPF
COLOR UR AUTO: YELLOW
DIFFERENTIAL METHOD BLD: NORMAL
EOSINOPHIL # BLD AUTO: 0.1 10E9/L (ref 0–0.7)
EOSINOPHIL NFR BLD AUTO: 1.1 %
ERYTHROCYTE [DISTWIDTH] IN BLOOD BY AUTOMATED COUNT: 15 % (ref 10–15)
GLUCOSE UR STRIP-MCNC: NEGATIVE MG/DL
HCT VFR BLD AUTO: 46.3 % (ref 35–47)
HGB BLD-MCNC: 15.3 G/DL (ref 11.7–15.7)
HGB UR QL STRIP: NEGATIVE
KETONES UR STRIP-MCNC: 15 MG/DL
LEUKOCYTE ESTERASE UR QL STRIP: NEGATIVE
LYMPHOCYTES # BLD AUTO: 1.5 10E9/L (ref 0.8–5.3)
LYMPHOCYTES NFR BLD AUTO: 16.8 %
MCH RBC QN AUTO: 30.3 PG (ref 26.5–33)
MCHC RBC AUTO-ENTMCNC: 33 G/DL (ref 31.5–36.5)
MCV RBC AUTO: 92 FL (ref 78–100)
MONOCYTES # BLD AUTO: 0.9 10E9/L (ref 0–1.3)
MONOCYTES NFR BLD AUTO: 10 %
NEUTROPHILS # BLD AUTO: 6.5 10E9/L (ref 1.6–8.3)
NEUTROPHILS NFR BLD AUTO: 71.8 %
NITRATE UR QL: NEGATIVE
PH UR STRIP: 5 PH (ref 5–7)
PLATELET # BLD AUTO: 213 10E9/L (ref 150–450)
RBC # BLD AUTO: 5.05 10E12/L (ref 3.8–5.2)
RBC #/AREA URNS AUTO: ABNORMAL /HPF
SOURCE: ABNORMAL
SP GR UR STRIP: >1.03 (ref 1–1.03)
UROBILINOGEN UR STRIP-ACNC: 0.2 EU/DL (ref 0.2–1)
WBC # BLD AUTO: 9.1 10E9/L (ref 4–11)
WBC #/AREA URNS AUTO: ABNORMAL /HPF

## 2020-02-27 PROCEDURE — 80048 BASIC METABOLIC PNL TOTAL CA: CPT | Performed by: FAMILY MEDICINE

## 2020-02-27 PROCEDURE — 85025 COMPLETE CBC W/AUTO DIFF WBC: CPT | Performed by: FAMILY MEDICINE

## 2020-02-27 PROCEDURE — 36415 COLL VENOUS BLD VENIPUNCTURE: CPT | Performed by: FAMILY MEDICINE

## 2020-02-27 PROCEDURE — 81001 URINALYSIS AUTO W/SCOPE: CPT | Performed by: FAMILY MEDICINE

## 2020-02-27 PROCEDURE — 99214 OFFICE O/P EST MOD 30 MIN: CPT | Performed by: FAMILY MEDICINE

## 2020-02-27 ASSESSMENT — MIFFLIN-ST. JEOR: SCORE: 1551.42

## 2020-02-27 NOTE — PROGRESS NOTES
Subjective     Elizabeth Galicia is a 74 year old female who presents to clinic today for the following health issues:    HPI   URINARY TRACT SYMPTOMS    Had been feeling tired for a few days - starting over the weekend.  May have been fighting something off  Had vaccines on Monday- Shingrix #2 and pneumococcal  That night slept with chills  No energy  Yesterday better but had fever - 101.5.  Took tylenol   Today feels better  Last meds were 130 AM due to pain.    Ate today a real meal first meal since Monday night  Feels dehydrated.  Had difficulty putting words together starting Tuesday night      Duration: 02/24/2020    Description  Patient has chills and ache    Intensity:  severe    Accompanying signs and symptoms:  Fever/chills: YES- 101.5 oral, chills  Flank pain no   Nausea and vomiting: no   Vaginal symptoms: none  Abdominal/Pelvic Pain: no     History  History of frequent UTI's: YES- bad case of no symptoms in uti  History of kidney stones: no   Sexually Active: no  Possibility of pregnancy: No    Precipitating or alleviating factors: none    Therapies tried and outcome: medical marijuana, tylenol   Outcome: to sleep         Patient Active Problem List   Diagnosis     HTN (hypertension)     Obesity (BMI 30-39.9)     Type 2 diabetes mellitus without complication (H)     ANCA-associated vasculitis (Microscopic polyangiitis)     Postoperative hypothyroidism     Anemia, unspecified type     Hyperplasia of renal artery (H)     Heterozygous for MTHFR gene mutation (H)     CKD (chronic kidney disease) stage 3, GFR 30-59 ml/min (H)     Prophylactic antibiotic     Morbid obesity (H)     Seasonal allergic rhinitis     Microscopic polyangiitis (H)     Past Surgical History:   Procedure Laterality Date     COLONOSCOPY       DILATION AND CURETTAGE       ENT SURGERY      partial thyroidectomy; tonsillectomy     EXCISE LESION EYELID Left 3/10/2016    Procedure: EXCISE LESION EYELID;  Surgeon: gR Nazario MD;   Location:  [facility]     HAND SURGERY       HYSTERECTOMY VAGINAL, COLPORRHAPHY ANTERIOR, POSTERIOR, COMBINED N/A 9/9/2014    Procedure: COMBINED HYSTERECTOMY VAGINAL, COLPORRHAPHY ANTERIOR, POSTERIOR;  Surgeon: Shay Winkler MD;  Location: [facility]     HYSTERECTOMY, PAP NO LONGER INDICATED       LAPAROSCOPIC SALPINGO-OOPHORECTOMY  6/27/2011    Procedure:LAPAROSCOPIC SALPINGO-OOPHORECTOMY; resection of left pelvic mass. ; Surgeon:MAYRA OLEARY; Location:[facility]     ORTHOPEDIC SURGERY       SALPINGO OOPHORECTOMY,R/L/PEDRO LUIS      Salpingo Oophorectomy for torsion in past     THYROIDECTOMY      Partial       Social History     Tobacco Use     Smoking status: Never Smoker     Smokeless tobacco: Never Used   Substance Use Topics     Alcohol use: Yes     Comment: rarely     Family History   Problem Relation Age of Onset     Unknown/Adopted Mother      Heart Disease Other          Current Outpatient Medications   Medication Sig Dispense Refill     ACE/ARB/ARNI NOT PRESCRIBED, INTENTIONAL, Please choose reason not prescribed, below (Patient not taking: Reported on 1/8/2020)       acetaminophen (TYLENOL) 500 MG tablet Take 2 tablets (1,000 mg) by mouth every 8 hours (Patient taking differently: Take 500-1,000 mg by mouth every 6 hours as needed )       albuterol (VENTOLIN HFA) 108 (90 Base) MCG/ACT inhaler TAKE 2 PUFFS BY MOUTH EVERY 4 HOURS AS NEEDED 18 Inhaler 5     CALCIUM LACTATE PO Take 4 tablets by mouth daily        Cholecalciferol (VITAMIN D3 PO) Take 1,000 Units by mouth daily       Cyanocobalamin ([employer] B-12 PO)        fexofenadine (ALLEGRA) 180 MG tablet Take 1 tablet (180 mg) by mouth daily 90 tablet 3     fluticasone (FLOVENT HFA) 110 MCG/ACT inhaler Inhale 1 puff into the lungs 2 times daily 12 g 5     levothyroxine (SYNTHROID/LEVOTHROID) 125 MCG tablet TAKE 1 TABLET (125 MCG) BY MOUTH EVERY MORNING (BEFORE BREAKFAST) 90 tablet 0     liothyronine (CYTOMEL) 5 MCG tablet TAKE 2 TABLETS (10 MCG) BY MOUTH 2 TIMES DAILY 360  "tablet 1     MAGNESIUM LACTATE PO Take 2 tablets by mouth daily       montelukast (SINGULAIR) 10 MG tablet TAKE 1 TABLET BY MOUTH EVERYDAY AT BEDTIME 90 tablet 3     omeprazole (PRILOSEC) 20 MG DR capsule Take 1 capsule (20 mg) by mouth daily 90 capsule 1     predniSONE (DELTASONE) 1 MG tablet Take 3 tablets (3 mg) by mouth daily       ranitidine (ZANTAC) 150 MG tablet TAKE 1 TABLET (150 MG) BY MOUTH 2 TIMES DAILY 180 tablet 1     UNABLE TO FIND MEDICATION NAME: medical cannibus       valACYclovir (VALTREX) 1000 mg tablet Take 2 tablets (2,000 mg) by mouth 2 times daily (Patient taking differently: Take 2,000 mg by mouth 2 times daily as needed ) 12 tablet 3       Reviewed and updated as needed this visit by Provider         Review of Systems   ROS COMP: Constitutional, HEENT, cardiovascular, pulmonary, GI, , musculoskeletal, neuro, skin, endocrine and psych systems are negative, except as otherwise noted.      Objective    /69   Pulse 89   Temp 97.9  F (36.6  C) (Oral)   Ht 1.651 m (5' 5\")   Wt 104.6 kg (230 lb 8 oz)   SpO2 97%   Breastfeeding No   BMI 38.36 kg/m    Body mass index is 38.36 kg/m .  Physical Exam   GENERAL: healthy, alert and no distress  ABDOMEN: soft, nontender, no hepatosplenomegaly, no masses and bowel sounds normal  BACK: no CVA tenderness, no paralumbar tenderness    Diagnostic Test Results:  Labs reviewed in Epic  Results for orders placed or performed in visit on 02/27/20   *UA reflex to Microscopic and Culture ([place] and [facility] (except [place] and [place])     Status: Abnormal    Specimen: Midstream Urine   Result Value Ref Range    Color Urine Yellow     Appearance Urine Clear     Glucose Urine Negative NEG^Negative mg/dL    Bilirubin Urine Small (A) NEG^Negative    Ketones Urine 15 (A) NEG^Negative mg/dL    Specific Gravity Urine >1.030 1.003 - 1.035    Blood Urine Negative NEG^Negative    pH Urine 5.0 5.0 - 7.0 pH    Protein Albumin Urine 100 (A) NEG^Negative " mg/dL    Urobilinogen Urine 0.2 0.2 - 1.0 EU/dL    Nitrite Urine Negative NEG^Negative    Leukocyte Esterase Urine Negative NEG^Negative    Source Midstream Urine    CBC with platelets differential     Status: None   Result Value Ref Range    WBC 9.1 4.0 - 11.0 10e9/L    RBC Count 5.05 3.8 - 5.2 10e12/L    Hemoglobin 15.3 11.7 - 15.7 g/dL    Hematocrit 46.3 35.0 - 47.0 %    MCV 92 78 - 100 fl    MCH 30.3 26.5 - 33.0 pg    MCHC 33.0 31.5 - 36.5 g/dL    RDW 15.0 10.0 - 15.0 %    Platelet Count 213 150 - 450 10e9/L    % Neutrophils 71.8 %    % Lymphocytes 16.8 %    % Monocytes 10.0 %    % Eosinophils 1.1 %    % Basophils 0.3 %    Absolute Neutrophil 6.5 1.6 - 8.3 10e9/L    Absolute Lymphocytes 1.5 0.8 - 5.3 10e9/L    Absolute Monocytes 0.9 0.0 - 1.3 10e9/L    Absolute Eosinophils 0.1 0.0 - 0.7 10e9/L    Absolute Basophils 0.0 0.0 - 0.2 10e9/L    Diff Method Automated Method    Basic metabolic panel     Status: Abnormal   Result Value Ref Range    Sodium 133 133 - 144 mmol/L    Potassium 3.9 3.4 - 5.3 mmol/L    Chloride 105 94 - 109 mmol/L    Carbon Dioxide 25 20 - 32 mmol/L    Anion Gap 3 3 - 14 mmol/L    Glucose 122 (H) 70 - 99 mg/dL    Urea Nitrogen 28 7 - 30 mg/dL    Creatinine 1.46 (H) 0.52 - 1.04 mg/dL    GFR Estimate 35 (L) >60 mL/min/[1.73_m2]    GFR Estimate If Black 41 (L) >60 mL/min/[1.73_m2]    Calcium 9.3 8.5 - 10.1 mg/dL   Urine Microscopic     Status: Abnormal   Result Value Ref Range    WBC Urine 0 - 5 OTO5^0 - 5 /HPF    RBC Urine O - 2 OTO2^O - 2 /HPF    Calcium Oxalate Few (A) NEG^Negative /HPF           Assessment & Plan     1. Dysuria  Will contact with results does not appear to be UTI  Will have her monitor for any new symptoms. Such as fevers or viral illness  - *UA reflex to Microscopic and Culture ([place] and [facility] (except [place] and [place])  - CBC with platelets differential  - Basic metabolic panel  - Urine Microscopic     BMI:   Estimated body mass index is 38.36 kg/m  as  "calculated from the following:    Height as of this encounter: 1.651 m (5' 5\").    Weight as of this encounter: 104.6 kg (230 lb 8 oz).                No follow-ups on file.    Caro Fraser MD  [facility]    "

## 2020-02-27 NOTE — PATIENT INSTRUCTIONS
INCREASE FLUIDS JUICE ELECTROLYTES     Follow fevers and energy    If worsening symptoms please return to be seen

## 2020-02-28 DIAGNOSIS — E89.0 POSTOPERATIVE HYPOTHYROIDISM: ICD-10-CM

## 2020-02-28 LAB
ANION GAP SERPL CALCULATED.3IONS-SCNC: 3 MMOL/L (ref 3–14)
BUN SERPL-MCNC: 28 MG/DL (ref 7–30)
CALCIUM SERPL-MCNC: 9.3 MG/DL (ref 8.5–10.1)
CHLORIDE SERPL-SCNC: 105 MMOL/L (ref 94–109)
CO2 SERPL-SCNC: 25 MMOL/L (ref 20–32)
CREAT SERPL-MCNC: 1.46 MG/DL (ref 0.52–1.04)
GFR SERPL CREATININE-BSD FRML MDRD: 35 ML/MIN/{1.73_M2}
GLUCOSE SERPL-MCNC: 122 MG/DL (ref 70–99)
POTASSIUM SERPL-SCNC: 3.9 MMOL/L (ref 3.4–5.3)
SODIUM SERPL-SCNC: 133 MMOL/L (ref 133–144)

## 2020-02-28 RX ORDER — LIOTHYRONINE SODIUM 5 UG/1
TABLET ORAL
Qty: 360 TABLET | Refills: 0 | Status: SHIPPED | OUTPATIENT
Start: 2020-02-28 | End: 2020-05-26

## 2020-02-28 NOTE — TELEPHONE ENCOUNTER
PN,    Refill request for Liothyronine.  Last TSH 3/29/19  Do you want to see patient or have her come in for lab only?    Taylor De Los Santos RN

## 2020-02-28 NOTE — TELEPHONE ENCOUNTER
"Cytomel 5MCG   Last Written Prescription Date:  09/04/2019  Last Fill Quantity: 360,  # refills: 1   Last office visit: 2/27/2020 with prescribing provider: Berta, last office visit with prescribing provider was on  01/08/2020   Future Office Visit:      Requested Prescriptions   Pending Prescriptions Disp Refills     liothyronine (CYTOMEL) 5 MCG tablet [Pharmacy Med Name: LIOTHYRONINE SOD 5 MCG TAB] 360 tablet 1     Sig: TAKE 2 TABLETS (10 MCG) BY MOUTH 2 TIMES DAILY       Thyroid Protocol Passed - 2/28/2020  2:01 AM        Passed - Patient is 12 years or older        Passed - Recent (12 mo) or future (30 days) visit within the authorizing provider's specialty     Patient has had an office visit with the authorizing provider or a provider within the authorizing providers department within the previous 12 mos or has a future within next 30 days. See \"Patient Info\" tab in inbasket, or \"Choose Columns\" in Meds & Orders section of the refill encounter.              Passed - Medication is active on med list        Passed - Normal TSH on file in past 12 months     Recent Labs   Lab Test 03/29/19  1202   TSH 1.34              Passed - No active pregnancy on record     If patient is pregnant or has had a positive pregnancy test, please check TSH.          Passed - No positive pregnancy test in past 12 months     If patient is pregnant or has had a positive pregnancy test, please check TSH.            "

## 2020-03-05 NOTE — RESULT ENCOUNTER NOTE
I called pt with results - creat bumped a little - she was feeling ill with a viral syndrome - now feels much better    Also confirmed she has worsening AI disease.  Is following with her rheumatologist and will increase prednisone for this    Advised to recheck creat and UA in a few months      Caro

## 2020-04-29 DIAGNOSIS — K21.9 GASTROESOPHAGEAL REFLUX DISEASE WITHOUT ESOPHAGITIS: ICD-10-CM

## 2020-04-29 NOTE — TELEPHONE ENCOUNTER
"Requested Prescriptions   Pending Prescriptions Disp Refills     omeprazole (PRILOSEC) 20 MG DR capsule [Pharmacy Med Name: OMEPRAZOLE DR 20 MG CAPSULE]  Last Written Prescription Date:  9/13/2019  Last Fill Quantity: 90 capsule,  # refills: 1   Last office visit: 2/27/2020 with prescribing provider:  Bria   Future Office Visit:     90 capsule 1     Sig: TAKE 1 CAPSULE BY MOUTH EVERY DAY       PPI Protocol Passed - 4/29/2020 12:29 AM        Passed - Not on Clopidogrel (unless Pantoprazole ordered)        Passed - No diagnosis of osteoporosis on record        Passed - Recent (12 mo) or future (30 days) visit within the authorizing provider's specialty     Patient has had an office visit with the authorizing provider or a provider within the authorizing providers department within the previous 12 mos or has a future within next 30 days. See \"Patient Info\" tab in inbasket, or \"Choose Columns\" in Meds & Orders section of the refill encounter.              Passed - Medication is active on med list        Passed - Patient is age 18 or older        Passed - No active pregnacy on record        Passed - No positive pregnancy test in past 12 months              "

## 2020-05-10 DIAGNOSIS — E89.0 POSTOPERATIVE HYPOTHYROIDISM: ICD-10-CM

## 2020-05-11 NOTE — TELEPHONE ENCOUNTER
Routing refill request to provider for review/approval because:  Labs not current:  TSH needed yearly  Jazmin TAVARES RN

## 2020-05-13 RX ORDER — LEVOTHYROXINE SODIUM 125 UG/1
TABLET ORAL
Qty: 90 TABLET | Refills: 0 | Status: SHIPPED | OUTPATIENT
Start: 2020-05-13 | End: 2020-08-06

## 2020-05-14 ENCOUNTER — TELEPHONE (OUTPATIENT)
Dept: FAMILY MEDICINE | Facility: CLINIC | Age: 75
End: 2020-05-14

## 2020-05-14 NOTE — TELEPHONE ENCOUNTER
Reason for call:  Patient reporting a symptom    Symptom or request: Poss sinus infection, tenderness under left eye, swollen under left eye and leaking out of left eye    Duration (how long have symptoms been present): 2 to 3 weeks    Have you been treated for this before? No    Additional comments: getting wrost    Phone Number patient can be reached at:  Home number on file [phone] (home)    Best Time:  Any      Can we leave a detailed message on this number:  YES on cell [phone]    Call taken on 5/14/2020 at 4:58 PM by Jackelin Valdivia

## 2020-05-15 ENCOUNTER — VIRTUAL VISIT (OUTPATIENT)
Dept: FAMILY MEDICINE | Facility: CLINIC | Age: 75
End: 2020-05-15
Payer: MEDICARE

## 2020-05-15 DIAGNOSIS — J30.1 SEASONAL ALLERGIC RHINITIS DUE TO POLLEN: ICD-10-CM

## 2020-05-15 DIAGNOSIS — J01.10 ACUTE NON-RECURRENT FRONTAL SINUSITIS: Primary | ICD-10-CM

## 2020-05-15 PROCEDURE — 99213 OFFICE O/P EST LOW 20 MIN: CPT | Mod: 95 | Performed by: FAMILY MEDICINE

## 2020-05-15 RX ORDER — OLOPATADINE HYDROCHLORIDE 2 MG/ML
1 SOLUTION/ DROPS OPHTHALMIC DAILY
Qty: 2.5 ML | Refills: 3 | Status: SHIPPED | OUTPATIENT
Start: 2020-05-15 | End: 2022-02-22

## 2020-05-15 RX ORDER — AZELASTINE 1 MG/ML
1 SPRAY, METERED NASAL 2 TIMES DAILY
Qty: 30 ML | Refills: 1 | Status: SHIPPED | OUTPATIENT
Start: 2020-05-15 | End: 2020-06-08

## 2020-05-15 NOTE — PROGRESS NOTES
"Elizabeth Galicia is a 74 year old female who is being evaluated via a billable video visit.      The patient has been notified of following:     \"This video visit will be conducted via a call between you and your physician/provider. We have found that certain health care needs can be provided without the need for an in-person physical exam.  This service lets us provide the care you need with a video conversation.  If a prescription is necessary we can send it directly to your pharmacy.  If lab work is needed we can place an order for that and you can then stop by our lab to have the test done at a later time.    Video visits are billed at different rates depending on your insurance coverage.  Please reach out to your insurance provider with any questions.    If during the course of the call the physician/provider feels a video visit is not appropriate, you will not be charged for this service.\"    Patient has given verbal consent for Video visit? Yes    How would you like to obtain your AVS? Mail a copy    Patient would like the video invitation sent by: Text to cell phone: [phone]    Will anyone else be joining your video visit? No     Subjective     Elizabeth Galicia is a 74 year old female who presents today via video visit for the following health issues:    HPI  RESPIRATORY SYMPTOMS      Duration: 2-3 weeks ago    Description  facial pain/pressure and eye pressure above sinus, tender around the eye area    Severity: moderate    Accompanying signs and symptoms: left eye is runny and sneezing a lot, fatigued and hot flashes    History (predisposing factors): allergies    Precipitating or alleviating factors: allergic to outdoors    Therapies tried and outcome:  Allegra, and inhalers helped some    Patient declined ACT states she has allergies    Having mini- hot flashes   Lasts 30 seconds   Maynard duration than with perimenopause.      Video Start Time: " 3:00pm    -------------------------------------    Patient Active Problem List   Diagnosis     HTN (hypertension)     Obesity (BMI 30-39.9)     Type 2 diabetes mellitus without complication (H)     ANCA-associated vasculitis (Microscopic polyangiitis)     Postoperative hypothyroidism     Anemia, unspecified type     Hyperplasia of renal artery (H)     Heterozygous for MTHFR gene mutation (H)     CKD (chronic kidney disease) stage 3, GFR 30-59 ml/min (H)     Prophylactic antibiotic     Morbid obesity (H)     Seasonal allergic rhinitis     Microscopic polyangiitis (H)     Past Surgical History:   Procedure Laterality Date     COLONOSCOPY       DILATION AND CURETTAGE       ENT SURGERY      partial thyroidectomy; tonsillectomy     EXCISE LESION EYELID Left 3/10/2016    Procedure: EXCISE LESION EYELID;  Surgeon: Rg Nazario MD;  Location: [facility]     HAND SURGERY       HYSTERECTOMY VAGINAL, COLPORRHAPHY ANTERIOR, POSTERIOR, COMBINED N/A 9/9/2014    Procedure: COMBINED HYSTERECTOMY VAGINAL, COLPORRHAPHY ANTERIOR, POSTERIOR;  Surgeon: Shay Winkler MD;  Location: [facility]     HYSTERECTOMY, PAP NO LONGER INDICATED       LAPAROSCOPIC SALPINGO-OOPHORECTOMY  6/27/2011    Procedure:LAPAROSCOPIC SALPINGO-OOPHORECTOMY; resection of left pelvic mass. ; Surgeon:MAYRA OLEARY; Location:[facility]     ORTHOPEDIC SURGERY       SALPINGO OOPHORECTOMY,R/L/PEDRO LUIS      Salpingo Oophorectomy for torsion in past     THYROIDECTOMY      Partial       Social History     Tobacco Use     Smoking status: Never Smoker     Smokeless tobacco: Never Used   Substance Use Topics     Alcohol use: Yes     Comment: rarely     Family History   Problem Relation Age of Onset     Unknown/Adopted Mother      Heart Disease Other            Reviewed and updated as needed this visit by Provider         Review of Systems   Constitutional, HEENT, cardiovascular, pulmonary, gi and gu systems are negative, except as otherwise noted.      Objective    There were no vitals  "taken for this visit.  Estimated body mass index is 38.36 kg/m  as calculated from the following:    Height as of 2/27/20: 1.651 m (5' 5\").    Weight as of 2/27/20: 104.6 kg (230 lb 8 oz).  Physical Exam     GENERAL: Healthy, alert and no distress  EYES: Eyes grossly normal to inspection.  No discharge or erythema, or obvious scleral/conjunctival abnormalities.  RESP: No audible wheeze, cough, or visible cyanosis.  No visible retractions or increased work of breathing.    SKIN: Visible skin clear. No significant rash, abnormal pigmentation or lesions.  NEURO: Cranial nerves grossly intact.  Mentation and speech appropriate for age.  PSYCH: Mentation appears normal, affect normal/bright, judgement and insight intact, normal speech and appearance well-groomed.      Diagnostic Test Results:  Labs reviewed in Epic        Assessment & Plan     1. Acute non-recurrent frontal sinusitis  Left frontal and maxillary sinus infection   Due to underlying allergies  Will add the astelin to flonase and will add pataday eye drops  Plan to treat the sinus infection with augmentin BID for 10 days  Pt will call or RTC if symptoms worsen or do not improve.   - olopatadine (PATADAY) 0.2 % ophthalmic solution; Place 1 drop into both eyes daily  Dispense: 2.5 mL; Refill: 3  - azelastine (ASTELIN) 0.1 % nasal spray; Spray 1 spray into both nostrils 2 times daily  Dispense: 30 mL; Refill: 1    2. Seasonal allergic rhinitis due to pollen     - amoxicillin-clavulanate (AUGMENTIN) 875-125 MG tablet; Take 1 tablet by mouth 2 times daily for 10 days  Dispense: 20 tablet; Refill: 0         No follow-ups on file.    Ella Hernandez DO  [facility]      Video-Visit Details    Type of service:  Video Visit    Video End Time:3:19 PM    Originating Location (pt. Location): Home    Distant Location (provider location):  [facility]     Platform used for Video Visit: Doximity   Had to switch to Doximity since AmWell didn't connect "     No follow-ups on file.       Ella Henrandez, DO

## 2020-05-22 DIAGNOSIS — E89.0 POSTOPERATIVE HYPOTHYROIDISM: ICD-10-CM

## 2020-05-22 NOTE — TELEPHONE ENCOUNTER
"Requested Prescriptions   Pending Prescriptions Disp Refills     liothyronine (CYTOMEL) 5 MCG tablet [Pharmacy Med Name: LIOTHYRONINE SOD 5 MCG TAB] 360 tablet 0     Sig: TAKE 2 TABLETS (10 MCG) BY MOUTH 2 TIMES DAILY  Last Written Prescription Date:  2/28/20  Last Fill Quantity: 360 tablet,  # refills: 0   Last office visit: 3/29/2019 with prescribing provider:  Ella Hernandez     Future Office Visit:                 Thyroid Protocol Failed - 5/22/2020 12:32 AM        Failed - Normal TSH on file in past 12 months     Recent Labs   Lab Test 03/29/19  1202   TSH 1.34              Passed - Patient is 12 years or older        Passed - Recent (12 mo) or future (30 days) visit within the authorizing provider's specialty     Patient has had an office visit with the authorizing provider or a provider within the authorizing providers department within the previous 12 mos or has a future within next 30 days. See \"Patient Info\" tab in inbasket, or \"Choose Columns\" in Meds & Orders section of the refill encounter.              Passed - Medication is active on med list        Passed - No active pregnancy on record     If patient is pregnant or has had a positive pregnancy test, please check TSH.          Passed - No positive pregnancy test in past 12 months     If patient is pregnant or has had a positive pregnancy test, please check TSH.               "

## 2020-05-26 RX ORDER — LIOTHYRONINE SODIUM 5 UG/1
TABLET ORAL
Qty: 360 TABLET | Refills: 0 | Status: SHIPPED | OUTPATIENT
Start: 2020-05-26 | End: 2020-11-30

## 2020-05-26 RX ORDER — LIOTHYRONINE SODIUM 5 UG/1
TABLET ORAL
Qty: 360 TABLET | Refills: 0 | OUTPATIENT
Start: 2020-05-26

## 2020-05-28 ENCOUNTER — TRANSFERRED RECORDS (OUTPATIENT)
Dept: HEALTH INFORMATION MANAGEMENT | Facility: CLINIC | Age: 75
End: 2020-05-28

## 2020-05-28 LAB
AST SERPL-CCNC: 21 U/L (ref 5–34)
CREAT SERPL-MCNC: 1.23 MG/DL (ref 0.5–1.3)
GFR SERPL CREATININE-BSD FRML MDRD: 45.5 ML/MIN/1.73M2

## 2020-06-01 ENCOUNTER — TELEPHONE (OUTPATIENT)
Dept: FAMILY MEDICINE | Facility: CLINIC | Age: 75
End: 2020-06-01

## 2020-06-01 NOTE — TELEPHONE ENCOUNTER
Per Rheumatology notes possible UTI.  Patient instructed to be further evaluated by PCP and have UC done.    Patient scheduled to see JF 6/2.  Taylor De Los Santos RN

## 2020-06-02 ENCOUNTER — OFFICE VISIT (OUTPATIENT)
Dept: FAMILY MEDICINE | Facility: CLINIC | Age: 75
End: 2020-06-02
Payer: MEDICARE

## 2020-06-02 VITALS
SYSTOLIC BLOOD PRESSURE: 115 MMHG | DIASTOLIC BLOOD PRESSURE: 76 MMHG | OXYGEN SATURATION: 96 % | WEIGHT: 235.1 LBS | RESPIRATION RATE: 14 BRPM | HEIGHT: 65 IN | BODY MASS INDEX: 39.17 KG/M2 | HEART RATE: 80 BPM

## 2020-06-02 DIAGNOSIS — R30.0 DYSURIA: Primary | ICD-10-CM

## 2020-06-02 LAB
ALBUMIN UR-MCNC: NEGATIVE MG/DL
APPEARANCE UR: CLEAR
BACTERIA #/AREA URNS HPF: ABNORMAL /HPF
BILIRUB UR QL STRIP: NEGATIVE
COLOR UR AUTO: YELLOW
GLUCOSE UR STRIP-MCNC: NEGATIVE MG/DL
HGB UR QL STRIP: NEGATIVE
KETONES UR STRIP-MCNC: NEGATIVE MG/DL
LEUKOCYTE ESTERASE UR QL STRIP: ABNORMAL
NITRATE UR QL: NEGATIVE
PH UR STRIP: 5.5 PH (ref 5–7)
RBC #/AREA URNS AUTO: ABNORMAL /HPF
SOURCE: ABNORMAL
SP GR UR STRIP: 1.02 (ref 1–1.03)
UROBILINOGEN UR STRIP-ACNC: 0.2 EU/DL (ref 0.2–1)
WBC #/AREA URNS AUTO: ABNORMAL /HPF

## 2020-06-02 PROCEDURE — 81001 URINALYSIS AUTO W/SCOPE: CPT | Performed by: FAMILY MEDICINE

## 2020-06-02 PROCEDURE — 99213 OFFICE O/P EST LOW 20 MIN: CPT | Performed by: FAMILY MEDICINE

## 2020-06-02 PROCEDURE — 87086 URINE CULTURE/COLONY COUNT: CPT | Performed by: FAMILY MEDICINE

## 2020-06-02 ASSESSMENT — PAIN SCALES - GENERAL: PAINLEVEL: NO PAIN (0)

## 2020-06-02 ASSESSMENT — MIFFLIN-ST. JEOR: SCORE: 1572.29

## 2020-06-02 NOTE — PROGRESS NOTES
Subjective     Elizabeth Galicia is a 74 year old female who presents to clinic today for the following health issues:    HPI   URINARY TRACT SYMPTOMS      Duration: WAS NOT HAVING SYMPTOMS WAS TOLD TO COME IN BY RHEUMATOLOGY because of abnormal urinalysis on routine labs    Description  Slight pressure    Intensity:  mild    Accompanying signs and symptoms:  Fever/chills: no   Flank pain no   Nausea and vomiting: no   Vaginal symptoms: none  Abdominal/Pelvic Pain: no     History  History of frequent UTI's: YES- some in the past but not many  History of kidney stones: no   Sexually Active: no   Possibility of pregnancy: no    Precipitating or alleviating factors: None  Therapies tried and outcome: nothing       Patient Active Problem List   Diagnosis     HTN (hypertension)     Obesity (BMI 30-39.9)     Type 2 diabetes mellitus without complication (H)     ANCA-associated vasculitis (Microscopic polyangiitis)     Postoperative hypothyroidism     Anemia, unspecified type     Hyperplasia of renal artery (H)     Heterozygous for MTHFR gene mutation (H)     CKD (chronic kidney disease) stage 3, GFR 30-59 ml/min (H)     Prophylactic antibiotic     Morbid obesity (H)     Seasonal allergic rhinitis     Microscopic polyangiitis (H)     Past Surgical History:   Procedure Laterality Date     COLONOSCOPY       DILATION AND CURETTAGE       ENT SURGERY      partial thyroidectomy; tonsillectomy     EXCISE LESION EYELID Left 3/10/2016    Procedure: EXCISE LESION EYELID;  Surgeon: Rg Nazario MD;  Location: [facility]     HAND SURGERY       HYSTERECTOMY VAGINAL, COLPORRHAPHY ANTERIOR, POSTERIOR, COMBINED N/A 9/9/2014    Procedure: COMBINED HYSTERECTOMY VAGINAL, COLPORRHAPHY ANTERIOR, POSTERIOR;  Surgeon: Shay Winkler MD;  Location: [facility]     HYSTERECTOMY, PAP NO LONGER INDICATED       LAPAROSCOPIC SALPINGO-OOPHORECTOMY  6/27/2011    Procedure:LAPAROSCOPIC SALPINGO-OOPHORECTOMY; resection of left pelvic mass. ;  Surgeon:MAYRA OLEARY; Location:[facility]     ORTHOPEDIC SURGERY       SALPINGO OOPHORECTOMY,R/L/PEDRO LUIS      Salpingo Oophorectomy for torsion in past     THYROIDECTOMY      Partial       Social History     Tobacco Use     Smoking status: Never Smoker     Smokeless tobacco: Never Used   Substance Use Topics     Alcohol use: Yes     Comment: rarely     Family History   Problem Relation Age of Onset     Unknown/Adopted Mother      Heart Disease Other          Current Outpatient Medications   Medication Sig Dispense Refill     albuterol (VENTOLIN HFA) 108 (90 Base) MCG/ACT inhaler TAKE 2 PUFFS BY MOUTH EVERY 4 HOURS AS NEEDED 18 Inhaler 5     azelastine (ASTELIN) 0.1 % nasal spray Spray 1 spray into both nostrils 2 times daily 30 mL 1     CALCIUM LACTATE PO Take 4 tablets by mouth daily        Cholecalciferol (VITAMIN D3 PO) Take 1,000 Units by mouth daily       Cyanocobalamin ([employer] B-12 PO)        fexofenadine (ALLEGRA) 180 MG tablet Take 1 tablet (180 mg) by mouth daily 90 tablet 3     fluticasone (FLOVENT HFA) 110 MCG/ACT inhaler Inhale 1 puff into the lungs 2 times daily 12 g 5     levothyroxine (SYNTHROID/LEVOTHROID) 125 MCG tablet TAKE 1 TABLET (125 MCG) BY MOUTH EVERY MORNING (BEFORE BREAKFAST) 90 tablet 0     liothyronine (CYTOMEL) 5 MCG tablet TAKE 2 TABLETS (10 MCG) BY MOUTH 2 TIMES DAILY 360 tablet 0     MAGNESIUM LACTATE PO Take 2 tablets by mouth daily       montelukast (SINGULAIR) 10 MG tablet TAKE 1 TABLET BY MOUTH EVERYDAY AT BEDTIME 90 tablet 3     olopatadine (PATADAY) 0.2 % ophthalmic solution Place 1 drop into both eyes daily 2.5 mL 3     omeprazole (PRILOSEC) 20 MG DR capsule TAKE 1 CAPSULE BY MOUTH EVERY DAY 90 capsule 1     predniSONE (DELTASONE) 1 MG tablet Take 3 tablets (3 mg) by mouth daily       ranitidine (ZANTAC) 150 MG tablet TAKE 1 TABLET (150 MG) BY MOUTH 2 TIMES DAILY 180 tablet 1     UNABLE TO FIND MEDICATION NAME: medical cannibus       valACYclovir (VALTREX) 1000 mg tablet Take 2 tablets  (2,000 mg) by mouth 2 times daily (Patient taking differently: Take 2,000 mg by mouth 2 times daily as needed ) 12 tablet 3     ACE/ARB/ARNI NOT PRESCRIBED, INTENTIONAL, Please choose reason not prescribed, below (Patient not taking: Reported on 5/15/2020)       acetaminophen (TYLENOL) 500 MG tablet Take 2 tablets (1,000 mg) by mouth every 8 hours (Patient not taking: Reported on 6/2/2020)       Allergies   Allergen Reactions     Cranberries [Cranberry Extract]      Causes herpes flair-up     Dairy [Milk Products]      Perfume Other (See Comments)     Stuffy in nose, HA     Seasonal Allergies          Reviewed and updated as needed this visit by Provider         Review of Systems   CONSTITUTIONAL: NEGATIVE for fever, chills, change in weight  GI: NEGATIVE for nausea, abdominal pain, heartburn, or change in bowel habits      Objective    There were no vitals taken for this visit.  There is no height or weight on file to calculate BMI.  Physical Exam   GENERAL: healthy, alert and no distress  ABDOMEN: soft, nontender, no hepatosplenomegaly, no masses and bowel sounds normal  MS: no gross musculoskeletal defects noted, no edema    Diagnostic Test Results:  Labs reviewed in Epic  Results for orders placed or performed in visit on 06/02/20   UA with Microscopic reflex to Culture     Status: Abnormal    Specimen: Midstream Urine   Result Value Ref Range    Color Urine Yellow     Appearance Urine Clear     Glucose Urine Negative NEG^Negative mg/dL    Bilirubin Urine Negative NEG^Negative    Ketones Urine Negative NEG^Negative mg/dL    Specific Gravity Urine 1.020 1.003 - 1.035    pH Urine 5.5 5.0 - 7.0 pH    Protein Albumin Urine Negative NEG^Negative mg/dL    Urobilinogen Urine 0.2 0.2 - 1.0 EU/dL    Nitrite Urine Negative NEG^Negative    Blood Urine Negative NEG^Negative    Leukocyte Esterase Urine Moderate (A) NEG^Negative    Source Midstream Urine     WBC Urine 10-25 (A) OTO5^0 - 5 /HPF    RBC Urine 2-5 (A) OTO2^O - 2  /HPF    Bacteria Urine Few (A) NEG^Negative /HPF   Urine Culture Aerobic Bacterial     Status: None    Specimen: Midstream Urine   Result Value Ref Range    Specimen Description Midstream Urine     Culture Micro No growth          Assessment & Plan       ICD-10-CM    1. Dysuria  R30.0 UA with Microscopic reflex to Culture     Urine Culture Aerobic Bacterial      Differential diagnosis includes typical urinary tract infection versus other sources of pelvic pain or urethritis    Discussed empiric antibioic treatment for the most common pathogens  Discussed urine culture follow-up by phone  Follow up: Return as needed if symptoms fail to improve        No follow-ups on file.    Patrick Schaefer MD  [facility]

## 2020-06-03 LAB
BACTERIA SPEC CULT: NO GROWTH
SPECIMEN SOURCE: NORMAL

## 2020-06-04 ENCOUNTER — TELEPHONE (OUTPATIENT)
Dept: FAMILY MEDICINE | Facility: CLINIC | Age: 75
End: 2020-06-04

## 2020-06-04 DIAGNOSIS — R30.0 DYSURIA: Primary | ICD-10-CM

## 2020-06-04 RX ORDER — NITROFURANTOIN 25; 75 MG/1; MG/1
100 CAPSULE ORAL 2 TIMES DAILY
Qty: 14 CAPSULE | Refills: 0 | Status: SHIPPED | OUTPATIENT
Start: 2020-06-04 | End: 2020-06-11

## 2020-06-04 NOTE — TELEPHONE ENCOUNTER
JF,   Pt called back about results  Denies frequency  When urinates feel like can't fully empty bladder  Also only urinates a small amount  Fatigued too  No burning   Slight pressure feeling   Would like to try abx  Pended pharmacy  Please advise   Thanks,  Jazmin TAVARES RN        Notes recorded by Faby Ling RN on 6/4/2020 at 11:11 AM CDT   LD, reviewed result note from SASHA. If symptoms not improving call back to triage, otherwise no need to call.   Faby Ling RN     ------     Notes recorded by Patrick Schaefer MD on 6/4/2020 at 10:56 AM CDT   Her urine culture shows no particular bacteria grew. Perhaps this is because sample was contaminated by skin bacteria, which happens. I still think this is a urinary infection. Call and find out,  symptoms should be improving. If they are not they should let me know..     Patrick Schaefer MD MPH

## 2020-06-06 DIAGNOSIS — J30.1 SEASONAL ALLERGIC RHINITIS DUE TO POLLEN: ICD-10-CM

## 2020-06-06 NOTE — TELEPHONE ENCOUNTER
"AZELASTINE 0.1% (137 MCG) SPRY   Last Written Prescription Date:  05/15/2020  Last Fill Quantity: 30ml,  # refills: 1   Last office visit: 6/2/2020 with prescribing provider:  ayan   Future Office Visit:  Nothing at this time scheduled.        Requested Prescriptions   Pending Prescriptions Disp Refills     azelastine (ASTELIN) 0.1 % nasal spray [Pharmacy Med Name: AZELASTINE 0.1% (137 MCG) SPRY]  1     Sig: SPRAY 1 SPRAY INTO BOTH NOSTRILS 2 TIMES DAILY       Antihistamines Protocol Failed - 6/6/2020 10:31 AM        Failed - Patient is 3-64 years of age     Apply weight-based dosing for peds patients age 3 - 12 years of age.    Forward request to provider for patients under the age of 3 or over the age of 64.          Passed - Recent (12 mo) or future (30 days) visit within the authorizing provider's specialty     Patient has had an office visit with the authorizing provider or a provider within the authorizing providers department within the previous 12 mos or has a future within next 30 days. See \"Patient Info\" tab in inbasket, or \"Choose Columns\" in Meds & Orders section of the refill encounter.              Passed - Medication is active on med list       Nasal Allergy Protocol Passed - 6/6/2020 10:31 AM        Passed - Patient is age 12 or older        Passed - Recent (12 mo) or future (30 days) visit within the authorizing provider's specialty     Patient has had an office visit with the authorizing provider or a provider within the authorizing providers department within the previous 12 mos or has a future within next 30 days. See \"Patient Info\" tab in inbasket, or \"Choose Columns\" in Meds & Orders section of the refill encounter.              Passed - Medication is active on med list           "

## 2020-06-08 RX ORDER — AZELASTINE 1 MG/ML
1 SPRAY, METERED NASAL 2 TIMES DAILY
Qty: 30 ML | Refills: 1 | Status: SHIPPED | OUTPATIENT
Start: 2020-06-08 | End: 2020-06-30

## 2020-06-08 NOTE — TELEPHONE ENCOUNTER
Prescription approved per [facility] [place] Protocol.  Jazmin TAVARES RN

## 2020-06-30 DIAGNOSIS — J30.1 SEASONAL ALLERGIC RHINITIS DUE TO POLLEN: ICD-10-CM

## 2020-06-30 RX ORDER — AZELASTINE 1 MG/ML
1 SPRAY, METERED NASAL 2 TIMES DAILY
Qty: 30 ML | Refills: 1 | Status: SHIPPED | OUTPATIENT
Start: 2020-06-30 | End: 2022-02-22

## 2020-06-30 NOTE — TELEPHONE ENCOUNTER
Prescription approved per [facility] Refill Protocol.  Jazmin TAVARES RN

## 2020-07-02 ENCOUNTER — TELEPHONE (OUTPATIENT)
Dept: FAMILY MEDICINE | Facility: CLINIC | Age: 75
End: 2020-07-02

## 2020-07-02 NOTE — TELEPHONE ENCOUNTER
Patient Quality Outreach      Summary:    Patient is due/failing the following:   Breast Cancer Screening - Mammogram    Type of outreach:    Sent letter.    Questions for provider review:    None                                                                                   **Start Working phrase here:**       Patient has the following on her problem list/HM: None                                                Jazmin TAVARES RN

## 2020-07-02 NOTE — LETTER
July 2, 2020      Elizabeth Galicia  [address] [address]      Baakri Johnson,    We care about your health and have reviewed your health plan and are making recommendations based on this review to optimize your health.    You are in particular need of attention regarding:   Breast Cancer Screening    At this time Dr Hernandez asked that we reach out and provide you information to schedule your yearly mammogram.     Screening Mammogram Scheduling:  [facility] [place] [phone]  [facility] [phone]  [facility] [phone]  Central Scheduling for All Locations [phone]    If you are underinsured or uninsured, we recommend you contact [facility].   They offer mammograms on a sliding fee charge.   You can schedule with them at [phone].    Sincerely,  Jazmin TAVARES RN

## 2020-08-05 ENCOUNTER — VIRTUAL VISIT (OUTPATIENT)
Dept: PALLIATIVE MEDICINE | Facility: CLINIC | Age: 75
End: 2020-08-05
Payer: MEDICARE

## 2020-08-05 DIAGNOSIS — M79.2 NEUROPATHIC PAIN: Primary | ICD-10-CM

## 2020-08-05 DIAGNOSIS — M70.62 GREATER TROCHANTERIC BURSITIS OF BOTH HIPS: ICD-10-CM

## 2020-08-05 DIAGNOSIS — M70.61 GREATER TROCHANTERIC BURSITIS OF BOTH HIPS: ICD-10-CM

## 2020-08-05 DIAGNOSIS — G89.4 CHRONIC PAIN SYNDROME: ICD-10-CM

## 2020-08-05 PROCEDURE — 99213 OFFICE O/P EST LOW 20 MIN: CPT | Mod: 95 | Performed by: PAIN MEDICINE

## 2020-08-05 ASSESSMENT — PAIN SCALES - GENERAL: PAINLEVEL: MILD PAIN (2)

## 2020-08-05 NOTE — PROGRESS NOTES
"Elizabeth Galicia is a 75 year old female who is being evaluated via a billable video visit.      The patient has been notified of following:     \"This video visit will be conducted via a call between you and your physician/provider. We have found that certain health care needs can be provided without the need for an in-person physical exam.  This service lets us provide the care you need with a video conversation.  If a prescription is necessary we can send it directly to your pharmacy.  If lab work is needed we can place an order for that and you can then stop by our lab to have the test done at a later time.    Video visits are billed at different rates depending on your insurance coverage.  Please reach out to your insurance provider with any questions.    If during the course of the call the physician/provider feels a video visit is not appropriate, you will not be charged for this service.\"    Patient has given verbal consent for Video visit? Yes  How would you like to obtain your AVS? MyChart  If you are dropped from the video visit, the video invite should be resent to: Text to cell phone: [phone]  Will anyone else be joining your video visit? No        Video-Visit Details    Type of service:  Video Visit    Video Start Time: 1110  Video End Time: 1125    Originating Location (pt. Location): Home    Distant Location (provider location):  [facility]     Platform used for Video Visit: Henrietta Banerjee MD    [facility] follow up      Reason for consultation:    Primary Care Provider is No Ref-Primary, Physician.  Pain medications are being prescribed by n/a.    Please see the [facility] health questionnaire which the patient completed and reviewed with me in detail.    Chief Complaint:    Chief Complaint   Patient presents with     Pain     Video visit due to Covid-19      MME prescribed prior to seeing patient:  Current MME:  rec  - " Further procedures recommended:    - consider bilateral greater trochanteric bursa injection- Would first recommend trial of medical cannabis. If interested please call.   - Medication Management:    Will certify for medical cannabis [email or web address]  - Physical Therapy: consider Pain PHYSICAL THERAPY       - Follow up:   3 months or sooner if interested in the procedure       Interval:  The pt reports sig benefit with medical cannabis. The pt always uses before bed time. The pt reports when she is more active she will use during the day. Overall the pt is very happy with the management. Of note the pt recent increase in pred dose with improvement in symptoms. Currently has f/u with rheum.   Of note patient also feels like her bilateral greater troches pain has resolved.  The patient notes significant improvement in her quality of life.  Patient would not like to make any changes in her regimen at this time.    Pain history:     Patient is referred by PCP- interested in medical cannabis.  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 The pt is currently on pred 4mg. Of note when pt was on higher doses she did not have as sig pain levels.     Pt main concern is she cant sleep 2/2 to leg pain   Elizabeth Galicia is a 73 year old female w/ chronic bilateral lat leg/hip pain. The pain has gotten progressively worse over the last couple of years. In general she denies any specific inciting event.  The pain is currently worse on the L>R. The pain is intermittent. The pain is a deep aching pain. The pain is occasionally sharp.  The pain occasionally radiates down her lat leg. Neg numbness. Neg burning. Neg tingling. She denies any overt weakness. The pt did have a recent fall, but was not 2/2 to weakness, rather she tripped. The pain is worse when sleeping on her sides. The pain is worse with prolonged walking  The pain is slightly better  when lying on her back. The pain is slightly better when changing positions. The pain is slightly bettter when sleeping her chair. The pt also does a lot of PHYSICAL THERAPY  And stretches for her IT band. The pt notes sig benefit, but in general the relief does not last.    Of note on occasion the pt will have more generalized pain. Specifically chest, shoulders, neg     The pt was using voltaren gel , but stopped 2/2 to benefit     The pt takes 2 tabs of 625mg at night. Occasionally she will take an additional tablet when being more active.     Of note pt has a gfr of 54, currently being seen by a rheumatologist.     Again pt main concern is not sleeping and she feels it makes every aspect of her life worse.     The pt continues to approach her symptoms from multiple directions and is now looking for other options.         Pain rating: intensity  Averages 4/10 on a 0-10 scale.      Current treatments include:  Medical cannabis  Acetaminophen  Previous medication treatments included:  voltaren gel        Other treatments have included:  Elizabeth Galicia has not been seen at a pain clinic in the past.    PT: yes helped   Chiropractic: yes  Acupuncture: no  TENs Unit: yes  Injections: no    Past Medical History:  Past Medical History:   Diagnosis Date     ANCA-associated vasculitis (H)      Anxiety      Gastro-oesophageal reflux disease      Herniated lumbar intervertebral disc      Herpes      Heterozygous for MTHFR gene mutation     Per CareEverywhere     Hyperlipidemia      Lesion of upper eyelid LEFT     Obesity (BMI 30-39.9)      Postoperative hypothyroidism      Seasonal allergies      Type 2 diabetes mellitus without complication (H) 5/24/2017     Uterine prolapse      Past Surgical History:  Past Surgical History:   Procedure Laterality Date     COLONOSCOPY       DILATION AND CURETTAGE       ENT SURGERY      partial thyroidectomy; tonsillectomy     EXCISE LESION EYELID Left 3/10/2016    Procedure: EXCISE  LESION EYELID;  Surgeon: Rg Nazario MD;  Location: [facility]     HAND SURGERY       HYSTERECTOMY VAGINAL, COLPORRHAPHY ANTERIOR, POSTERIOR, COMBINED N/A 9/9/2014    Procedure: COMBINED HYSTERECTOMY VAGINAL, COLPORRHAPHY ANTERIOR, POSTERIOR;  Surgeon: Shay Winkler MD;  Location: [facility]     HYSTERECTOMY, PAP NO LONGER INDICATED       LAPAROSCOPIC SALPINGO-OOPHORECTOMY  6/27/2011    Procedure:LAPAROSCOPIC SALPINGO-OOPHORECTOMY; resection of left pelvic mass. ; Surgeon:MAYRA OLEARY; Location:[facility]     ORTHOPEDIC SURGERY       SALPINGO OOPHORECTOMY,R/L/PEDRO LUIS      Salpingo Oophorectomy for torsion in past     THYROIDECTOMY      Partial     Medications:  Current Outpatient Medications   Medication Sig Dispense Refill     albuterol (VENTOLIN HFA) 108 (90 Base) MCG/ACT inhaler TAKE 2 PUFFS BY MOUTH EVERY 4 HOURS AS NEEDED 18 Inhaler 5     azelastine (ASTELIN) 0.1 % nasal spray SPRAY 1 SPRAY INTO BOTH NOSTRILS 2 TIMES DAILY 30 mL 1     CALCIUM LACTATE PO Take 4 tablets by mouth daily        Cholecalciferol (VITAMIN D3 PO) Take 1,000 Units by mouth daily       Cyanocobalamin ([employer] B-12 PO)        fexofenadine (ALLEGRA) 180 MG tablet Take 1 tablet (180 mg) by mouth daily 90 tablet 3     fluticasone (FLOVENT HFA) 110 MCG/ACT inhaler Inhale 1 puff into the lungs 2 times daily 12 g 5     levothyroxine (SYNTHROID/LEVOTHROID) 125 MCG tablet TAKE 1 TABLET (125 MCG) BY MOUTH EVERY MORNING (BEFORE BREAKFAST) 90 tablet 0     liothyronine (CYTOMEL) 5 MCG tablet TAKE 2 TABLETS (10 MCG) BY MOUTH 2 TIMES DAILY 360 tablet 0     MAGNESIUM LACTATE PO Take 2 tablets by mouth daily       montelukast (SINGULAIR) 10 MG tablet TAKE 1 TABLET BY MOUTH EVERYDAY AT BEDTIME 90 tablet 3     olopatadine (PATADAY) 0.2 % ophthalmic solution Place 1 drop into both eyes daily 2.5 mL 3     omeprazole (PRILOSEC) 20 MG DR capsule TAKE 1 CAPSULE BY MOUTH EVERY DAY 90 capsule 1     predniSONE (DELTASONE) 1 MG tablet Take 3 tablets (3 mg) by mouth daily        ranitidine (ZANTAC) 150 MG tablet TAKE 1 TABLET (150 MG) BY MOUTH 2 TIMES DAILY 180 tablet 1     UNABLE TO FIND MEDICATION NAME: medical cannibus       valACYclovir (VALTREX) 1000 mg tablet Take 2 tablets (2,000 mg) by mouth 2 times daily (Patient taking differently: Take 2,000 mg by mouth 2 times daily as needed ) 12 tablet 3     ACE/ARB/ARNI NOT PRESCRIBED, INTENTIONAL, Please choose reason not prescribed, below (Patient not taking: Reported on 5/15/2020)       acetaminophen (TYLENOL) 500 MG tablet Take 2 tablets (1,000 mg) by mouth every 8 hours (Patient not taking: Reported on 6/2/2020)       Allergies:     Allergies   Allergen Reactions     Cranberries [Cranberry Extract]      Causes herpes flair-up     Dairy [Milk Products]      Perfume Other (See Comments)     Stuffy in nose, HA     Seasonal Allergies      Social History:    Occupation/Schooling: no  Tobacco use: no  Alcohol use: no  Drug use: no  History of chemical dependency treatment: no    Family history:  Family History   Problem Relation Age of Onset     Unknown/Adopted Mother      Heart Disease Other      Family history of headaches: no    Review of Systems:  Skin: negative  Eyes: negative  Ears/Nose/Throat: negative  Respiratory: No shortness of breath, dyspnea on exertion, cough, or hemoptysis  Cardiovascular: negative  Gastrointestinal: negative  Genitourinary: negative  Musculoskeletal: negative  Neurologic: negative  Psychiatric: negative  Hematologic/Lymphatic/Immunologic: negative  Endocrine: negative    Physical Exam:  There were no vitals filed for this visit.  Exam:  Constitutional: healthy, alert and no distress  Respiratory: Speaking in full sentences no accessory muscles use   Psychiatric: mentation appears normal and affect normal/bright    Musculoskeletal exam:  Gait wnl   Cervical spine: ROM within normal limits  Able to easily overcome gravity with both her upper and lower extremities  No pain to palpation over her greater  deborah.         Assessment/Plan:  Elizabeth Galicia is a 73 year old female who presents with the complaints of bilateral lateral leg/hip pain.   Elizabeth was seen today for pain.    Diagnoses and all orders for this visit:    Neuropathic pain    Chronic pain syndrome    Greater trochanteric bursitis of both hips         - Further procedures recommended:    -None at this time  - Medication Management:    Will recertify for medical cannabis [email or web address]  - Physical Therapy: None at this time  - Follow up:   1 year              Total time spent was 15 minutes    Deon Banerjee MD  [facility]  This note was created with voice recognition software, and while reviewed for accuracy, typos may remain.

## 2020-08-06 ENCOUNTER — TELEPHONE (OUTPATIENT)
Dept: PALLIATIVE MEDICINE | Facility: CLINIC | Age: 75
End: 2020-08-06

## 2020-08-06 DIAGNOSIS — E89.0 POSTOPERATIVE HYPOTHYROIDISM: ICD-10-CM

## 2020-08-06 RX ORDER — LEVOTHYROXINE SODIUM 125 UG/1
TABLET ORAL
Qty: 30 TABLET | Refills: 0 | Status: SHIPPED | OUTPATIENT
Start: 2020-08-06 | End: 2020-08-31

## 2020-08-06 NOTE — TELEPHONE ENCOUNTER
Medication is being filled for 1 time refill only due to:  Patient needs to be seen because due for thyroid labs.

## 2020-08-06 NOTE — TELEPHONE ENCOUNTER
Pt will call back to schedule 1 year follow up.    Caro RHODES    [facility]

## 2020-08-20 ENCOUNTER — TRANSFERRED RECORDS (OUTPATIENT)
Dept: HEALTH INFORMATION MANAGEMENT | Facility: CLINIC | Age: 75
End: 2020-08-20

## 2020-08-29 DIAGNOSIS — E89.0 POSTOPERATIVE HYPOTHYROIDISM: ICD-10-CM

## 2020-08-31 NOTE — TELEPHONE ENCOUNTER
Left non detailed VM for pt asking that they callback and schedule lab only  Overdue for TSH  Jazmin TAVARES RN

## 2020-09-02 RX ORDER — LEVOTHYROXINE SODIUM 125 UG/1
TABLET ORAL
Qty: 30 TABLET | Refills: 0 | Status: SHIPPED | OUTPATIENT
Start: 2020-09-02 | End: 2020-09-28

## 2020-09-02 NOTE — TELEPHONE ENCOUNTER
PN,  Left VM #2 for patient  See below messages  No response from patient to our outreach  Please advise on further refill  Thanks,  Jazmin TAVARES RN

## 2020-09-17 ENCOUNTER — TELEPHONE (OUTPATIENT)
Dept: FAMILY MEDICINE | Facility: CLINIC | Age: 75
End: 2020-09-17

## 2020-09-17 NOTE — TELEPHONE ENCOUNTER
Summary:    Patient is due/failing the following:   Diabetes visit and eye exam    Type of outreach:  Sent letter.  Action needed: Patient needs office visit for diabetes visit.    If need for provider review:    Please indicate OV, lab, MTM, or nurse appt if needed.  Indicate fasting or not fasting.                                                                                                                                          Risa Tavares MA

## 2020-09-17 NOTE — LETTER
[facility]  [address]  [facility] [id number] [phone]       September 17, 2020    Paul Galicia  [address] [facility] [address]    Dear PAUL,    We care about your health and have reviewed your health plan and are making recommendations based on this review, to optimize your health.    You are in particular need of attention regarding:  -Diabetes    We are recommending that you:  -schedule a FOLLOWUP OFFICE APPOINTMENT with me.  I will recheck your: A1c test.      In addition, here is a list of due or overdue Health Maintenance reminders.    Health Maintenance Due   Topic Date Due     Diabetic Foot Exam  1945     Asthma Action Plan - yearly  1945     Discuss Advance Care Planning  1945     Eye Exam  1945     Colorectal Cancer Screening  06/19/1956     Hepatitis B Vaccine (1 of 3 - Risk 3-dose series) 06/19/1965     Annual Wellness Visit  10/27/2017     Mammogram  10/27/2018     Kidney Microalbumin Urine Test  11/30/2018     A1C Lab  09/29/2019     FALL RISK ASSESSMENT  01/10/2020     Asthma Control Test  03/13/2020     Cholesterol Lab  03/29/2020     Flu Vaccine (1) 09/01/2020       To address the above recommendations, we encourage you to contact us at [phone], via [employer] or by contacting Central Scheduling toll free at [phone] 24 hours a day. They will assist you with finding the most convenient time and location.      We care about your health and have reviewed your health plan and are making recommendations based on this review to optimize your health.    You are in particular need of attention regarding:   Diabetic Eye Exam    At this time Dr Hernandez asked that we reach out to advise you complete this as soon as you are able.   You can go to any eye doctor ([facility], [facility], etc).     If you'd like, we also have an eye doctor in the same building as us here at the [facility].   Her name is  Elaine Evangelista - she's in [address] and the number to call and schedule with her is [phone].    If you've already had your eye exam this year please give us the date and place you had it completed.   Then we can update your chart.     Please let us know if you have questions/concerns.    Sincerely,  [facility]

## 2020-09-29 ENCOUNTER — ALLIED HEALTH/NURSE VISIT (OUTPATIENT)
Dept: NURSING | Facility: CLINIC | Age: 75
End: 2020-09-29
Payer: MEDICARE

## 2020-09-29 DIAGNOSIS — E89.0 POSTOPERATIVE HYPOTHYROIDISM: ICD-10-CM

## 2020-09-29 DIAGNOSIS — Z23 NEED FOR VACCINATION: ICD-10-CM

## 2020-09-29 PROCEDURE — G0009 ADMIN PNEUMOCOCCAL VACCINE: HCPCS

## 2020-09-29 PROCEDURE — 84443 ASSAY THYROID STIM HORMONE: CPT | Performed by: FAMILY MEDICINE

## 2020-09-29 PROCEDURE — 36415 COLL VENOUS BLD VENIPUNCTURE: CPT | Performed by: FAMILY MEDICINE

## 2020-09-29 PROCEDURE — 90670 PCV13 VACCINE IM: CPT

## 2020-09-29 NOTE — LETTER
September 30, 2020      Elizabeth Galicia  [address] [facility] [address]        Dear ,    We are writing to inform you of your test results.    Enclosed is a copy of your results. If you have any questions, please contact us at [phone].         -TSH (thyroid stimulating hormone) level is normal which indicates appropriate thyroid replacement dosing.  ADVISE: continuing same replacement dose and rechecking this in 12 months.     Thanks,   Ella Hernandez,      Resulted Orders   TSH with free T4 reflex   Result Value Ref Range    TSH 0.62 0.40 - 4.00 mU/L       If you have any questions or concerns, please call the clinic at the number listed above.       Sincerely,        [facility]

## 2020-09-29 NOTE — NURSING NOTE
Prior to immunization administration, verified patients identity using patient s name and date of birth. Please see Immunization Activity for additional information.     Screening Questionnaire for Adult Immunization    Are you sick today?   No   Do you have allergies to medications, food, a vaccine component or latex?   No   Have you ever had a serious reaction after receiving a vaccination?   No   Do you have a long-term health problem with heart, lung, kidney, or metabolic disease (e.g., diabetes), asthma, a blood disorder, no spleen, complement component deficiency, a cochlear implant, or a spinal fluid leak?  Are you on long-term aspirin therapy?   No   Do you have cancer, leukemia, HIV/AIDS, or any other immune system problem?   No   Do you have a parent, brother, or sister with an immune system problem?   No   In the past 3 months, have you taken medications that affect  your immune system, such as prednisone, other steroids, or anticancer drugs; drugs for the treatment of rheumatoid arthritis, Crohn s disease, or psoriasis; or have you had radiation treatments?   No   Have you had a seizure, or a brain or other nervous system problem?   No   During the past year, have you received a transfusion of blood or blood    products, or been given immune (gamma) globulin or antiviral drug?   No   For women: Are you pregnant or is there a chance you could become       pregnant during the next month?   No   Have you received any vaccinations in the past 4 weeks?   No     Immunization questionnaire answers were all negative.        Per orders of Dr. Ella Hernandez, injection of PCV-13 given by Maricruz Arndt. Patient instructed to remain in clinic for 15 minutes afterwards, and to report any adverse reaction to me immediately.       Screening performed by Maricruz Arndt on 9/29/2020 at 2:25 PM.

## 2020-09-30 LAB — TSH SERPL DL<=0.005 MIU/L-ACNC: 0.62 MU/L (ref 0.4–4)

## 2020-09-30 NOTE — RESULT ENCOUNTER NOTE
Please send copy of results with a letter:    Enclosed is a copy of your results. If you have any questions, please contact us at [phone].      -TSH (thyroid stimulating hormone) level is normal which indicates appropriate thyroid replacement dosing.  ADVISE: continuing same replacement dose and rechecking this in 12 months.    Thanks,   Ella Hernandez, DO

## 2020-10-08 DIAGNOSIS — B00.9 HERPES SIMPLEX VIRUS INFECTION: ICD-10-CM

## 2020-10-08 RX ORDER — VALACYCLOVIR HYDROCHLORIDE 1 G/1
2000 TABLET, FILM COATED ORAL 2 TIMES DAILY PRN
Qty: 12 TABLET | Refills: 0 | Status: SHIPPED | OUTPATIENT
Start: 2020-10-08 | End: 2021-01-05

## 2020-10-08 NOTE — TELEPHONE ENCOUNTER
"Valtrex:    Prescription approved per [facility] Refill Protocol.  Taylor De Los Santos RN    Requested Prescriptions   Pending Prescriptions Disp Refills     valACYclovir (VALTREX) 1000 mg tablet [Pharmacy Med Name: VALACYCLOVIR HCL 1 GRAM TABLET] 12 tablet 0     Sig: Take 2 tablets (2,000 mg) by mouth 2 times daily as needed (flare up)       Antivirals for Herpes Protocol Passed - 10/8/2020  2:43 PM        Passed - Patient is age 12 or older        Passed - Recent (12 mo) or future (30 days) visit within the authorizing provider's specialty     Patient has had an office visit with the authorizing provider or a provider within the authorizing providers department within the previous 12 mos or has a future within next 30 days. See \"Patient Info\" tab in inbasket, or \"Choose Columns\" in Meds & Orders section of the refill encounter.              Passed - Medication is active on med list        Passed - Normal serum creatinine on file in past 12 months     Recent Labs   Lab Test 05/28/20   CR 1.230       Ok to refill medication if creatinine is low             "

## 2020-10-27 DIAGNOSIS — E89.0 POSTOPERATIVE HYPOTHYROIDISM: ICD-10-CM

## 2020-10-28 RX ORDER — LEVOTHYROXINE SODIUM 125 UG/1
125 TABLET ORAL DAILY
Qty: 90 TABLET | Refills: 0 | Status: SHIPPED | OUTPATIENT
Start: 2020-10-28 | End: 2021-01-05

## 2020-10-28 NOTE — TELEPHONE ENCOUNTER
"Last Written Prescription Date:  9/28/2020  Last Fill Quantity: 30,  # refills: 0  Future Office Visit:      Prescription approved per [facility] Refill Protocol.  Taylor De Los Santos RN    Requested Prescriptions   Signed Prescriptions Disp Refills    levothyroxine (SYNTHROID/LEVOTHROID) 125 MCG tablet 90 tablet 0     Sig: Take 1 tablet (125 mcg) by mouth daily       Thyroid Protocol Passed - 10/27/2020 10:30 AM        Passed - Patient is 12 years or older        Passed - Recent (12 mo) or future (30 days) visit within the authorizing provider's specialty     Patient has had an office visit with the authorizing provider or a provider within the authorizing providers department within the previous 12 mos or has a future within next 30 days. See \"Patient Info\" tab in inbasket, or \"Choose Columns\" in Meds & Orders section of the refill encounter.              Passed - Medication is active on med list        Passed - Normal TSH on file in past 12 months     Recent Labs   Lab Test 09/29/20  1410   TSH 0.62              Passed - No active pregnancy on record     If patient is pregnant or has had a positive pregnancy test, please check TSH.          Passed - No positive pregnancy test in past 12 months     If patient is pregnant or has had a positive pregnancy test, please check TSH.               "

## 2020-11-05 ENCOUNTER — TELEPHONE (OUTPATIENT)
Dept: PALLIATIVE MEDICINE | Facility: CLINIC | Age: 75
End: 2020-11-05

## 2020-11-05 NOTE — TELEPHONE ENCOUNTER
Patient called and stated she did not submit her medical marijuana soon enough and now they are requiring that Dr. Banerjee to send it again        Melissa Emerson    [facility]

## 2020-11-06 NOTE — TELEPHONE ENCOUNTER
Reviewed chart. Pt's last visit was on 8/5 with recommendations to follow up in one year. Notes state:    Medication Management:               Will recertify for medical cannabis [email or web address]   -------------    Will route to provider for review and completion of recertification if appropriate.     DELIO SolorzanoN, RN-BC  Patient Care Supervisor  [facility] [place]

## 2020-11-11 ENCOUNTER — TRANSFERRED RECORDS (OUTPATIENT)
Dept: HEALTH INFORMATION MANAGEMENT | Facility: CLINIC | Age: 75
End: 2020-11-11

## 2021-01-05 ENCOUNTER — VIRTUAL VISIT (OUTPATIENT)
Dept: FAMILY MEDICINE | Facility: CLINIC | Age: 76
End: 2021-01-05
Payer: MEDICARE

## 2021-01-05 DIAGNOSIS — Z12.11 SCREEN FOR COLON CANCER: ICD-10-CM

## 2021-01-05 DIAGNOSIS — J32.9 SINUSITIS, UNSPECIFIED CHRONICITY, UNSPECIFIED LOCATION: ICD-10-CM

## 2021-01-05 DIAGNOSIS — I10 ESSENTIAL HYPERTENSION: Chronic | ICD-10-CM

## 2021-01-05 DIAGNOSIS — B00.9 HERPES SIMPLEX VIRUS INFECTION: ICD-10-CM

## 2021-01-05 DIAGNOSIS — I77.82 ANCA-ASSOCIATED VASCULITIS (H): Chronic | ICD-10-CM

## 2021-01-05 DIAGNOSIS — Z00.00 ENCOUNTER FOR MEDICARE ANNUAL WELLNESS EXAM: Primary | ICD-10-CM

## 2021-01-05 DIAGNOSIS — N18.30 STAGE 3 CHRONIC KIDNEY DISEASE, UNSPECIFIED WHETHER STAGE 3A OR 3B CKD (H): Chronic | ICD-10-CM

## 2021-01-05 DIAGNOSIS — K21.9 GASTROESOPHAGEAL REFLUX DISEASE WITHOUT ESOPHAGITIS: ICD-10-CM

## 2021-01-05 DIAGNOSIS — M31.7 MICROSCOPIC POLYANGIITIS (H): ICD-10-CM

## 2021-01-05 DIAGNOSIS — J30.1 SEASONAL ALLERGIC RHINITIS DUE TO POLLEN: ICD-10-CM

## 2021-01-05 DIAGNOSIS — J45.909 REACTIVE AIRWAY DISEASE WITHOUT COMPLICATION, UNSPECIFIED ASTHMA SEVERITY, UNSPECIFIED WHETHER PERSISTENT: ICD-10-CM

## 2021-01-05 DIAGNOSIS — E11.9 TYPE 2 DIABETES MELLITUS WITHOUT COMPLICATION, WITHOUT LONG-TERM CURRENT USE OF INSULIN (H): Chronic | ICD-10-CM

## 2021-01-05 DIAGNOSIS — E89.0 POSTOPERATIVE HYPOTHYROIDISM: ICD-10-CM

## 2021-01-05 PROCEDURE — G0439 PPPS, SUBSEQ VISIT: HCPCS | Mod: 95 | Performed by: FAMILY MEDICINE

## 2021-01-05 RX ORDER — DEXAMETHASONE 4 MG/1
TABLET ORAL
Qty: 36 INHALER | Refills: 1 | Status: SHIPPED | OUTPATIENT
Start: 2021-01-05 | End: 2022-06-03

## 2021-01-05 RX ORDER — LEVOTHYROXINE SODIUM 125 UG/1
125 TABLET ORAL DAILY
Qty: 90 TABLET | Refills: 2 | Status: SHIPPED | OUTPATIENT
Start: 2021-01-05 | End: 2021-09-08

## 2021-01-05 RX ORDER — VALACYCLOVIR HYDROCHLORIDE 500 MG/1
500 TABLET, FILM COATED ORAL 2 TIMES DAILY
Qty: 6 TABLET | Refills: 11 | Status: SHIPPED | OUTPATIENT
Start: 2021-01-05 | End: 2021-09-08

## 2021-01-05 RX ORDER — MONTELUKAST SODIUM 10 MG/1
TABLET ORAL
Qty: 90 TABLET | Refills: 3 | Status: SHIPPED | OUTPATIENT
Start: 2021-01-05 | End: 2022-02-22

## 2021-01-05 ASSESSMENT — ASTHMA QUESTIONNAIRES
QUESTION_1 LAST FOUR WEEKS HOW MUCH OF THE TIME DID YOUR ASTHMA KEEP YOU FROM GETTING AS MUCH DONE AT WORK, SCHOOL OR AT HOME: NONE OF THE TIME
QUESTION_4 LAST FOUR WEEKS HOW OFTEN HAVE YOU USED YOUR RESCUE INHALER OR NEBULIZER MEDICATION (SUCH AS ALBUTEROL): ONE OR TWO TIMES PER DAY
ACT_TOTALSCORE: 21
QUESTION_5 LAST FOUR WEEKS HOW WOULD YOU RATE YOUR ASTHMA CONTROL: WELL CONTROLLED
QUESTION_3 LAST FOUR WEEKS HOW OFTEN DID YOUR ASTHMA SYMPTOMS (WHEEZING, COUGHING, SHORTNESS OF BREATH, CHEST TIGHTNESS OR PAIN) WAKE YOU UP AT NIGHT OR EARLIER THAN USUAL IN THE MORNING: NOT AT ALL
QUESTION_2 LAST FOUR WEEKS HOW OFTEN HAVE YOU HAD SHORTNESS OF BREATH: NOT AT ALL

## 2021-01-05 NOTE — PATIENT INSTRUCTIONS
Patient Education   Personalized Prevention Plan  You are due for the preventive services outlined below.  Your care team is available to assist you in scheduling these services.  If you have already completed any of these items, please share that information with your care team to update in your medical record.  Health Maintenance Due   Topic Date Due     Diabetic Foot Exam  1945     Asthma Action Plan - yearly  1945     Discuss Advance Care Planning  1945     Eye Exam  1945     Colorectal Cancer Screening  06/19/1956     Annual Wellness Visit  10/27/2017     Mammogram  10/27/2018     Kidney Microalbumin Urine Test  11/30/2018     A1C Lab  09/29/2019     FALL RISK ASSESSMENT  01/10/2020     Asthma Control Test  03/13/2020     Cholesterol Lab  03/29/2020     Flu Vaccine (1) 09/01/2020     PHQ-2  01/01/2021           PLEASE CALL TO SCHEDULE YOUR MAMMOGRAM  [facility] [phone]

## 2021-01-05 NOTE — PROGRESS NOTES
Elizabeth Galicia is a 75 year old female who is being evaluated via a billable video visit.      How would you like to obtain your AVS? Mail a copy  If the video visit is dropped, the invitation should be resent by: Send to e-mail at: [email or web address]  Will anyone else be joining your video visit? No     Video Start Time: 2:05pm  Assessment & Plan     Encounter for Medicare annual wellness exam     - Lipid panel reflex to direct LDL Fasting; Future    Stage 3 chronic kidney disease, unspecified whether stage 3a or 3b CKD  Had lab done at rheumatology    Microscopic polyangiitis (H)     - STATIN NOT PRESCRIBED (INTENTIONAL); Please choose reason not prescribed from choices below.    ANCA-associated vasculitis (Microscopic polyangiitis)     - STATIN NOT PRESCRIBED (INTENTIONAL); Please choose reason not prescribed from choices below.    Type 2 diabetes mellitus without complication, without long-term current use of insulin (H)     - STATIN NOT PRESCRIBED (INTENTIONAL); Please choose reason not prescribed from choices below.  - **A1C FUTURE anytime; Future  - Lipid panel reflex to direct LDL Fasting; Future    Postoperative hypothyroidism  TSH is in goal - had done here in September  - levothyroxine (SYNTHROID/LEVOTHROID) 125 MCG tablet; Take 1 tablet (125 mcg) by mouth daily    Seasonal allergic rhinitis due to pollen   refilled   - montelukast (SINGULAIR) 10 MG tablet; TAKE 1 TABLET BY MOUTH EVERYDAY AT BEDTIME    Reactive airway disease without complication, unspecified asthma severity, unspecified whether persistent     - fluticasone (FLOVENT HFA) 110 MCG/ACT inhaler; INHALE 1 PUFF BY MOUTH IN TO THE LUNGS TWICE A DAY AS DIRECTED  - montelukast (SINGULAIR) 10 MG tablet; TAKE 1 TABLET BY MOUTH EVERYDAY AT BEDTIME    Sinusitis, unspecified chronicity, unspecified location  Hx of sinusitis   Will rx abx but try nasal sprays first   - amoxicillin-clavulanate (AUGMENTIN) 875-125 MG tablet; Take 1 tablet  "by mouth 2 times daily    Essential hypertension       Herpes simplex virus infection  Changed dose - needs for genital sores  - valACYclovir (VALTREX) 500 MG tablet; Take 1 tablet (500 mg) by mouth 2 times daily    Screen for colon cancer  Due for some type of screening -   Ordered cologuard  - COLOGUARD([employer])    Gastroesophageal reflux disease without esophagitis  Refilled   - omeprazole (PRILOSEC) 20 MG DR capsule; TAKE 1 CAPSULE BY MOUTH EVERY DAY    Review of external notes as documented above                    BMI:   Estimated body mass index is 39.12 kg/m  as calculated from the following:    Height as of 6/2/20: 1.651 m (5' 5\").    Weight as of 6/2/20: 106.6 kg (235 lb 1.6 oz).         See Patient Instructions    Return in about 53 weeks (around 1/11/2022) for Annual Wellness Visit.    Ella Hernandez, [facility]     Elizabeth Galicia is a 75 year old who presents to clinic today for the following health issues     HPI     Annual Wellness Visit    Patient has been advised of split billing requirements and indicates understanding: Yes     Are you in the first 12 months of your Medicare Part B coverage?  No    Physical Health:    In general, how would you rate your overall physical health? good    Outside of work, how many days during the week do you exercise?2-3 days/week    Outside of work, approximately how many minutes a day do you exercise?45-60 minutes    If you drink alcohol do you typically have >3 drinks per day or >7 drinks per week? No    Do you usually eat at least 4 servings of fruit and vegetables a day, include whole grains & fiber and avoid regularly eating high fat or \"junk\" foods? Yes    Do you have any problems taking medications regularly? No    Do you have any side effects from medications? none    Needs assistance for the following daily activities: no assistance needed    Which of the following safety concerns are present in your home?  " none identified     Hearing impairment: No    In the past 6 months, have you been bothered by leaking of urine? No    Pt concerned about sinus infection, having sinus pressure, ear pain, congestion causing cough x2 weeks.    There were no vitals taken for this visit.  Weight: Provided by patient  Height: Provided by patient  BMI: Based on patient-provided information  Blood Pressure: Unable to obtain due to video visit    Mental Health:    In general, how would you rate your overall mental or emotional health? excellent  PHQ-2 Score:      Do you feel safe in your environment? Yes    ACT Total Scores 9/13/2019 1/5/2021   ACT TOTAL SCORE (Goal Greater than or Equal to 20) 20 21   In the past 12 months, how many times did you visit the emergency room for your asthma without being admitted to the hospital? 0 0   In the past 12 months, how many times were you hospitalized overnight because of your asthma? 0 0         Have you ever done Advance Care Planning? (For example, a Health Directive, POLST, or a discussion with a medical provider or your loved ones about your wishes)? Yes, advance care planning is on file.    Fall risk:  Fallen 2 or more times in the past year?: No  Any fall with injury in the past year?: No    Cognitive Screening: Unable to complete due to virtual visit; need for additional assessment in future face-to-face visit    Do you have sleep apnea, excessive snoring or daytime drowsiness?: no    Current providers sharing in care for this patient include:   Patient Care Team:  Ella Hernandez DO as PCP - General (Family Practice)  Ella Hernandez DO as Assigned PCP    Patient has been advised of split billing requirements and indicates understanding: Yes    Review of Systems   Constitutional, HEENT, cardiovascular, pulmonary, GI, , musculoskeletal, neuro, skin, endocrine and psych systems are negative, except as otherwise noted.      Objective    Vitals - Patient Reported  Weight (Patient Reported):  "110.7 kg (244 lb)  Height (Patient Reported): 165.1 cm (5' 5\")  BMI (Based on Pt Reported Ht/Wt): 40.6      Vitals:  No vitals were obtained today due to virtual visit.    Physical Exam   GENERAL: Healthy, alert and no distress  EYES: Eyes grossly normal to inspection.  No discharge or erythema, or obvious scleral/conjunctival abnormalities.  RESP: No audible wheeze, cough, or visible cyanosis.  No visible retractions or increased work of breathing.    SKIN: Visible skin clear. No significant rash, abnormal pigmentation or lesions.  NEURO: Cranial nerves grossly intact.  Mentation and speech appropriate for age.  PSYCH: Mentation appears normal, affect normal/bright, judgement and insight intact, normal speech and appearance well-groomed.    Due for labs           Video-Visit Details    Type of service:  Video Visit    Video End Time:2:30pm    Originating Location (pt. Location): Home    Distant Location (provider location):  [facility]     Platform used for Video Visit: Arely    "

## 2021-01-06 ASSESSMENT — ASTHMA QUESTIONNAIRES: ACT_TOTALSCORE: 21

## 2021-01-08 ENCOUNTER — TELEPHONE (OUTPATIENT)
Dept: FAMILY MEDICINE | Facility: CLINIC | Age: 76
End: 2021-01-08
Payer: COMMERCIAL

## 2021-01-08 NOTE — TELEPHONE ENCOUNTER
Patient Quality Outreach      Summary:    Patient is due/failing the following:   Breast Cancer Screening - Mammogram    Type of outreach:    Phone, left message for patient/parent to call back.    Questions for provider review:    None                                                                                                                                     Jinny Navas     Chart routed to Care Team.

## 2021-01-15 ENCOUNTER — HEALTH MAINTENANCE LETTER (OUTPATIENT)
Age: 76
End: 2021-01-15

## 2021-03-08 ENCOUNTER — TRANSFERRED RECORDS (OUTPATIENT)
Dept: HEALTH INFORMATION MANAGEMENT | Facility: CLINIC | Age: 76
End: 2021-03-08

## 2021-03-08 LAB
AST SERPL-CCNC: 22 U/L (ref 5–34)
CREAT SERPL-MCNC: 1.23 MG/DL (ref 0.5–1.3)
GFR SERPL CREATININE-BSD FRML MDRD: 45.4 ML/MIN/1.73M2

## 2021-03-15 NOTE — TELEPHONE ENCOUNTER
Patient states she cut back on her own to 1 tablet daily 2-3 weeks ago.    Rosanne Monsivais RN     ADMIT

## 2021-06-09 ENCOUNTER — TRANSFERRED RECORDS (OUTPATIENT)
Dept: HEALTH INFORMATION MANAGEMENT | Facility: CLINIC | Age: 76
End: 2021-06-09

## 2021-06-09 LAB
AST SERPL-CCNC: 23 U/L (ref 5–34)
CREAT SERPL-MCNC: 1.15 MG/DL (ref 0.5–1.3)

## 2021-06-28 ENCOUNTER — TRANSFERRED RECORDS (OUTPATIENT)
Dept: HEALTH INFORMATION MANAGEMENT | Facility: CLINIC | Age: 76
End: 2021-06-28

## 2021-07-19 ENCOUNTER — TRANSFERRED RECORDS (OUTPATIENT)
Dept: HEALTH INFORMATION MANAGEMENT | Facility: CLINIC | Age: 76
End: 2021-07-19
Payer: COMMERCIAL

## 2021-09-05 ENCOUNTER — HEALTH MAINTENANCE LETTER (OUTPATIENT)
Age: 76
End: 2021-09-05

## 2021-09-08 ENCOUNTER — TRANSFERRED RECORDS (OUTPATIENT)
Dept: HEALTH INFORMATION MANAGEMENT | Facility: CLINIC | Age: 76
End: 2021-09-08

## 2021-09-08 LAB
AST SERPL-CCNC: 25 U/L (ref 5–34)
CREATININE (EXTERNAL): 1.05 MG/DL (ref 0.5–1.3)

## 2021-11-20 DIAGNOSIS — J30.1 SEASONAL ALLERGIC RHINITIS DUE TO POLLEN: ICD-10-CM

## 2021-11-20 DIAGNOSIS — J45.909 REACTIVE AIRWAY DISEASE WITHOUT COMPLICATION, UNSPECIFIED ASTHMA SEVERITY, UNSPECIFIED WHETHER PERSISTENT: ICD-10-CM

## 2021-11-24 RX ORDER — FEXOFENADINE HCL 180 MG/1
TABLET ORAL
Qty: 90 TABLET | Refills: 2 | Status: SHIPPED | OUTPATIENT
Start: 2021-11-24 | End: 2022-08-04

## 2021-11-29 ENCOUNTER — VIRTUAL VISIT (OUTPATIENT)
Dept: PALLIATIVE MEDICINE | Facility: CLINIC | Age: 76
End: 2021-11-29
Payer: MEDICARE

## 2021-11-29 DIAGNOSIS — M54.16 LUMBAR RADICULOPATHY: ICD-10-CM

## 2021-11-29 DIAGNOSIS — M79.2 NEUROPATHIC PAIN: Primary | ICD-10-CM

## 2021-11-29 DIAGNOSIS — M70.61 GREATER TROCHANTERIC BURSITIS OF BOTH HIPS: ICD-10-CM

## 2021-11-29 DIAGNOSIS — M70.62 GREATER TROCHANTERIC BURSITIS OF BOTH HIPS: ICD-10-CM

## 2021-11-29 PROCEDURE — 99214 OFFICE O/P EST MOD 30 MIN: CPT | Mod: 95 | Performed by: PAIN MEDICINE

## 2021-11-29 ASSESSMENT — PAIN SCALES - GENERAL: PAINLEVEL: MILD PAIN (2)

## 2021-11-29 NOTE — PROGRESS NOTES
Is Pt currently in [place]? Yes    NOTE:  If Pt is not in [place], Appointment needs to be canceled and rescheduled.  PAUL is a 76 year old who is being evaluated via a billable video visit.      How would you like to obtain your AVS? Felice  If the video visit is dropped, the invitation should be resent by: Text to cell phone: [phone]  Will anyone else be joining your video visit? No        Airam Chen CMA (AAMA)    Video Start Time: 1:01 PM  Video-Visit Details    Type of service:  Video Visit    Video End Time:122    Originating Location (pt. Location): Home    Distant Location (provider location):  [facility]     Platform used for Video Visit: [facility] follow up      Reason for consultation:    Primary Care Provider is No Ref-Primary, Physician.  Pain medications are being prescribed by n/a.    Please see the [facility] health questionnaire which the patient completed and reviewed with me in detail.    Chief Complaint:    Chief Complaint   Patient presents with     Pain     MME prescribed prior to seeing patient:  Current MME:  rec  -- Further procedures recommended:    -None at this time  - Medication Management:    Will recertify for medical cannabis [email or web address]  - Physical Therapy: None at this time  - Follow up:   1 year        Interval:  Pt partner has stage 4 colon cancer  Cont to work with rheum- not issues  Of note has new provider   Has Llbp radiating to LE- currently reasonably controlled   She reports sig benefit with the medical cannabis  The pt reports intermittent flare  She reports she does not always feel it in her lBP  Sometimes in her leg  She reports occasional burning in the bottom of her feet  The pt reports sometimes it very itchy  She denies any specific inciting event  Although she does feel sometimes how she slept could plays a role  Denies any recent falls  Denies overt progressive  weakness        She cont to have intermittent bilat greater troch pain  The patient notes significant improvement in her quality of life with medical cannabis  Patient would not like to make any changes in her regimen at this time.    Pain history:     Patient is referred by PCP- interested in medical cannabis.  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 The pt is currently on pred 4mg. Of note when pt was on higher doses she did not have as sig pain levels.     Pt main concern is she cant sleep 2/2 to leg pain   Elizabeth Galicia is a 73 year old female w/ chronic bilateral lat leg/hip pain. The pain has gotten progressively worse over the last couple of years. In general she denies any specific inciting event.  The pain is currently worse on the L>R. The pain is intermittent. The pain is a deep aching pain. The pain is occasionally sharp.  The pain occasionally radiates down her lat leg. Neg numbness. Neg burning. Neg tingling. She denies any overt weakness. The pt did have a recent fall, but was not 2/2 to weakness, rather she tripped. The pain is worse when sleeping on her sides. The pain is worse with prolonged walking  The pain is slightly better when lying on her back. The pain is slightly better when changing positions. The pain is slightly bettter when sleeping her chair. The pt also does a lot of PHYSICAL THERAPY  And stretches for her IT band. The pt notes sig benefit, but in general the relief does not last.    Of note on occasion the pt will have more generalized pain. Specifically chest, shoulders, neg     The pt was using voltaren gel , but stopped 2/2 to benefit     The pt takes 2 tabs of 625mg at night. Occasionally she will take an additional tablet when being more active.     Of note pt has a gfr of 54, currently being seen by a rheumatologist.     Again pt main concern is not sleeping and she feels it makes every aspect of  her life worse.     The pt continues to approach her symptoms from multiple directions and is now looking for other options.         Pain rating: intensity  Averages 4/10 on a 0-10 scale.      Current treatments include:  Medical cannabis  Acetaminophen  Previous medication treatments included:  voltaren gel        Other treatments have included:  Elizabeth Galicia has not been seen at a pain clinic in the past.    PT: yes helped   Chiropractic: yes  Acupuncture: no  TENs Unit: yes  Injections: no    Past Medical History:  Past Medical History:   Diagnosis Date     ANCA-associated vasculitis (H)      Anxiety      Gastro-oesophageal reflux disease      Herniated lumbar intervertebral disc      Herpes      Heterozygous for MTHFR gene mutation     Per CareEverywhere     Hyperlipidemia      Lesion of upper eyelid LEFT     Obesity (BMI 30-39.9)      Postoperative hypothyroidism      Seasonal allergies      Type 2 diabetes mellitus without complication (H) 5/24/2017     Uterine prolapse      Past Surgical History:  Past Surgical History:   Procedure Laterality Date     COLONOSCOPY       DILATION AND CURETTAGE       ENT SURGERY      partial thyroidectomy; tonsillectomy     EXCISE LESION EYELID Left 3/10/2016    Procedure: EXCISE LESION EYELID;  Surgeon: Rg Nazario MD;  Location: [facility]     HAND SURGERY       HYSTERECTOMY VAGINAL, COLPORRHAPHY ANTERIOR, POSTERIOR, COMBINED N/A 9/9/2014    Procedure: COMBINED HYSTERECTOMY VAGINAL, COLPORRHAPHY ANTERIOR, POSTERIOR;  Surgeon: Shay Winkler MD;  Location: [facility]     HYSTERECTOMY, PAP NO LONGER INDICATED       LAPAROSCOPIC SALPINGO-OOPHORECTOMY  6/27/2011    Procedure:LAPAROSCOPIC SALPINGO-OOPHORECTOMY; resection of left pelvic mass. ; Surgeon:MAYRA OLEARY; Location:[facility]     ORTHOPEDIC SURGERY       SALPINGO OOPHORECTOMY,R/L/PEDRO LUIS      Salpingo Oophorectomy for torsion in past     THYROIDECTOMY      Partial     Medications:  Current Outpatient Medications   Medication  Sig Dispense Refill     CALCIUM LACTATE PO Take 4 tablets by mouth daily        Cholecalciferol (VITAMIN D3 PO) Take 1,000 Units by mouth daily       Cyanocobalamin ([employer] B-12 PO)        fexofenadine (ALLEGRA) 180 MG tablet TAKE 1 TABLET BY MOUTH EVERY DAY 90 tablet 2     fluticasone (FLOVENT HFA) 110 MCG/ACT inhaler INHALE 1 PUFF BY MOUTH IN TO THE LUNGS TWICE A DAY AS DIRECTED 36 Inhaler 1     levothyroxine (SYNTHROID/LEVOTHROID) 125 MCG tablet Take 1 tablet (125 mcg) by mouth daily 90 tablet 0     liothyronine (CYTOMEL) 5 MCG tablet TAKE 2 TABLETS (10 MCG) BY MOUTH 2 TIMES DAILY 360 tablet 0     MAGNESIUM LACTATE PO Take 2 tablets by mouth daily       UNABLE TO FIND MEDICATION NAME: medical cannibus       albuterol (VENTOLIN HFA) 108 (90 Base) MCG/ACT inhaler TAKE 2 PUFFS BY MOUTH EVERY 4 HOURS AS NEEDED (Patient not taking: Reported on 11/29/2021) 18 Inhaler 5     azelastine (ASTELIN) 0.1 % nasal spray SPRAY 1 SPRAY INTO BOTH NOSTRILS 2 TIMES DAILY (Patient not taking: Reported on 11/29/2021) 30 mL 1     montelukast (SINGULAIR) 10 MG tablet TAKE 1 TABLET BY MOUTH EVERYDAY AT BEDTIME (Patient not taking: Reported on 11/29/2021) 90 tablet 3     olopatadine (PATADAY) 0.2 % ophthalmic solution Place 1 drop into both eyes daily (Patient not taking: Reported on 11/29/2021) 2.5 mL 3     omeprazole (PRILOSEC) 20 MG DR capsule TAKE 1 CAPSULE BY MOUTH EVERY DAY (Patient not taking: Reported on 11/29/2021) 90 capsule 3     predniSONE (DELTASONE) 1 MG tablet Take 3 tablets (3 mg) by mouth daily (Patient not taking: Reported on 11/29/2021)       STATIN NOT PRESCRIBED (INTENTIONAL) Please choose reason not prescribed from choices below.       valACYclovir (VALTREX) 500 MG tablet Take 1 tablet (500 mg) by mouth 2 times daily (Patient not taking: Reported on 11/29/2021) 6 tablet 2     Allergies:     Allergies   Allergen Reactions     Cranberries [Cranberry Extract]      Causes herpes flair-up     Dairy [Milk Products]       Perfume Other (See Comments)     Stuffy in nose, HA     Seasonal Allergies      Social History:    Occupation/Schooling: no  Tobacco use: no  Alcohol use: no  Drug use: no  History of chemical dependency treatment: no    Family history:  Family History   Problem Relation Age of Onset     Unknown/Adopted Mother      Heart Disease Other      Family history of headaches: no    Review of Systems:  Skin: negative  Eyes: negative  Ears/Nose/Throat: negative  Respiratory: No shortness of breath, dyspnea on exertion, cough, or hemoptysis  Cardiovascular: negative  Gastrointestinal: negative  Genitourinary: negative  Musculoskeletal: negative  Neurologic: negative  Psychiatric: negative  Hematologic/Lymphatic/Immunologic: negative  Endocrine: negative    Physical Exam:  There were no vitals filed for this visit.  Exam:  Constitutional: healthy, alert and no distress  Respiratory: Speaking in full sentences no accessory muscles use   Psychiatric: mentation appears normal and affect normal/bright    Musculoskeletal exam:  Gait wnl   Cervical spine: ROM within normal limits  Able to easily overcome gravity with both her upper and lower extremities  Neg slump  No pain to palpation over her greater troches.         Assessment/Plan:  Nicolas Galicia is a 73 year old female who presents with the complaints of bilateral lateral leg/hip pain.   Nicolas was seen today for pain.    Diagnoses and all orders for this visit:    Neuropathic pain    Greater trochanteric bursitis of both hips    Lumbar radiculopathy         - Further procedures recommended:    -None at this time  - Medication Management:    - recertify for medical cannabis [email or web address]  - Physical Therapy: None at this time  - Follow up:   1 year              The total TIME spent on this patient on the day of the appointment was 22 minutes.   Time spent preparing to see the patient (reviewing records and tests)  Time spend face to face with the  patient  Time spent ordering tests, medications, procedures and referrals  Time spent Referring and communicating with other healthcare professionals  Documenting clinical information in Epic      Deon Banerjee MD  [facility] [place]  This note was created with voice recognition software, and while reviewed for accuracy, typos may remain.

## 2021-11-29 NOTE — PROGRESS NOTES
11/29/21 1243   PEG: A Thee-Item Scale Assessing Pain Intensity and Interference        0 = No pain / No interference    10 = Pain as bad as you can imagine / Completely interferes   What number best describes your pain on average in the past week? 2   What number best describes how, during the past week, pain has interfered with your enjoyment of life? 0   What number best describes how, during the past week, pain has interfered with your general activity? 2   PEG Total Score 1.33

## 2021-12-09 ENCOUNTER — TRANSFERRED RECORDS (OUTPATIENT)
Dept: HEALTH INFORMATION MANAGEMENT | Facility: CLINIC | Age: 76
End: 2021-12-09
Payer: MEDICARE

## 2022-02-03 NOTE — PROGRESS NOTES
[facility]  Pediatric Speech Therapy Treatment Note    Date: 2/2/2022    Patient Name: Brock Vanegas  MRN: [id number]  Therapy Diagnosis:   Encounter Diagnoses   Name Primary?   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 10 m.o.    Visit # 20/ Visits Authorized: Pending authorization    Date of Evaluation: 2/17/2021   Plan of Care Expiration Date: 2/18/2022  Authorization Date: 1/12/2022-1/12/2023  Testing last administered: 2/18/2021, 2/2/2022    Time In: 5:30 PM  Time Out: 6:10  PM  Total Billable Time: 40 min       Precautions: Standard     Subjective:   Parent reports: Mother wants to attend therapy weekly but there are no available openings at this time.   He was not compliant to home exercise program.   Response to previous treatment: Pt required max cues for redirection and joked for majority of session. Clinician gave maximum cuing for Brock to participate and attend to task.    Pain: Brock was unable to rate pain on a numeric scale, but no pain behaviors were noted in today's session. Pt was coughing frequently throughout session.   Objective:   UNTIMED  Procedure Min.   Speech- Language- Voice Therapy    40   Total Untimed Units: 1  Charges Billed/# of units: 1     Short Term Goals: (3 months) Current Progress:   1.  Correctly produce the /sh/ and /ch/ phonemes in all positions of words, phrases, and conversation, with and without a model, with 90% accuracy over 3 consecutive sessions.   Progressing/ Not Met 2/2/2022 DNT due to testing.    Previous: /sh/ words  initial 80% accuracy   Medial 70% accuracy   Final 70% accuracy     /ch/ words  Initial 80% accuracy    Medial 80%  Patients blister to left foot partially opened/drained. Cleansed with microklenz spray and covered with polymem dressing. Will continue to monitor.   "accuracy   Final 80% accuracy    2. Correctly produce blends in all positions of words, phrases, and conversation, with and without a model,  with 90% accuracy across 3 consecutive sessions.    Progressing/ Not Met 2/2/2022 DNT due to testing.   3. Correctly produce "j" /dg/ in all positions of words, phrases, and conversation, with and without a model,  with 90% accuracy across 3 consecutive sessions.   Progressing/ Not Met 2/2/2022 DNT due to testing.   4.  Complete language/pragmatic assessments to determine needed additional goals.  Progressing/ Not Met 2/2/2022 Initiated but unable to complete due to attention, behavior, and time constraints.   5. Correctly produce /t/ and /d/ phonemes in initial, medial, and final position of words without using clicking sound on alveolar ridge with 90% accuracy across 3 consecutive sessions.   Progressing/ Not Met 2/2/2022   DNT due to testing.    Previous: /d/ in words  Initial 80% accuracy  Medial 80% accuracy  Final 60% accuracy   /t/ in words  Initial and medial 80% accuracy  Final 70% accuracy   "clicking" noise observed as compensatory strategy, pt is unaware of sound   6. Correctly produce /k/ and /g/ phonemes in initial, medial, and final position of words without using clicking sound on alveolar ridge with 90% accuracy across 3 consecutive sessions.   Progressing/ Not Met 2/2/2022 DNT due to testing.       Patient Education/Response:   SLP and caregiver discussed plan for Brock's articulation targets for therapy. Pt's mother wants therapy every week but due to the wait list, there are no openings at a time that is convenient for patient. Therapist offered alternative locations, but Brock's mother stated there is a transportation issue. SLP educated caregivers on strategies used in speech therapy to demonstrate carryover of skills into everyday environments. Caregiver did demonstrate understanding of all discussed this date.     Home program established: Continue " "previously established program. Pt reported he doesn't complete exercises at home.   Exercises were reviewed and Brock was able to demonstrate them prior to the end of the session.  Brock demonstrated good  understanding of the education provided.     See EMR under Patient Instructions for exercises provided throughout therapy.  Assessment:   Brock is progressing toward his goals. Initiated articulation reassessment this session but couldn't be completed due to attention and behavior. Testing will be completed next session. However, attention and participation remain a barrier to therapy. Pt requires constant redirection and cuing to participate and attend to task. Pt likes to joke and will only participate when prompted. Pt is maintaining current progress. Current goals remain appropriate. Goals will be added and re-assessed as needed.     Pt prognosis is Guarded. Pt will continue to benefit from skilled outpatient speech and language therapy to address the deficits listed in the problem list on initial evaluation, provide pt/family education and to maximize pt's level of independence in the home and community environment.     Medical necessity is demonstrated by the following IMPAIRMENTS:  Poor intelligibility to unfamiliar listeners. Reliant on caregivers to recast/repair communication breakdown.   Barriers to Therapy: decreased attention and participation, "joking'  The patient's spiritual, cultural, social, and educational needs were considered and the patient is agreeable to plan of care.   Plan:   Continue Plan of Care for 1 time every other week for 3-6 months to address articulation skills.    Radames Valerio, NOMAN-SLP   2/2/2022         "

## 2022-02-06 DIAGNOSIS — E89.0 POSTOPERATIVE HYPOTHYROIDISM: ICD-10-CM

## 2022-02-10 RX ORDER — LEVOTHYROXINE SODIUM 125 UG/1
TABLET ORAL
Qty: 30 TABLET | Refills: 0 | Status: SHIPPED | OUTPATIENT
Start: 2022-02-10 | End: 2022-03-04

## 2022-02-16 ENCOUNTER — LAB (OUTPATIENT)
Dept: LAB | Facility: CLINIC | Age: 77
End: 2022-02-16
Payer: MEDICARE

## 2022-02-16 DIAGNOSIS — N18.30 STAGE 3 CHRONIC KIDNEY DISEASE, UNSPECIFIED WHETHER STAGE 3A OR 3B CKD (H): ICD-10-CM

## 2022-02-16 DIAGNOSIS — E89.0 POSTOPERATIVE HYPOTHYROIDISM: ICD-10-CM

## 2022-02-16 DIAGNOSIS — E11.9 TYPE 2 DIABETES MELLITUS WITHOUT COMPLICATION, WITHOUT LONG-TERM CURRENT USE OF INSULIN (H): Chronic | ICD-10-CM

## 2022-02-16 DIAGNOSIS — E11.9 TYPE 2 DIABETES MELLITUS WITHOUT COMPLICATION, WITHOUT LONG-TERM CURRENT USE OF INSULIN (H): ICD-10-CM

## 2022-02-16 DIAGNOSIS — Z00.00 ENCOUNTER FOR MEDICARE ANNUAL WELLNESS EXAM: ICD-10-CM

## 2022-02-16 DIAGNOSIS — N18.30 STAGE 3 CHRONIC KIDNEY DISEASE, UNSPECIFIED WHETHER STAGE 3A OR 3B CKD (H): Primary | ICD-10-CM

## 2022-02-16 LAB — HBA1C MFR BLD: 7 % (ref 0–5.6)

## 2022-02-16 PROCEDURE — 83036 HEMOGLOBIN GLYCOSYLATED A1C: CPT

## 2022-02-16 PROCEDURE — 84439 ASSAY OF FREE THYROXINE: CPT

## 2022-02-16 PROCEDURE — 36415 COLL VENOUS BLD VENIPUNCTURE: CPT

## 2022-02-16 PROCEDURE — 85027 COMPLETE CBC AUTOMATED: CPT

## 2022-02-16 PROCEDURE — 80048 BASIC METABOLIC PNL TOTAL CA: CPT

## 2022-02-16 PROCEDURE — 84443 ASSAY THYROID STIM HORMONE: CPT

## 2022-02-16 PROCEDURE — 80061 LIPID PANEL: CPT

## 2022-02-17 LAB
ANION GAP SERPL CALCULATED.3IONS-SCNC: 6 MMOL/L (ref 3–14)
BUN SERPL-MCNC: 17 MG/DL (ref 7–30)
CALCIUM SERPL-MCNC: 9.3 MG/DL (ref 8.5–10.1)
CHLORIDE BLD-SCNC: 109 MMOL/L (ref 94–109)
CHOLEST SERPL-MCNC: 217 MG/DL
CO2 SERPL-SCNC: 22 MMOL/L (ref 20–32)
CREAT SERPL-MCNC: 1.08 MG/DL (ref 0.52–1.04)
ERYTHROCYTE [DISTWIDTH] IN BLOOD BY AUTOMATED COUNT: 14.4 % (ref 10–15)
FASTING STATUS PATIENT QL REPORTED: YES
GFR SERPL CREATININE-BSD FRML MDRD: 53 ML/MIN/1.73M2
GLUCOSE BLD-MCNC: 139 MG/DL (ref 70–99)
HCT VFR BLD AUTO: 46.8 % (ref 35–47)
HDLC SERPL-MCNC: 33 MG/DL
HGB BLD-MCNC: 15.4 G/DL (ref 11.7–15.7)
LDLC SERPL CALC-MCNC: 126 MG/DL
MCH RBC QN AUTO: 29.9 PG (ref 26.5–33)
MCHC RBC AUTO-ENTMCNC: 32.9 G/DL (ref 31.5–36.5)
MCV RBC AUTO: 91 FL (ref 78–100)
NONHDLC SERPL-MCNC: 184 MG/DL
PLATELET # BLD AUTO: 344 10E3/UL (ref 150–450)
POTASSIUM BLD-SCNC: 4.3 MMOL/L (ref 3.4–5.3)
RBC # BLD AUTO: 5.15 10E6/UL (ref 3.8–5.2)
SODIUM SERPL-SCNC: 137 MMOL/L (ref 133–144)
T4 FREE SERPL-MCNC: 0.99 NG/DL (ref 0.76–1.46)
TRIGL SERPL-MCNC: 290 MG/DL
TSH SERPL DL<=0.005 MIU/L-ACNC: 0.39 MU/L (ref 0.4–4)
WBC # BLD AUTO: 7.2 10E3/UL (ref 4–11)

## 2022-02-18 NOTE — RESULT ENCOUNTER NOTE
Dear PAUL,   Your test results are all back -   We can discuss labs at your upcoming visit.  Let us know if you have any questions.  -Ella Hernandez, DO

## 2022-02-20 ENCOUNTER — HEALTH MAINTENANCE LETTER (OUTPATIENT)
Age: 77
End: 2022-02-20

## 2022-02-22 ENCOUNTER — MYC MEDICAL ADVICE (OUTPATIENT)
Dept: FAMILY MEDICINE | Facility: CLINIC | Age: 77
End: 2022-02-22

## 2022-02-22 ENCOUNTER — VIRTUAL VISIT (OUTPATIENT)
Dept: FAMILY MEDICINE | Facility: CLINIC | Age: 77
End: 2022-02-22
Payer: MEDICARE

## 2022-02-22 ENCOUNTER — TELEPHONE (OUTPATIENT)
Dept: FAMILY MEDICINE | Facility: CLINIC | Age: 77
End: 2022-02-22
Payer: MEDICARE

## 2022-02-22 DIAGNOSIS — K21.9 GASTROESOPHAGEAL REFLUX DISEASE, UNSPECIFIED WHETHER ESOPHAGITIS PRESENT: Primary | ICD-10-CM

## 2022-02-22 DIAGNOSIS — R10.13 EPIGASTRIC PAIN: ICD-10-CM

## 2022-02-22 DIAGNOSIS — R14.0 ABDOMINAL BLOATING: ICD-10-CM

## 2022-02-22 PROCEDURE — 99214 OFFICE O/P EST MOD 30 MIN: CPT | Mod: 95 | Performed by: FAMILY MEDICINE

## 2022-02-22 NOTE — PROGRESS NOTES
PAUL is a 76 year old who is being evaluated via a billable video visit.      How would you like to obtain your AVS? MyChart  If the video visit is dropped, the invitation should be resent by: Text to cell phone: [phone]  Will anyone else be joining your video visit? No      Video Start Time: 11:52 PM    Assessment & Plan     Gastroesophageal reflux disease, unspecified whether esophagitis present  - omeprazole (PRILOSEC) 20 MG DR capsule; TAKE 1 CAPSULE BY MOUTH EVERY DAY    Epigastric pain  - Helicobacter pylori Antigen Stool; Future    Abdominal bloating    Her symptoms sound consistent with GERD.  I recommended omeprazole daily for 2 weeks.  We discussed dietary modifications that may help.  She is interested in H. pylori testing since her symptoms feel similar to when she was diagnosed and treated for this 25-30 years ago.  She has a follow-up with her PCP already scheduled to be done in a couple of weeks.  She may be seen sooner if needed.                   Return in about 10 days (around 3/4/2022) for Follow up with PCP.    Jesus Paredes, [facility]   PAUL is a 76 year old who presents for the following health issues     HPI         She describes experiencing abdominal pain and bloating symptoms over the past month.  She has been under a lot of stress.  Her  has stage IV colon cancer.  She is unaware of what may be causing her symptoms.  They do not seem to be food related.  However, her symptoms feel similar to when she was diagnosed and treated for H. pylori 25-30 years ago.  She denies nausea or vomiting symptoms.  Her stools appear normal.  They are not black or bloody.  She is not having fevers.  She stopped taking omeprazole about 10 months ago.  She took a dose yesterday which seemed to help.  She has a history of CKD stage III, type 2 diabetes, hypothyroidism, reactive airway disease and ANCA associated vasculitis.  She recently had  labs checked through her PCP which were stable.    Review of Systems         Objective       Vitals:  No vitals were obtained today due to virtual visit.    Physical Exam   GENERAL: Healthy, alert and no distress  EYES: Eyes grossly normal to inspection.  No discharge or erythema, or obvious scleral/conjunctival abnormalities.  RESP: No audible wheeze, cough, or visible cyanosis.  No visible retractions or increased work of breathing.    SKIN: Visible skin clear. No significant rash, abnormal pigmentation or lesions.  NEURO: Cranial nerves grossly intact.  Mentation and speech appropriate for age.  PSYCH: Mentation appears normal, affect normal/bright, judgement and insight intact, normal speech and appearance well-groomed.                Video-Visit Details    Type of service:  Video Visit    Video End Time:12:13 PM    Originating Location (pt. Location): Home    Distant Location (provider location):  [facility]     Platform used for Video Visit: [employer]

## 2022-02-22 NOTE — TELEPHONE ENCOUNTER
Patient calling in regarding GI symptoms. Noticed symptoms a few months ago but worsening over the last couple of weeks.     Heartburn, very bloated, can normally trace back bloating to what she ate but has it all of the time now regardless of what has been eaten.     Band of pain/discomfort about 6 inches below sternum. Has been taking more and more Gaviscon to help with symptoms. Also taking a papaya supplement that aids with digestion.    Discomfort/pain is constant, last night however, was more painful then usual, rating pain 6/10 lasted for several hours. Took papaya digestive supplement and gaviscon again and did help alleviate pain.    Patient says she was diagnosed and treated for H pylori several years ago. Scheduled for VV today with DE to discuss symptoms and have any meds and tests ordered.    Thank you,  Faby Ling RN  [place]

## 2022-02-25 ASSESSMENT — ASTHMA QUESTIONNAIRES: ACT_TOTALSCORE: 25

## 2022-02-25 NOTE — TELEPHONE ENCOUNTER
Patient Quality Outreach    Patient is due for the following:   Asthma  -  ACT needed    NEXT STEPS:   ACT send via [employer]     Type of outreach:    Sent [employer] message.      Questions for provider review:    None     Aminah Armijo, [facility]

## 2022-03-01 ENCOUNTER — MYC MEDICAL ADVICE (OUTPATIENT)
Dept: FAMILY MEDICINE | Facility: CLINIC | Age: 77
End: 2022-03-01
Payer: MEDICARE

## 2022-03-01 DIAGNOSIS — E89.0 POSTOPERATIVE HYPOTHYROIDISM: ICD-10-CM

## 2022-03-01 RX ORDER — LIOTHYRONINE SODIUM 5 UG/1
TABLET ORAL
Qty: 1 TABLET | Refills: 0 | OUTPATIENT
Start: 2022-03-01

## 2022-03-03 NOTE — PROGRESS NOTES
PAUL is a 76 year old who is being evaluated via a billable video visit.      How would you like to obtain your AVS? MyChart  If the video visit is dropped, the invitation should be resent by: Send to e-mail at: [email or web address]  Will anyone else be joining your video visit? No      Video Start Time: 12:40pm    Assessment & Plan     Screen for colon cancer     - GWEN([employer])    Visit for screening mammogram   due - numberr give    Type 2 diabetes mellitus without complication, without long-term current use of insulin (H)     - metFORMIN (GLUCOPHAGE-XR) 500 MG 24 hr tablet; Start one tablet daily for 2 weeks then increase to two tablets daily  - **Comprehensive metabolic panel FUTURE 2mo; Future    Postoperative hypothyroidism    - liothyronine (CYTOMEL) 5 MCG tablet; TAKE 2 TABLETS (10 MCG) BY MOUTH 2 TIMES DAILY  - levothyroxine (SYNTHROID/LEVOTHROID) 125 MCG tablet; Take 1 tablet (125 mcg) by mouth daily  - **TSH with free T4 reflex FUTURE 2mo; Future    Microscopic polyangiitis (H)      Hyperplasia of renal artery (H)       Stage 3 chronic kidney disease, unspecified whether stage 3a or 3b CKD (H)       Mixed hyperlipidemia     - atorvastatin (LIPITOR) 20 MG tablet; Take 1 tablet (20 mg) by mouth daily  - Lipid panel reflex to direct LDL Fasting; Future    HSV-2 infection     - valACYclovir (VALTREX) 500 MG tablet; Take 1 tablet (500 mg) by mouth 2 times daily for 3 days      35 minutes spent on the date of the encounter doing chart review, patient visit and documentation            No follow-ups on file.    Ella Hernandez, [facility]   PAUL is a 76 year old who presents for the following health issues     HPI     Patient said she is following up per providers instructions     Health is dominated by spouse - going to [place] - mass is shrinking so they will be doing surgery on March 17th    GERD - took prilosec and symptoms really helped   Unable  to tolerate coffee    Polyangitiitis -   Was seeing Dr. Arnold - now see Dr. Escobedo      Diabetes -   Was on prednisone before - thought was   Diet - eating more carbohyrates due to pandemic and once spouse dx this past summer - also worse diet  Not eating enough veggies   Eats very little dairy  Doing pilates virtually twice a week  Not going out as much so less active    The ASCVD Risk score ([place] TRUONG Jr., et al., 2013) failed to calculate for the following reasons:    The systolic blood pressure is missing       Review of Systems   Constitutional, HEENT, cardiovascular, pulmonary, gi and gu systems are negative, except as otherwise noted.      Objective           Vitals:  No vitals were obtained today due to virtual visit.    Physical Exam   GENERAL: Healthy, alert and no distress  EYES: Eyes grossly normal to inspection.  No discharge or erythema, or obvious scleral/conjunctival abnormalities.  RESP: No audible wheeze, cough, or visible cyanosis.  No visible retractions or increased work of breathing.    SKIN: Visible skin clear. No significant rash, abnormal pigmentation or lesions.  NEURO: Cranial nerves grossly intact.  Mentation and speech appropriate for age.  PSYCH: Mentation appears normal, affect normal/bright, judgement and insight intact, normal speech and appearance well-groomed.                Video-Visit Details    Type of service:  Video Visit    Video End Time:1:15pm    Originating Location (pt. Location): Home    Distant Location (provider location):  [facility]     Platform used for Video Visit: [employer]

## 2022-03-04 ENCOUNTER — VIRTUAL VISIT (OUTPATIENT)
Dept: FAMILY MEDICINE | Facility: CLINIC | Age: 77
End: 2022-03-04
Payer: MEDICARE

## 2022-03-04 DIAGNOSIS — I77.89 HYPERPLASIA OF RENAL ARTERY (H): Chronic | ICD-10-CM

## 2022-03-04 DIAGNOSIS — Z12.11 SCREEN FOR COLON CANCER: ICD-10-CM

## 2022-03-04 DIAGNOSIS — M31.7 MICROSCOPIC POLYANGIITIS (H): ICD-10-CM

## 2022-03-04 DIAGNOSIS — Z12.31 VISIT FOR SCREENING MAMMOGRAM: ICD-10-CM

## 2022-03-04 DIAGNOSIS — E78.2 MIXED HYPERLIPIDEMIA: ICD-10-CM

## 2022-03-04 DIAGNOSIS — N18.30 STAGE 3 CHRONIC KIDNEY DISEASE, UNSPECIFIED WHETHER STAGE 3A OR 3B CKD (H): Chronic | ICD-10-CM

## 2022-03-04 DIAGNOSIS — B00.9 HSV-2 INFECTION: ICD-10-CM

## 2022-03-04 DIAGNOSIS — E89.0 POSTOPERATIVE HYPOTHYROIDISM: Chronic | ICD-10-CM

## 2022-03-04 DIAGNOSIS — E11.9 TYPE 2 DIABETES MELLITUS WITHOUT COMPLICATION, WITHOUT LONG-TERM CURRENT USE OF INSULIN (H): Primary | Chronic | ICD-10-CM

## 2022-03-04 PROCEDURE — 99214 OFFICE O/P EST MOD 30 MIN: CPT | Mod: 95 | Performed by: FAMILY MEDICINE

## 2022-03-04 RX ORDER — ATORVASTATIN CALCIUM 20 MG/1
20 TABLET, FILM COATED ORAL DAILY
Qty: 90 TABLET | Refills: 0 | Status: SHIPPED | OUTPATIENT
Start: 2022-03-04 | End: 2022-05-17

## 2022-03-04 RX ORDER — METFORMIN HCL 500 MG
TABLET, EXTENDED RELEASE 24 HR ORAL
Qty: 60 TABLET | Refills: 2 | Status: SHIPPED | OUTPATIENT
Start: 2022-03-04 | End: 2022-05-16

## 2022-03-04 RX ORDER — LIOTHYRONINE SODIUM 5 UG/1
TABLET ORAL
Qty: 360 TABLET | Refills: 1 | Status: SHIPPED | OUTPATIENT
Start: 2022-03-04 | End: 2022-08-09

## 2022-03-04 RX ORDER — LEVOTHYROXINE SODIUM 125 UG/1
125 TABLET ORAL DAILY
Qty: 90 TABLET | Refills: 1 | Status: SHIPPED | OUTPATIENT
Start: 2022-03-04 | End: 2022-07-05

## 2022-03-04 RX ORDER — VALACYCLOVIR HYDROCHLORIDE 500 MG/1
500 TABLET, FILM COATED ORAL 2 TIMES DAILY
Qty: 6 TABLET | Refills: 11 | Status: SHIPPED | OUTPATIENT
Start: 2022-03-04 | End: 2023-03-28

## 2022-03-30 NOTE — PLAN OF CARE
Problem: Patient Care Overview  Goal: Plan of Care/Patient Progress Review  Outcome: No Change  A&OX4, calm and coop. Up ind in room. VSS. Denies pain. Sergio diet, good appetite, ,120. LS dim, receiving bactrim. Creat worse, IVF's for 1 liter then stop. Hopeful to DC tomorrow.        Calcipotriene Pregnancy And Lactation Text: This medication has not been proven safe during pregnancy. It is unknown if this medication is excreted in breast milk.

## 2022-03-30 NOTE — PROVIDER NOTIFICATION
MD Notification    Notified Person:  MD    Notified Persons Name: Dr. Laguerre    Notification Date/Time: 12/2/17 2080    Notification Interaction:  Web-paged Physician    Purpose of Notification: In your note you wanted to d/c fluids. Could you put in the order please? Thank you    Orders Received:    Comments: MD d/c fluids     No

## 2022-04-18 DIAGNOSIS — J45.909 REACTIVE AIRWAY DISEASE WITHOUT COMPLICATION, UNSPECIFIED ASTHMA SEVERITY, UNSPECIFIED WHETHER PERSISTENT: ICD-10-CM

## 2022-04-18 DIAGNOSIS — J30.1 SEASONAL ALLERGIC RHINITIS DUE TO POLLEN: ICD-10-CM

## 2022-04-18 DIAGNOSIS — E78.2 MIXED HYPERLIPIDEMIA: ICD-10-CM

## 2022-04-19 RX ORDER — MONTELUKAST SODIUM 10 MG/1
TABLET ORAL
Qty: 1 TABLET | Refills: 0 | OUTPATIENT
Start: 2022-04-19

## 2022-04-19 RX ORDER — ATORVASTATIN CALCIUM 20 MG/1
TABLET, FILM COATED ORAL
Qty: 1 TABLET | Refills: 0 | OUTPATIENT
Start: 2022-04-19

## 2022-04-19 NOTE — TELEPHONE ENCOUNTER
Atorvastatin:    Too soon.  Refill sent 3/4/22 for #90    Montelukast:  Discontinued 2/22/22    Taylor De Los Santos RN

## 2022-04-26 ENCOUNTER — TRANSFERRED RECORDS (OUTPATIENT)
Dept: HEALTH INFORMATION MANAGEMENT | Facility: CLINIC | Age: 77
End: 2022-04-26

## 2022-05-01 ENCOUNTER — TRANSFERRED RECORDS (OUTPATIENT)
Dept: MULTI SPECIALTY CLINIC | Facility: CLINIC | Age: 77
End: 2022-05-01

## 2022-05-01 LAB — RETINOPATHY: NORMAL

## 2022-05-17 ENCOUNTER — MYC MEDICAL ADVICE (OUTPATIENT)
Dept: FAMILY MEDICINE | Facility: CLINIC | Age: 77
End: 2022-05-17
Payer: MEDICARE

## 2022-05-17 DIAGNOSIS — H90.8 MIXED CONDUCTIVE AND SENSORINEURAL HEARING LOSS, UNSPECIFIED LATERALITY: Primary | ICD-10-CM

## 2022-05-17 DIAGNOSIS — K21.9 GASTROESOPHAGEAL REFLUX DISEASE, UNSPECIFIED WHETHER ESOPHAGITIS PRESENT: ICD-10-CM

## 2022-05-17 NOTE — TELEPHONE ENCOUNTER
Routing refill request to provider for review/approval because:  Drug not on the [facility] refill protocol   Jazmin TAVARES RN

## 2022-05-31 ENCOUNTER — TELEPHONE (OUTPATIENT)
Dept: FAMILY MEDICINE | Facility: CLINIC | Age: 77
End: 2022-05-31
Payer: MEDICARE

## 2022-05-31 NOTE — TELEPHONE ENCOUNTER
Reason for Call: Request for an order or referral:    Order or referral being requested: LAB ORDERS FOR MEDICATION REVIEW    Date needed: as soon as possible    Has the patient been seen by the PCP for this problem? YES    Additional comments: PATIENT SCHEDULED A FOLLOW UP FOR MEDICATION REVIEW 7-8-22    PATIENT SCHEDULED LAB APPT 7-1-22     Phone number Patient can be reached at:  Cell number on file:    Telephone Information:   Mobile [phone]       Best Time:  ASAP    Can we leave a detailed message on this number?  YES    Call taken on 5/31/2022 at 4:34 PM by Janae Curiel

## 2022-06-03 DIAGNOSIS — E89.0 POSTOPERATIVE HYPOTHYROIDISM: Chronic | ICD-10-CM

## 2022-06-03 DIAGNOSIS — E11.9 TYPE 2 DIABETES MELLITUS WITHOUT COMPLICATION, WITHOUT LONG-TERM CURRENT USE OF INSULIN (H): Chronic | ICD-10-CM

## 2022-06-03 DIAGNOSIS — J45.909 REACTIVE AIRWAY DISEASE WITHOUT COMPLICATION, UNSPECIFIED ASTHMA SEVERITY, UNSPECIFIED WHETHER PERSISTENT: ICD-10-CM

## 2022-06-03 RX ORDER — METFORMIN HCL 500 MG
TABLET, EXTENDED RELEASE 24 HR ORAL
Qty: 180 TABLET | Refills: 0 | OUTPATIENT
Start: 2022-06-03

## 2022-06-03 RX ORDER — DEXAMETHASONE 4 MG/1
TABLET ORAL
Qty: 36 G | Refills: 0 | Status: SHIPPED | OUTPATIENT
Start: 2022-06-03 | End: 2022-06-03

## 2022-06-03 RX ORDER — LIOTHYRONINE SODIUM 5 UG/1
TABLET ORAL
Qty: 360 TABLET | Refills: 1 | OUTPATIENT
Start: 2022-06-03

## 2022-06-03 RX ORDER — DEXAMETHASONE 4 MG/1
TABLET ORAL
Qty: 36 G | Refills: 0 | Status: SHIPPED | OUTPATIENT
Start: 2022-06-03 | End: 2022-08-08

## 2022-06-03 NOTE — TELEPHONE ENCOUNTER
Flovent:  Prescription approved per [facility] Refill Protocol.    Jazmin TAVARES RN

## 2022-06-28 NOTE — PATIENT INSTRUCTIONS
Preparing for Your Surgery  Getting started  A nurse will call you to review your health history and instructions. They will give you an arrival time based on your scheduled surgery time. Please be ready to share:    Your doctor's clinic name and phone number    Your medical, surgical and anesthesia history    A list of allergies and sensitivities    A list of medicines, including herbal treatments and over-the-counter drugs    Whether the patient has a legal guardian (ask how to send us the papers in advance)  Please tell us if you're pregnant--or if there's any chance you might be pregnant. Some surgeries may injure a fetus (unborn baby), so they require a pregnancy test. Surgeries that are safe for a fetus don't always need a test, and you can choose whether to have one.   If you have a child who's having surgery, please ask for a copy of Preparing for Your Child's Surgery.    Preparing for surgery    Within 30 days of surgery: Have a pre-op exam (sometimes called an H&P, or History and Physical). This can be done at a clinic or pre-operative center.  ? If you're having a , you may not need this exam. Talk to your care team.    At your pre-op exam, talk to your care team about all medicines you take. If you need to stop any medicines before surgery, ask when to start taking them again.  ? We do this for your safety. Many medicines can make you bleed too much during surgery. Some change how well surgery (anesthesia) drugs work.    Call your insurance company to let them know you're having surgery. (If you don't have insurance, call [phone].)    Call your clinic if there's any change in your health. This includes signs of a cold or flu (sore throat, runny nose, cough, rash, fever). It also includes a scrape or scratch near the surgery site.    If you have questions on the day of surgery, call your hospital or surgery center.  COVID testing  You may need to be tested for COVID-19 before having  surgery. If so, we will give you instructions.  Eating and drinking guidelines  For your safety: Unless your surgeon tells you otherwise, follow the guidelines below.    Eat and drink as usual until 8 hours before surgery. After that, no food or milk.    Drink clear liquids until 2 hours before surgery. These are liquids you can see through, like water, Gatorade and Propel Water. You may also have black coffee and tea (no cream or milk).    Nothing by mouth within 2 hours of surgery. This includes gum, candy and breath mints.    If you drink alcohol: Stop drinking it the night before surgery.    If your care team tells you to take medicine on the morning of surgery, it's okay to take it with a sip of water.  Preventing infection    Shower or bathe the night before and morning of your surgery. Follow the instructions your clinic gave you. (If no instructions, use regular soap.)    Don't shave or clip hair near your surgery site. We'll remove the hair if needed.    Don't smoke or vape the morning of surgery. You may chew nicotine gum up to 2 hours before surgery. A nicotine patch is okay.  ? Note: Some surgeries require you to completely quit smoking and nicotine. Check with your surgeon.    Your care team will make every effort to keep you safe from infection. We will:  ? Clean our hands often with soap and water (or an alcohol-based hand rub).  ? Clean the skin at your surgery site with a special soap that kills germs.  ? Give you a special gown to keep you warm. (Cold raises the risk of infection.)  ? Wear special hair covers, masks, gowns and gloves during surgery.  ? Give antibiotic medicine, if prescribed. Not all surgeries need antibiotics.  What to bring on the day of surgery    Photo ID and insurance card    Copy of your health care directive, if you have one    Glasses and hearing aides (bring cases)  ? You can't wear contacts during surgery    Inhaler and eye drops, if you use them (tell us about these when  you arrive)    CPAP machine or breathing device, if you use them    A few personal items, if spending the night    If you have . . .  ? A pacemaker, ICD (cardiac defibrillator) or other implant: Bring the ID card.  ? An implanted stimulator: Bring the remote control.  ? A legal guardian: Bring a copy of the certified (court-stamped) guardianship papers.  Please remove any jewelry, including body piercings. Leave jewelry and other valuables at home.  If you're going home the day of surgery    You must have a responsible adult drive you home. They should stay with you overnight as well.    If you don't have someone to stay with you, and you aren't safe to go home alone, we may keep you overnight. Insurance often won't pay for this.  After surgery  If it's hard to control your pain or you need more pain medicine, please call your surgeon's office.  Questions?   If you have any questions for your care team, list them here: _________________________________________________________________________________________________________________________________________________________________________ ____________________________________ ____________________________________ ____________________________________  For informational purposes only. Not to replace the advice of your health care provider. Copyright   2003, 2019 [facility]. All rights reserved. Clinically reviewed by Melissa Miranda MD. [employer] [id number] - REV 07/21.

## 2022-07-01 ENCOUNTER — LAB (OUTPATIENT)
Dept: LAB | Facility: CLINIC | Age: 77
End: 2022-07-01
Payer: MEDICARE

## 2022-07-01 ENCOUNTER — OFFICE VISIT (OUTPATIENT)
Dept: FAMILY MEDICINE | Facility: CLINIC | Age: 77
End: 2022-07-01
Payer: MEDICARE

## 2022-07-01 VITALS
SYSTOLIC BLOOD PRESSURE: 125 MMHG | DIASTOLIC BLOOD PRESSURE: 79 MMHG | TEMPERATURE: 97.8 F | OXYGEN SATURATION: 97 % | BODY MASS INDEX: 38.65 KG/M2 | WEIGHT: 232 LBS | RESPIRATION RATE: 16 BRPM | HEIGHT: 65 IN | HEART RATE: 67 BPM

## 2022-07-01 DIAGNOSIS — I10 ESSENTIAL HYPERTENSION: ICD-10-CM

## 2022-07-01 DIAGNOSIS — E89.0 POSTOPERATIVE HYPOTHYROIDISM: ICD-10-CM

## 2022-07-01 DIAGNOSIS — N18.30 STAGE 3 CHRONIC KIDNEY DISEASE, UNSPECIFIED WHETHER STAGE 3A OR 3B CKD (H): ICD-10-CM

## 2022-07-01 DIAGNOSIS — Z01.818 PREOP GENERAL PHYSICAL EXAM: Primary | ICD-10-CM

## 2022-07-01 DIAGNOSIS — H25.89 OTHER AGE-RELATED CATARACT, UNSPECIFIED LATERALITY: ICD-10-CM

## 2022-07-01 DIAGNOSIS — E78.2 MIXED HYPERLIPIDEMIA: ICD-10-CM

## 2022-07-01 DIAGNOSIS — E11.9 TYPE 2 DIABETES MELLITUS WITHOUT COMPLICATION, WITHOUT LONG-TERM CURRENT USE OF INSULIN (H): Chronic | ICD-10-CM

## 2022-07-01 DIAGNOSIS — E11.9 TYPE 2 DIABETES MELLITUS WITHOUT COMPLICATION, WITHOUT LONG-TERM CURRENT USE OF INSULIN (H): ICD-10-CM

## 2022-07-01 LAB
BASOPHILS # BLD AUTO: 0 10E3/UL (ref 0–0.2)
BASOPHILS NFR BLD AUTO: 0 %
EOSINOPHIL # BLD AUTO: 0.4 10E3/UL (ref 0–0.7)
EOSINOPHIL NFR BLD AUTO: 5 %
ERYTHROCYTE [DISTWIDTH] IN BLOOD BY AUTOMATED COUNT: 14.4 % (ref 10–15)
HCT VFR BLD AUTO: 45.4 % (ref 35–47)
HGB BLD-MCNC: 15.1 G/DL (ref 11.7–15.7)
HOLD SPECIMEN: NORMAL
LYMPHOCYTES # BLD AUTO: 2.7 10E3/UL (ref 0.8–5.3)
LYMPHOCYTES NFR BLD AUTO: 33 %
MCH RBC QN AUTO: 30.3 PG (ref 26.5–33)
MCHC RBC AUTO-ENTMCNC: 33.3 G/DL (ref 31.5–36.5)
MCV RBC AUTO: 91 FL (ref 78–100)
MONOCYTES # BLD AUTO: 0.6 10E3/UL (ref 0–1.3)
MONOCYTES NFR BLD AUTO: 7 %
NEUTROPHILS # BLD AUTO: 4.4 10E3/UL (ref 1.6–8.3)
NEUTROPHILS NFR BLD AUTO: 55 %
PLATELET # BLD AUTO: 285 10E3/UL (ref 150–450)
RBC # BLD AUTO: 4.98 10E6/UL (ref 3.8–5.2)
WBC # BLD AUTO: 8.1 10E3/UL (ref 4–11)

## 2022-07-01 PROCEDURE — 36415 COLL VENOUS BLD VENIPUNCTURE: CPT

## 2022-07-01 PROCEDURE — 80050 GENERAL HEALTH PANEL: CPT | Performed by: PHYSICIAN ASSISTANT

## 2022-07-01 PROCEDURE — 99214 OFFICE O/P EST MOD 30 MIN: CPT | Performed by: PHYSICIAN ASSISTANT

## 2022-07-01 PROCEDURE — 83036 HEMOGLOBIN GLYCOSYLATED A1C: CPT

## 2022-07-01 PROCEDURE — 84439 ASSAY OF FREE THYROXINE: CPT

## 2022-07-01 PROCEDURE — 80061 LIPID PANEL: CPT

## 2022-07-01 RX ORDER — ATORVASTATIN CALCIUM 20 MG/1
20 TABLET, FILM COATED ORAL
COMMUNITY
Start: 2022-05-24 | End: 2022-07-08

## 2022-07-01 NOTE — NURSING NOTE
"Chief Complaint   Patient presents with     Pre-Op Exam     Cataract right eye 7/11/22 left eye 7/18/22     initial /79   Pulse 67   Temp 97.8  F (36.6  C) (Temporal)   Resp 16   Ht 1.651 m (5' 5\")   Wt 105.2 kg (232 lb)   SpO2 97%   BMI 38.61 kg/m   Estimated body mass index is 38.61 kg/m  as calculated from the following:    Height as of this encounter: 1.651 m (5' 5\").    Weight as of this encounter: 105.2 kg (232 lb).  BP completed using cuff size: large.  L  arm      Health Maintenance that is potentially due pending provider review:  NONE    n/a    Seth Collier ma  "

## 2022-07-01 NOTE — RESULT ENCOUNTER NOTE
"Cristian Johnson  Your attached labs are within normal limits. Best of luck with your upcoming procedure!  Please contact the office with any questions or concerns.    Sophia Calvin \"Jeff\" DIRK Barney    "

## 2022-07-01 NOTE — LETTER
[facility]  [address], [address]  [facility] [address]  Phone: [phone]  Primary Provider: Ella Hernandez  Pre-op Performing Provider: HUANG DICKERSON      PREOPERATIVE EVALUATION:  Today's date: 7/1/2022    Elizabeth Galicia is a 77 year old female who presents for a preoperative evaluation.    Surgical Information:  Surgery/Procedure: Cataract  Surgery Location: [facility]  Surgeon:   Surgery Date: 7/11/22  Time of Surgery: am  Where patient plans to recover: At home with family  Fax number for surgical facility: [phone]    Type of Anesthesia Anticipated: Local    Assessment & Plan     The proposed surgical procedure is considered LOW risk.      ICD-10-CM    1. Preop general physical exam  Z01.818 CBC with platelets and differential   2. Other age-related cataract, unspecified laterality  H25.89 CBC with platelets and differential   3. Type 2 diabetes mellitus without complication, without long-term current use of insulin (H)  E11.9    4. Postoperative hypothyroidism  E89.0    5. Mixed hyperlipidemia  E78.2    6. Essential hypertension  I10    7. Stage 3 chronic kidney disease, unspecified whether stage 3a or 3b CKD (H)  N18.30          Risks and Recommendations:  The patient has the following additional risks and recommendations for perioperative complications:  Diabetes:  - Patient is not on insulin therapy: regular NPO guidelines can be followed.     Medication Instructions:  Patient is to take all scheduled medications on the day of surgery EXCEPT for modifications listed below:   - metformin: HOLD day of surgery.    RECOMMENDATION:  APPROVAL GIVEN to proceed with proposed procedure, without further diagnostic evaluation.    Review of prior external note(s) from - previous routine notes      20 minutes spent on the date of the encounter doing chart review, history and exam, documentation and further activities per the  note        Subjective     HPI related to upcoming procedure: history of cataract with plans for surgical repair       Preop Questions 7/1/2022   1. Have you ever had a heart attack or stroke? No   2. Have you ever had surgery on your heart or blood vessels, such as a stent placement, a coronary artery bypass, or surgery on an artery in your head, neck, heart, or legs? No   3. Do you have chest pain with activity? No   4. Do you have a history of  heart failure? No   5. Do you currently have a cold, bronchitis or symptoms of other infection? UNKNOWN - right leg swelling, improving, no signs of infection   6. Do you have a cough, shortness of breath, or wheezing? No   7. Do you or anyone in your family have previous history of blood clots? No   8. Do you or does anyone in your family have a serious bleeding problem such as prolonged bleeding following surgeries or cuts? No   9. Have you ever had problems with anemia or been told to take iron pills? YES - a few months ago, has improved   10. Have you had any abnormal blood loss such as black, tarry or bloody stools, or abnormal vaginal bleeding? No   11. Have you ever had a blood transfusion? No   12. Are you willing to have a blood transfusion if it is medically needed before, during, or after your surgery? Yes   13. Have you or any of your relatives ever had problems with anesthesia? No   14. Do you have sleep apnea, excessive snoring or daytime drowsiness? No   15. Do you have any artifical heart valves or other implanted medical devices like a pacemaker, defibrillator, or continuous glucose monitor? No   16. Do you have artificial joints? No   17. Are you allergic to latex? No     Health Care Directive:  Patient has a Health Care Directive on file      Preoperative Review of :   reviewed - no record of controlled substances prescribed.      Status of Chronic Conditions:  See problem list for active medical problems.  Problems all longstanding and stable,  except as noted/documented.  See ROS for pertinent symptoms related to these conditions.    DIABETES - Patient has a longstanding history of DiabetesType Type II . Patient is being treated with oral agents and denies significant side effects. Control has been good. Complicating factors include but are not limited to: hypertension and hyperlipidemia.     HYPERLIPIDEMIA - Patient has a long history of significant Hyperlipidemia requiring medication for treatment with recent good control. Patient reports no problems or side effects with the medication.     HYPOTHYROIDISM - Patient has a longstanding history of chronic Hypothyroidism. Patient has been doing well, noting no tremor, insomnia, hair loss or changes in skin texture. Continues to take medications as directed, without adverse reactions or side effects. Last TSH   Lab Results   Component Value Date    TSH 0.39 (L) 02/16/2022   .        Review of Systems  CONSTITUTIONAL: NEGATIVE for fever, chills, change in weight  ENT/MOUTH: NEGATIVE for ear, mouth and throat problems  RESP: NEGATIVE for significant cough or SOB  CV: NEGATIVE for chest pain, palpitations or peripheral edema    Patient Active Problem List    Diagnosis Date Noted     Seasonal allergic rhinitis 09/13/2019     Priority: Medium     Microscopic polyangiitis (H) 09/13/2019     Priority: Medium     Morbid obesity (H) 03/29/2019     Priority: Medium     Prophylactic antibiotic 12/31/2017     Priority: Medium     CKD (chronic kidney disease) stage 3, GFR 30-59 ml/min (H)      Priority: Medium     Postoperative hypothyroidism 06/11/2017     Priority: Medium     Anemia, unspecified type 06/11/2017     Priority: Medium     Hyperplasia of renal artery (H) 06/11/2017     Priority: Medium     Heterozygous for MTHFR gene mutation      Priority: Medium     Per CareEverywhere       ANCA-associated vasculitis (Microscopic polyangiitis) 06/09/2017     Priority: Medium     Type 2 diabetes mellitus without  complication (H) 05/24/2017     Priority: Medium     Obesity (BMI 30-39.9)      Priority: Medium     Heterozygous MTHFR mutation [id number] 08/25/2015     Priority: Medium     HTN (hypertension)      Priority: Medium      Past Medical History:   Diagnosis Date     ANCA-associated vasculitis (H)      Anxiety      Gastro-oesophageal reflux disease      Herniated lumbar intervertebral disc      Herpes      Heterozygous for MTHFR gene mutation     Per CareEverywhere     Hyperlipidemia      Lesion of upper eyelid LEFT     Obesity (BMI 30-39.9)      Postoperative hypothyroidism      Seasonal allergies      Type 2 diabetes mellitus without complication (H) 5/24/2017     Uterine prolapse      Past Surgical History:   Procedure Laterality Date     COLONOSCOPY       DILATION AND CURETTAGE       ENT SURGERY      partial thyroidectomy; tonsillectomy     EXCISE LESION EYELID Left 3/10/2016    Procedure: EXCISE LESION EYELID;  Surgeon: Rg Nazario MD;  Location: [facility]     HAND SURGERY       HYSTERECTOMY VAGINAL, COLPORRHAPHY ANTERIOR, POSTERIOR, COMBINED N/A 9/9/2014    Procedure: COMBINED HYSTERECTOMY VAGINAL, COLPORRHAPHY ANTERIOR, POSTERIOR;  Surgeon: Shay Winkler MD;  Location: [facility]     HYSTERECTOMY, PAP NO LONGER INDICATED       LAPAROSCOPIC SALPINGO-OOPHORECTOMY  6/27/2011    Procedure:LAPAROSCOPIC SALPINGO-OOPHORECTOMY; resection of left pelvic mass. ; Surgeon:MAYRA OLEARY; Location:[facility]     ORTHOPEDIC SURGERY       SALPINGO OOPHORECTOMY,R/L/PEDRO LUIS      Salpingo Oophorectomy for torsion in past     THYROIDECTOMY      Partial     Current Outpatient Medications   Medication Sig Dispense Refill     atorvastatin (LIPITOR) 20 MG tablet Take 20 mg by mouth       CALCIUM LACTATE PO Take 4 tablets by mouth daily        Cholecalciferol (VITAMIN D3 PO) Take 1,000 Units by mouth daily       Cyanocobalamin ([employer] B-12 PO)        fexofenadine (ALLEGRA) 180 MG tablet TAKE 1 TABLET BY MOUTH EVERY DAY 90 tablet 2     fluticasone  "(FLOVENT HFA) 110 MCG/ACT inhaler INHALE 1 PUFF BY MOUTH IN TO THE LUNGS TWICE A DAY AS DIRECTED 36 g 0     levothyroxine (SYNTHROID/LEVOTHROID) 125 MCG tablet Take 1 tablet (125 mcg) by mouth daily 90 tablet 1     liothyronine (CYTOMEL) 5 MCG tablet TAKE 2 TABLETS (10 MCG) BY MOUTH 2 TIMES DAILY 360 tablet 1     MAGNESIUM LACTATE PO Take 2 tablets by mouth daily       metFORMIN (GLUCOPHAGE XR) 500 MG 24 hr tablet START ONE TABLET DAILY FOR 2 WEEKS THEN INCREASE TO TWO TABLETS DAILY 180 tablet 0     omeprazole (PRILOSEC) 20 MG DR capsule TAKE 1 CAPSULE BY MOUTH EVERY DAY 90 capsule 3     UNABLE TO FIND MEDICATION NAME: medical cannibus       valACYclovir (VALTREX) 500 MG tablet Take 1 tablet (500 mg) by mouth 2 times daily for 3 days 6 tablet 11       Allergies   Allergen Reactions     Cranberries [Cranberry Extract]      Causes herpes flair-up     Dairy [Milk Products]      Lactase      Perfume Other (See Comments)     Stuffy in nose, HA     Seasonal Allergies      Sulfa Drugs Unknown     Other reaction(s): Unknown        Social History     Tobacco Use     Smoking status: Never Smoker     Smokeless tobacco: Never Used   Substance Use Topics     Alcohol use: Yes     Comment: rarely     Family History   Problem Relation Age of Onset     Unknown/Adopted Mother      Heart Disease Other      History   Drug Use No         Objective     /79   Pulse 67   Temp 97.8  F (36.6  C) (Temporal)   Resp 16   Ht 1.651 m (5' 5\")   Wt 105.2 kg (232 lb)   SpO2 97%   BMI 38.61 kg/m      Physical Exam  GENERAL APPEARANCE: healthy, alert and no distress  HENT: ear canals and TM's normal and nose and mouth without ulcers or lesions  RESP: lungs clear to auscultation - no rales, rhonchi or wheezes  CV: regular rate and rhythm, normal S1 S2, no S3 or S4 and no murmur, click or rub   ABDOMEN: soft, nontender, no HSM or masses and bowel sounds normal  NEURO: Normal strength and tone, sensory exam grossly normal, mentation intact " and speech normal    Recent Labs   Lab Test 02/16/22  1316 06/09/21  0000   HGB 15.4  --      --      --    POTASSIUM 4.3  --    CR 1.08* 1.150   A1C 7.0*  --         Diagnostics:  Labs pending at this time.  Results will be reviewed when available.   No EKG required for low risk surgery (cataract, skin procedure, breast biopsy, etc).    Revised Cardiac Risk Index (RCRI):  The patient has the following serious cardiovascular risks for perioperative complications:   - No serious cardiac risks = 0 points     RCRI Interpretation: 0 points: Class I (very low risk - 0.4% complication rate)           Signed Electronically by: Sophia Barney PA-C  Copy of this evaluation report is provided to requesting physician.

## 2022-07-02 LAB
ALBUMIN SERPL-MCNC: 3.6 G/DL (ref 3.4–5)
ALP SERPL-CCNC: 72 U/L (ref 40–150)
ALT SERPL W P-5'-P-CCNC: 31 U/L (ref 0–50)
ANION GAP SERPL CALCULATED.3IONS-SCNC: 9 MMOL/L (ref 3–14)
AST SERPL W P-5'-P-CCNC: 18 U/L (ref 0–45)
BILIRUB SERPL-MCNC: 0.6 MG/DL (ref 0.2–1.3)
BUN SERPL-MCNC: 18 MG/DL (ref 7–30)
CALCIUM SERPL-MCNC: 9.1 MG/DL (ref 8.5–10.1)
CHLORIDE BLD-SCNC: 106 MMOL/L (ref 94–109)
CHOLEST SERPL-MCNC: 128 MG/DL
CO2 SERPL-SCNC: 22 MMOL/L (ref 20–32)
CREAT SERPL-MCNC: 1.11 MG/DL (ref 0.52–1.04)
FASTING STATUS PATIENT QL REPORTED: YES
GFR SERPL CREATININE-BSD FRML MDRD: 51 ML/MIN/1.73M2
GLUCOSE BLD-MCNC: 115 MG/DL (ref 70–99)
HDLC SERPL-MCNC: 53 MG/DL
LDLC SERPL CALC-MCNC: 47 MG/DL
NONHDLC SERPL-MCNC: 75 MG/DL
POTASSIUM BLD-SCNC: 4.4 MMOL/L (ref 3.4–5.3)
PROT SERPL-MCNC: 6.8 G/DL (ref 6.8–8.8)
SODIUM SERPL-SCNC: 137 MMOL/L (ref 133–144)
T4 FREE SERPL-MCNC: 1.2 NG/DL (ref 0.76–1.46)
TRIGL SERPL-MCNC: 138 MG/DL
TSH SERPL DL<=0.005 MIU/L-ACNC: 0.02 MU/L (ref 0.4–4)

## 2022-07-02 NOTE — PROGRESS NOTES
"PAUL is a 77 year old who is being evaluated via a billable video visit.      How would you like to obtain your AVS? MyChart  If the video visit is dropped, the invitation should be resent by:   Will anyone else be joining your video visit? No        Assessment & Plan     Type 2 diabetes mellitus without complication, without long-term current use of insulin (H)  On metformin 1000mg daily  Doing well with diet and exercise - has lost 10 pounds  Had diabetes eye exam - in the gre months  Has cataract surgery in the next month  Plan to recheck A1c in 4 months   - metFORMIN (GLUCOPHAGE XR) 500 MG 24 hr tablet; Take TWO TABLETS DAILY  - **A1C FUTURE 3mo; Future    Postoperative hypothyroidism  TSH low and T4 normal  Will lower the dose of the levothyroixine from 125mcg down to 112 mcg  Plan to recheck lab only visit in 6 weeks  Also on Liiothyroixine but that dose not adjusted     Stage 3 chronic kidney disease, unspecified whether stage 3a or 3b CKD (H)   secondary to her polyangitis    Skin lesion  Lesion on neck and upper chest -   - Adult Dermatology Referral; Future    Lump of skin of lower extremity, unspecified laterality   etiology unclear -   Went to  and was bigger at the time  Plan to monitor - if persists  Will check xray of lower leg -   Consider MRI if not diagnostic and lump persists     Gastroesophageal reflux disease, unspecified whether esophagitis present  On omeprazole daily     Hyperplasia of renal artery (H)   as above     Mixed hyperlipidemia   improved lipids   - atorvastatin (LIPITOR) 20 MG tablet; Take 1 tablet (20 mg) by mouth daily             BMI:   Estimated body mass index is 38.61 kg/m  as calculated from the following:    Height as of 7/1/22: 1.651 m (5' 5\").    Weight as of 7/1/22: 105.2 kg (232 lb).           No follow-ups on file.    Ella Hernandez DO  [facility]    Subjective   PAUL is a 77 year old, presenting for the following health issues:  No chief " complaint on file.      HPI     Lab results    Lipids-   Started statin    Diabetes -   Has changed - lost 10 pounds   More veggies  Taking metformin 1000mg daily     Middle front of the right shin swelling -     Goes to chiropractor -         Review of Systems   Constitutional, HEENT, cardiovascular, pulmonary, gi and gu systems are negative, except as otherwise noted.      Objective           Vitals:  No vitals were obtained today due to virtual visit.    Physical Exam   GENERAL: Healthy, alert and no distress  EYES: Eyes grossly normal to inspection.  No discharge or erythema, or obvious scleral/conjunctival abnormalities.  RESP: No audible wheeze, cough, or visible cyanosis.  No visible retractions or increased work of breathing.    SKIN: Visible skin clear. No significant rash, abnormal pigmentation or lesions.  NEURO: Cranial nerves grossly intact.  Mentation and speech appropriate for age.  PSYCH: Mentation appears normal, affect normal/bright, judgement and insight intact, normal speech and appearance well-groomed.    See Epic for labs - all reviewed             Video-Visit Details    Video Start Time: 12:45pm    Type of service:  Video Visit    Video End Time:1:18pm    Originating Location (pt. Location): Home    Distant Location (provider location):  [facility]     Platform used for Video Visit: [employer]    .  ..

## 2022-07-05 ENCOUNTER — MYC MEDICAL ADVICE (OUTPATIENT)
Dept: FAMILY MEDICINE | Facility: CLINIC | Age: 77
End: 2022-07-05

## 2022-07-05 DIAGNOSIS — E11.9 TYPE 2 DIABETES MELLITUS WITHOUT COMPLICATION, WITHOUT LONG-TERM CURRENT USE OF INSULIN (H): Primary | ICD-10-CM

## 2022-07-05 DIAGNOSIS — E89.0 POSTOPERATIVE HYPOTHYROIDISM: Chronic | ICD-10-CM

## 2022-07-05 RX ORDER — LEVOTHYROXINE SODIUM 112 UG/1
112 TABLET ORAL DAILY
Qty: 30 TABLET | Refills: 1 | Status: SHIPPED | OUTPATIENT
Start: 2022-07-05 | End: 2022-09-06

## 2022-07-05 NOTE — RESULT ENCOUNTER NOTE
Dear PAUL,   Your test results are all back -   -Cholesterol levels (LDL,HDL, Triglycerides) are normal.  ADVISE: rechecking in 1 year.   -Liver and gallbladder tests (ALT,AST, Alk phos,bilirubin) are normal.  -Kidney function (GFR) is decreased.  ADVISE: it appears stable - plan to recheck in 6 months.  -Sodium is normal.  -Potassium is normal.  -Calcium is normal.  -Glucose is slight elevated and may be a sign of early diabetes (prediabetes). ADVISE:: eating a low carbohydrate diet, exercising, trying to lose weight (if necessary) and rechecking your glucose level in 6 months.  -TSH (thyroid stimulating hormone) is abnormal suggesting you are currently overreplaced.  ADVISE: changing your medication dose to 112 micrograms/day (a prescription has been sent to your pharmacy) and rechecking your TSH with a lab appointment in 6-8 weeks.  Let us know if you have any questions.  -Ella Hernandez, DO

## 2022-07-06 LAB — HBA1C MFR BLD: 6.3 % (ref 0–5.6)

## 2022-07-06 NOTE — TELEPHONE ENCOUNTER
PN,  Please see below MyChart message and advise.  A1c not checked with recent labs  Last check was 2/16/2022  Thanks,  Jazmin TAVARES RN

## 2022-07-08 ENCOUNTER — VIRTUAL VISIT (OUTPATIENT)
Dept: FAMILY MEDICINE | Facility: CLINIC | Age: 77
End: 2022-07-08
Payer: MEDICARE

## 2022-07-08 DIAGNOSIS — L98.9 SKIN LESION: ICD-10-CM

## 2022-07-08 DIAGNOSIS — K21.9 GASTROESOPHAGEAL REFLUX DISEASE, UNSPECIFIED WHETHER ESOPHAGITIS PRESENT: ICD-10-CM

## 2022-07-08 DIAGNOSIS — R22.40 LUMP OF SKIN OF LOWER EXTREMITY, UNSPECIFIED LATERALITY: ICD-10-CM

## 2022-07-08 DIAGNOSIS — N18.30 STAGE 3 CHRONIC KIDNEY DISEASE, UNSPECIFIED WHETHER STAGE 3A OR 3B CKD (H): ICD-10-CM

## 2022-07-08 DIAGNOSIS — E78.2 MIXED HYPERLIPIDEMIA: ICD-10-CM

## 2022-07-08 DIAGNOSIS — I77.89 HYPERPLASIA OF RENAL ARTERY (H): ICD-10-CM

## 2022-07-08 DIAGNOSIS — E11.9 TYPE 2 DIABETES MELLITUS WITHOUT COMPLICATION, WITHOUT LONG-TERM CURRENT USE OF INSULIN (H): Primary | ICD-10-CM

## 2022-07-08 DIAGNOSIS — E89.0 POSTOPERATIVE HYPOTHYROIDISM: ICD-10-CM

## 2022-07-08 PROCEDURE — 99214 OFFICE O/P EST MOD 30 MIN: CPT | Mod: 95 | Performed by: FAMILY MEDICINE

## 2022-07-08 RX ORDER — METFORMIN HCL 500 MG
TABLET, EXTENDED RELEASE 24 HR ORAL
Qty: 180 TABLET | Refills: 1 | Status: SHIPPED | OUTPATIENT
Start: 2022-07-08 | End: 2022-12-27

## 2022-07-08 RX ORDER — ATORVASTATIN CALCIUM 20 MG/1
20 TABLET, FILM COATED ORAL DAILY
Qty: 90 TABLET | Refills: 3 | Status: SHIPPED | OUTPATIENT
Start: 2022-07-08 | End: 2023-07-28

## 2022-07-08 NOTE — NURSING NOTE
"Chief Complaint   Patient presents with     Results     Lipids and A1c results     initial There were no vitals taken for this visit. Estimated body mass index is 38.61 kg/m  as calculated from the following:    Height as of 7/1/22: 1.651 m (5' 5\").    Weight as of 7/1/22: 105.2 kg (232 lb).  BP completed using cuff size: .  L  R arm      Health Maintenance that is potentially due pending provider review:      Seth Collier ma  "

## 2022-07-08 NOTE — Clinical Note
Patient had diabetic eye exam 5/1/22 at [facility]

## 2022-08-10 NOTE — PLAN OF CARE
Problem: Goal Outcome Summary  Goal: Goal Outcome Summary  PT: Pt evaluated, treatment initiated. Pt is a 71 year old female, admitted with bilateral LE swelling and pneumonia. Pt lives with her wife in a house with 17 stairs to get to her bedroom. At baseline, pt is independent with all functional mobility. Currently, pt is independent with bed mobility, transfers, SBA for ambulation x170 ft, no AD and CGA for 3 stairs. Pt requires seated rest break after ambulation and is only able to negotiate 3 stairs due to fatigue. Pt independently don/doff gown and performs toilet transfer with SBA. PT Recommendation: Home with assist from wife as needed pending progress with negotiating 17 stairs.        no

## 2022-09-03 DIAGNOSIS — E89.0 POSTOPERATIVE HYPOTHYROIDISM: Chronic | ICD-10-CM

## 2022-09-06 RX ORDER — LEVOTHYROXINE SODIUM 112 UG/1
TABLET ORAL
Qty: 30 TABLET | Refills: 1 | Status: SHIPPED | OUTPATIENT
Start: 2022-09-06 | End: 2022-09-28

## 2022-09-06 NOTE — TELEPHONE ENCOUNTER
Routing refill request to provider for review/approval because:  Labs out of range:  TSH  Lupe MATHEW RN

## 2022-10-05 ENCOUNTER — LAB (OUTPATIENT)
Dept: LAB | Facility: CLINIC | Age: 77
End: 2022-10-05
Payer: MEDICARE

## 2022-10-05 DIAGNOSIS — E89.0 POSTOPERATIVE HYPOTHYROIDISM: ICD-10-CM

## 2022-10-05 DIAGNOSIS — E11.9 TYPE 2 DIABETES MELLITUS WITHOUT COMPLICATION, WITHOUT LONG-TERM CURRENT USE OF INSULIN (H): ICD-10-CM

## 2022-10-05 LAB — HBA1C MFR BLD: 6.5 % (ref 0–5.6)

## 2022-10-05 PROCEDURE — 36415 COLL VENOUS BLD VENIPUNCTURE: CPT

## 2022-10-05 PROCEDURE — 84439 ASSAY OF FREE THYROXINE: CPT

## 2022-10-05 PROCEDURE — 84443 ASSAY THYROID STIM HORMONE: CPT

## 2022-10-05 PROCEDURE — 83036 HEMOGLOBIN GLYCOSYLATED A1C: CPT

## 2022-10-06 LAB
T4 FREE SERPL-MCNC: 0.98 NG/DL (ref 0.76–1.46)
TSH SERPL DL<=0.005 MIU/L-ACNC: 0.13 MU/L (ref 0.4–4)

## 2022-10-11 DIAGNOSIS — E89.0 POSTOPERATIVE HYPOTHYROIDISM: Chronic | ICD-10-CM

## 2022-10-11 RX ORDER — LEVOTHYROXINE SODIUM 100 UG/1
100 TABLET ORAL DAILY
Qty: 30 TABLET | Refills: 1 | Status: SHIPPED | OUTPATIENT
Start: 2022-10-11 | End: 2022-12-27

## 2022-10-11 NOTE — RESULT ENCOUNTER NOTE
Dear PAUL,   Your test results are all back -   -TSH (thyroid stimulating hormone) is abnormal suggesting you are currently overreplaced.  ADVISE: changing your medication dose to 100 micrograms/day (a prescription has been sent to your pharmacy) and rechecking your TSH with a lab appointment in 6-8 weeks.  Let us know if you have any questions.  -Ella Hernandez, DO

## 2022-10-13 ENCOUNTER — TRANSFERRED RECORDS (OUTPATIENT)
Dept: HEALTH INFORMATION MANAGEMENT | Facility: CLINIC | Age: 77
End: 2022-10-13

## 2022-10-26 ENCOUNTER — TRANSFERRED RECORDS (OUTPATIENT)
Dept: HEALTH INFORMATION MANAGEMENT | Facility: CLINIC | Age: 77
End: 2022-10-26

## 2022-10-29 ENCOUNTER — HEALTH MAINTENANCE LETTER (OUTPATIENT)
Age: 77
End: 2022-10-29

## 2022-11-25 DIAGNOSIS — E89.0 POSTOPERATIVE HYPOTHYROIDISM: Chronic | ICD-10-CM

## 2022-11-28 DIAGNOSIS — E89.0 POSTOPERATIVE HYPOTHYROIDISM: Chronic | ICD-10-CM

## 2022-11-29 RX ORDER — LEVOTHYROXINE SODIUM 100 UG/1
TABLET ORAL
Qty: 1 TABLET | Refills: 0 | OUTPATIENT
Start: 2022-11-29

## 2022-12-01 NOTE — TELEPHONE ENCOUNTER
Patient due for physical.  No response to outreach.  Request now one week old.  Please advise.  Lupe MATHEW RN

## 2022-12-02 RX ORDER — LIOTHYRONINE SODIUM 5 UG/1
TABLET ORAL
Qty: 120 TABLET | Refills: 0 | Status: SHIPPED | OUTPATIENT
Start: 2022-12-02 | End: 2022-12-27

## 2022-12-02 NOTE — TELEPHONE ENCOUNTER
Left message for patient to call [facility] back  When patient calls back please transfer to an RN  Lupe MATHEW RN

## 2022-12-08 NOTE — TELEPHONE ENCOUNTER
Called patient again  Verbalized understanding  Will call back to schedule lab only  Khushboo FELDMAN RN

## 2022-12-09 NOTE — TELEPHONE ENCOUNTER
PN,      Routing refill request to provider for review/approval because:  Antihistamines Protocol Failed 11/20/2021 08:45 AM   Protocol Details  Patient is 3-64 years of age     Last VV 1/5/21: annual wellness exam.    Thanks,  Taylor De Los Santos RN                
done
done

## 2022-12-11 DIAGNOSIS — E11.9 TYPE 2 DIABETES MELLITUS WITHOUT COMPLICATION, WITHOUT LONG-TERM CURRENT USE OF INSULIN (H): ICD-10-CM

## 2022-12-13 RX ORDER — METFORMIN HCL 500 MG
TABLET, EXTENDED RELEASE 24 HR ORAL
Qty: 1 TABLET | Refills: 0 | OUTPATIENT
Start: 2022-12-13

## 2022-12-23 ENCOUNTER — TELEPHONE (OUTPATIENT)
Dept: FAMILY MEDICINE | Facility: CLINIC | Age: 77
End: 2022-12-23

## 2022-12-23 DIAGNOSIS — E11.9 TYPE 2 DIABETES MELLITUS WITHOUT COMPLICATION, WITHOUT LONG-TERM CURRENT USE OF INSULIN (H): Chronic | ICD-10-CM

## 2022-12-23 DIAGNOSIS — N18.30 STAGE 3 CHRONIC KIDNEY DISEASE, UNSPECIFIED WHETHER STAGE 3A OR 3B CKD (H): Primary | Chronic | ICD-10-CM

## 2022-12-23 NOTE — TELEPHONE ENCOUNTER
PN  Patient scheduled for annual physical, as well as TSH recheck.  Hoping to have all labs done for physical at TSH appointment.  Pended some labs to start with.  Please advise.  Lupe MATHEW RN

## 2022-12-27 DIAGNOSIS — E11.9 TYPE 2 DIABETES MELLITUS WITHOUT COMPLICATION, WITHOUT LONG-TERM CURRENT USE OF INSULIN (H): ICD-10-CM

## 2022-12-27 DIAGNOSIS — E89.0 POSTOPERATIVE HYPOTHYROIDISM: Chronic | ICD-10-CM

## 2022-12-27 RX ORDER — LEVOTHYROXINE SODIUM 100 UG/1
TABLET ORAL
Qty: 90 TABLET | Refills: 0 | Status: SHIPPED | OUTPATIENT
Start: 2022-12-27 | End: 2023-02-24

## 2022-12-27 RX ORDER — LIOTHYRONINE SODIUM 5 UG/1
TABLET ORAL
Qty: 180 TABLET | Refills: 0 | Status: SHIPPED | OUTPATIENT
Start: 2022-12-27 | End: 2023-02-24

## 2022-12-27 RX ORDER — METFORMIN HCL 500 MG
TABLET, EXTENDED RELEASE 24 HR ORAL
Qty: 180 TABLET | Refills: 0 | Status: SHIPPED | OUTPATIENT
Start: 2022-12-27 | End: 2023-03-28

## 2022-12-27 NOTE — TELEPHONE ENCOUNTER
Patient calling to express frustration with refill process.  Prescription was supposed to go on Friday and looks like it did not go through.  Patient expresses that she does everything she is supposed to do, now has been out of medication for two weeks.  RN attempted to explain that physical is an expectation of patients on an annual basis regardless of other follow-up.  Patient feels medication has been held hostage by nursing.  RN apologized for medication not going through on Friday, ensured prescription was sent while on phone.  Patient expresses that she feels this is a money-making tool for [facility].  RN explained that refills are used as a tool to help remind patients that they are coming due for an appointment. Also explained that there is a large volume of patients who move through the clinic, so unfortunately this is one of the ways that we rely on to connect with patients.  Patient asked who to speak with regarding this- RN referred to clinic manager.  Prescription approved per [facility] Refill Protocol through time of patient's physical.  Lupe MATHEW RN

## 2023-01-06 ENCOUNTER — LAB (OUTPATIENT)
Dept: LAB | Facility: CLINIC | Age: 78
End: 2023-01-06
Payer: MEDICARE

## 2023-01-06 DIAGNOSIS — N18.30 STAGE 3 CHRONIC KIDNEY DISEASE, UNSPECIFIED WHETHER STAGE 3A OR 3B CKD (H): Chronic | ICD-10-CM

## 2023-01-06 DIAGNOSIS — E11.9 TYPE 2 DIABETES MELLITUS WITHOUT COMPLICATION, WITHOUT LONG-TERM CURRENT USE OF INSULIN (H): Chronic | ICD-10-CM

## 2023-01-06 LAB
ALBUMIN SERPL BCG-MCNC: 4.1 G/DL (ref 3.5–5.2)
ALP SERPL-CCNC: 69 U/L (ref 35–104)
ALT SERPL W P-5'-P-CCNC: 23 U/L (ref 10–35)
ANION GAP SERPL CALCULATED.3IONS-SCNC: 15 MMOL/L (ref 7–15)
AST SERPL W P-5'-P-CCNC: 23 U/L (ref 10–35)
BILIRUB SERPL-MCNC: 0.5 MG/DL
BUN SERPL-MCNC: 21.9 MG/DL (ref 8–23)
CALCIUM SERPL-MCNC: 9.9 MG/DL (ref 8.8–10.2)
CHLORIDE SERPL-SCNC: 106 MMOL/L (ref 98–107)
CHOLEST SERPL-MCNC: 171 MG/DL
CREAT SERPL-MCNC: 1.15 MG/DL (ref 0.51–0.95)
DEPRECATED HCO3 PLAS-SCNC: 21 MMOL/L (ref 22–29)
GFR SERPL CREATININE-BSD FRML MDRD: 49 ML/MIN/1.73M2
GLUCOSE SERPL-MCNC: 105 MG/DL (ref 70–99)
HBA1C MFR BLD: 6.6 % (ref 0–5.6)
HDLC SERPL-MCNC: 47 MG/DL
LDLC SERPL CALC-MCNC: 77 MG/DL
NONHDLC SERPL-MCNC: 124 MG/DL
POTASSIUM SERPL-SCNC: 4.5 MMOL/L (ref 3.4–5.3)
PROT SERPL-MCNC: 6.7 G/DL (ref 6.4–8.3)
SODIUM SERPL-SCNC: 142 MMOL/L (ref 136–145)
TRIGL SERPL-MCNC: 237 MG/DL

## 2023-01-06 PROCEDURE — 83036 HEMOGLOBIN GLYCOSYLATED A1C: CPT

## 2023-01-06 PROCEDURE — 80061 LIPID PANEL: CPT

## 2023-01-06 PROCEDURE — 80053 COMPREHEN METABOLIC PANEL: CPT

## 2023-01-06 PROCEDURE — 36415 COLL VENOUS BLD VENIPUNCTURE: CPT

## 2023-01-09 ENCOUNTER — VIRTUAL VISIT (OUTPATIENT)
Dept: PALLIATIVE MEDICINE | Facility: CLINIC | Age: 78
End: 2023-01-09
Payer: MEDICARE

## 2023-01-09 DIAGNOSIS — M54.16 LUMBAR RADICULOPATHY: Primary | ICD-10-CM

## 2023-01-09 DIAGNOSIS — M70.61 GREATER TROCHANTERIC BURSITIS OF BOTH HIPS: ICD-10-CM

## 2023-01-09 DIAGNOSIS — M70.62 GREATER TROCHANTERIC BURSITIS OF BOTH HIPS: ICD-10-CM

## 2023-01-09 PROCEDURE — 99213 OFFICE O/P EST LOW 20 MIN: CPT | Mod: 95 | Performed by: PAIN MEDICINE

## 2023-01-09 RX ORDER — PREDNISONE 5 MG/1
5 TABLET ORAL DAILY
COMMUNITY
End: 2024-02-07 | Stop reason: ALTCHOICE

## 2023-01-09 NOTE — PROGRESS NOTES
PAUL is a 77 year old who is being evaluated via a billable video visit.      How would you like to obtain your AVS? MyChart  If the video visit is dropped, the invitation should be resent by: Text to cell phone: [phone]  Will anyone else be joining your video visit? No   Is Pt currently in [place]? Yes    Leslee Mejia MA    NOTE:  If Pt is not in [place], Appointment needs to be canceled and rescheduled.          Video-Visit Details    Type of service:  Video Visit   Video Start Time: 2:35 PM  Video End Time:2:50 PM    Originating Location (pt. Location): Home    Distant Location (provider location):  On-site  Platform used for Video Visit: [employer]       1/9/23    [facility] follow up      Reason for consultation:    Primary Care Provider is No Ref-Primary, Physician.  Pain medications are being prescribed by n/a.    Please see the [facility] health questionnaire which the patient completed and reviewed with me in detail.    Chief Complaint:    Chief Complaint   Patient presents with     Pain     MME prescribed prior to seeing patient:  Current MME:  rec   Further procedures recommended:    -None at this time  - Medication Management:    - recertify for medical cannabis [email or web address]  - Physical Therapy: None at this time  - Follow up:   1 year      Interval:  Pt on pred for rheum plan to taper  Pt partner has stage 4 colon cancer- but doing well   Cont to work with rheum- not issues  Of note has new provider   Has Llbp radiating to LE- currently reasonably controlled   Currently not having any SE from the cannabis  The pt reports intermittent flare  The pt uses the cannabis most nights  The pt reports benefit in sleep and pain  Denies any recent falls  Denies overt progressive weakness  The patient notes significant improvement in her quality of life with medical cannabis      Pain history:     Patient is referred by PCP- interested in medical  cannabis.  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 The pt is currently on pred 4mg. Of note when pt was on higher doses she did not have as sig pain levels.     Pt main concern is she cant sleep 2/2 to leg pain   Elizabeth Galicia is a 73 year old female w/ chronic bilateral lat leg/hip pain. The pain has gotten progressively worse over the last couple of years. In general she denies any specific inciting event.  The pain is currently worse on the L>R. The pain is intermittent. The pain is a deep aching pain. The pain is occasionally sharp.  The pain occasionally radiates down her lat leg. Neg numbness. Neg burning. Neg tingling. She denies any overt weakness. The pt did have a recent fall, but was not 2/2 to weakness, rather she tripped. The pain is worse when sleeping on her sides. The pain is worse with prolonged walking  The pain is slightly better when lying on her back. The pain is slightly better when changing positions. The pain is slightly bettter when sleeping her chair. The pt also does a lot of PHYSICAL THERAPY  And stretches for her IT band. The pt notes sig benefit, but in general the relief does not last.    Of note on occasion the pt will have more generalized pain. Specifically chest, shoulders, neg     The pt was using voltaren gel , but stopped 2/2 to benefit     The pt takes 2 tabs of 625mg at night. Occasionally she will take an additional tablet when being more active.     Of note pt has a gfr of 54, currently being seen by a rheumatologist.     Again pt main concern is not sleeping and she feels it makes every aspect of her life worse.     The pt continues to approach her symptoms from multiple directions and is now looking for other options.         Pain rating: intensity  Averages 4/10 on a 0-10 scale.      Current treatments include:  Medical cannabis  Acetaminophen  Previous medication treatments  included:  voltaren gel        Other treatments have included:  Elizabeth Galicia has not been seen at a pain clinic in the past.    PT: yes helped     Injections: no    Past Medical History:  Past Medical History:   Diagnosis Date     ANCA-associated vasculitis (H)      Anxiety      Gastro-oesophageal reflux disease      Herniated lumbar intervertebral disc      Herpes      Heterozygous for MTHFR gene mutation     Per CareEverywhere     Hyperlipidemia      Lesion of upper eyelid LEFT     Obesity (BMI 30-39.9)      Postoperative hypothyroidism      Seasonal allergies      Type 2 diabetes mellitus without complication (H) 5/24/2017     Uterine prolapse      Past Surgical History:  Past Surgical History:   Procedure Laterality Date     COLONOSCOPY       DILATION AND CURETTAGE       ENT SURGERY      partial thyroidectomy; tonsillectomy     EXCISE LESION EYELID Left 3/10/2016    Procedure: EXCISE LESION EYELID;  Surgeon: Rg Nazario MD;  Location: [facility]     HAND SURGERY       HYSTERECTOMY VAGINAL, COLPORRHAPHY ANTERIOR, POSTERIOR, COMBINED N/A 9/9/2014    Procedure: COMBINED HYSTERECTOMY VAGINAL, COLPORRHAPHY ANTERIOR, POSTERIOR;  Surgeon: Shay Winkler MD;  Location: [facility]     HYSTERECTOMY, PAP NO LONGER INDICATED       LAPAROSCOPIC SALPINGO-OOPHORECTOMY  6/27/2011    Procedure:LAPAROSCOPIC SALPINGO-OOPHORECTOMY; resection of left pelvic mass. ; Surgeon:MAYRA OLEARY; Location:[facility]     ORTHOPEDIC SURGERY       SALPINGO OOPHORECTOMY,R/L/PEDRO LUIS      Salpingo Oophorectomy for torsion in past     THYROIDECTOMY      Partial     Medications:  Current Outpatient Medications   Medication Sig Dispense Refill     atorvastatin (LIPITOR) 20 MG tablet Take 1 tablet (20 mg) by mouth daily 90 tablet 3     CALCIUM LACTATE PO Take 4 tablets by mouth daily        Cholecalciferol (VITAMIN D3 PO) Take 1,000 Units by mouth daily       Cyanocobalamin ([employer] B-12 PO)        fexofenadine (ALLEGRA) 180 MG tablet TAKE 1 TABLET BY MOUTH  EVERY DAY 90 tablet 2     FLOVENT  MCG/ACT inhaler INHALE 1 PUFF BY MOUTH IN TO THE LUNGS TWICE A DAY AS DIRECTED 24 g 1     levothyroxine (SYNTHROID/LEVOTHROID) 100 MCG tablet TAKE 1 TABLET BY MOUTH EVERY DAY 90 tablet 0     liothyronine (CYTOMEL) 5 MCG tablet TAKE 2 TABLETS (10 MCG) BY MOUTH 2 TIMES DAILY 180 tablet 0     MAGNESIUM LACTATE PO Take 2 tablets by mouth daily       metFORMIN (GLUCOPHAGE XR) 500 MG 24 hr tablet TAKE 2 TABLETS BY MOUTH EVERY  tablet 0     omeprazole (PRILOSEC) 20 MG DR capsule TAKE 1 CAPSULE BY MOUTH EVERY DAY 90 capsule 3     predniSONE (DELTASONE) 5 MG tablet Take 5 mg by mouth daily Pt takes it one one a day       UNABLE TO FIND MEDICATION NAME: medical cannibus       valACYclovir (VALTREX) 500 MG tablet Take 1 tablet (500 mg) by mouth 2 times daily for 3 days 6 tablet 11     Allergies:     Allergies   Allergen Reactions     Cranberries [Cranberry Extract]      Causes herpes flair-up     Dairy [Milk Products]      Lactase      Perfume Other (See Comments)     Stuffy in nose, HA     Seasonal Allergies      Sulfa Drugs Unknown     Other reaction(s): Unknown     Social History:    Occupation/Schooling: no  Tobacco use: no  Alcohol use: no  Drug use: no  History of chemical dependency treatment: no    Family history:  Family History   Problem Relation Age of Onset     Unknown/Adopted Mother      Heart Disease Other      Family history of headaches: no    Review of Systems:  Skin: negative  Eyes: negative  Ears/Nose/Throat: negative  Respiratory: No shortness of breath, dyspnea on exertion, cough, or hemoptysis  Cardiovascular: negative  Gastrointestinal: negative  Genitourinary: negative  Musculoskeletal: negative  Neurologic: negative  Psychiatric: negative  Hematologic/Lymphatic/Immunologic: negative  Endocrine: negative    Physical Exam:  There were no vitals filed for this visit.  Exam:  Constitutional: healthy, alert and no distress  Respiratory: Speaking in full  sentences no accessory muscles use   Psychiatric: mentation appears normal and affect normal/bright    Musculoskeletal exam:    Cervical spine: ROM within normal limits  Able to easily overcome gravity   Neg slump        Assessment/Plan:  Elizabeth Galicia is a 73 year old female who presents with the complaints of bilateral lateral leg/hip pain.   Elizabeth was seen today for pain.    Diagnoses and all orders for this visit:    Lumbar radiculopathy    Greater trochanteric bursitis of both hips         - Further procedures recommended:    -None at this time  - Medication Management:    - recertify for medical cannabis [email or web address]  - Physical Therapy: None at this time  - Follow up:   1 year              The total TIME spent on this patient on the day of the appointment was15 minutes.   Time spent preparing to see the patient (reviewing records and tests)  Time spend face to face with the patient  Time spent ordering tests, medications, procedures and referrals  Time spent Referring and communicating with other healthcare professionals  Documenting clinical information in Epic      Deon Banerjee MD  [facility]  This note was created with voice recognition software, and while reviewed for accuracy, typos may remain.

## 2023-01-09 NOTE — PATIENT INSTRUCTIONS
----------------------------------------------------------------  [facility] Number:  [phone]   Call with any questions about your care and for scheduling assistance.   Calls are returned Monday through Friday between 8 AM and 4:30 PM. We usually get back to you within 2 business days depending on the issue/request.    If we are prescribing your medications:  For opioid medication refills, call the clinic or send a Locality message 7 days in advance.  Please include:  Name of requested medication  Name of the pharmacy.  For non-opioid medications, call your pharmacy directly to request a refill. Please allow 3-4 days to be processed.   Per [place] State Law:  All controlled substance prescriptions must be filled within 30 days of being written.    For those controlled substances allowing refills, pickup must occur within 30 days of last fill.      We believe regular attendance is key to your success in our program!    Any time you are unable to keep your appointment we ask that you call us at least 24 hours in advance to cancel.This will allow us to offer the appointment time to another patient.   Multiple missed appointments may lead to dismissal from the clinic.

## 2023-01-10 NOTE — RESULT ENCOUNTER NOTE
Dear PAUL,   Your test results are all back -   -Cholesterol levels (LDL,HDL, Triglycerides) are borderline - total cholesterol is good but the triglycerides are up  ADVISE: rechecking in 1 year.   -Liver and gallbladder tests (ALT,AST, Alk phos,bilirubin) are normal.  -Kidney function (GFR) is decreased.  ADVISE: plan to recheck in 6 months.    -Sodium is normal.  -Potassium is normal.  -Calcium is normal.  -Glucose is elevated due to your diabetes.  -A1C (test of diabetes control the last 2-3 months) is at your goal. Please continue with your current plan. Also, you should make an appointment to see me and recheck your A1C test in 6 months.   Let us know if you have any questions.  -Ella Hernandez, DO

## 2023-01-30 DIAGNOSIS — E89.0 POSTOPERATIVE HYPOTHYROIDISM: Chronic | ICD-10-CM

## 2023-01-30 DIAGNOSIS — J45.909 REACTIVE AIRWAY DISEASE WITHOUT COMPLICATION, UNSPECIFIED ASTHMA SEVERITY, UNSPECIFIED WHETHER PERSISTENT: ICD-10-CM

## 2023-01-30 NOTE — TELEPHONE ENCOUNTER
Routing refill request to provider for review/approval because:  See message from pharmacy.  Lupe MATHEW RN

## 2023-01-31 RX ORDER — LIOTHYRONINE SODIUM 5 UG/1
TABLET ORAL
Qty: 1 TABLET | Refills: 0 | OUTPATIENT
Start: 2023-01-31

## 2023-01-31 RX ORDER — FLUTICASONE PROPIONATE 110 UG/1
1 AEROSOL, METERED RESPIRATORY (INHALATION) 2 TIMES DAILY
Qty: 1 G | Refills: 0 | OUTPATIENT
Start: 2023-01-31

## 2023-01-31 RX ORDER — LEVOTHYROXINE SODIUM 100 UG/1
TABLET ORAL
Qty: 1 TABLET | Refills: 0 | OUTPATIENT
Start: 2023-01-31

## 2023-01-31 NOTE — TELEPHONE ENCOUNTER
Spoke with patient.  States she will call her insurance plan to see what is covered.    Just picked up Rx this week so doesn't need refill, paid out of pocket (high price).  States 2 inhalers will last her about 8 months.    If no other option she will continue to pay out of pocket  Uses one puff daily and helps tremendously    Will address again when current Rx close to running out    Taylor De Los Santos RN

## 2023-01-31 NOTE — TELEPHONE ENCOUNTER
Please check with patient if this medication is not covered on her formulary - what do they cover?  -Ella Hernandez, DO

## 2023-02-23 DIAGNOSIS — K21.9 GASTROESOPHAGEAL REFLUX DISEASE, UNSPECIFIED WHETHER ESOPHAGITIS PRESENT: ICD-10-CM

## 2023-02-23 DIAGNOSIS — E89.0 POSTOPERATIVE HYPOTHYROIDISM: Chronic | ICD-10-CM

## 2023-02-23 DIAGNOSIS — E11.9 TYPE 2 DIABETES MELLITUS WITHOUT COMPLICATION, WITHOUT LONG-TERM CURRENT USE OF INSULIN (H): ICD-10-CM

## 2023-02-23 RX ORDER — LEVOTHYROXINE SODIUM 100 UG/1
TABLET ORAL
Qty: 90 TABLET | Refills: 0 | OUTPATIENT
Start: 2023-02-23

## 2023-02-23 RX ORDER — METFORMIN HCL 500 MG
TABLET, EXTENDED RELEASE 24 HR ORAL
Qty: 180 TABLET | Refills: 0 | OUTPATIENT
Start: 2023-02-23

## 2023-02-23 NOTE — TELEPHONE ENCOUNTER
"Requested Prescriptions   Pending Prescriptions Disp Refills     levothyroxine (SYNTHROID/LEVOTHROID) 100 MCG tablet [Pharmacy Med Name: LEVOTHYROXINE 100 MCG TABLET] 90 tablet 0     Sig: TAKE 1 TABLET BY MOUTH EVERY DAY       Thyroid Protocol Failed - 2/23/2023  2:59 AM        Failed - Normal TSH on file in past 12 months     Recent Labs   Lab Test 10/05/22  1344   TSH 0.13*              Passed - Patient is 12 years or older        Passed - Recent (12 mo) or future (30 days) visit within the authorizing provider's specialty     Patient has had an office visit with the authorizing provider or a provider within the authorizing providers department within the previous 12 mos or has a future within next 30 days. See \"Patient Info\" tab in inbasket, or \"Choose Columns\" in Meds & Orders section of the refill encounter.              Passed - Medication is active on med list        Passed - No active pregnancy on record     If patient is pregnant or has had a positive pregnancy test, please check TSH.          Passed - No positive pregnancy test in past 12 months     If patient is pregnant or has had a positive pregnancy test, please check TSH.             liothyronine (CYTOMEL) 5 MCG tablet [Pharmacy Med Name: LIOTHYRONINE SOD 5 MCG TAB] 180 tablet 0     Sig: TAKE 2 TABLETS (10 MCG) BY MOUTH 2 TIMES DAILY       Thyroid Protocol Failed - 2/23/2023  2:59 AM        Failed - Normal TSH on file in past 12 months     Recent Labs   Lab Test 10/05/22  1344   TSH 0.13*              Passed - Patient is 12 years or older        Passed - Recent (12 mo) or future (30 days) visit within the authorizing provider's specialty     Patient has had an office visit with the authorizing provider or a provider within the authorizing providers department within the previous 12 mos or has a future within next 30 days. See \"Patient Info\" tab in inbasket, or \"Choose Columns\" in Meds & Orders section of the refill encounter.              Passed - " "Medication is active on med list        Passed - No active pregnancy on record     If patient is pregnant or has had a positive pregnancy test, please check TSH.          Passed - No positive pregnancy test in past 12 months     If patient is pregnant or has had a positive pregnancy test, please check TSH.             omeprazole (PRILOSEC) 20 MG DR capsule [Pharmacy Med Name: OMEPRAZOLE DR 20 MG CAPSULE] 90 capsule 3     Sig: TAKE 1 CAPSULE BY MOUTH EVERY DAY       PPI Protocol Passed - 2/23/2023  2:59 AM        Passed - Not on Clopidogrel (unless Pantoprazole ordered)        Passed - No diagnosis of osteoporosis on record        Passed - Recent (12 mo) or future (30 days) visit within the authorizing provider's specialty     Patient has had an office visit with the authorizing provider or a provider within the authorizing providers department within the previous 12 mos or has a future within next 30 days. See \"Patient Info\" tab in inbasket, or \"Choose Columns\" in Meds & Orders section of the refill encounter.              Passed - Medication is active on med list        Passed - Patient is age 18 or older        Passed - No active pregnacy on record        Passed - No positive pregnancy test in past 12 months           metFORMIN (GLUCOPHAGE XR) 500 MG 24 hr tablet [Pharmacy Med Name: METFORMIN HCL  MG TABLET] 180 tablet 0     Sig: TAKE 2 TABLETS BY MOUTH EVERY DAY       Biguanide Agents Failed - 2/23/2023  2:59 AM        Failed - Recent (6 mo) or future (30 days) visit within the authorizing provider's specialty     Patient had office visit in the last 6 months or has a visit in the next 30 days with authorizing provider or within the authorizing provider's specialty.  See \"Patient Info\" tab in inTravelatuset, or \"Choose Columns\" in Meds & Orders section of the refill encounter.            Passed - Patient is age 10 or older        Passed - Patient has documented A1c within the specified period of time.     If " HgbA1C is 8 or greater, it needs to be on file within the past 3 months.  If less than 8, must be on file within the past 6 months.     Recent Labs   Lab Test 01/06/23  1429   A1C 6.6*             Passed - Patient's CR is NOT>1.4 OR Patient's EGFR is NOT<45 within past 12 mos.     Recent Labs   Lab Test 01/06/23  1429 05/28/20  0000 02/27/20  1429   GFRESTIMATED 49*   < > 35*   GFRESTBLACK  --   --  41*    < > = values in this interval not displayed.       Recent Labs   Lab Test 01/06/23  1429   CR 1.15*             Passed - Patient does NOT have a diagnosis of CHF.        Passed - Medication is active on med list        Passed - Patient is not pregnant        Passed - Patient has not had a positive pregnancy test within the past 12 mos.          Has upcoming visit 3/28.    Patient should have synthroid medication until 3/27. Should have one more refill on file for omeprazole. Should have metformin medication until 3/27.    Routing refill request to provider for review/approval because:  Labs out of range:  TSH  Liothyronine    Rg Alfred RN   [facility]

## 2023-02-24 RX ORDER — LIOTHYRONINE SODIUM 5 UG/1
TABLET ORAL
Qty: 180 TABLET | Refills: 0 | Status: SHIPPED | OUTPATIENT
Start: 2023-02-24 | End: 2023-03-28

## 2023-02-24 RX ORDER — LEVOTHYROXINE SODIUM 100 UG/1
100 TABLET ORAL DAILY
Qty: 90 TABLET | Refills: 0 | Status: SHIPPED | OUTPATIENT
Start: 2023-02-24 | End: 2023-03-28

## 2023-03-28 ENCOUNTER — OFFICE VISIT (OUTPATIENT)
Dept: FAMILY MEDICINE | Facility: CLINIC | Age: 78
End: 2023-03-28
Payer: MEDICARE

## 2023-03-28 VITALS
BODY MASS INDEX: 39.15 KG/M2 | SYSTOLIC BLOOD PRESSURE: 126 MMHG | OXYGEN SATURATION: 96 % | HEART RATE: 72 BPM | RESPIRATION RATE: 16 BRPM | DIASTOLIC BLOOD PRESSURE: 77 MMHG | TEMPERATURE: 97.5 F | HEIGHT: 65 IN | WEIGHT: 235 LBS

## 2023-03-28 DIAGNOSIS — K21.9 GASTROESOPHAGEAL REFLUX DISEASE, UNSPECIFIED WHETHER ESOPHAGITIS PRESENT: ICD-10-CM

## 2023-03-28 DIAGNOSIS — E11.9 TYPE 2 DIABETES MELLITUS WITHOUT COMPLICATION, WITHOUT LONG-TERM CURRENT USE OF INSULIN (H): Chronic | ICD-10-CM

## 2023-03-28 DIAGNOSIS — I77.89 HYPERPLASIA OF RENAL ARTERY (H): ICD-10-CM

## 2023-03-28 DIAGNOSIS — M31.7 MICROSCOPIC POLYANGIITIS (H): ICD-10-CM

## 2023-03-28 DIAGNOSIS — I10 PRIMARY HYPERTENSION: Chronic | ICD-10-CM

## 2023-03-28 DIAGNOSIS — N18.30 STAGE 3 CHRONIC KIDNEY DISEASE, UNSPECIFIED WHETHER STAGE 3A OR 3B CKD (H): ICD-10-CM

## 2023-03-28 DIAGNOSIS — E66.01 MORBID OBESITY (H): ICD-10-CM

## 2023-03-28 DIAGNOSIS — E89.0 POSTOPERATIVE HYPOTHYROIDISM: Chronic | ICD-10-CM

## 2023-03-28 DIAGNOSIS — J45.909 REACTIVE AIRWAY DISEASE WITHOUT COMPLICATION, UNSPECIFIED ASTHMA SEVERITY, UNSPECIFIED WHETHER PERSISTENT: ICD-10-CM

## 2023-03-28 DIAGNOSIS — Z00.00 ENCOUNTER FOR MEDICARE ANNUAL WELLNESS EXAM: Primary | ICD-10-CM

## 2023-03-28 DIAGNOSIS — B00.9 HSV-2 INFECTION: ICD-10-CM

## 2023-03-28 LAB
CREAT UR-MCNC: 57 MG/DL
MICROALBUMIN UR-MCNC: <12 MG/L
MICROALBUMIN/CREAT UR: NORMAL MG/G{CREAT}
TSH SERPL DL<=0.005 MIU/L-ACNC: 0.57 UIU/ML (ref 0.3–4.2)

## 2023-03-28 PROCEDURE — 82043 UR ALBUMIN QUANTITATIVE: CPT | Performed by: FAMILY MEDICINE

## 2023-03-28 PROCEDURE — 90662 IIV NO PRSV INCREASED AG IM: CPT | Performed by: FAMILY MEDICINE

## 2023-03-28 PROCEDURE — 99214 OFFICE O/P EST MOD 30 MIN: CPT | Mod: 25 | Performed by: FAMILY MEDICINE

## 2023-03-28 PROCEDURE — 82570 ASSAY OF URINE CREATININE: CPT | Performed by: FAMILY MEDICINE

## 2023-03-28 PROCEDURE — G0439 PPPS, SUBSEQ VISIT: HCPCS | Performed by: FAMILY MEDICINE

## 2023-03-28 PROCEDURE — 84443 ASSAY THYROID STIM HORMONE: CPT | Performed by: FAMILY MEDICINE

## 2023-03-28 PROCEDURE — G0008 ADMIN INFLUENZA VIRUS VAC: HCPCS | Performed by: FAMILY MEDICINE

## 2023-03-28 PROCEDURE — 36415 COLL VENOUS BLD VENIPUNCTURE: CPT | Performed by: FAMILY MEDICINE

## 2023-03-28 RX ORDER — VALACYCLOVIR HYDROCHLORIDE 500 MG/1
500 TABLET, FILM COATED ORAL 2 TIMES DAILY
Qty: 6 TABLET | Refills: 11 | Status: SHIPPED | OUTPATIENT
Start: 2023-03-28 | End: 2024-05-01

## 2023-03-28 RX ORDER — METFORMIN HCL 500 MG
TABLET, EXTENDED RELEASE 24 HR ORAL
Qty: 180 TABLET | Refills: 0 | Status: SHIPPED | OUTPATIENT
Start: 2023-03-28 | End: 2023-08-01

## 2023-03-28 ASSESSMENT — ENCOUNTER SYMPTOMS: BREAST MASS: 0

## 2023-03-28 ASSESSMENT — PATIENT HEALTH QUESTIONNAIRE - PHQ9
10. IF YOU CHECKED OFF ANY PROBLEMS, HOW DIFFICULT HAVE THESE PROBLEMS MADE IT FOR YOU TO DO YOUR WORK, TAKE CARE OF THINGS AT HOME, OR GET ALONG WITH OTHER PEOPLE: NOT DIFFICULT AT ALL
SUM OF ALL RESPONSES TO PHQ QUESTIONS 1-9: 3
SUM OF ALL RESPONSES TO PHQ QUESTIONS 1-9: 3

## 2023-03-28 ASSESSMENT — ANXIETY QUESTIONNAIRES
7. FEELING AFRAID AS IF SOMETHING AWFUL MIGHT HAPPEN: NOT AT ALL
GAD7 TOTAL SCORE: 1
7. FEELING AFRAID AS IF SOMETHING AWFUL MIGHT HAPPEN: NOT AT ALL
GAD7 TOTAL SCORE: 1
5. BEING SO RESTLESS THAT IT IS HARD TO SIT STILL: NOT AT ALL
3. WORRYING TOO MUCH ABOUT DIFFERENT THINGS: NOT AT ALL
4. TROUBLE RELAXING: NOT AT ALL
GAD7 TOTAL SCORE: 1
6. BECOMING EASILY ANNOYED OR IRRITABLE: SEVERAL DAYS
2. NOT BEING ABLE TO STOP OR CONTROL WORRYING: NOT AT ALL
IF YOU CHECKED OFF ANY PROBLEMS ON THIS QUESTIONNAIRE, HOW DIFFICULT HAVE THESE PROBLEMS MADE IT FOR YOU TO DO YOUR WORK, TAKE CARE OF THINGS AT HOME, OR GET ALONG WITH OTHER PEOPLE: NOT DIFFICULT AT ALL
8. IF YOU CHECKED OFF ANY PROBLEMS, HOW DIFFICULT HAVE THESE MADE IT FOR YOU TO DO YOUR WORK, TAKE CARE OF THINGS AT HOME, OR GET ALONG WITH OTHER PEOPLE?: NOT DIFFICULT AT ALL
1. FEELING NERVOUS, ANXIOUS, OR ON EDGE: NOT AT ALL

## 2023-03-28 ASSESSMENT — ASTHMA QUESTIONNAIRES
ACT_TOTALSCORE: 21
QUESTION_3 LAST FOUR WEEKS HOW OFTEN DID YOUR ASTHMA SYMPTOMS (WHEEZING, COUGHING, SHORTNESS OF BREATH, CHEST TIGHTNESS OR PAIN) WAKE YOU UP AT NIGHT OR EARLIER THAN USUAL IN THE MORNING: NOT AT ALL
QUESTION_1 LAST FOUR WEEKS HOW MUCH OF THE TIME DID YOUR ASTHMA KEEP YOU FROM GETTING AS MUCH DONE AT WORK, SCHOOL OR AT HOME: NONE OF THE TIME
ACT_TOTALSCORE: 21
QUESTION_5 LAST FOUR WEEKS HOW WOULD YOU RATE YOUR ASTHMA CONTROL: WELL CONTROLLED
QUESTION_4 LAST FOUR WEEKS HOW OFTEN HAVE YOU USED YOUR RESCUE INHALER OR NEBULIZER MEDICATION (SUCH AS ALBUTEROL): ONE OR TWO TIMES PER DAY
QUESTION_2 LAST FOUR WEEKS HOW OFTEN HAVE YOU HAD SHORTNESS OF BREATH: NOT AT ALL

## 2023-03-28 ASSESSMENT — ACTIVITIES OF DAILY LIVING (ADL): CURRENT_FUNCTION: NO ASSISTANCE NEEDED

## 2023-03-28 ASSESSMENT — PAIN SCALES - GENERAL: PAINLEVEL: NO PAIN (0)

## 2023-03-28 NOTE — PROGRESS NOTES
"SUBJECTIVE:   PAUL is a 77 year old who presents for Preventive Visit.  No flowsheet data found.   Patient has been advised of split billing requirements and indicates understanding: Yes  Are you in the first 12 months of your Medicare coverage?  No    Healthy Habits:     In general, how would you rate your overall health?  Good    Frequency of exercise:  2-3 days/week    Duration of exercise:  15-30 minutes    Do you usually eat at least 4 servings of fruit and vegetables a day, include whole grains    & fiber and avoid regularly eating high fat or \"junk\" foods?  No    Taking medications regularly:  Yes    Medication side effects:  None    Ability to successfully perform activities of daily living:  No assistance needed    Home Safety:  Throw rugs in the hallway and lack of grab bars in the bathroom    Hearing Impairment:  No hearing concerns    In the past 6 months, have you been bothered by leaking of urine?  No    In general, how would you rate your overall mental or emotional health?  Excellent      PHQ-2 Total Score: 0    Additional concerns today:  Yes    Answers for HPI/ROS submitted by the patient on 3/28/2023  If you checked off any problems, how difficult have these problems made it for you to do your work, take care of things at home, or get along with other people?: Not difficult at all  PHQ9 TOTAL SCORE: 3  VICTORIANO 7 TOTAL SCORE: 1      Have you ever done Advance Care Planning? (For example, a Health Directive, POLST, or a discussion with a medical provider or your loved ones about your wishes): Yes, advance care planning is on file.       Fall risk  Fallen 2 or more times in the past year?: No  Any fall with injury in the past year?: No    Cognitive Screening   1) Repeat 3 items (Leader, Season, Table)    2) Clock draw: NORMAL  3) 3 item recall: Recalls 3 objects  Results: 3 items recalled: COGNITIVE IMPAIRMENT LESS LIKELY    Mini-CogTM Copyright S Rehan. Licensed by the author for use in [facility]; reprinted with permission (soob@[facility]). All rights reserved.          Reviewed and updated as needed this visit by clinical staff                  Reviewed and updated as needed this visit by Provider                 Social History     Tobacco Use     Smoking status: Never     Smokeless tobacco: Never   Substance Use Topics     Alcohol use: Yes     Comment: rarely          Alcohol Use 3/28/2023   Prescreen: >3 drinks/day or >7 drinks/week? No     Do you have a current opioid prescription? No  Do you use any other controlled substances or medications that are not prescribed by a provider? None           PROBLEMS TO ADD ON...  -------------------------------------  Pt has hx of diabetes -   Interested in trying ozempic  Discussed slowly titrating dose up     Current providers sharing in care for this patient include:    Patient Care Team:  Ella Hernandez DO as PCP - General (Family Practice)  Ella Hernandez DO as Assigned PCP  Debi Rivera PAAniyaC as Physician Assistant (Dermatology)    The following health maintenance items are reviewed in Epic and correct as of today:  Health Maintenance   Topic Date Due     DIABETIC FOOT EXAM  Never done     ANNUAL REVIEW OF HM ORDERS  Never done     ASTHMA ACTION PLAN  Never done     MICROALBUMIN  11/30/2018     MEDICARE ANNUAL WELLNESS VISIT  01/05/2022     DEXA  06/07/2022     INFLUENZA VACCINE (1) 09/01/2022     EYE EXAM  05/01/2023     HEMOGLOBIN  07/01/2023     A1C  07/06/2023     ASTHMA CONTROL TEST  09/28/2023     TSH W/FREE T4 REFLEX  10/05/2023     DTAP/TDAP/TD IMMUNIZATION (2 - Td or Tdap) 10/17/2023     BMP  01/06/2024     LIPID  01/06/2024     FALL RISK ASSESSMENT  03/28/2024     ADVANCE CARE PLANNING  01/05/2026     HEPATITIS C SCREENING  Completed     PHQ-2 (once per calendar year)  Completed     Pneumococcal Vaccine: 65+ Years  Completed     URINALYSIS  Completed     ZOSTER IMMUNIZATION  Completed     COVID-19 Vaccine  Completed     IPV  IMMUNIZATION  Aged Out     MENINGITIS IMMUNIZATION  Aged Out     MAMMO SCREENING  Discontinued     Patient Active Problem List   Diagnosis     HTN (hypertension)     Obesity (BMI 30-39.9)     Type 2 diabetes mellitus without complication (H)     ANCA-associated vasculitis (Microscopic polyangiitis)     Postoperative hypothyroidism     Anemia, unspecified type     Hyperplasia of renal artery (H)     Heterozygous for MTHFR gene mutation     CKD (chronic kidney disease) stage 3, GFR 30-59 ml/min (H)     Prophylactic antibiotic     Morbid obesity (H)     Seasonal allergic rhinitis     Microscopic polyangiitis (H)     Heterozygous MTHFR mutation [id number]     Past Surgical History:   Procedure Laterality Date     BIOPSY  20-bryan years ago    Left breast     COLONOSCOPY       DILATION AND CURETTAGE       ENT SURGERY      partial thyroidectomy; tonsillectomy     EXCISE LESION EYELID Left 03/10/2016    Procedure: EXCISE LESION EYELID;  Surgeon: Rg Nazario MD;  Location: [facility]     HAND SURGERY       HYSTERECTOMY VAGINAL, COLPORRHAPHY ANTERIOR, POSTERIOR, COMBINED N/A 09/09/2014    Procedure: COMBINED HYSTERECTOMY VAGINAL, COLPORRHAPHY ANTERIOR, POSTERIOR;  Surgeon: Shay Winkler MD;  Location: [facility]     HYSTERECTOMY, PAP NO LONGER INDICATED       LAPAROSCOPIC SALPINGO-OOPHORECTOMY  06/27/2011    Procedure:LAPAROSCOPIC SALPINGO-OOPHORECTOMY; resection of left pelvic mass. ; Surgeon:MAYRA OLEARY; Location:[facility]     ORTHOPEDIC SURGERY       SALPINGO OOPHORECTOMY,R/L/PEDRO LUIS      Salpingo Oophorectomy for torsion in past     THYROIDECTOMY      Partial       Social History     Tobacco Use     Smoking status: Never     Smokeless tobacco: Never   Substance Use Topics     Alcohol use: Yes     Comment: One cocktail every couple of weeks or less     Family History   Problem Relation Age of Onset     Unknown/Adopted Mother      Hyperlipidemia Mother         Birth mother - I m adopted & have little health info     Anxiety Disorder  "Mother         Women in that family were all considered  nervous      Obesity Mother      Unknown/Adopted Father         Birth father had some kind of heart issue     Heart Disease Other      Mental Illness No family hx of          Pneumonia Vaccine:UTD  Mammogram Screening:       Pertinent mammograms are reviewed under the imaging tab.    Review of Systems   HENT: Positive for congestion and hearing loss.    Breasts:  Negative for tenderness, breast mass and discharge.   Genitourinary: Negative for pelvic pain, vaginal bleeding and vaginal discharge.         OBJECTIVE:   There were no vitals taken for this visit. Estimated body mass index is 38.61 kg/m  as calculated from the following:    Height as of 7/1/22: 1.651 m (5' 5\").    Weight as of 7/1/22: 105.2 kg (232 lb).  Physical Exam  GENERAL APPEARANCE: alert and no distress  EYES: Eyes grossly normal to inspection, PERRL and conjunctivae and sclerae normal  HENT: ear canals and TM's normal, nose and mouth without ulcers or lesions, oropharynx clear and oral mucous membranes moist  NECK: no adenopathy, no asymmetry, masses, or scars and thyroid normal to palpation  RESP: lungs clear to auscultation - no rales, rhonchi or wheezes  BREAST: normal without masses, tenderness or nipple discharge and no palpable axillary masses or adenopathy  CV: regular rate and rhythm, normal S1 S2, no S3 or S4, no murmur, click or rub, no peripheral edema and peripheral pulses strong  ABDOMEN: soft, nontender, no hepatosplenomegaly, no masses and bowel sounds normal  MS: no musculoskeletal defects are noted and gait is age appropriate without ataxia  SKIN: no suspicious lesions or rashes  NEURO: Normal strength and tone, sensory exam grossly normal, mentation intact and speech normal  PSYCH: mentation appears normal and affect normal/bright    Diagnostic Test Results:  Labs reviewed in Epic  Results for orders placed or performed in visit on 03/28/23   Albumin Random Urine " Quantitative with Creat Ratio     Status: None   Result Value Ref Range    Creatinine Urine mg/dL 57.0 mg/dL    Albumin Urine mg/L <12.0 mg/L    Albumin Urine mg/g Cr     TSH with free T4 reflex     Status: Normal   Result Value Ref Range    TSH 0.57 0.30 - 4.20 uIU/mL       ASSESSMENT / PLAN:   (Z00.00) Encounter for Medicare annual wellness exam  (primary encounter diagnosis)  Comment:    Plan: PRIMARY CARE FOLLOW-UP SCHEDULING             (N18.30) Stage 3 chronic kidney disease, unspecified whether stage 3a or 3b CKD (H)  Comment: CKD - will check microalbumin   Plan: Albumin Random Urine Quantitative with Creat         Ratio             (J45.909) Reactive airway disease without complication, unspecified asthma severity, unspecified whether persistent  Comment:    Plan:      (E11.9) Type 2 diabetes mellitus without complication, without long-term current use of insulin (H)  Comment: discussed medication changes -   Will try ozempic weekly - will titrate dose up    If tolerates first month will increase to 0.5mg dose weekly for next two months as she will be traveling to [place] and dont want to adjust dose while traveling abroad  Plan: metFORMIN (GLUCOPHAGE XR) 500 MG 24 hr tablet,         semaglutide (OZEMPIC) 2 MG/3ML SOPN pen             (K21.9) Gastroesophageal reflux disease, unspecified whether esophagitis present  Comment:  Refilled   Plan: omeprazole (PRILOSEC) 20 MG DR capsule             (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  Comment: refilled for prn   Plan: valACYclovir (VALTREX) 500 MG tablet             (I10) Primary hypertension  Comment:    Plan:      (E89.0) Postoperative hypothyroidism  Comment:  Due for throid recheck   If in range will refill med for year -     Plan: TSH with free T4 reflex        Refilled med       Patient has been advised of split billing requirements and indicates understanding: Yes      COUNSELING:  Reviewed preventive health counseling, as reflected in patient instructions      BMI:  "  Estimated body mass index is 38.61 kg/m  as calculated from the following:    Height as of 7/1/22: 1.651 m (5' 5\").    Weight as of 7/1/22: 105.2 kg (232 lb).   Weight management plan: Discussed healthy diet and exercise guidelines      She reports that she has never smoked. She has never used smokeless tobacco.      Appropriate preventive services were discussed with this patient, including applicable screening as appropriate for cardiovascular disease, diabetes, osteopenia/osteoporosis, and glaucoma.  As appropriate for age/gender, discussed screening for colorectal cancer, prostate cancer, breast cancer, and cervical cancer. Checklist reviewing preventive services available has been given to the patient.    Reviewed patients plan of care and provided an AVS. The Basic Care Plan (routine screening as documented in Health Maintenance) for Elizabeth meets the Care Plan requirement. This Care Plan has been established and reviewed with the Patient.       Ella Hernandez, [facility]    Identified Health Risks:    I have reviewed Opioid Use Disorder and Substance Use Disorder risk factors and made any needed referrals.     "

## 2023-03-28 NOTE — PATIENT INSTRUCTIONS
Patient Education   Personalized Prevention Plan  You are due for the preventive services outlined below.  Your care team is available to assist you in scheduling these services.  If you have already completed any of these items, please share that information with your care team to update in your medical record.  Health Maintenance Due   Topic Date Due     Diabetic Foot Exam  Never done     ANNUAL REVIEW OF HM ORDERS  Never done     Asthma Action Plan - yearly  Never done     Kidney Microalbumin Urine Test  11/30/2018     Annual Wellness Visit  01/05/2022     Osteoporosis Screening  06/07/2022     Flu Vaccine (1) 09/01/2022       Understanding USDA MyPlate  The USDA has guidelines to help you make healthy food choices. These are called MyPlate. MyPlate shows the food groups that make up healthy meals using the image of a place setting. Before you eat, think about the healthiest choices for what to put on your plate or in your cup or bowl. To learn more about building a healthy plate, visit www.choosemyplate.gov.     The food groups    Fruits. Any fruit or 100% fruit juice counts as part of the Fruit Group. Fruits may be fresh, canned, frozen, or dried, and may be whole, cut-up, or pureed. Make 1/2 of your plate fruits and vegetables.    Vegetables. Any vegetable or 100% vegetable juice counts as a member of the Vegetable Group. Vegetables may be fresh, frozen, canned, or dried. They can be served raw or cooked and may be whole, cut-up, or mashed. Make 1/2 of your plate fruits and vegetables.    Grains. All foods made from grains are part of the Grains Group. These include wheat, rice, oats, cornmeal, and barley. Grains are often used to make foods such as bread, pasta, oatmeal, cereal, tortillas, and grits. Grains should be no more than 1/4 of your plate. At least half of your grains should be whole grains.    Protein. This group includes meat, poultry, seafood, beans and peas, eggs, processed soy products (such  as tofu), nuts (including nut butters), and seeds. Make protein choices no more than 1/4 of your plate. Meat and poultry choices should be lean or low fat.    Dairy. The Dairy Group includes all fluid milk products and foods made from milk that contain calcium, such as yogurt and cheese. (Foods that have little calcium, such as cream, butter, and cream cheese, are not part of this group.) Most dairy choices should be low-fat or fat-free.    Oils. Oils aren't a food group, but they do contain essential nutrients. However it's important to watch your intake of oils. These are fats that are liquid at room temperature. They include canola, corn, olive, soybean, vegetable, and sunflower oil. Foods that are mainly oil include mayonnaise, certain salad dressings, and soft margarines. You likely already get your daily oil allowance from the foods you eat.  Things to limit  Eating healthy also means limiting these things in your diet:    Salt (sodium). Many processed foods have a lot of sodium. To keep sodium intake down, eat fresh vegetables, meats, poultry, and seafood when possible. Purchase low-sodium, reduced-sodium, or no-salt-added food products at the store. And don't add salt to your meals at home. Instead, season them with herbs and spices such as dill, oregano, cumin, and paprika. Or try adding flavor with lemon or lime zest and juice.    Saturated fat. Saturated fats are most often found in animal products such as beef, pork, and chicken. They are often solid at room temperature, such as butter. To reduce your saturated fat intake, choose leaner cuts of meat and poultry. And try healthier cooking methods such as grilling, broiling, roasting, or baking. For a simple lower-fat swap, use plain nonfat yogurt instead of mayonnaise when making potato salad or macaroni salad.    Added sugars. These are sugars added to foods. They are in foods such as ice cream, candy, soda, fruit drinks, sports drinks, energy drinks,  cookies, pastries, jams, and syrups. Cut down on added sugars by sharing sweet treats with a family member or friend. You can also choose fruit for dessert, and drink water or other unsweetened beverages.  [employer] last reviewed this educational content on 6/1/2020 2000-2022 [employer]. All rights reserved. This information is not intended as a substitute for professional medical care. Always follow your healthcare professional's instructions.

## 2023-03-28 NOTE — NURSING NOTE
Per orders of Dr. Hernandez, injection of FluHD given by Bella Mohan RN. Prior to immunization administration, verified patients identity using patient s name and date of birth. Patient instructed to remain in clinic for 15 minutes afterwards, and to report any adverse reaction to me or clinic staff immediately.    Bella Mohan RN on 3/28/2023 at 12:34 PM.    Please see Immunization Activity for additional information.

## 2023-03-29 RX ORDER — LIOTHYRONINE SODIUM 5 UG/1
TABLET ORAL
Qty: 180 TABLET | Refills: 3 | Status: SHIPPED | OUTPATIENT
Start: 2023-03-29 | End: 2023-07-28

## 2023-03-29 RX ORDER — LEVOTHYROXINE SODIUM 100 UG/1
100 TABLET ORAL DAILY
Qty: 90 TABLET | Refills: 3 | Status: SHIPPED | OUTPATIENT
Start: 2023-03-29 | End: 2024-05-16 | Stop reason: DRUGHIGH

## 2023-03-29 NOTE — RESULT ENCOUNTER NOTE
Dear PAUL,   Your test results are all back -   -TSH (thyroid stimulating hormone) level is normal which indicates appropriate thyroid replacement dosing.  ADVISE: continuing same replacement dose and rechecking this in 12 months.  -Microalbumin (urine protein) test is normal.  ADVISE: rechecking this annually.  Let us know if you have any questions.  -Ella Hernandez, DO

## 2023-04-13 DIAGNOSIS — E11.9 TYPE 2 DIABETES MELLITUS WITHOUT COMPLICATION, WITHOUT LONG-TERM CURRENT USE OF INSULIN (H): Chronic | ICD-10-CM

## 2023-04-13 NOTE — TELEPHONE ENCOUNTER
PN,  Routing refill request to provider for review/approval because:  Labs out of range:  Cr        Thanks,  Lon PROCTOR RN   [facility]           no

## 2023-04-14 RX ORDER — SEMAGLUTIDE 0.68 MG/ML
0.5 INJECTION, SOLUTION SUBCUTANEOUS
Qty: 3 ML | Refills: 0 | Status: SHIPPED | OUTPATIENT
Start: 2023-04-14 | End: 2023-05-16 | Stop reason: DRUGHIGH

## 2023-04-14 NOTE — TELEPHONE ENCOUNTER
Please let her know that the dose is to be increased to 0.5mg weekly.  Wrote for one more month -   Please have her reschedule with me at that time so we can reassess how it is working and future dose increases.  Thanks  PN

## 2023-04-17 ENCOUNTER — TELEPHONE (OUTPATIENT)
Dept: FAMILY MEDICINE | Facility: CLINIC | Age: 78
End: 2023-04-17
Payer: MEDICARE

## 2023-04-17 ENCOUNTER — E-VISIT (OUTPATIENT)
Dept: FAMILY MEDICINE | Facility: CLINIC | Age: 78
End: 2023-04-17
Payer: MEDICARE

## 2023-04-17 DIAGNOSIS — Z53.9 ERRONEOUS ENCOUNTER--DISREGARD: Primary | ICD-10-CM

## 2023-04-17 NOTE — TELEPHONE ENCOUNTER
Patient called  States she sent a mychart requesting antibiotics   Has cough and congestion  Believes it to be bronchitis  Denies other symptoms  States this has occurred in the past  Evisit was sent, but provider out of office today  Scheduled tomorrow for OV  Will call back if new or worsening symptoms in the mean time  Khushboo FELDMAN RN

## 2023-04-18 ENCOUNTER — OFFICE VISIT (OUTPATIENT)
Dept: FAMILY MEDICINE | Facility: CLINIC | Age: 78
End: 2023-04-18
Payer: MEDICARE

## 2023-04-18 VITALS
OXYGEN SATURATION: 97 % | HEIGHT: 65 IN | HEART RATE: 91 BPM | TEMPERATURE: 97.6 F | BODY MASS INDEX: 38.99 KG/M2 | SYSTOLIC BLOOD PRESSURE: 132 MMHG | WEIGHT: 234 LBS | DIASTOLIC BLOOD PRESSURE: 60 MMHG

## 2023-04-18 DIAGNOSIS — M31.7 MICROSCOPIC POLYANGIITIS (H): ICD-10-CM

## 2023-04-18 DIAGNOSIS — R05.1 ACUTE COUGH: Primary | ICD-10-CM

## 2023-04-18 DIAGNOSIS — J20.9 ACUTE BRONCHITIS, UNSPECIFIED ORGANISM: ICD-10-CM

## 2023-04-18 PROCEDURE — U0003 INFECTIOUS AGENT DETECTION BY NUCLEIC ACID (DNA OR RNA); SEVERE ACUTE RESPIRATORY SYNDROME CORONAVIRUS 2 (SARS-COV-2) (CORONAVIRUS DISEASE [COVID-19]), AMPLIFIED PROBE TECHNIQUE, MAKING USE OF HIGH THROUGHPUT TECHNOLOGIES AS DESCRIBED BY CMS-2020-01-R: HCPCS | Performed by: FAMILY MEDICINE

## 2023-04-18 PROCEDURE — U0005 INFEC AGEN DETEC AMPLI PROBE: HCPCS | Performed by: FAMILY MEDICINE

## 2023-04-18 PROCEDURE — 99213 OFFICE O/P EST LOW 20 MIN: CPT | Mod: CS | Performed by: FAMILY MEDICINE

## 2023-04-18 RX ORDER — BENZONATATE 100 MG/1
100 CAPSULE ORAL 2 TIMES DAILY PRN
Qty: 30 CAPSULE | Refills: 0 | Status: SHIPPED | OUTPATIENT
Start: 2023-04-18 | End: 2023-05-16

## 2023-04-18 RX ORDER — PREDNISONE 20 MG/1
20 TABLET ORAL DAILY
Qty: 5 TABLET | Refills: 0 | Status: SHIPPED | OUTPATIENT
Start: 2023-04-18 | End: 2023-05-16

## 2023-04-18 ASSESSMENT — ENCOUNTER SYMPTOMS: COUGH: 1

## 2023-04-18 ASSESSMENT — PAIN SCALES - GENERAL: PAINLEVEL: NO PAIN (0)

## 2023-04-18 NOTE — PROGRESS NOTES
Assessment & Plan     Acute cough     - Symptomatic COVID-19 Virus (Coronavirus) by PCR Nose  - amoxicillin-clavulanate (AUGMENTIN) 875-125 MG tablet; Take 1 tablet by mouth 2 times daily for 7 days  - predniSONE (DELTASONE) 20 MG tablet; Take 1 tablet (20 mg) by mouth daily    Acute bronchitis, unspecified organism     - predniSONE (DELTASONE) 20 MG tablet; Take 1 tablet (20 mg) by mouth daily  - benzonatate (TESSALON) 100 MG capsule; Take 1 capsule (100 mg) by mouth 2 times daily as needed for cough  - amoxicillin-clavulanate (AUGMENTIN) 875-125 MG tablet; Take 1 tablet by mouth 2 times daily for 7 days  - predniSONE (DELTASONE) 20 MG tablet; Take 1 tablet (20 mg) by mouth daily  - albuterol (PROAIR HFA/PROVENTIL HFA/VENTOLIN HFA) 108 (90 Base) MCG/ACT inhaler; Inhale 2 puffs into the lungs every 6 hours as needed for shortness of breath, wheezing or cough                 Ella Hernandez [facility]   PAUL is a 77 year old, presenting for the following health issues:  Cough (Allergies and infection)        4/18/2023    11:23 AM   Additional Questions   Roomed by tessie sesay   Accompanied by n/a         4/18/2023    11:23 AM   Patient Reported Additional Medications   Patient reports taking the following new medications n/a     Cough    History of Present Illness       Reason for visit:  Lung infection  Symptom onset:  1-3 days ago  Symptoms include:  Labored brearhing  sore throat  fatigue  Symptom intensity:  Moderate  Symptom progression:  Worsening  Had these symptoms before:  Yes  Has tried/received treatment for these symptoms:  Yes  Previous treatment was successful:  Yes  Prior treatment description:  Antibiotics  What makes it worse:  Activity  What makes it better:  Sleep    She eats 2-3 servings of fruits and vegetables daily.She consumes 0 sweetened beverage(s) daily.She exercises with enough effort to increase her heart rate 10 to 19 minutes per day.  She  "exercises with enough effort to increase her heart rate 3 or less days per week.   She is taking medications regularly.     Symptoms started 3 days ago       Ongoing cough. Phlegm is green.         Review of Systems   Respiratory: Positive for cough.             Objective    /60 (BP Location: Right arm, Patient Position: Sitting, Cuff Size: Adult Regular)   Pulse 91   Temp 97.6  F (36.4  C) (Temporal)   Ht 1.64 m (5' 4.57\")   Wt 106.1 kg (234 lb)   SpO2 97%   BMI 39.46 kg/m    Body mass index is 39.46 kg/m .  Physical Exam   GENERAL: alert and no distress  HENT: ear canals and TM's normal, nose and mouth without ulcers or lesions  RESP: expiratory wheezes throughout and inspiratory wheezes throughout  CV: regular rates and rhythm and normal S1 S2, no S3 or S4                    "

## 2023-04-19 LAB — SARS-COV-2 RNA RESP QL NAA+PROBE: NEGATIVE

## 2023-04-19 RX ORDER — PREDNISONE 20 MG/1
20 TABLET ORAL DAILY
Qty: 5 TABLET | Refills: 0 | Status: SHIPPED | OUTPATIENT
Start: 2023-04-19 | End: 2023-05-16

## 2023-04-19 RX ORDER — ALBUTEROL SULFATE 90 UG/1
2 AEROSOL, METERED RESPIRATORY (INHALATION) EVERY 6 HOURS PRN
Qty: 18 G | Refills: 0 | Status: SHIPPED | OUTPATIENT
Start: 2023-04-19 | End: 2023-05-11

## 2023-04-19 NOTE — RESULT ENCOUNTER NOTE
Dear PAUL,   Your test results are all back -   COVID test is negative.  Let us know if you have any questions.  -Ella Hernandez, DO

## 2023-04-25 ENCOUNTER — TELEPHONE (OUTPATIENT)
Dept: FAMILY MEDICINE | Facility: CLINIC | Age: 78
End: 2023-04-25
Payer: MEDICARE

## 2023-04-25 NOTE — TELEPHONE ENCOUNTER
Patient called back  Reports she is having difficulty sleeping   Wheezing , congestion, headache and cough   Stated Augmentin only improved cough but other symptoms persist     Huddled PN   Patient scheduled for an   In OV tomorrow  Patient verbalized understanding       CLARITZA Forrest

## 2023-04-25 NOTE — TELEPHONE ENCOUNTER
Pt states that she was being treated last week for bronchitis. Pt does not feel that any of her symptoms are not getting better. Pt stating stating that she can not sleep at night. So, she feels sleep deprived.     Would like to know what her next steps should be. Thanks    Radha Gupta RN  [facility]

## 2023-04-26 ENCOUNTER — ANCILLARY PROCEDURE (OUTPATIENT)
Dept: GENERAL RADIOLOGY | Facility: CLINIC | Age: 78
End: 2023-04-26
Attending: FAMILY MEDICINE
Payer: MEDICARE

## 2023-04-26 ENCOUNTER — OFFICE VISIT (OUTPATIENT)
Dept: FAMILY MEDICINE | Facility: CLINIC | Age: 78
End: 2023-04-26
Payer: MEDICARE

## 2023-04-26 VITALS
SYSTOLIC BLOOD PRESSURE: 131 MMHG | TEMPERATURE: 97.4 F | RESPIRATION RATE: 20 BRPM | DIASTOLIC BLOOD PRESSURE: 76 MMHG | HEART RATE: 77 BPM | OXYGEN SATURATION: 94 % | HEIGHT: 65 IN | WEIGHT: 234.7 LBS | BODY MASS INDEX: 39.1 KG/M2

## 2023-04-26 DIAGNOSIS — J06.9 UPPER RESPIRATORY TRACT INFECTION, UNSPECIFIED TYPE: ICD-10-CM

## 2023-04-26 DIAGNOSIS — R06.2 WHEEZING: ICD-10-CM

## 2023-04-26 DIAGNOSIS — J06.9 UPPER RESPIRATORY TRACT INFECTION, UNSPECIFIED TYPE: Primary | ICD-10-CM

## 2023-04-26 DIAGNOSIS — I77.82 ANCA-ASSOCIATED VASCULITIS (H): Chronic | ICD-10-CM

## 2023-04-26 LAB
C PNEUM DNA SPEC QL NAA+PROBE: NOT DETECTED
FLUAV H1 2009 PAND RNA SPEC QL NAA+PROBE: NOT DETECTED
FLUAV H1 RNA SPEC QL NAA+PROBE: NOT DETECTED
FLUAV H3 RNA SPEC QL NAA+PROBE: NOT DETECTED
FLUAV RNA SPEC QL NAA+PROBE: NOT DETECTED
FLUBV RNA SPEC QL NAA+PROBE: NOT DETECTED
HADV DNA SPEC QL NAA+PROBE: NOT DETECTED
HCOV PNL SPEC NAA+PROBE: NOT DETECTED
HMPV RNA SPEC QL NAA+PROBE: DETECTED
HPIV1 RNA SPEC QL NAA+PROBE: NOT DETECTED
HPIV2 RNA SPEC QL NAA+PROBE: NOT DETECTED
HPIV3 RNA SPEC QL NAA+PROBE: NOT DETECTED
HPIV4 RNA SPEC QL NAA+PROBE: NOT DETECTED
M PNEUMO DNA SPEC QL NAA+PROBE: NOT DETECTED
PROCALCITONIN SERPL IA-MCNC: 0.05 NG/ML
RSV RNA SPEC QL NAA+PROBE: NOT DETECTED
RSV RNA SPEC QL NAA+PROBE: NOT DETECTED
RV+EV RNA SPEC QL NAA+PROBE: NOT DETECTED

## 2023-04-26 PROCEDURE — 84145 PROCALCITONIN (PCT): CPT | Performed by: FAMILY MEDICINE

## 2023-04-26 PROCEDURE — 87581 M.PNEUMON DNA AMP PROBE: CPT | Performed by: FAMILY MEDICINE

## 2023-04-26 PROCEDURE — 71046 X-RAY EXAM CHEST 2 VIEWS: CPT | Mod: TC | Performed by: RADIOLOGY

## 2023-04-26 PROCEDURE — 87633 RESP VIRUS 12-25 TARGETS: CPT | Performed by: FAMILY MEDICINE

## 2023-04-26 PROCEDURE — 83876 ASSAY MYELOPEROXIDASE: CPT | Performed by: FAMILY MEDICINE

## 2023-04-26 PROCEDURE — 99214 OFFICE O/P EST MOD 30 MIN: CPT | Performed by: FAMILY MEDICINE

## 2023-04-26 PROCEDURE — 36415 COLL VENOUS BLD VENIPUNCTURE: CPT | Performed by: FAMILY MEDICINE

## 2023-04-26 PROCEDURE — 87486 CHLMYD PNEUM DNA AMP PROBE: CPT | Performed by: FAMILY MEDICINE

## 2023-04-26 RX ORDER — CODEINE PHOSPHATE AND GUAIFENESIN 10; 100 MG/5ML; MG/5ML
1-2 SOLUTION ORAL EVERY 4 HOURS PRN
Qty: 236 ML | Refills: 0 | Status: SHIPPED | OUTPATIENT
Start: 2023-04-26 | End: 2023-05-16

## 2023-04-26 RX ORDER — PREDNISONE 20 MG/1
TABLET ORAL
Qty: 12 TABLET | Refills: 0 | Status: SHIPPED | OUTPATIENT
Start: 2023-04-26 | End: 2023-05-16

## 2023-04-26 NOTE — PROGRESS NOTES
Assessment & Plan     Upper respiratory tract infection, unspecified type  URI suspected however patient does have history of vasculitis - ANCA and does follow with rheumatology who recommended getting myeloperoxidase ab today -   Diff includes viral vs bacterial   CXRAY does not show infiltrate however will still check procalcitonin - discussed CBC will show elevated WBC   Will also do viral PCR nasopharyngeal test due to complexity of her history and not responding to abx and prednisone at this time.  Pulse ox is slightly lower   Will increase prednisone to 40mg x3, 20mg x3, 10mg x3  Pt will call or RTC if symptoms worsen or do not improve.   - Myeloperoxidase Antibody IgG; Future  - Procalcitonin; Future  - XR Chest 2 Views; Future  - Respiratory Panel PCR  - guaiFENesin-codeine (ROBITUSSIN AC) 100-10 MG/5ML solution; Take 5-10 mLs by mouth every 4 hours as needed for cough  - predniSONE (DELTASONE) 20 MG tablet; Take  2 tabs daily x 3 days, then 1 tab daily x 3 days, then 1/2 tab daily x 3 days.    Wheezing  As above   - guaiFENesin-codeine (ROBITUSSIN AC) 100-10 MG/5ML solution; Take 5-10 mLs by mouth every 4 hours as needed for cough  - predniSONE (DELTASONE) 20 MG tablet; Take  2 tabs daily x 3 days, then 1 tab daily x 3 days, then 1/2 tab daily x 3 days.    ANCA-associated vasculitis (Microscopic polyangiitis)   as above                    Ella Hernandez [facility]   PAUL is a 77 year old, presenting for the following health issues:  Cough        4/26/2023     1:28 PM   Additional Questions   Roomed by Peyton WEBBER     Concern - Ongoing Cough   Onset: 1-2 weeks ago  Description: Cough   Intensity: severe  Progression of Symptoms: Mild improving  Accompanying Signs & Symptoms: Wheezing and SOB, Tightness around the side of the abdominal area due to coughing and headache    Previous history of similar problem:   Precipitating factors:        Worsened by:  "NOne  Alleviating factors:        Improved by: Antibiotics   Therapies tried and outcome: None    For first 6 days 25mg prednsione (20+5)   Then stepped down to 10+5 - 15mg  Today last day of antibiotic          Review of Systems         Objective    /76   Pulse 77   Temp 97.4  F (36.3  C) (Temporal)   Resp 20   Ht 1.64 m (5' 4.57\")   Wt 106.5 kg (234 lb 11.2 oz)   SpO2 94%   BMI 39.58 kg/m    Body mass index is 39.58 kg/m .  Physical Exam   GENERAL: alert and no distress  HENT: nose and mouth without ulcers or lesions, rhinorrhea clear and thick, oropharynx clear and oral mucous membranes moist  RESP:diffuse wheezing throughout - both inspiratory and expiratory   CV: regular rates and rhythm, normal S1 S2, no S3 or S4 and no murmur, click or rub    CXR - Reviewed and interpreted by me Normal- no infiltrates, effusions, pneumothoraces, cardiomegaly or masses  No results found for this or any previous visit (from the past 24 hour(s)).                "

## 2023-04-28 LAB
MYELOPEROXIDASE AB SER IA-ACNC: 2.7 U/ML
MYELOPEROXIDASE AB SER IA-ACNC: NEGATIVE

## 2023-04-28 NOTE — RESULT ENCOUNTER NOTE
Dear PAUL,   Your test results are all back -   Labs are back -   The procalcitonin does not indicate bacteria   And the Viral PCR does show the Human metapneumovirus - I suspect this is the cause of all your symptoms.  I hope the higher dose of prednisone is helping.  If you feel like you are having more trouble breathing or your O2 sats are <90% I recommend getting re-evaluated in clinic, urgent care or ER if after hours.    The good news is that the myeloperoxidase antibody IgG is negative.    Let us know if you have any questions.  -Ella Hernandez, DO

## 2023-05-16 ENCOUNTER — VIRTUAL VISIT (OUTPATIENT)
Dept: PHARMACY | Facility: CLINIC | Age: 78
End: 2023-05-16
Payer: COMMERCIAL

## 2023-05-16 DIAGNOSIS — J30.1 SEASONAL ALLERGIC RHINITIS DUE TO POLLEN: ICD-10-CM

## 2023-05-16 DIAGNOSIS — J45.20 MILD INTERMITTENT ASTHMA, UNSPECIFIED WHETHER COMPLICATED: ICD-10-CM

## 2023-05-16 DIAGNOSIS — E11.9 TYPE 2 DIABETES MELLITUS WITHOUT COMPLICATION, WITHOUT LONG-TERM CURRENT USE OF INSULIN (H): Primary | Chronic | ICD-10-CM

## 2023-05-16 DIAGNOSIS — R52 PAIN: ICD-10-CM

## 2023-05-16 DIAGNOSIS — E78.5 HYPERLIPIDEMIA LDL GOAL <70: ICD-10-CM

## 2023-05-16 DIAGNOSIS — K21.00 GASTROESOPHAGEAL REFLUX DISEASE WITH ESOPHAGITIS, UNSPECIFIED WHETHER HEMORRHAGE: ICD-10-CM

## 2023-05-16 DIAGNOSIS — E89.0 POSTOPERATIVE HYPOTHYROIDISM: Chronic | ICD-10-CM

## 2023-05-16 DIAGNOSIS — Z78.9 TAKES DIETARY SUPPLEMENTS: ICD-10-CM

## 2023-05-16 DIAGNOSIS — I77.82 ANCA-ASSOCIATED VASCULITIS (H): Chronic | ICD-10-CM

## 2023-05-16 PROCEDURE — 99207 PR NO CHARGE LOS: CPT | Mod: VID

## 2023-05-16 RX ORDER — ACETAMINOPHEN 500 MG
500-1000 TABLET ORAL EVERY 6 HOURS PRN
COMMUNITY

## 2023-05-16 NOTE — PROGRESS NOTES
Medication Therapy Management (MTM) Encounter    ASSESSMENT:                            Medication Adherence/Access: No issues identified    Type 2 Diabetes:  After discussion today patient would like to wait to start Ozempic to avoid potential side effects on upcoming trip which is reasonable since A1c has been stable and at goal of <7%. Educated that her first pen that was filled on 3/29/2023 should be discarded as it will be beyond the recommended use date out of the refrigerator of 56 days before the patient can start the medication. Called [facility]  to determine if they have any stability testing for unused medication pens out of the refrigerator and unfortunately they do not and recommended that the first day out of the refrigerator starts the 56 days even if the pen was unused and placed back into the refrigerator later. Educated to the patient that their first pen will need to be discarded and that the second pen filled on 4/24/2023 would have an expiration date of 6/19/2023, thus could be used at our next appointment for a first injection but a new pen should then be used after.     Hyperlipidemia: LDL at goal of <70 per ADA guidelines.     Asthma/Allergies: Stable, educated that ideally maximum recommended dose of Allegra is 180mg daily however this has been discussed previously with Dr. Hernandez and is the only therapy that is effective for allergies. Will continue to monitor. ACT at goal >20.    Hypothyroidism: Was not able to assess administration of current medications in regards to foods and other supplements including calcium and magnesium today due to time. Recent TSH is WNL thus will continue to monitor.     GERD: Stable.     Microscopic polyangiitis: Stable.     Pain: Stable.     Supplements: Stable.      PLAN:                            1. Discard your first Ozempic pen that was filled on 3/29/2023.    2. Your second Ozempic pen will have an expiration date of 6/19/2023. That being said you  could use one dose from that pen at our appointment on 6/20 since you mentioned there is probably a day or two that the medication was stored in the fridge at the pharmacy before you could pick it up. Thus this would be the only dose you could use for that pen then and you would have to use a new pen for the next doses OR we can have you use a new pen completely. Regardless, I will send in a new prescription so that you can fill the medication for your next doses.     Follow-up: 6/20/2023 for Ozempic teaching     SUBJECTIVE/OBJECTIVE:                          PAUL Galicia is a 77 year old female contacted via secure video for an initial visit. She was referred to me from Dr. Hernandez.      Reason for visit: Education on Ozempic.    Allergies/ADRs: Reviewed in chart  Past Medical History: Reviewed in chart  Tobacco: She reports that she has never smoked. She has never used smokeless tobacco.  Alcohol: 1-3 beverages / 2-3 weeks  Other Substance Use: no recreational marijuana use, uses medical grade.     Medication Adherence/Access: Patient uses pill box(es).  Patient takes medications 2 time(s) per day.   Per patient, misses medication 0 times per week. Might forget morning doses once every 2-3 weeks but will tend to make up for it later in the day.   Medication barriers: none.     Type 2 Diabetes:  Currently taking metformin XR 1,000mg daily. Patient is not experiencing side effects. Was recently prescribed Ozempic and presents for a teaching today. After hearing about possible side effects patient would prefer to wait to start Ozempic after upcoming 3 week vacation to [place]. Notes that she has 2 Ozempic pens one was filled on 3/29/2023 and an additional pen filled on 4/24/2023. She has not been storing these medications in the refrigerator.   Blood sugar monitoring: never.  Symptoms of low blood sugar? none  Symptoms of high blood sugar? none  Eye exam: due  Foot exam: due  Aspirin: Not taking due to age  Statin:  Yes: atorvastatin 20mg daily   ACEi/ARB: No. UACR not elevated, BP controlled.   Urine Albumin:   Lab Results   Component Value Date    UMALCR  03/28/2023      Comment:      Unable to calculate, urine albumin and/or urine creatinine is outside detectable limits.  Microalbuminuria is defined as an albumin:creatinine ratio of 17 to 299 for males and 25 to 299 for females. A ratio of albumin:creatinine of 300 or higher is indicative of overt proteinuria.  Due to biologic variability, positive results should be confirmed by a second, first-morning random or 24-hour timed urine specimen. If there is discrepancy, a third specimen is recommended. When 2 out of 3 results are in the microalbuminuria range, this is evidence for incipient nephropathy and warrants increased efforts at glucose control, blood pressure control, and institution of therapy with an angiotensin-converting-enzyme (ACE) inhibitor (if the patient can tolerate it).        Lab Results   Component Value Date    A1C 6.6 01/06/2023    A1C 6.5 10/05/2022    A1C 6.3 07/01/2022    A1C 7.0 02/16/2022    A1C 6.6 03/29/2019    A1C 6.3 02/16/2018    A1C 6.7 05/23/2017    A1C 6.9 05/14/2017     Hyperlipidemia: Currently taking atorvastatin 20mg daily.  Patient reports no significant myalgias or other side effects.  Recent Labs   Lab Test 01/06/23  1429 07/01/22  1401   CHOL 171 128   HDL 47* 53   LDL 77 47   TRIG 237* 138     Asthma/Allergies: Current medications: prescribed Flovent 1 puff twice daily but usually uses 1 puff once daily unless it is a time of year when she has more symptoms then will use 1 puff twice daily. Also has albuterol on hand as needed but notes she has not had to use this recently, tried to use for a recent viral infection she had but it was not effective. Takes Allegra 2 tablets daily (notes that this dose has been confirmed and approved by Dr. Hernandez). Has tried other things in the past for allergies including Zyrtec and Flonase but they have  not been effective and Flonase caused a rebound in symptoms.  Patient rinses their mouth after using steroid inhaler. Triggers include: pollens.  Patient reports the following symptoms: none. Notes that Flovent is very effective to help with allergies and breathing.       1/5/2021     1:55 PM 2/25/2022    10:33 AM 3/28/2023     3:12 AM   ACT Total Scores   ACT TOTAL SCORE (Goal Greater than or Equal to 20) 21 25 21   In the past 12 months, how many times did you visit the emergency room for your asthma without being admitted to the hospital? 0 0 0   In the past 12 months, how many times were you hospitalized overnight because of your asthma? 0 0 0     Hypothyroidism: Patient is taking Levothyroxine 100 mcg daily and liothyronine 10mcg twice daily. Patient is having the following symptoms: none.   TSH   Date Value Ref Range Status   03/28/2023 0.57 0.30 - 4.20 uIU/mL Final   10/05/2022 0.13 (L) 0.40 - 4.00 mU/L Final   09/29/2020 0.62 0.40 - 4.00 mU/L Final     GERD: Current medications include: Prilosec (omeprazole) 20mg once daily. Patient reports no current symptoms.  Patient feels that current regimen is effective. Every once in awhile still has breakthrough symptoms. Has tried to stop previously but was unable to do so as symptoms re-occurred.     Microscopic polyangiitis: Current therapy includes prednisone 5mg daily as recommended by her rheumatologist, notes that she will likely need to continue to take for up to 3 more months as recent test results showed an increase in antibodies.     Pain: Current therapy includes medical cannabis 20mg nightly and Acetaminophen 2 tablets (1,000mg) once weekly as needed for pain. Was not able to assess pain fully today due to time but patient notes that medical cannabis has been very effective for both pain and helping with sleep.      Supplements: Current therapy includes calcium 1-2 tablets daily, vitamin B-12 1 tablet daily, and vitamin D 1000 units daily, magnesium 2  tablets daily. Denies side effects and finds effective.     Today's Vitals: There were no vitals taken for this visit.  ----------------  I spent 29 minutes with this patient today. All changes were made via collaborative practice agreement with Ella Hernandez DO. A copy of the visit note was provided to the patient's provider(s).    A summary of these recommendations was sent via [employer].    Tammy Mora, PharmD  Medication Therapy Management Resident  [phone]    Preceptor cosignature: Elizabeth NSIA ClementeGrayson was seen independently by Dr. Mora. I have reviewed the assessment and plan. Caprice Low, PharmD, BARBARA, BCACP    Telemedicine Visit Details  Type of service:  Video Conference via [employer]  Start Time: 3:01 PM  End Time: 3:30 PM     Medication Therapy Recommendations  Type 2 diabetes mellitus without complication (H)    Current Medication: semaglutide (OZEMPIC, 0.25 OR 0.5 MG/DOSE,) 2 MG/3ML SOPN pen (Discontinued)   Rationale: Does not understand instructions - Adherence - Adherence   Recommendation: Provide Education - Storage   Status: Patient Agreed - Adherence/Education

## 2023-05-16 NOTE — PATIENT INSTRUCTIONS
"Recommendations from today's MTM visit:                                                      1. Discard your first Ozempic pen that was filled on 3/29/2023.    2. Your second Ozempic pen will have an expiration date of 6/19/2023. That being said you could use one dose from that pen at our appointment on 6/20 since you mentioned there is probably a day or two that the medication was stored in the fridge at the pharmacy before you could pick it up. Thus this would be the only dose you could use for that pen then and you would have to use a new pen for the next doses OR we can have you use a new pen completely. Regardless, I will send in a new prescription so that you can fill the medication for your next doses.     Follow-up: 6/20/2023 for Ozempic teaching     It was great speaking with you today.  I value your experience and would be very thankful for your time in providing feedback in our clinic survey. In the next few days, you may receive an email or text message from [employer] with a link to a survey related to your  clinical pharmacist.\"     To schedule another MTM appointment, please call the clinic directly or you may call the MTM scheduling line at [phone] or toll-free at [phone].     My Clinical Pharmacist's contact information:                                                      Please feel free to contact me with any questions or concerns you have.      Tammy Mora, PharmD  Medication Therapy Management Resident  [phone]   "

## 2023-05-25 ENCOUNTER — TRANSFERRED RECORDS (OUTPATIENT)
Dept: HEALTH INFORMATION MANAGEMENT | Facility: CLINIC | Age: 78
End: 2023-05-25
Payer: MEDICARE

## 2023-06-20 ENCOUNTER — VIRTUAL VISIT (OUTPATIENT)
Dept: PHARMACY | Facility: CLINIC | Age: 78
End: 2023-06-20
Payer: COMMERCIAL

## 2023-06-20 DIAGNOSIS — E11.9 TYPE 2 DIABETES MELLITUS WITHOUT COMPLICATION, WITHOUT LONG-TERM CURRENT USE OF INSULIN (H): Primary | ICD-10-CM

## 2023-06-20 PROCEDURE — 99207 PR NO CHARGE LOS: CPT | Mod: VID

## 2023-06-20 NOTE — PROGRESS NOTES
Medication Therapy Management (MTM) Encounter    ASSESSMENT:                            Medication Adherence/Access: No issues identified    Type 2 Diabetes: Educated on Ozempic side effects and administration today. Patient was able to administer first injection today. Would benefit from optimizing Ozempic dose to maximum tolerated dose to help with weight loss and well as continued blood sugar control. Would benefit from obtaining updated A1c in two months after starting Ozempic since the first month on therapy would not reflect a maintenance dose of Ozempic.     PLAN:                            1. Start Ozempic 0.25mg once weekly for 4 weeks, if you are tolerating it we can increase it to 0.5mg once weekly     Follow-up: 4 weeks via My Chart to check in on Ozempic, MTM follow up scheduled for 8/22/2023 to discuss Ozempic titration and would be due for A1c.     SUBJECTIVE/OBJECTIVE:                          PAUL Galicia is a 78 year old female contacted via secure video for a follow-up visit.  Today's visit is a follow-up MTM visit from 5/16/2023     Reason for visit: Ozempic teaching.    Allergies/ADRs: Reviewed in chart  Past Medical History: Reviewed in chart    Medication Adherence/Access: no issues reported    Type 2 Diabetes:  Patient notes that they were able to get a new supply of Ozempic and has since stored updated supply in the refrigerator. Also taking metformin XR 1000mg daily. Is planning to start Ozempic to help with both blood sugar control in addition to weight loss benefits. Has questions regarding side effects of Ozempic and how to do the injection.   Aspirin: Not taking due to age  Blood sugar monitoring: never.  Current diabetes symptoms: none  Lab Results   Component Value Date    A1C 6.6 (H) 01/06/2023     Today's Vitals: There were no vitals taken for this visit.  ----------------  I spent 33 minutes with this patient today. All changes were made via collaborative practice agreement  with Ella Hernandez DO. A copy of the visit note was provided to the patient's provider(s).    A summary of these recommendations was sent via [employer].    Tammy Mora, PharmD  Medication Therapy Management Resident  [phone]    Preceptor cosignature: Elizabeth NISA Galicia was seen independently by Dr. Mora. I have reviewed the assessment and plan. Malka NguyenD, BARBARA, BCACP      Telemedicine Visit Details  Type of service:  Video Conference via [employer]  Start Time: 3:05 PM  End Time: 3:38 PM     Medication Therapy Recommendations  Type 2 diabetes mellitus without complication (H)    Current Medication: semaglutide (OZEMPIC) 2 MG/3ML pen   Rationale: Does not understand instructions - Adherence - Adherence   Recommendation: Provide Education   Status: Patient Agreed - Adherence/Education

## 2023-06-21 NOTE — PATIENT INSTRUCTIONS
"Recommendations from today's MTM visit:                                                      1. Start Ozempic 0.25mg once weekly for 4 weeks, if you are tolerating it we can increase it to 0.5mg once weekly     Follow-up: 4 weeks via My Chart to check in on Ozempic, MTM follow up scheduled for 8/22/2023 to discuss Ozempic titration and would be due for A1c.     It was great speaking with you today.  I value your experience and would be very thankful for your time in providing feedback in our clinic survey. In the next few days, you may receive an email or text message from [employer] with a link to a survey related to your  clinical pharmacist.\"     To schedule another MTM appointment, please call the clinic directly or you may call the MTM scheduling line at [phone] or toll-free at [phone].     My Clinical Pharmacist's contact information:                                                      Please feel free to contact me with any questions or concerns you have.      Tammy Mora, PharmD  Medication Therapy Management Resident  [phone]   "

## 2023-06-27 ENCOUNTER — TELEPHONE (OUTPATIENT)
Dept: FAMILY MEDICINE | Facility: CLINIC | Age: 78
End: 2023-06-27
Payer: MEDICARE

## 2023-06-27 NOTE — TELEPHONE ENCOUNTER
PN,  Patient calling to request visit for follow-up with you.  Has been having trouble swallowing, low energy.  Was seen in urgent care yesterday through [facility].  Did not give her anything, white counts were slightly elevated.  Did do a CT, found a spot on her lung.  Is feeling somewhat better today.    Patient's theory is that she had some very thick drainage which collected and then irritated her throat as she tried to clear it.  Patient would like to get in to follow-up with you, but no visits available for the next three weeks.  Patient's wife also was diagnosed with recurring stage 4 colon cancer, so patient is feeling overwhelmed.  Please advise.  Lupe MATHEW RN

## 2023-06-27 NOTE — TELEPHONE ENCOUNTER
Looks like she was referred to ENT -   Has she been able to schedule to see them. I reviewed the tests and the imaging and that is the best next step.  I am happy to double book someplace but really feel like above needs to be doing sooner than me.  Thanks  PN

## 2023-06-28 ENCOUNTER — TRANSFERRED RECORDS (OUTPATIENT)
Dept: HEALTH INFORMATION MANAGEMENT | Facility: CLINIC | Age: 78
End: 2023-06-28

## 2023-07-28 DIAGNOSIS — E78.2 MIXED HYPERLIPIDEMIA: ICD-10-CM

## 2023-07-28 DIAGNOSIS — E11.9 TYPE 2 DIABETES MELLITUS WITHOUT COMPLICATION, WITHOUT LONG-TERM CURRENT USE OF INSULIN (H): Chronic | ICD-10-CM

## 2023-07-28 DIAGNOSIS — E89.0 POSTOPERATIVE HYPOTHYROIDISM: Chronic | ICD-10-CM

## 2023-07-28 RX ORDER — LIOTHYRONINE SODIUM 5 UG/1
TABLET ORAL
Qty: 360 TABLET | Refills: 0 | Status: SHIPPED | OUTPATIENT
Start: 2023-07-28 | End: 2023-09-27

## 2023-07-28 RX ORDER — ATORVASTATIN CALCIUM 20 MG/1
TABLET, FILM COATED ORAL
Qty: 90 TABLET | Refills: 0 | Status: SHIPPED | OUTPATIENT
Start: 2023-07-28 | End: 2023-09-27

## 2023-07-28 NOTE — TELEPHONE ENCOUNTER
"Requested Prescriptions   Pending Prescriptions Disp Refills    liothyronine (CYTOMEL) 5 MCG tablet [Pharmacy Med Name: LIOTHYRONINE SOD 5 MCG TAB] 360 tablet 1     Sig: TAKE 2 TABLETS (10 MCG) BY MOUTH 2 TIMES DAILY       Thyroid Protocol Passed - 7/28/2023 12:59 PM        Passed - Patient is 12 years or older        Passed - Recent (12 mo) or future (30 days) visit within the authorizing provider's specialty     Patient has had an office visit with the authorizing provider or a provider within the authorizing providers department within the previous 12 mos or has a future within next 30 days. See \"Patient Info\" tab in inbasket, or \"Choose Columns\" in Meds & Orders section of the refill encounter.              Passed - Medication is active on med list        Passed - Normal TSH on file in past 12 months     Recent Labs   Lab Test 03/28/23  1242   TSH 0.57              Passed - No active pregnancy on record     If patient is pregnant or has had a positive pregnancy test, please check TSH.          Passed - No positive pregnancy test in past 12 months     If patient is pregnant or has had a positive pregnancy test, please check TSH.            atorvastatin (LIPITOR) 20 MG tablet [Pharmacy Med Name: ATORVASTATIN 20 MG TABLET] 90 tablet 3     Sig: TAKE 1 TABLET BY MOUTH EVERY DAY       Statins Protocol Passed - 7/28/2023 12:59 PM        Passed - LDL on file in past 12 months     Recent Labs   Lab Test 01/06/23  1429   LDL 77             Passed - No abnormal creatine kinase in past 12 months     Recent Labs   Lab Test 05/13/17  1111                   Passed - Recent (12 mo) or future (30 days) visit within the authorizing provider's specialty     Patient has had an office visit with the authorizing provider or a provider within the authorizing providers department within the previous 12 mos or has a future within next 30 days. See \"Patient Info\" tab in inbasket, or \"Choose Columns\" in Meds & Orders section of the " "refill encounter.              Passed - Medication is active on med list        Passed - Patient is age 18 or older        Passed - No active pregnancy on record        Passed - No positive pregnancy test in past 12 months          metFORMIN (GLUCOPHAGE XR) 500 MG 24 hr tablet [Pharmacy Med Name: METFORMIN HCL  MG TABLET] 180 tablet 0     Sig: TAKE 2 TABLETS BY MOUTH EVERY DAY       Biguanide Agents Failed - 7/28/2023 12:59 PM        Failed - Patient has documented A1c within the specified period of time.     If HgbA1C is 8 or greater, it needs to be on file within the past 3 months.  If less than 8, must be on file within the past 6 months.     Recent Labs   Lab Test 01/06/23  1429   A1C 6.6*             Passed - Patient is age 10 or older        Passed - Patient's CR is NOT>1.4 OR Patient's EGFR is NOT<45 within past 12 mos.     Recent Labs   Lab Test 01/06/23  1429 05/28/20  0000 02/27/20  1429   GFRESTIMATED 49*   < > 35*   GFRESTBLACK  --   --  41*    < > = values in this interval not displayed.       Recent Labs   Lab Test 01/06/23  1429   CR 1.15*             Passed - Patient does NOT have a diagnosis of CHF.        Passed - Medication is active on med list        Passed - Patient is not pregnant        Passed - Patient has not had a positive pregnancy test within the past 12 mos.         Passed - Recent (6 mo) or future (30 days) visit within the authorizing provider's specialty     Patient had office visit in the last 6 months or has a visit in the next 30 days with authorizing provider or within the authorizing provider's specialty.  See \"Patient Info\" tab in inbasket, or \"Choose Columns\" in Meds & Orders section of the refill encounter.              Prescription approved per [facility] Refill Protocol.     Routing refill request to provider for review/approval because medication did not pass protocol.    Pt has an appointment on 08/14/23      Radha Gupta RN  [facility]   "

## 2023-08-01 RX ORDER — METFORMIN HCL 500 MG
TABLET, EXTENDED RELEASE 24 HR ORAL
Qty: 180 TABLET | Refills: 0 | Status: SHIPPED | OUTPATIENT
Start: 2023-08-01 | End: 2023-09-27

## 2023-08-07 NOTE — TELEPHONE ENCOUNTER
Toya -   Any insurance issues if I place the order for audiology patient is requesting?  Thanks,   Ella Hernandez   No exercise/No heavy lifting/No sports/gym

## 2023-08-14 ENCOUNTER — OFFICE VISIT (OUTPATIENT)
Dept: FAMILY MEDICINE | Facility: CLINIC | Age: 78
End: 2023-08-14
Payer: MEDICARE

## 2023-08-14 VITALS
RESPIRATION RATE: 16 BRPM | DIASTOLIC BLOOD PRESSURE: 73 MMHG | OXYGEN SATURATION: 97 % | WEIGHT: 219 LBS | BODY MASS INDEX: 36.49 KG/M2 | TEMPERATURE: 97.1 F | HEIGHT: 65 IN | SYSTOLIC BLOOD PRESSURE: 119 MMHG | HEART RATE: 80 BPM

## 2023-08-14 DIAGNOSIS — E11.9 TYPE 2 DIABETES MELLITUS WITHOUT COMPLICATION, WITH LONG-TERM CURRENT USE OF INSULIN (H): Primary | ICD-10-CM

## 2023-08-14 DIAGNOSIS — Z79.4 TYPE 2 DIABETES MELLITUS WITHOUT COMPLICATION, WITH LONG-TERM CURRENT USE OF INSULIN (H): Primary | ICD-10-CM

## 2023-08-14 DIAGNOSIS — N18.31 STAGE 3A CHRONIC KIDNEY DISEASE (H): ICD-10-CM

## 2023-08-14 DIAGNOSIS — H02.403 PTOSIS OF BOTH EYELIDS: ICD-10-CM

## 2023-08-14 LAB
ERYTHROCYTE [DISTWIDTH] IN BLOOD BY AUTOMATED COUNT: 13.2 % (ref 10–15)
HBA1C MFR BLD: 6.3 % (ref 0–5.6)
HCT VFR BLD AUTO: 46.2 % (ref 35–47)
HGB BLD-MCNC: 15 G/DL (ref 11.7–15.7)
MCH RBC QN AUTO: 30.4 PG (ref 26.5–33)
MCHC RBC AUTO-ENTMCNC: 32.5 G/DL (ref 31.5–36.5)
MCV RBC AUTO: 94 FL (ref 78–100)
PLATELET # BLD AUTO: 279 10E3/UL (ref 150–450)
RBC # BLD AUTO: 4.93 10E6/UL (ref 3.8–5.2)
WBC # BLD AUTO: 7.6 10E3/UL (ref 4–11)

## 2023-08-14 PROCEDURE — 83036 HEMOGLOBIN GLYCOSYLATED A1C: CPT | Performed by: FAMILY MEDICINE

## 2023-08-14 PROCEDURE — 36415 COLL VENOUS BLD VENIPUNCTURE: CPT | Performed by: FAMILY MEDICINE

## 2023-08-14 PROCEDURE — 80053 COMPREHEN METABOLIC PANEL: CPT | Performed by: FAMILY MEDICINE

## 2023-08-14 PROCEDURE — 85027 COMPLETE CBC AUTOMATED: CPT | Performed by: FAMILY MEDICINE

## 2023-08-14 PROCEDURE — 99214 OFFICE O/P EST MOD 30 MIN: CPT | Performed by: FAMILY MEDICINE

## 2023-08-14 ASSESSMENT — PAIN SCALES - GENERAL: PAINLEVEL: NO PAIN (0)

## 2023-08-14 NOTE — PROGRESS NOTES
[facility]  [address], [address]  [facility] [address]  Phone: [phone]  Primary Provider: Ella Hernandez  Pre-op Performing Provider: LALITHA LEMUS      PREOPERATIVE EVALUATION:  Today's date: 8/14/2023    Elizabeth Galicia is a 78 year old female who presents for a preoperative evaluation.      8/14/2023     2:42 PM   Additional Questions   Roomed by Chloe MERCHANT   Accompanied by n/a       Surgical Information:  Surgery/Procedure: Bilateral Eyelid Surgery   Surgery Location[facility]  Surgeon: Dr. Rg Nazario MD  Surgery Date: 8/24/2023  Time of Surgery: 8:00 am  Where patient plans to recover: At home with family  Fax number for surgical facility: [phone]    Assessment & Plan     The proposed surgical procedure is considered LOW risk.    Ptosis of both eyelids  - Comprehensive metabolic panel (BMP + Alb, Alk Phos, ALT, AST, Total. Bili, TP); Future  - CBC with platelets; Future    Type 2 diabetes mellitus without complication, with long-term current use of insulin (H)  Excellent glycemic control.  - HEMOGLOBIN A1C; Future    Stage 3a chronic kidney disease (H)  Stable renal function. Patient not on ace due to worsening renal disease.   - Comprehensive metabolic panel (BMP + Alb, Alk Phos, ALT, AST, Total. Bili, TP); Future      Risks and Recommendations:  The patient has the following additional risks and recommendations for perioperative complications:   - patient has a history of ANCA associated vasculitis. Currently taking low dose prednisone 5mg once daily. Renal function is stable.   - Advised patient to continue with her current dose of steroid.     Antiplatelet or Anticoagulation Medication Instructions:   - Patient is on no antiplatelet or anticoagulation medications.      RECOMMENDATION:  APPROVAL GIVEN to proceed with proposed procedure, without further diagnostic evaluation.        Subjective     HPI related to  upcoming procedure:   Elizabeth Galicia 78 year old who presents to clinic for preoperative history and physical exam prior to bilateral blepharoplasty. Patient reports raising her forehead in order to lift her eyelid to see.           8/14/2023     2:44 PM   Preop Questions   1. Have you ever had a heart attack or stroke? No   2. Have you ever had surgery on your heart or blood vessels, such as a stent placement, a coronary artery bypass, or surgery on an artery in your head, neck, heart, or legs? No   3. Do you have chest pain with activity? No   4. Do you have a history of  heart failure? No   5. Do you currently have a cold, bronchitis or symptoms of other infection? No   6. Do you have a cough, shortness of breath, or wheezing? No   7. Do you or anyone in your family have previous history of blood clots? UNKNOWN - patient is adopted. Family history is unknown. No personal hx.    8. Do you or does anyone in your family have a serious bleeding problem such as prolonged bleeding following surgeries or cuts? UNKNOWN - No personal history. Family hx is unknown    9. Have you ever had problems with anemia or been told to take iron pills? YES - Anemia in the past. Current hemoglobin is 15.1 mg/dl.    10. Have you had any abnormal blood loss such as black, tarry or bloody stools, or abnormal vaginal bleeding? No   11. Have you ever had a blood transfusion? No   12. Are you willing to have a blood transfusion if it is medically needed before, during, or after your surgery? Yes   13. Have you or any of your relatives ever had problems with anesthesia? UNKNOWN - No personal history. Family hx is unknown    14. Do you have sleep apnea, excessive snoring or daytime drowsiness? No   15. Do you have any artifical heart valves or other implanted medical devices like a pacemaker, defibrillator, or continuous glucose monitor? No   16. Do you have artificial joints? No   17. Are you allergic to latex? No     Health Care  Directive:  Patient has a Health Care Directive on file    Preoperative Review of :   reviewed - no record of controlled substances prescribed.      Review of Systems  Constitutional, neuro, ENT, endocrine, pulmonary, cardiac, gastrointestinal, genitourinary, musculoskeletal, integument and psychiatric systems are negative, except as otherwise noted.    Patient Active Problem List    Diagnosis Date Noted    Seasonal allergic rhinitis 09/13/2019     Priority: Medium    Microscopic polyangiitis (H) 09/13/2019     Priority: Medium    Morbid obesity (H) 03/29/2019     Priority: Medium    Prophylactic antibiotic 12/31/2017     Priority: Medium    CKD (chronic kidney disease) stage 3, GFR 30-59 ml/min (H)      Priority: Medium    Postoperative hypothyroidism 06/11/2017     Priority: Medium    Anemia, unspecified type 06/11/2017     Priority: Medium    Hyperplasia of renal artery (H) 06/11/2017     Priority: Medium    Heterozygous for MTHFR gene mutation      Priority: Medium     Per CareEverywhere      ANCA-associated vasculitis (Microscopic polyangiitis) 06/09/2017     Priority: Medium    Type 2 diabetes mellitus without complication (H) 05/24/2017     Priority: Medium    Obesity (BMI 30-39.9)      Priority: Medium    Heterozygous MTHFR mutation [id number] 08/25/2015     Priority: Medium    HTN (hypertension)      Priority: Medium      Past Medical History:   Diagnosis Date    ANCA-associated vasculitis (H)     Anxiety     Gastro-oesophageal reflux disease     Herniated lumbar intervertebral disc     Herpes     Heterozygous for MTHFR gene mutation     Per CareEverywhere    Hyperlipidemia     Lesion of upper eyelid LEFT    Obesity (BMI 30-39.9)     Postoperative hypothyroidism     Seasonal allergies     Type 2 diabetes mellitus without complication (H) 05/24/2017    Uncomplicated asthma Few years ago    Uterine prolapse      Past Surgical History:   Procedure Laterality Date    BIOPSY  20-bryan years ago    Left breast     COLONOSCOPY      DILATION AND CURETTAGE      ENT SURGERY      partial thyroidectomy; tonsillectomy    EXCISE LESION EYELID Left 03/10/2016    Procedure: EXCISE LESION EYELID;  Surgeon: Rg Nazario MD;  Location: [facility]    HAND SURGERY      HYSTERECTOMY VAGINAL, COLPORRHAPHY ANTERIOR, POSTERIOR, COMBINED N/A 09/09/2014    Procedure: COMBINED HYSTERECTOMY VAGINAL, COLPORRHAPHY ANTERIOR, POSTERIOR;  Surgeon: Shay Wiknler MD;  Location: [facility]    HYSTERECTOMY, PAP NO LONGER INDICATED      LAPAROSCOPIC SALPINGO-OOPHORECTOMY  06/27/2011    Procedure:LAPAROSCOPIC SALPINGO-OOPHORECTOMY; resection of left pelvic mass. ; Surgeon:MAYRA OLEARY; Location:[facility]    ORTHOPEDIC SURGERY      SALPINGO OOPHORECTOMY,R/L/PEDRO LUIS      Salpingo Oophorectomy for torsion in past    THYROIDECTOMY      Partial     Current Outpatient Medications   Medication Sig Dispense Refill    acetaminophen (TYLENOL) 500 MG tablet Take 500-1,000 mg by mouth every 6 hours as needed for mild pain      atorvastatin (LIPITOR) 20 MG tablet TAKE 1 TABLET BY MOUTH EVERY DAY 90 tablet 0    CALCIUM LACTATE PO Take 1-2 tablets by mouth daily      Cholecalciferol (VITAMIN D3 PO) Take 1,000 Units by mouth daily      Cyanocobalamin ([employer] B-12 PO) Take 1 tablet by mouth daily      FLOVENT  MCG/ACT inhaler INHALE 1 PUFF BY MOUTH IN TO THE LUNGS TWICE A DAY AS DIRECTED 24 g 1    levothyroxine (SYNTHROID/LEVOTHROID) 100 MCG tablet Take 1 tablet (100 mcg) by mouth daily 90 tablet 3    liothyronine (CYTOMEL) 5 MCG tablet TAKE 2 TABLETS (10 MCG) BY MOUTH 2 TIMES DAILY 360 tablet 0    MAGNESIUM LACTATE PO Take 2 tablets by mouth daily      metFORMIN (GLUCOPHAGE XR) 500 MG 24 hr tablet TAKE 2 TABLETS BY MOUTH EVERY  tablet 0    omeprazole (PRILOSEC) 20 MG DR capsule Take 1 capsule (20 mg) by mouth daily 90 capsule 3    OZEMPIC, 0.25 OR 0.5 MG/DOSE, 2 MG/3ML pen INJECT 0.25MG SUBCUTANEOUS ONCE WEEKLY FOR 4 TOTAL WEEKS THEN INCREASE TO 0.5MG ONCE WEEKLY 3 mL 0  "   predniSONE (DELTASONE) 5 MG tablet Take 5 mg by mouth daily Pt takes it one one a day      UNABLE TO FIND MEDICATION NAME: medical cannibus 20mg at bedtime      VENTOLIN  (90 Base) MCG/ACT inhaler INHALE 2 PUFFS INTO THE LUNGS EVERY 6 HOURS AS NEEDED FOR SHORTNESS OF BREATH, WHEEZING OR COUGH 18 g 1    fexofenadine (ALLEGRA) 180 MG tablet TAKE 1 TABLET BY MOUTH EVERY DAY (Patient not taking: Reported on 8/14/2023) 90 tablet 2    valACYclovir (VALTREX) 500 MG tablet Take 1 tablet (500 mg) by mouth 2 times daily (Patient not taking: Reported on 5/16/2023) 6 tablet 11       Allergies   Allergen Reactions    Cranberries [Cranberry Extract]      Causes herpes flair-up    Dairy [Milk Products]      Can tolerate butter and hard cheeses     Perfume Other (See Comments)     Stuffy in nose, HA    Seasonal Allergies     Tilactase         Social History     Tobacco Use    Smoking status: Never    Smokeless tobacco: Never   Substance Use Topics    Alcohol use: Yes     Comment: One cocktail every couple of weeks or less       History   Drug Use No         Objective     /73 (BP Location: Left arm, Patient Position: Sitting, Cuff Size: Adult Large)   Pulse 80   Temp 97.1  F (36.2  C) (Temporal)   Resp 16   Ht 1.657 m (5' 5.24\")   Wt 99.3 kg (219 lb)   SpO2 97%   BMI 36.18 kg/m      Physical Exam  GENERAL APPEARANCE: healthy, alert and no distress  RESP: lungs clear to auscultation - no rales, rhonchi or wheezes  Eye: Ptosis of bilateral eyelids.   CV: regular rate and rhythm, normal S1 S2, no S3 or S4 and no murmur, click or rub   ABDOMEN: soft, nontender, no HSM or masses and bowel sounds normal  NEURO: Normal strength and tone, sensory exam grossly normal, mentation intact and speech normal    Recent Labs   Lab Test 01/06/23  1429 10/05/22  1344 07/01/22  1405 07/01/22  1401 02/16/22  1316   HGB  --   --  15.1  --  15.4   PLT  --   --  285  --  344     --   --  137 137   POTASSIUM 4.5  --   --  4.4 " 4.3   CR 1.15*  --   --  1.11* 1.08*   A1C 6.6* 6.5* 6.3*  --  7.0*        Diagnostics:  Labs pending at this time.  Results will be reviewed when available.   No EKG required for low risk surgery (cataract, skin procedure, breast biopsy, etc).    Revised Cardiac Risk Index (RCRI):  The patient has the following serious cardiovascular risks for perioperative complications:   - No serious cardiac risks = 0 points     RCRI Interpretation: 0 points: Class I (very low risk - 0.4% complication rate)         Signed Electronically by: Sunil Ridley MD  Copy of this evaluation report is provided to requesting physician.

## 2023-08-15 LAB
ALBUMIN SERPL BCG-MCNC: 4.4 G/DL (ref 3.5–5.2)
ALP SERPL-CCNC: 66 U/L (ref 35–104)
ALT SERPL W P-5'-P-CCNC: 26 U/L (ref 0–50)
ANION GAP SERPL CALCULATED.3IONS-SCNC: 14 MMOL/L (ref 7–15)
AST SERPL W P-5'-P-CCNC: 24 U/L (ref 0–45)
BILIRUB SERPL-MCNC: 0.4 MG/DL
BUN SERPL-MCNC: 11.7 MG/DL (ref 8–23)
CALCIUM SERPL-MCNC: 10.3 MG/DL (ref 8.8–10.2)
CHLORIDE SERPL-SCNC: 105 MMOL/L (ref 98–107)
CREAT SERPL-MCNC: 1.18 MG/DL (ref 0.51–0.95)
DEPRECATED HCO3 PLAS-SCNC: 22 MMOL/L (ref 22–29)
GFR SERPL CREATININE-BSD FRML MDRD: 47 ML/MIN/1.73M2
GLUCOSE SERPL-MCNC: 106 MG/DL (ref 70–99)
POTASSIUM SERPL-SCNC: 4.7 MMOL/L (ref 3.4–5.3)
PROT SERPL-MCNC: 7 G/DL (ref 6.4–8.3)
SODIUM SERPL-SCNC: 141 MMOL/L (ref 136–145)

## 2023-08-16 NOTE — RESULT ENCOUNTER NOTE
Dear PAUL,   Your kidney function is stable. GFR decreased slightly. It went from 49 to 47.   Let's continue to monitor it once every 3 months.       Best Regards  Sunil Ridley MD

## 2023-08-22 ENCOUNTER — VIRTUAL VISIT (OUTPATIENT)
Dept: PHARMACY | Facility: CLINIC | Age: 78
End: 2023-08-22
Payer: COMMERCIAL

## 2023-08-22 DIAGNOSIS — E11.9 TYPE 2 DIABETES MELLITUS WITHOUT COMPLICATION, WITHOUT LONG-TERM CURRENT USE OF INSULIN (H): Primary | Chronic | ICD-10-CM

## 2023-08-22 PROCEDURE — 99207 PR NO CHARGE LOS: CPT | Mod: VID | Performed by: PHARMACIST

## 2023-08-22 NOTE — PROGRESS NOTES
"Medication Therapy Management (MTM) Encounter    ASSESSMENT:                            Medication Adherence/Access: No issues identified    Type 2 Diabetes:    A1c at goal. Tolerating meds Ok - discussed continuing current doses vs. Increasing Ozempic to increase weight loss, but given her A1c and continued nausea I would suggest continuing lower dose for now (she is also still seeing a downward trend in her weight).   UACR, BP and lipids at goal (on statin). Appropriately not on aspirin (primary prevention, age >70).      PLAN:                            Continue medications unchanged    Follow-up: 6 months, sooner if needed.     SUBJECTIVE/OBJECTIVE:                          PAUL Galicia is a 78 year old female contacted via secure video for a follow-up visit from 6/20/23.       Reason for visit: follow-up on Ozempic start - she is frustrated her A1c didn't decrease more. She is excited that she is down about 18 pounds.     Allergies/ADRs: Reviewed in chart  Past Medical History: Reviewed in chart  Tobacco: She reports that she has never smoked. She has never used smokeless tobacco.  Alcohol: about 1 beverages every 2-3 weeks    Medication Adherence/Access: no issues reported    Type 2 Diabetes:    Metformin XR 2 tablets daily  Ozempic 0.5mg weekly  (currently on hold for upcoming surgery)   Side effects (mostly due to Ozempic): still having some nausea almost daily. Doesn't last super long. Initially this was paired with a drop in energy, but this has improved. She reports that it's difficult for her to determine what is what - her spouse was recently dx with recurrent stage 4 colon cancer and she has also had a significant amount of drainage down the back of her throat due to allergies (as well as inner ear stuff).   Also having some some constipation but this is not unusual for her - uses \"traditional Chinese medicine\" and a stool softener.   Current diabetes symptoms: none  Diet/Exercise: unchanged from " previous visits.     Today's Vitals: There were no vitals taken for this visit.  ----------------  I spent 27 minutes with this patient today (visit stopped so she could attend her pilates class). All changes were made via collaborative practice agreement with Ella Hernandez DO. A copy of the visit note was provided to the patient's provider(s).    A summary of these recommendations was sent via [employer].    Malka ParrishD, BARBARA, BCACP  MTM Pharmacist, [facility]     Telemedicine Visit Details  Type of service:  Video Conference via [employer]  Joined the call at 8/22/2023, 12:33:57 pm.  Left the call at 8/22/2023, 1:00:55 pm.  You were on the call for 26 minutes 58 seconds .     Medication Therapy Recommendations  No medication therapy recommendations to display

## 2023-08-24 PROCEDURE — 88305 TISSUE EXAM BY PATHOLOGIST: CPT | Mod: TC,ORL | Performed by: OPHTHALMOLOGY

## 2023-08-24 PROCEDURE — 88305 TISSUE EXAM BY PATHOLOGIST: CPT | Mod: 26 | Performed by: STUDENT IN AN ORGANIZED HEALTH CARE EDUCATION/TRAINING PROGRAM

## 2023-08-24 NOTE — PATIENT INSTRUCTIONS
"Recommendations from today's MTM visit:                                                       Continue medications unchanged    Follow-up: 6 months, sooner if needed.     It was great speaking with you today.  I value your experience and would be very thankful for your time in providing feedback in our clinic survey. In the next few days, you may receive an email or text message from [facility] [employer] with a link to a survey related to your  clinical pharmacist.\"     To schedule another MTM appointment, please call the clinic directly or you may call the MTM scheduling line at [phone] or toll-free at [phone].     My Clinical Pharmacist's contact information:                                                      Please feel free to contact me with any questions or concerns you have.      Caprice Low, Pharm.D., M.B.A., [facility]  MTM Pharmacist, [facility]  Pager: [phone]  Email: [email or web address]     "

## 2023-08-28 ENCOUNTER — LAB REQUISITION (OUTPATIENT)
Dept: LAB | Facility: CLINIC | Age: 78
End: 2023-08-28
Payer: MEDICARE

## 2023-08-29 LAB
PATH REPORT.COMMENTS IMP SPEC: NORMAL
PATH REPORT.COMMENTS IMP SPEC: NORMAL
PATH REPORT.FINAL DX SPEC: NORMAL
PATH REPORT.GROSS SPEC: NORMAL
PATH REPORT.MICROSCOPIC SPEC OTHER STN: NORMAL
PATH REPORT.RELEVANT HX SPEC: NORMAL
PHOTO IMAGE: NORMAL

## 2023-09-27 DIAGNOSIS — E89.0 POSTOPERATIVE HYPOTHYROIDISM: Chronic | ICD-10-CM

## 2023-09-27 DIAGNOSIS — E78.2 MIXED HYPERLIPIDEMIA: ICD-10-CM

## 2023-09-27 DIAGNOSIS — J20.9 ACUTE BRONCHITIS, UNSPECIFIED ORGANISM: ICD-10-CM

## 2023-09-27 DIAGNOSIS — E11.9 TYPE 2 DIABETES MELLITUS WITHOUT COMPLICATION, WITHOUT LONG-TERM CURRENT USE OF INSULIN (H): Chronic | ICD-10-CM

## 2023-09-27 RX ORDER — METFORMIN HCL 500 MG
TABLET, EXTENDED RELEASE 24 HR ORAL
Qty: 180 TABLET | Refills: 0 | Status: SHIPPED | OUTPATIENT
Start: 2023-09-27 | End: 2024-05-16

## 2023-09-27 RX ORDER — SEMAGLUTIDE 0.68 MG/ML
INJECTION, SOLUTION SUBCUTANEOUS
Qty: 3 ML | Refills: 0 | Status: SHIPPED | OUTPATIENT
Start: 2023-09-27 | End: 2023-10-25

## 2023-09-27 RX ORDER — LIOTHYRONINE SODIUM 5 UG/1
TABLET ORAL
Qty: 360 TABLET | Refills: 0 | Status: SHIPPED | OUTPATIENT
Start: 2023-09-27 | End: 2024-01-02

## 2023-09-27 RX ORDER — ATORVASTATIN CALCIUM 20 MG/1
TABLET, FILM COATED ORAL
Qty: 90 TABLET | Refills: 0 | Status: SHIPPED | OUTPATIENT
Start: 2023-09-27 | End: 2024-01-02

## 2023-09-27 RX ORDER — BENZONATATE 100 MG/1
100 CAPSULE ORAL 2 TIMES DAILY PRN
Qty: 1 CAPSULE | Refills: 0 | OUTPATIENT
Start: 2023-09-27

## 2023-09-27 NOTE — TELEPHONE ENCOUNTER
PN,      Routing refill request to provider for review/approval because:  GLP-1 Agonists Protocol [id number]/27/2023 08:30 AM   Protocol Details Normal serum creatinine on file in past 12 months       Last OV 8/14/23    Thank you,  Taylor De Los Santos RN

## 2023-10-12 ENCOUNTER — TRANSFERRED RECORDS (OUTPATIENT)
Dept: MULTI SPECIALTY CLINIC | Facility: CLINIC | Age: 78
End: 2023-10-12

## 2023-10-12 LAB — RETINOPATHY: NORMAL

## 2023-10-25 DIAGNOSIS — J20.9 ACUTE BRONCHITIS, UNSPECIFIED ORGANISM: ICD-10-CM

## 2023-10-25 DIAGNOSIS — E11.9 TYPE 2 DIABETES MELLITUS WITHOUT COMPLICATION, WITHOUT LONG-TERM CURRENT USE OF INSULIN (H): Chronic | ICD-10-CM

## 2023-10-25 RX ORDER — SEMAGLUTIDE 0.68 MG/ML
0.25 INJECTION, SOLUTION SUBCUTANEOUS
Qty: 3 ML | Refills: 2 | Status: SHIPPED | OUTPATIENT
Start: 2023-10-25 | End: 2023-10-25

## 2023-10-25 RX ORDER — BENZONATATE 100 MG/1
100 CAPSULE ORAL 2 TIMES DAILY PRN
Qty: 30 CAPSULE | Refills: 0 | OUTPATIENT
Start: 2023-10-25

## 2023-10-25 RX ORDER — SEMAGLUTIDE 0.68 MG/ML
0.5 INJECTION, SOLUTION SUBCUTANEOUS
Qty: 3 ML | Refills: 3 | Status: SHIPPED | OUTPATIENT
Start: 2023-10-25 | End: 2023-12-04 | Stop reason: DRUGHIGH

## 2023-10-25 NOTE — TELEPHONE ENCOUNTER
PN,  Patient called.  Would like to have more refills available on this medication so she does not have to call for the medication all the time.    Pended order with refills if approved.  Not sure if this needs to be adjusted to reflect current 0.5 mg dose?    Patient would like MyChart when completed.  Lupe MATHEW RN

## 2023-10-25 NOTE — TELEPHONE ENCOUNTER
Looks like her dose is 0.5mg weekly per pharmD note   Will write for that dose and send refills for total of 4 months   Please let her know   Thanks  PN

## 2023-12-31 DIAGNOSIS — E78.2 MIXED HYPERLIPIDEMIA: ICD-10-CM

## 2023-12-31 DIAGNOSIS — E89.0 POSTOPERATIVE HYPOTHYROIDISM: Chronic | ICD-10-CM

## 2024-01-02 RX ORDER — ATORVASTATIN CALCIUM 20 MG/1
TABLET, FILM COATED ORAL
Qty: 90 TABLET | Refills: 0 | Status: SHIPPED | OUTPATIENT
Start: 2024-01-02 | End: 2024-03-29

## 2024-01-02 RX ORDER — LIOTHYRONINE SODIUM 5 UG/1
TABLET ORAL
Qty: 360 TABLET | Refills: 0 | Status: SHIPPED | OUTPATIENT
Start: 2024-01-02 | End: 2024-05-01

## 2024-01-31 DIAGNOSIS — E89.0 POSTOPERATIVE HYPOTHYROIDISM: Chronic | ICD-10-CM

## 2024-01-31 DIAGNOSIS — E78.2 MIXED HYPERLIPIDEMIA: ICD-10-CM

## 2024-01-31 RX ORDER — ATORVASTATIN CALCIUM 20 MG/1
TABLET, FILM COATED ORAL
Qty: 90 TABLET | Refills: 0 | OUTPATIENT
Start: 2024-01-31

## 2024-01-31 RX ORDER — LIOTHYRONINE SODIUM 5 UG/1
TABLET ORAL
Qty: 360 TABLET | Refills: 0 | OUTPATIENT
Start: 2024-01-31

## 2024-02-07 ENCOUNTER — VIRTUAL VISIT (OUTPATIENT)
Dept: PALLIATIVE MEDICINE | Facility: CLINIC | Age: 79
End: 2024-02-07
Payer: MEDICARE

## 2024-02-07 DIAGNOSIS — M70.62 GREATER TROCHANTERIC BURSITIS OF BOTH HIPS: ICD-10-CM

## 2024-02-07 DIAGNOSIS — M70.61 GREATER TROCHANTERIC BURSITIS OF BOTH HIPS: ICD-10-CM

## 2024-02-07 DIAGNOSIS — M54.16 LUMBAR RADICULOPATHY: Primary | ICD-10-CM

## 2024-02-07 PROCEDURE — 99214 OFFICE O/P EST MOD 30 MIN: CPT | Mod: 95 | Performed by: PAIN MEDICINE

## 2024-02-07 RX ORDER — PREDNISONE 1 MG/1
1 TABLET ORAL DAILY
COMMUNITY
Start: 2023-12-06 | End: 2024-05-16

## 2024-02-07 ASSESSMENT — PAIN SCALES - PAIN ENJOYMENT GENERAL ACTIVITY SCALE (PEG)
PEG_TOTALSCORE: 1.33
AVG_PAIN_PASTWEEK: 2
PEG_TOTALSCORE: 1.33
INTERFERED_GENERAL_ACTIVITY: 1
INTERFERED_GENERAL_ACTIVITY: 1
AVG_PAIN_PASTWEEK: 2
INTERFERED_ENJOYMENT_LIFE: 1
INTERFERED_ENJOYMENT_LIFE: 1

## 2024-02-07 ASSESSMENT — PAIN SCALES - GENERAL: PAINLEVEL: MILD PAIN (2)

## 2024-02-07 NOTE — PROGRESS NOTES
PAUL is a 78 year old who is being evaluated via a billable video visit.      How would you like to obtain your AVS? MyChart  If the video visit is dropped, the invitation should be resent by: Text to cell phone: [phone]  Will anyone else be joining your video visit? No  Is Pt currently in [place]? Yes    NOTE:  If Pt is not in [place], Appointment needs to be canceled and rescheduled.      Bethany Moon MA  [facility]          Video-Visit Details    Type of service:  Video Visit   Video Start Time:  1130  Video End Time: 1145    Originating Location (pt. Location): Home    Distant Location (provider location):  On-site  Platform used for Video Visit: [facility] follow up      Reason for consultation:    Primary Care Provider is No Ref-Primary, Physician.  Pain medications are being prescribed by n/a.    Please see the [facility] [place] health questionnaire which the patient completed and reviewed with me in detail.    Chief Complaint:    Chief Complaint   Patient presents with    Pain     MME prescribed prior to seeing patient:  Current MME:  rec   Further procedures recommended:    -None at this time  - Medication Management:    - recertify for medical cannabis [email or web address]  - Physical Therapy: None at this time  - Follow up:   1 year      Interval: chronic ckd 1.18  Lots of fam stress partner  stage 4 colon cancer in hospital  Cont to work with rheum- recent pred taper   Has Llbp radiating to LE- currently worse because of sleeping on hospital bed  +  numbness ting burning   Currently not having any SE from the cannabis  The pt uses the cannabis most nights  The pt reports benefit in sleep and pain  Denies any recent falls  Denies overt progressive weakness  The patient notes significant improvement in her quality of life with medical cannabis  Pain does sig affecting quality of life   Cont to pilates and massage      Pain history:     Patient is referred by PCP- interested in medical cannabis.  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 The pt is currently on pred 4mg. Of note when pt was on higher doses she did not have as sig pain levels.     Pt main concern is she cant sleep 2/2 to leg pain   Elizabeth Galicia is a 73 year old female w/ chronic bilateral lat leg/hip pain. The pain has gotten progressively worse over the last couple of years. In general she denies any specific inciting event.  The pain is currently worse on the L>R. The pain is intermittent. The pain is a deep aching pain. The pain is occasionally sharp.  The pain occasionally radiates down her lat leg. Neg numbness. Neg burning. Neg tingling. She denies any overt weakness. The pt did have a recent fall, but was not 2/2 to weakness, rather she tripped. The pain is worse when sleeping on her sides. The pain is worse with prolonged walking  The pain is slightly better when lying on her back. The pain is slightly better when changing positions. The pain is slightly bettter when sleeping her chair. The pt also does a lot of PHYSICAL THERAPY  And stretches for her IT band. The pt notes sig benefit, but in general the relief does not last.    Of note on occasion the pt will have more generalized pain. Specifically chest, shoulders, neg     The pt was using voltaren gel , but stopped 2/2 to benefit     The pt takes 2 tabs of 625mg at night. Occasionally she will take an additional tablet when being more active.     Of note pt has a gfr of 54, currently being seen by a rheumatologist.     Again pt main concern is not sleeping and she feels it makes every aspect of her life worse.     The pt continues to approach her symptoms from multiple directions and is now looking for other options.         Pain rating: intensity  Averages 4/10 on a 0-10 scale.      Current treatments include:  Medical  cannabis  Acetaminophen  Previous medication treatments included:  voltaren gel        Other treatments have included:  Elizabeth Galicia has not been seen at a pain clinic in the past.    PT: yes helped     Injections: no    Past Medical History:  Past Medical History:   Diagnosis Date    ANCA-associated vasculitis (H)     Anxiety     Gastro-oesophageal reflux disease     Herniated lumbar intervertebral disc     Herpes     Heterozygous for MTHFR gene mutation     Per CareEverywhere    Hyperlipidemia     Lesion of upper eyelid LEFT    Obesity (BMI 30-39.9)     Postoperative hypothyroidism     Seasonal allergies     Type 2 diabetes mellitus without complication (H) 05/24/2017    Uncomplicated asthma Few years ago    Uterine prolapse      Past Surgical History:  Past Surgical History:   Procedure Laterality Date    BIOPSY  20-bryan years ago    Left breast    COLONOSCOPY      DILATION AND CURETTAGE      ENT SURGERY      partial thyroidectomy; tonsillectomy    EXCISE LESION EYELID Left 03/10/2016    Procedure: EXCISE LESION EYELID;  Surgeon: Rg Nazario MD;  Location: [facility]    HAND SURGERY      HYSTERECTOMY VAGINAL, COLPORRHAPHY ANTERIOR, POSTERIOR, COMBINED N/A 09/09/2014    Procedure: COMBINED HYSTERECTOMY VAGINAL, COLPORRHAPHY ANTERIOR, POSTERIOR;  Surgeon: Shay Winkler MD;  Location: [facility]    HYSTERECTOMY, PAP NO LONGER INDICATED      LAPAROSCOPIC SALPINGO-OOPHORECTOMY  06/27/2011    Procedure:LAPAROSCOPIC SALPINGO-OOPHORECTOMY; resection of left pelvic mass. ; Surgeon:MAYRA OLEARY; Location:[facility]    ORTHOPEDIC SURGERY      SALPINGO OOPHORECTOMY,R/L/PEDRO LUIS      Salpingo Oophorectomy for torsion in past    THYROIDECTOMY      Partial     Medications:  Current Outpatient Medications   Medication Sig Dispense Refill    ACE/ARB/ARNI NOT PRESCRIBED (INTENTIONAL) Please choose reason not prescribed from choices below.      acetaminophen (TYLENOL) 500 MG tablet Take 500-1,000 mg by mouth every 6 hours as needed  for mild pain      atorvastatin (LIPITOR) 20 MG tablet TAKE 1 TABLET BY MOUTH EVERY DAY 90 tablet 0    CALCIUM LACTATE PO Take 1-2 tablets by mouth daily      Cholecalciferol (VITAMIN D3 PO) Take 1,000 Units by mouth daily      Cyanocobalamin ([employer] B-12 PO) Take 1 tablet by mouth daily      FLOVENT  MCG/ACT inhaler INHALE 1 PUFF BY MOUTH IN TO THE LUNGS TWICE A DAY AS DIRECTED 24 g 1    levothyroxine (SYNTHROID/LEVOTHROID) 100 MCG tablet Take 1 tablet (100 mcg) by mouth daily 90 tablet 3    liothyronine (CYTOMEL) 5 MCG tablet TAKE 2 TABLETS (10 MCG) BY MOUTH 2 TIMES DAILY 360 tablet 0    MAGNESIUM LACTATE PO Take 2 tablets by mouth daily      metFORMIN (GLUCOPHAGE XR) 500 MG 24 hr tablet TAKE 2 TABLETS BY MOUTH EVERY  tablet 0    omeprazole (PRILOSEC) 20 MG DR capsule Take 1 capsule (20 mg) by mouth daily 90 capsule 3    predniSONE (DELTASONE) 1 MG tablet Take 1 mg by mouth daily 3 tabets      Semaglutide, 1 MG/DOSE, (OZEMPIC) 4 MG/3ML pen Inject 1 mg Subcutaneous every 7 days 3 mL 1    UNABLE TO FIND MEDICATION NAME: medical cannibus 20mg at bedtime      valACYclovir (VALTREX) 500 MG tablet Take 1 tablet (500 mg) by mouth 2 times daily 6 tablet 11    VENTOLIN  (90 Base) MCG/ACT inhaler INHALE 2 PUFFS INTO THE LUNGS EVERY 6 HOURS AS NEEDED FOR SHORTNESS OF BREATH, WHEEZING OR COUGH 18 g 1    fexofenadine (ALLEGRA) 180 MG tablet TAKE 1 TABLET BY MOUTH EVERY DAY (Patient not taking: Reported on 2/7/2024) 90 tablet 2     Allergies:     Allergies   Allergen Reactions    Cranberries [Cranberry Extract]      Causes herpes flair-up    Dairy [Milk Products]      Can tolerate butter and hard cheeses     Perfume Other (See Comments)     Stuffy in nose, HA    Seasonal Allergies     Tilactase      Family history:  Family History   Problem Relation Age of Onset    Unknown/Adopted Mother     Hyperlipidemia Mother         Birth mother - I m adopted & have little health info    Anxiety Disorder Mother          Women in that family were all considered  nervous     Obesity Mother     Unknown/Adopted Father         Birth father had some kind of heart issue    Heart Disease Other     Mental Illness No family hx of        Physical Exam:  There were no vitals filed for this visit.  Exam:  Constitutional: healthy, alert and no distress  Respiratory: Speaking in full sentences no accessory muscles use   Psychiatric: mentation appears normal and affect normal/bright  Neg slump      Musculoskeletal exam:    Cervical spine: ROM within normal limits  Able to easily overcome gravity   Neg slump        Assessment/Plan:  Paul Galicia is a 73 year old female who presents with the complaints of bilateral lateral leg/hip pain.   PAUL was seen today for pain.    Diagnoses and all orders for this visit:    Lumbar radiculopathy  -     Adult Pain Clinic Follow-Up Order; Future    Greater trochanteric bursitis of both hips  -     Adult Pain Clinic Follow-Up Order; Future           - Further procedures recommended:    -None at this time  - Medication Management:    - recertify for medical cannabis [email or web address]  - Physical Therapy: None at this time  - Follow up:   1 year              The total TIME spent on this patient on the day of the appointment was 20 minutes.   Time spent preparing to see the patient (reviewing records and tests)  Time spend face to face with the patient  Time spent ordering tests, medications, procedures and referrals  Time spent Referring and communicating with other healthcare professionals  Documenting clinical information in Epic      Deon Banerjee MD  [facility]  This note was created with voice recognition software, and while reviewed for accuracy, typos may remain.

## 2024-02-08 ENCOUNTER — NURSE TRIAGE (OUTPATIENT)
Dept: FAMILY MEDICINE | Facility: CLINIC | Age: 79
End: 2024-02-08
Payer: MEDICARE

## 2024-02-08 DIAGNOSIS — J01.90 ACUTE SINUSITIS WITH SYMPTOMS > 10 DAYS: Primary | ICD-10-CM

## 2024-02-08 NOTE — TELEPHONE ENCOUNTER
Nurse Triage SBAR    Is this a 2nd Level Triage? NO    Situation:   Patient calling  Requesting antibiotic for possible sinus infection    Background:   Reports she gets sinus infections often - particularly with mold allergy and change in seasons  Is currently in [place] at [facility] ( is currently admitted)    Assessment:   Experiencing sinus pain/pressure rating 3/10  Headache and R ear ache 3/10  Is using nasal washes and tylenol for pain     Protocol Recommended Disposition:   No disposition on file.    Recommendation:   Recommended appointment today  Patient declined due to currently in [facility] PCP typically prescribes antibiotic without appointment  Previous Rx 2021  Huddled with covering provider  Will forward for review     Routed to provider    Does the patient meet one of the following criteria for ADS visit consideration? 16+ years old, with an [facility] PCP     TIP  Providers, please consider if this condition is appropriate for management at one of our Acute and Diagnostic Services sites.     If patient is a good candidate, please use dotphrase <dot>triageresponse and select Refer to ADS to document.    Additional Information   Negative: Sounds like a life-threatening emergency to the triager   Negative: Difficulty breathing, and not from stuffy nose (e.g., not relieved by cleaning out the nose)   Negative: SEVERE headache and has fever   Negative: Patient sounds very sick or weak to the triager   Negative: SEVERE sinus pain   Negative: Severe headache   Negative: Redness or swelling on the cheek, forehead, or around the eye   Negative: Fever > 103 F (39.4 C)   Negative: Fever > 101 F (38.3 C) and over 60 years of age   Negative: Fever > 100.0 F (37.8 C) and has diabetes mellitus or a weak immune system (e.g., HIV positive, cancer chemotherapy, organ transplant, splenectomy, chronic steroids)   Negative: Fever > 100.0 F (37.8 C) and bedridden (e.g., nursing home patient, stroke,  chronic illness, recovering from surgery)   Negative: Fever present > 3 days (72 hours)   Negative: Fever returns after gone for over 24 hours and symptoms worse or not improved   Negative: Sinus pain (not just congestion) and fever   Earache    Protocols used: Sinus Pain and Congestion-A-[place]

## 2024-02-27 ENCOUNTER — PATIENT OUTREACH (OUTPATIENT)
Dept: CARE COORDINATION | Facility: CLINIC | Age: 79
End: 2024-02-27
Payer: MEDICARE

## 2024-03-12 ENCOUNTER — PATIENT OUTREACH (OUTPATIENT)
Dept: CARE COORDINATION | Facility: CLINIC | Age: 79
End: 2024-03-12
Payer: MEDICARE

## 2024-03-29 DIAGNOSIS — E78.2 MIXED HYPERLIPIDEMIA: ICD-10-CM

## 2024-03-29 RX ORDER — ATORVASTATIN CALCIUM 20 MG/1
20 TABLET, FILM COATED ORAL DAILY
Qty: 90 TABLET | Refills: 0 | Status: SHIPPED | OUTPATIENT
Start: 2024-03-29 | End: 2024-06-25

## 2024-03-30 ENCOUNTER — HEALTH MAINTENANCE LETTER (OUTPATIENT)
Age: 79
End: 2024-03-30

## 2024-04-24 ENCOUNTER — TELEPHONE (OUTPATIENT)
Dept: PHARMACY | Facility: CLINIC | Age: 79
End: 2024-04-24
Payer: MEDICARE

## 2024-04-24 DIAGNOSIS — E11.9 TYPE 2 DIABETES MELLITUS WITHOUT COMPLICATION, WITHOUT LONG-TERM CURRENT USE OF INSULIN (H): ICD-10-CM

## 2024-04-24 DIAGNOSIS — E78.5 HYPERLIPIDEMIA LDL GOAL <70: Primary | ICD-10-CM

## 2024-04-24 NOTE — TELEPHONE ENCOUNTER
Hi , Patient is scheduled to see you 5/16/24 and would like to get her labs drawn ahead of time, are you able to place those lab orders and let her know once that's in so she can get that scheduled?     Thanks   Keely Kwan [facility]    [phone]

## 2024-05-01 DIAGNOSIS — E89.0 POSTOPERATIVE HYPOTHYROIDISM: Chronic | ICD-10-CM

## 2024-05-01 DIAGNOSIS — E11.9 TYPE 2 DIABETES MELLITUS WITHOUT COMPLICATION, WITHOUT LONG-TERM CURRENT USE OF INSULIN (H): Chronic | ICD-10-CM

## 2024-05-01 DIAGNOSIS — B00.9 HSV-2 INFECTION: ICD-10-CM

## 2024-05-01 RX ORDER — SEMAGLUTIDE 1.34 MG/ML
1 INJECTION, SOLUTION SUBCUTANEOUS
Qty: 9 ML | Refills: 0 | Status: SHIPPED | OUTPATIENT
Start: 2024-05-01 | End: 2024-07-31

## 2024-05-01 RX ORDER — VALACYCLOVIR HYDROCHLORIDE 500 MG/1
500 TABLET, FILM COATED ORAL 2 TIMES DAILY
Qty: 6 TABLET | Refills: 0 | Status: SHIPPED | OUTPATIENT
Start: 2024-05-01

## 2024-05-01 RX ORDER — LIOTHYRONINE SODIUM 5 UG/1
TABLET ORAL
Qty: 120 TABLET | Refills: 0 | Status: SHIPPED | OUTPATIENT
Start: 2024-05-01 | End: 2024-05-24

## 2024-05-11 ENCOUNTER — LAB (OUTPATIENT)
Dept: LAB | Facility: CLINIC | Age: 79
End: 2024-05-11
Payer: MEDICARE

## 2024-05-11 DIAGNOSIS — E78.5 HYPERLIPIDEMIA LDL GOAL <70: ICD-10-CM

## 2024-05-11 DIAGNOSIS — E11.9 TYPE 2 DIABETES MELLITUS WITHOUT COMPLICATION, WITHOUT LONG-TERM CURRENT USE OF INSULIN (H): ICD-10-CM

## 2024-05-11 LAB
ANION GAP SERPL CALCULATED.3IONS-SCNC: 9 MMOL/L (ref 7–15)
BUN SERPL-MCNC: 22.6 MG/DL (ref 8–23)
CALCIUM SERPL-MCNC: 10.1 MG/DL (ref 8.8–10.2)
CHLORIDE SERPL-SCNC: 107 MMOL/L (ref 98–107)
CHOLEST SERPL-MCNC: 146 MG/DL
CREAT SERPL-MCNC: 1.1 MG/DL (ref 0.51–0.95)
CREAT UR-MCNC: 47.3 MG/DL
DEPRECATED HCO3 PLAS-SCNC: 23 MMOL/L (ref 22–29)
EGFRCR SERPLBLD CKD-EPI 2021: 51 ML/MIN/1.73M2
FASTING STATUS PATIENT QL REPORTED: YES
FASTING STATUS PATIENT QL REPORTED: YES
GLUCOSE SERPL-MCNC: 101 MG/DL (ref 70–99)
HBA1C MFR BLD: 5.7 % (ref 0–5.6)
HDLC SERPL-MCNC: 47 MG/DL
LDLC SERPL CALC-MCNC: 72 MG/DL
MICROALBUMIN UR-MCNC: <12 MG/L
MICROALBUMIN/CREAT UR: NORMAL MG/G{CREAT}
NONHDLC SERPL-MCNC: 99 MG/DL
POTASSIUM SERPL-SCNC: 4.8 MMOL/L (ref 3.4–5.3)
SODIUM SERPL-SCNC: 139 MMOL/L (ref 135–145)
T4 FREE SERPL-MCNC: 1.17 NG/DL (ref 0.9–1.7)
TRIGL SERPL-MCNC: 136 MG/DL
TSH SERPL DL<=0.005 MIU/L-ACNC: 0.02 UIU/ML (ref 0.3–4.2)

## 2024-05-11 PROCEDURE — 84439 ASSAY OF FREE THYROXINE: CPT

## 2024-05-11 PROCEDURE — 80061 LIPID PANEL: CPT

## 2024-05-11 PROCEDURE — 80048 BASIC METABOLIC PNL TOTAL CA: CPT

## 2024-05-11 PROCEDURE — 84443 ASSAY THYROID STIM HORMONE: CPT

## 2024-05-11 PROCEDURE — 83036 HEMOGLOBIN GLYCOSYLATED A1C: CPT

## 2024-05-11 PROCEDURE — 82043 UR ALBUMIN QUANTITATIVE: CPT

## 2024-05-11 PROCEDURE — 82570 ASSAY OF URINE CREATININE: CPT

## 2024-05-11 PROCEDURE — 36415 COLL VENOUS BLD VENIPUNCTURE: CPT

## 2024-05-16 ENCOUNTER — VIRTUAL VISIT (OUTPATIENT)
Dept: PHARMACY | Facility: CLINIC | Age: 79
End: 2024-05-16
Payer: COMMERCIAL

## 2024-05-16 DIAGNOSIS — E11.9 TYPE 2 DIABETES MELLITUS WITHOUT COMPLICATION, WITHOUT LONG-TERM CURRENT USE OF INSULIN (H): ICD-10-CM

## 2024-05-16 DIAGNOSIS — E78.5 HYPERLIPIDEMIA LDL GOAL <70: ICD-10-CM

## 2024-05-16 DIAGNOSIS — E89.0 POSTOPERATIVE HYPOTHYROIDISM: Primary | Chronic | ICD-10-CM

## 2024-05-16 DIAGNOSIS — J45.20 MILD INTERMITTENT ASTHMA, UNSPECIFIED WHETHER COMPLICATED: ICD-10-CM

## 2024-05-16 DIAGNOSIS — R52 PAIN: ICD-10-CM

## 2024-05-16 DIAGNOSIS — Z78.9 TAKES DIETARY SUPPLEMENTS: ICD-10-CM

## 2024-05-16 DIAGNOSIS — J30.1 SEASONAL ALLERGIC RHINITIS DUE TO POLLEN: ICD-10-CM

## 2024-05-16 DIAGNOSIS — K21.00 GASTROESOPHAGEAL REFLUX DISEASE WITH ESOPHAGITIS, UNSPECIFIED WHETHER HEMORRHAGE: ICD-10-CM

## 2024-05-16 DIAGNOSIS — I77.82 ANCA-ASSOCIATED VASCULITIS (H): ICD-10-CM

## 2024-05-16 PROCEDURE — 99207 PR NO CHARGE LOS: CPT | Mod: 95 | Performed by: PHARMACIST

## 2024-05-16 RX ORDER — GABAPENTIN 100 MG/1
100-200 CAPSULE ORAL AT BEDTIME
COMMUNITY
Start: 2024-05-01

## 2024-05-16 RX ORDER — LEVOTHYROXINE SODIUM 88 UG/1
88 TABLET ORAL DAILY
Qty: 90 TABLET | Refills: 0 | Status: SHIPPED | OUTPATIENT
Start: 2024-05-16 | End: 2024-07-31

## 2024-05-16 NOTE — PATIENT INSTRUCTIONS
"Recommendations from today's MTM visit:                                                    MTM (medication therapy management) is a service provided by a clinical pharmacist designed to help you get the most of out of your medicines.   Today we reviewed what your medicines are for, how to know if they are working, that your medicines are safe and how to make your medicine regimen as easy as possible.      Stop Levothyroxine 100mcg and start levothyroxine 88mcg once daily in it's place. We need to decrease the dose of levothyroxine based on your labs.  Since you have lost so much weight, you no longer need as much thyroid hormone.   We'll want to recheck your thyroid labs about 2-3 months after changing your dose to make sure we decreased it enough. I'll send you a [employer] message to help you remember to do this.   Let us know when you are getting low on Flovent. This inhaler is no longer available, so we'll need to switch you to a new one that is covered by your insurance.   It looks like your last Wellness exam was in March 2023 and your last visit with a provider was in August.  I would recommend scheduling a follow-up appointment with Dr. Hernandez.  I think the last time you had a DEXA scan was in 2017. Because you were on prednisone for a while (and it's been more than 5 years since your last one), it would be a good idea to get another DEXA scan. I would recommend getting this at your Rheumatology office since that's where you got the last one. But if they are no longer able to do them, let us know and we can order one at [place].     Follow-up: Labs in July/August, MTM visit in 1 year, sooner if needed.     It was great speaking with you today.  I value your experience and would be very thankful for your time in providing feedback in our clinic survey. In the next few days, you may receive an email or text message from [employer] with a link to a survey related to your  clinical pharmacist.\"     To schedule " another MTM appointment, please call the clinic directly or you may call the MTM scheduling line at [phone] or toll-free at [phone].     My Clinical Pharmacist's contact information:                                                      Please feel free to contact me with any questions or concerns you have.      Caprice Low, Pharm.D., M.B.A., [facility]  MT Pharmacist, [facility]  Pager: [phone]  Email: [email or web address]

## 2024-05-16 NOTE — PROGRESS NOTES
Medication Therapy Management (MTM) Encounter    ASSESSMENT:                            Medication Adherence/Access: No issues identified    Diabetes   A1c at goal. UACR at goal.   Tolerating Ozempic well. No need to change medications, but as able, work on reduction in stress eating/improving food choices.   Needs eye exam    Hyperlipidemia   LDL very near goal, Trigs at goal, HDL near goal - no changes needed, will focus on diet changes.      Asthma   With Allergies  May benefit from rotating to a different second generation antihistamine. Would benefit from restarting Flovent. In the future could consider YARELY/ICS or ICS/LABA for as needed rescue use to save on having to purchase two inhalers.     GERD    Resolved     Hypothyroidism   TSH too low, likely due to significant weight loss (roughly 40 pounds). Would benefit from dose reduction of levothyroxine.  May benefit from future labs testing for T3 to verify correct liothyronine dose.       Supplements   Stable    Microscopic polyangiitis:   Would benefit from a DEXA scan given prednisone therapy. Last scan was in 2017.      Pain:   Stable    PLAN:                            Stop Levothyroxine 100mcg and start levothyroxine 88mcg once daily in it's place. We need to decrease the dose of levothyroxine based on your labs.  Since you have lost so much weight, you no longer need as much thyroid hormone.   We'll want to recheck your thyroid labs about 2-3 months after changing your dose to make sure we decreased it enough. I'll send you a [employer] message to help you remember to do this.   Let us know when you are getting low on Flovent. This inhaler is no longer available, so we'll need to switch you to a new one that is covered by your insurance.   It looks like your last Wellness exam was in March 2023 and your last visit with a provider was in August.  I would recommend scheduling a follow-up appointment with Dr. Hernandez.  I think the last time you had a DEXA scan  was in 2017. Because you were on prednisone for a while (and it's been more than 5 years since your last one), it would be a good idea to get another DEXA scan. I would recommend getting this at your Rheumatology office since that's where you got the last one. But if they are no longer able to do them, let us know and we can order one at [place].     Follow-up: Labs in July/August, MTM visit in 1 year, sooner if needed.     SUBJECTIVE/OBJECTIVE:                          PAUL Galicia is a 78 year old female contacted via secure video for a follow-up visit.       Reason for visit: Comprehensive medication review.    Allergies/ADRs: Reviewed in chart  Past Medical History: Reviewed in chart  Tobacco: She reports that she has never smoked. She has never used smokeless tobacco.  Alcohol: 1-3 beverages / 2-3 week    Medication Adherence/Access: no issues reported    Diabetes   Ozempic 1mg weekly   Current diabetes symptoms: none  Blood sugar monitoring: never  Diet/Exercise: Has not been eating well as her SO is in the hospital and has been stress eating as a result.      Eye exam in the last 12 months? No  Foot exam: due  Starting weight: 235 pounds  Current weight: 198 pounds    Hyperlipidemia   Atorvastatin 20mg daily  Patient reports no significant myalgias or other side effects.       Asthma   With Allergies  Fexofenadine 180mg - doesn't think this is working as well as it used to.   Ventolin HFA  Flovent 110mcg HFA - uses when she has a lot of allergy symptoms because they start to go to her lungs. This is occurring for her now. Has not started using Flovent again, but has one at home.   Very seldom has to use rescue inhaler maybe 1-2 times in last 6 months.    Patient reports no current medication side effects.      Patient reports the following symptoms: chest tightness and allergy symptoms.     Meds tried:   Flonase - caused rebound symptoms.        GERD    Not currently on medications. Was able to stop  omeprazole without increase in reflux.        Hypothyroidism   Levothyroxine 100 mcg daily.   Liothyronine 5mcg 2 tablets twice daily   Patient is having the following symptoms: none.      Supplements   Calcium 1-2 tablets daily  Vitamin B-12 1 tablet daily  Vitamin D 1000 units daily  Magnesium 2 tablets daily.   Denies side effects and finds effective.        Microscopic polyangiitis:   Not currently on medications. Followed by Rheumatolgist Ana Escobedo MD and was recently titrated completely off prednisone. No symptoms. Has follow-up planned soon.      Pain:   Gabapentin 100mg 1-2 capsules at bedtime. Doesn't have a way to decide how many she takes sometimes one, sometimes two. Has been very effective for her.   Medical cannabis 20mg nightly   Acetaminophen 2 tablets (1,000mg) once weekly as needed for pain.   No side effects reported  ----------------  I spent 28 minutes with this patient today. All changes were made via collaborative practice agreement with Ella Hernandez DO. A copy of the visit note was provided to the patient's provider(s).    A summary of these recommendations was sent via [employer].    Caprice Low, MalkaD, BARBARA, BCACP  MTM Pharmacist, [facility]     Telemedicine Visit Details  Type of service:  Telephone visit  Joined the call at 5/16/2024, 2:36:55 pm.  Left the call at 5/16/2024, 3:04:41 pm.  You were on the call for 27 minutes 45 seconds     Medication Therapy Recommendations  ANCA-associated vasculitis (H)    Current Medication: predniSONE (DELTASONE) 1 MG tablet (Discontinued)   Rationale: Medication requires monitoring - Needs additional monitoring - Safety   Recommendation: Referral to Service    Status: Accepted - no CPA Needed         Postoperative hypothyroidism    Current Medication: levothyroxine (SYNTHROID/LEVOTHROID) 100 MCG tablet (Discontinued)   Rationale: Dose too high - Dosage too high - Safety   Recommendation: Decrease Dose - levothyroxine 88 MCG tablet    Status: Accepted per CPA

## 2024-05-24 DIAGNOSIS — E89.0 POSTOPERATIVE HYPOTHYROIDISM: Chronic | ICD-10-CM

## 2024-05-24 RX ORDER — LIOTHYRONINE SODIUM 5 UG/1
TABLET ORAL
Qty: 120 TABLET | Refills: 0 | Status: SHIPPED | OUTPATIENT
Start: 2024-05-24 | End: 2024-06-21

## 2024-05-24 NOTE — TELEPHONE ENCOUNTER
Please let her know :  Looks like her TSH was off last check   She is due for recheck  She should schedule 6 weeks AFTER starting her new dose of medication   Thanks  PN

## 2024-05-28 NOTE — TELEPHONE ENCOUNTER
Left message for patient to call [facility] back  When patient calls back please transfer to an RN  Khushboo FELDMAN RN

## 2024-05-28 NOTE — TELEPHONE ENCOUNTER
Patient called back,    Patient will schedule a lab draw for approximately 6 weeks from now.    Doni MATHEW RN

## 2024-05-30 ENCOUNTER — TRANSFERRED RECORDS (OUTPATIENT)
Dept: HEALTH INFORMATION MANAGEMENT | Facility: CLINIC | Age: 79
End: 2024-05-30
Payer: MEDICARE

## 2024-06-08 ENCOUNTER — HEALTH MAINTENANCE LETTER (OUTPATIENT)
Age: 79
End: 2024-06-08

## 2024-06-20 DIAGNOSIS — E89.0 POSTOPERATIVE HYPOTHYROIDISM: Chronic | ICD-10-CM

## 2024-06-21 RX ORDER — LIOTHYRONINE SODIUM 5 UG/1
TABLET ORAL
Qty: 360 TABLET | Refills: 0 | Status: SHIPPED | OUTPATIENT
Start: 2024-06-21 | End: 2024-10-04

## 2024-06-23 DIAGNOSIS — E78.2 MIXED HYPERLIPIDEMIA: ICD-10-CM

## 2024-06-23 DIAGNOSIS — E89.0 POSTOPERATIVE HYPOTHYROIDISM: Chronic | ICD-10-CM

## 2024-06-24 RX ORDER — LEVOTHYROXINE SODIUM 100 UG/1
100 TABLET ORAL DAILY
Qty: 90 TABLET | Refills: 3 | OUTPATIENT
Start: 2024-06-24

## 2024-06-25 RX ORDER — ATORVASTATIN CALCIUM 20 MG/1
20 TABLET, FILM COATED ORAL DAILY
Qty: 90 TABLET | Refills: 0 | Status: SHIPPED | OUTPATIENT
Start: 2024-06-25 | End: 2024-10-04

## 2024-07-25 ENCOUNTER — TRANSFERRED RECORDS (OUTPATIENT)
Dept: HEALTH INFORMATION MANAGEMENT | Facility: CLINIC | Age: 79
End: 2024-07-25
Payer: MEDICARE

## 2024-08-24 DIAGNOSIS — E89.0 POSTOPERATIVE HYPOTHYROIDISM: Chronic | ICD-10-CM

## 2024-08-26 RX ORDER — LIOTHYRONINE SODIUM 5 UG/1
TABLET ORAL
Qty: 360 TABLET | Refills: 0 | OUTPATIENT
Start: 2024-08-26

## 2024-08-26 RX ORDER — LEVOTHYROXINE SODIUM 100 UG/1
100 TABLET ORAL DAILY
Qty: 90 TABLET | Refills: 3 | OUTPATIENT
Start: 2024-08-26

## 2024-09-03 ENCOUNTER — TRANSFERRED RECORDS (OUTPATIENT)
Dept: HEALTH INFORMATION MANAGEMENT | Facility: CLINIC | Age: 79
End: 2024-09-03
Payer: MEDICARE

## 2024-09-03 ENCOUNTER — TELEPHONE (OUTPATIENT)
Dept: FAMILY MEDICINE | Facility: CLINIC | Age: 79
End: 2024-09-03
Payer: MEDICARE

## 2024-09-03 NOTE — TELEPHONE ENCOUNTER
Reason for Call:  Appointment Request    Patient requesting this type of appt:  12 days of COvid positive, Ear Infection and Fever     Requested provider: Ella Hernandez    Reason patient unable to be scheduled: Not within requested timeframe    When does patient want to be seen/preferred time: 1-2 days    Comments: 12 days of COvid positive, Ear Infection and Fever     Could we send this information to you in [facility] or would you prefer to receive a phone call?:   Patient would prefer a phone call   Okay to leave a detailed message?: Yes at Cell number on file:    Telephone Information:   Mobile [phone]       Call taken on 9/3/2024 at 4:56 PM by TAO HERNANDEZ

## 2024-09-04 ENCOUNTER — OFFICE VISIT (OUTPATIENT)
Dept: FAMILY MEDICINE | Facility: CLINIC | Age: 79
End: 2024-09-04
Payer: MEDICARE

## 2024-09-04 VITALS
SYSTOLIC BLOOD PRESSURE: 136 MMHG | TEMPERATURE: 96.9 F | RESPIRATION RATE: 19 BRPM | HEART RATE: 79 BPM | DIASTOLIC BLOOD PRESSURE: 74 MMHG | OXYGEN SATURATION: 97 %

## 2024-09-04 DIAGNOSIS — E89.0 POSTOPERATIVE HYPOTHYROIDISM: Chronic | ICD-10-CM

## 2024-09-04 DIAGNOSIS — J30.1 SEASONAL ALLERGIC RHINITIS DUE TO POLLEN: ICD-10-CM

## 2024-09-04 DIAGNOSIS — E11.9 TYPE 2 DIABETES MELLITUS WITHOUT COMPLICATION, WITHOUT LONG-TERM CURRENT USE OF INSULIN (H): Chronic | ICD-10-CM

## 2024-09-04 DIAGNOSIS — J45.31 MILD PERSISTENT REACTIVE AIRWAY DISEASE WITH WHEEZING WITH ACUTE EXACERBATION: Primary | ICD-10-CM

## 2024-09-04 DIAGNOSIS — E78.2 MIXED HYPERLIPIDEMIA: ICD-10-CM

## 2024-09-04 DIAGNOSIS — J45.909 REACTIVE AIRWAY DISEASE WITHOUT COMPLICATION, UNSPECIFIED ASTHMA SEVERITY, UNSPECIFIED WHETHER PERSISTENT: ICD-10-CM

## 2024-09-04 PROCEDURE — 36415 COLL VENOUS BLD VENIPUNCTURE: CPT | Performed by: FAMILY MEDICINE

## 2024-09-04 PROCEDURE — G2211 COMPLEX E/M VISIT ADD ON: HCPCS | Performed by: FAMILY MEDICINE

## 2024-09-04 PROCEDURE — 99214 OFFICE O/P EST MOD 30 MIN: CPT | Performed by: FAMILY MEDICINE

## 2024-09-04 PROCEDURE — 84443 ASSAY THYROID STIM HORMONE: CPT | Performed by: FAMILY MEDICINE

## 2024-09-04 PROCEDURE — 84439 ASSAY OF FREE THYROXINE: CPT | Performed by: FAMILY MEDICINE

## 2024-09-04 PROCEDURE — 84481 FREE ASSAY (FT-3): CPT | Performed by: FAMILY MEDICINE

## 2024-09-04 RX ORDER — FLUTICASONE PROPIONATE 110 UG/1
AEROSOL, METERED RESPIRATORY (INHALATION)
Qty: 24 G | Refills: 1 | Status: SHIPPED | OUTPATIENT
Start: 2024-09-04

## 2024-09-04 RX ORDER — ALBUTEROL SULFATE 90 UG/1
1-2 AEROSOL, METERED RESPIRATORY (INHALATION) EVERY 6 HOURS PRN
Qty: 18 G | Refills: 3 | Status: SHIPPED | OUTPATIENT
Start: 2024-09-04

## 2024-09-04 ASSESSMENT — ASTHMA QUESTIONNAIRES
QUESTION_2 LAST FOUR WEEKS HOW OFTEN HAVE YOU HAD SHORTNESS OF BREATH: ONCE OR TWICE A WEEK
QUESTION_4 LAST FOUR WEEKS HOW OFTEN HAVE YOU USED YOUR RESCUE INHALER OR NEBULIZER MEDICATION (SUCH AS ALBUTEROL): ONE OR TWO TIMES PER DAY
QUESTION_3 LAST FOUR WEEKS HOW OFTEN DID YOUR ASTHMA SYMPTOMS (WHEEZING, COUGHING, SHORTNESS OF BREATH, CHEST TIGHTNESS OR PAIN) WAKE YOU UP AT NIGHT OR EARLIER THAN USUAL IN THE MORNING: TWO OR THREE NIGHTS A WEEK
QUESTION_5 LAST FOUR WEEKS HOW WOULD YOU RATE YOUR ASTHMA CONTROL: SOMEWHAT CONTROLLED
QUESTION_1 LAST FOUR WEEKS HOW MUCH OF THE TIME DID YOUR ASTHMA KEEP YOU FROM GETTING AS MUCH DONE AT WORK, SCHOOL OR AT HOME: NONE OF THE TIME
ACT_TOTALSCORE: 16
ACT_TOTALSCORE: 16

## 2024-09-04 ASSESSMENT — PAIN SCALES - GENERAL: PAINLEVEL: NO PAIN (0)

## 2024-09-04 NOTE — PROGRESS NOTES
Assessment & Plan     1. Mild persistent reactive airway disease with wheezing with acute exacerbation  Plan: I do recommend treatment with IS & eval by Allergist  - fluticasone (FLOVENT HFA) 110 MCG/ACT inhaler; INHALE 1 PUFF BY MOUTH IN TO THE LUNGS TWICE A DAY AS DIRECTED  Dispense: 24 g; Refill: 1    - albuterol (VENTOLIN HFA) 108 (90 Base) MCG/ACT inhaler; Inhale 1-2 puffs into the lungs every 6 hours as needed for shortness of breath, wheezing or cough.  Dispense: 18 g; Refill: 3  - Adult Allergy/Asthma  Referral; Future    There seems to be a pattern of flare up of symptoms kianna in fall     Postoperative hypothyroidism    - T4, free  - T3, Free  - TSH      Diabetes   Hyperlipidemia  Under care of primary care physicain and no acute concerns      The longitudinal plan of care for the diagnosis(es)/condition(s) as documented were addressed during this visit. Due to the added complexity in care, I will continue to support PAUL in the subsequent management and with ongoing continuity of care.Prescription drug management  I spent a total of 21 minutes on the day of the visit.   Time spent by me doing chart review, history and exam, documentation and further activities per the note          Subjective   PAUL is a 79 year old, presenting for the following health issues:  Ear Problem (COVID POSOTIVE  test 1 week ago./Double ear infection/)        9/4/2024     2:33 PM   Additional Questions   Roomed by JOANA EPPERSON   Accompanied by RUBIO MICHLEE         9/4/2024     2:33 PM   Patient Reported Additional Medications   Patient reports taking the following new medications N/A       Via the Health Maintenance questionnaire, the patient has reported the following services have been completed -Eye Exam: [facility] 2023-10-12, this information has been sent to the abstraction team.  History of Present Illness       Reason for visit:  Followup on Covid diagnosis    She eats 2-3 servings of fruits and vegetables  daily.She consumes 1 sweetened beverage(s) daily.She exercises with enough effort to increase her heart rate 10 to 19 minutes per day.  She exercises with enough effort to increase her heart rate 4 days per week.   She is taking medications regularly.     Completed paxlovid for positive coivid about 12 days ago  Seen by ENT yesterday  Was given antibiotic and steroid - started since yesterday  Has taken so far 2nd dose of antibiotic and 3 doses of steroid and feels 70% better  She was referred back to primary care  clinic from ENT clinic  Due to wheezing. Similar episodes in past over past 2-3 yrs, mostly fall allergies     She feels she may have had covid in July as well      Has been lately having ear discharge as well    Wants lab completed         Review of Systems  Constitutional, HEENT, cardiovascular, pulmonary, GI, , musculoskeletal, neuro, skin, endocrine and psych systems are negative, except as otherwise noted.      Objective    /74 (BP Location: Left arm, Patient Position: Sitting, Cuff Size: Adult Regular)   Pulse 79   Temp 96.9  F (36.1  C) (Temporal)   Resp 19   SpO2 97%   There is no height or weight on file to calculate BMI.  Physical Exam   GENERAL: alert and no distress  NECK: no adenopathy, no asymmetry, masses, or scars  RESP: lungs clear to auscultation - no rales, rhonchi or wheezes  CV: regular rate and rhythm, normal S1 S2, no S3 or S4, no murmur, click or rub, no peripheral edema  ABDOMEN: soft, nontender, no hepatosplenomegaly, no masses and bowel sounds normal  MS: no gross musculoskeletal defects noted, no edema            Signed Electronically by: Kalani Deleon MD

## 2024-09-04 NOTE — TELEPHONE ENCOUNTER
Spoke to patient  Talking in long full sentences   Patient had appointment with ENT provider yesterday   ENT encouraged patient to follow up with PCP as well  Due to fever and lung sounds on auscultation  Patient states she believes her fever broke last evening  She is feeling less foggy this morning  Did complete Paxlovid treatment - day 12 of Covid+  Patient states ENT provider diagnosed ear infection and prescribed antibiotics     Attempted to pull records through care everywhere  Scheduled for same day OV  Patient will call back if further questions or concerns  Khushboo Waller RN

## 2024-09-05 ENCOUNTER — TELEPHONE (OUTPATIENT)
Dept: FAMILY MEDICINE | Facility: CLINIC | Age: 79
End: 2024-09-05
Payer: MEDICARE

## 2024-09-05 DIAGNOSIS — J45.31 MILD PERSISTENT REACTIVE AIRWAY DISEASE WITH WHEEZING WITH ACUTE EXACERBATION: Primary | ICD-10-CM

## 2024-09-05 PROBLEM — E78.2 MIXED HYPERLIPIDEMIA: Status: ACTIVE | Noted: 2024-09-05

## 2024-09-05 LAB
T3FREE SERPL-MCNC: 2.4 PG/ML (ref 2–4.4)
T4 FREE SERPL-MCNC: 0.66 NG/DL (ref 0.9–1.7)
TSH SERPL DL<=0.005 MIU/L-ACNC: 0.17 UIU/ML (ref 0.3–4.2)

## 2024-09-05 NOTE — TELEPHONE ENCOUNTER
Triage,   Received fax from pharmacy stating fluticasone 110 mcg is not covered by insurance.   Requesting alternative.   Suggested alternative is arnuity ellipta 100 mcg INH.  Thanks!  Alberta FERNANDEZ

## 2024-09-11 DIAGNOSIS — E89.0 POSTOPERATIVE HYPOTHYROIDISM: Chronic | ICD-10-CM

## 2024-09-11 RX ORDER — LEVOTHYROXINE SODIUM 75 UG/1
75 TABLET ORAL DAILY
Qty: 60 TABLET | Refills: 0 | Status: SHIPPED | OUTPATIENT
Start: 2024-09-11

## 2024-09-11 NOTE — RESULT ENCOUNTER NOTE
Dear PAUL,   Your test results are all back -   -TSH (thyroid stimulating hormone) is abnormal suggesting you are currently overreplaced.  ADVISE: changing your medication dose to 75 micrograms/day (a prescription has been sent to your pharmacy) and rechecking your TSH with a lab appointment in 6-8 weeks.  Let us know if you have any questions.  -Ella Hernandez, DO

## 2024-09-23 ENCOUNTER — TRANSFERRED RECORDS (OUTPATIENT)
Dept: HEALTH INFORMATION MANAGEMENT | Facility: CLINIC | Age: 79
End: 2024-09-23
Payer: MEDICARE

## 2024-09-24 ENCOUNTER — PATIENT OUTREACH (OUTPATIENT)
Dept: CARE COORDINATION | Facility: CLINIC | Age: 79
End: 2024-09-24
Payer: MEDICARE

## 2024-09-25 ENCOUNTER — TRANSFERRED RECORDS (OUTPATIENT)
Dept: HEALTH INFORMATION MANAGEMENT | Facility: CLINIC | Age: 79
End: 2024-09-25
Payer: MEDICARE

## 2024-09-25 LAB
ALT SERPL-CCNC: 9 IU/L (ref 6–32)
AST SERPL-CCNC: 15 U/L (ref 10–35)

## 2024-10-04 DIAGNOSIS — E78.2 MIXED HYPERLIPIDEMIA: ICD-10-CM

## 2024-10-04 RX ORDER — ATORVASTATIN CALCIUM 20 MG/1
20 TABLET, FILM COATED ORAL DAILY
Qty: 90 TABLET | Refills: 0 | Status: SHIPPED | OUTPATIENT
Start: 2024-10-04

## 2024-10-09 ENCOUNTER — TRANSFERRED RECORDS (OUTPATIENT)
Dept: HEALTH INFORMATION MANAGEMENT | Facility: CLINIC | Age: 79
End: 2024-10-09
Payer: MEDICARE

## 2024-10-30 ENCOUNTER — TRANSFERRED RECORDS (OUTPATIENT)
Dept: HEALTH INFORMATION MANAGEMENT | Facility: CLINIC | Age: 79
End: 2024-10-30
Payer: MEDICARE

## 2024-11-04 DIAGNOSIS — E78.2 MIXED HYPERLIPIDEMIA: ICD-10-CM

## 2024-11-04 DIAGNOSIS — E89.0 POSTOPERATIVE HYPOTHYROIDISM: Chronic | ICD-10-CM

## 2024-11-04 RX ORDER — ATORVASTATIN CALCIUM 20 MG/1
20 TABLET, FILM COATED ORAL DAILY
Qty: 90 TABLET | Refills: 0 | OUTPATIENT
Start: 2024-11-04

## 2024-11-05 NOTE — TELEPHONE ENCOUNTER
Looks like dose was changed to 75mcg   Please call patient and verify dose -   If that was the dose she is due for TSH recheck   Thanks  PN

## 2024-11-06 ENCOUNTER — ANCILLARY PROCEDURE (OUTPATIENT)
Dept: GENERAL RADIOLOGY | Facility: CLINIC | Age: 79
End: 2024-11-06
Attending: FAMILY MEDICINE
Payer: MEDICARE

## 2024-11-06 ENCOUNTER — OFFICE VISIT (OUTPATIENT)
Dept: FAMILY MEDICINE | Facility: CLINIC | Age: 79
End: 2024-11-06
Payer: MEDICARE

## 2024-11-06 VITALS
WEIGHT: 186.8 LBS | DIASTOLIC BLOOD PRESSURE: 79 MMHG | RESPIRATION RATE: 18 BRPM | SYSTOLIC BLOOD PRESSURE: 144 MMHG | HEART RATE: 70 BPM | TEMPERATURE: 97.5 F | OXYGEN SATURATION: 93 % | BODY MASS INDEX: 30.02 KG/M2 | HEIGHT: 66 IN

## 2024-11-06 DIAGNOSIS — J45.31 MILD PERSISTENT REACTIVE AIRWAY DISEASE WITH WHEEZING WITH ACUTE EXACERBATION: ICD-10-CM

## 2024-11-06 DIAGNOSIS — J22 LOWER RESPIRATORY INFECTION: Primary | ICD-10-CM

## 2024-11-06 DIAGNOSIS — E89.0 POSTOPERATIVE HYPOTHYROIDISM: Chronic | ICD-10-CM

## 2024-11-06 DIAGNOSIS — E11.9 TYPE 2 DIABETES MELLITUS WITHOUT COMPLICATION, WITHOUT LONG-TERM CURRENT USE OF INSULIN (H): ICD-10-CM

## 2024-11-06 DIAGNOSIS — J01.90 ACUTE SINUSITIS WITH SYMPTOMS GREATER THAN 10 DAYS: ICD-10-CM

## 2024-11-06 DIAGNOSIS — R05.1 ACUTE COUGH: ICD-10-CM

## 2024-11-06 DIAGNOSIS — N18.31 STAGE 3A CHRONIC KIDNEY DISEASE (H): ICD-10-CM

## 2024-11-06 LAB
FLUAV RNA SPEC QL NAA+PROBE: NEGATIVE
FLUBV RNA RESP QL NAA+PROBE: NEGATIVE
RSV RNA SPEC NAA+PROBE: NEGATIVE
SARS-COV-2 RNA RESP QL NAA+PROBE: POSITIVE

## 2024-11-06 PROCEDURE — 84443 ASSAY THYROID STIM HORMONE: CPT | Performed by: FAMILY MEDICINE

## 2024-11-06 PROCEDURE — G2211 COMPLEX E/M VISIT ADD ON: HCPCS | Performed by: FAMILY MEDICINE

## 2024-11-06 PROCEDURE — 84481 FREE ASSAY (FT-3): CPT | Performed by: FAMILY MEDICINE

## 2024-11-06 PROCEDURE — 99215 OFFICE O/P EST HI 40 MIN: CPT | Performed by: FAMILY MEDICINE

## 2024-11-06 PROCEDURE — 87637 SARSCOV2&INF A&B&RSV AMP PRB: CPT | Performed by: FAMILY MEDICINE

## 2024-11-06 PROCEDURE — 84439 ASSAY OF FREE THYROXINE: CPT | Performed by: FAMILY MEDICINE

## 2024-11-06 PROCEDURE — 36415 COLL VENOUS BLD VENIPUNCTURE: CPT | Performed by: FAMILY MEDICINE

## 2024-11-06 PROCEDURE — 71046 X-RAY EXAM CHEST 2 VIEWS: CPT | Mod: TC | Performed by: INTERNAL MEDICINE

## 2024-11-06 RX ORDER — LEVOTHYROXINE SODIUM 100 UG/1
100 TABLET ORAL DAILY
Qty: 90 TABLET | Refills: 3 | OUTPATIENT
Start: 2024-11-06

## 2024-11-06 RX ORDER — AZITHROMYCIN 250 MG/1
TABLET, FILM COATED ORAL
Qty: 6 TABLET | Refills: 0 | Status: SHIPPED | OUTPATIENT
Start: 2024-11-06 | End: 2024-11-11

## 2024-11-06 RX ORDER — PREDNISONE 20 MG/1
TABLET ORAL
Qty: 15 TABLET | Refills: 0 | Status: SHIPPED | OUTPATIENT
Start: 2024-11-06

## 2024-11-06 ASSESSMENT — PAIN SCALES - GENERAL: PAINLEVEL_OUTOF10: NO PAIN (0)

## 2024-11-06 NOTE — PROGRESS NOTES
Assessment & Plan     Lower respiratory infection  I recommended that we check a chest x-ray today.  I personally reviewed the images with her and provided an initial interpretation.  No acute infiltrates or effusions seen.  There is slightly increased vascular markings bilaterally.  The formal radiology report is pending.  We discussed her worsening symptoms and decided to start a course of prednisone, Augmentin and Z-Ravinder to provide coverage for bacterial component to her respiratory infection symptoms.  If she develops any signs or symptoms of worsening respiratory distress she will seek medical attention right away.  I recommended she continue the fluticasone inhaler and use albuterol as needed.  - amoxicillin-clavulanate (AUGMENTIN) 875-125 MG tablet; Take 1 tablet by mouth 2 times daily for 10 days.  - azithromycin (ZITHROMAX) 250 MG tablet; Take 2 tablets (500 mg) by mouth daily for 1 day, THEN 1 tablet (250 mg) daily for 4 days.    Acute sinusitis with symptoms greater than 10 days  We are starting Augmentin as part of today's treatment.    Mild persistent reactive airway disease with wheezing with acute exacerbation  I recommended she continue the fluticasone inhaler twice daily, albuterol as needed and we are starting a 10-day prednisone taper.  - predniSONE (DELTASONE) 20 MG tablet; Take 2 tablets (40 mg) daily x5 days.  Then take 1 tablet (20 mg) daily x5 days.    Acute cough  We are screening for influenza/RSV and COVID today.  - Symptomatic Influenza A/B, RSV, & SARS-CoV2 PCR (COVID-19) Nasopharyngeal; Future  - Symptomatic Influenza A/B, RSV, & SARS-CoV2 PCR (COVID-19) Nasopharyngeal  - XR Chest 2 Views; Future    CKD (chronic kidney disease) stage 3, GFR 30-59 ml/min (H)  This issue is stable.  On 5/11/2024 her creatinine was 1.10, GFR 51    Type 2 diabetes mellitus without complication (H)  This issue is stable on her current medications.  On 5/11/2024 her hemoglobin A1c was 5.7%.          40  "minutes spent by me on the date of the encounter doing chart review, review of test results, interpretation of tests, patient visit, and documentation         Subjective   Elizabeth is a 79 year old, presenting for the following health issues:  Cough        11/6/2024     2:25 PM   Additional Questions   Roomed by Jeannine JERRY     HPI       Acute Illness  Acute illness concerns:  URI  Onset/Duration: October 25th  Symptoms:  Fever: No  Chills/Sweats: No  Headache (location?): YES  Sinus Pressure: YES  Conjunctivitis:  YES  Ear Pain: YES: bilateral, more on left side   Recent ear infections, tubes placed  Rhinorrhea: YES  Congestion: YES  Sore Throat: YES- resolved  Cough: YES-productive of green sputum  Wheeze: YES.  Currently on fluticasone inhaler and albuterol as needed.  Decreased Appetite: No  Nausea: YES - from drainaged at back of throat  Vomiting: No  Diarrhea: Unsure d/t baseline  Dysuria/Freq.: No  Dysuria or Hematuria: No  Fatigue/Achiness: YES  Sick/Strep Exposure: YES - recent travel  Therapies tried and outcome: Antihistimines - unsure if helpful  Salt water rinses - helpful  Has not completed at home Covid tests        Review of Systems  Constitutional, HEENT, cardiovascular, pulmonary, GI, , musculoskeletal, neuro, skin, endocrine and psych systems are negative, except as otherwise noted.      Objective    BP (!) 144/79   Pulse 70   Temp 97.5  F (36.4  C) (Temporal)   Resp 18   Ht 1.664 m (5' 5.5\")   Wt 84.7 kg (186 lb 12.8 oz)   SpO2 93%   BMI 30.61 kg/m    Body mass index is 30.61 kg/m .  Physical Exam     General appearance: Alert, cooperative, appears slightly fatigued  Head: Normocephalic, atraumatic  Eyes: Pupils equal round and reactive, conjunctiva clear, extraocular movements intact  Mouth: Mucous membranes moist, pharynx slightly erythematous without exudates  Ears: Ear tube present on the left.  The tympanic membrane appeared normal.  The visualized portion of the right tympanic membrane " is normal.  Only able to visualize a portion of the right TM secondary to cerumen.    Neck: Supple, trachea midline, no adenopathy  Lungs: Wheezing present throughout.  Breathing not labored.  Heart: Normal rate, regular rhythm and normal S1, S2, no murmurs, rubs, clicks or gallops  Neurological: Alert, oriented, normal speech, no focal findings or movement disorders noted  Extremities: No edema, no clubbing or cyanosis.  Normal range of motion.  Ambulates without difficulty              Signed Electronically by: Jesus Paredes,

## 2024-11-06 NOTE — TELEPHONE ENCOUNTER
PN,  Spoke with patient.  Confirmed 75 mcg dosing.  Advised needs TSH rechecked.    Patient states for last two plus weeks she has had fatigue and productive cough, as well as congestion.  Not getting better.  Sounds fatigued on phone.  RN advised visit for further evaluation- will see DE and get TSH checked this afternoon.    Lupe MATHEW RN

## 2024-11-07 LAB
T3FREE SERPL-MCNC: 2.8 PG/ML (ref 2–4.4)
T4 FREE SERPL-MCNC: 0.76 NG/DL (ref 0.9–1.7)
TSH SERPL DL<=0.005 MIU/L-ACNC: 0.25 UIU/ML (ref 0.3–4.2)

## 2024-11-13 ENCOUNTER — TELEPHONE (OUTPATIENT)
Dept: FAMILY MEDICINE | Facility: CLINIC | Age: 79
End: 2024-11-13
Payer: MEDICARE

## 2024-11-13 RX ORDER — LIOTHYRONINE SODIUM 5 UG/1
TABLET ORAL
Qty: 120 TABLET | Refills: 0 | Status: SHIPPED | OUTPATIENT
Start: 2024-11-13

## 2024-11-13 RX ORDER — LEVOTHYROXINE SODIUM 75 UG/1
75 TABLET ORAL DAILY
Qty: 60 TABLET | Refills: 0 | Status: SHIPPED | OUTPATIENT
Start: 2024-11-13

## 2024-11-13 NOTE — TELEPHONE ENCOUNTER
Pt calling to reply to [facility] [employer] [facility] message, on 11/6 OV, to say she has been taking all 4 liothyronine 5mcg at the same time in the evening, and the levothyroxine she takes 1 75mcg every evening as well.    Also needs refills on both rx's asap as has one day left.    Thanks!  Rg SAWANT RN   [facility]

## 2024-11-13 NOTE — RESULT ENCOUNTER NOTE
Deasaniya Johnson,   Your test results are all back -   Your thyroid labs show the TSH is still low.  I know we lowered the levothyroxine dose last time but I wonder if we should lower the liothyronine dose slightly this time.  Please verify that you have been taking the liothyronine 5mcg - TWO in the morning and TWO at night.  I think we may want to continue the levothyroxine 75mcg and have you take liothyronine 5mg twice a day (only one pill) with plan to recheck in 6 weeks.  Let us know if you have any questions.  -Ella Hernandez, DO

## 2024-11-14 NOTE — TELEPHONE ENCOUNTER
"Dr. Hernandez,     Please see below tele message and advise.   Appears refills of liothyronine and levothyroxine were signed/sent on 11/13/24.     Your lab result note from 11/06 OV: \"Please verify that you have been taking the liothyronine 5mcg - TWO in the morning and TWO at night. I think we may want to continue the levothyroxine 75mcg and have you take liothyronine 5mg twice a day (only one pill) with plan to recheck in 6 weeks.\"    See reply from pt: \"she has been taking all 4 liothyronine 5mcg at the same time in the evening, and the levothyroxine she takes 1 75mcg every evening as well.\"    Thanks,   Bella HOWELL RN    "

## 2024-11-15 NOTE — TELEPHONE ENCOUNTER
Please let her know the only change then will be she takes TWO of the liothyronine instead of 4.  Other med will stay the same  Thanks  PN

## 2024-11-27 NOTE — PROGRESS NOTES
"[facility]  OB/GYN Clinic Note    SUBJECTIVE:                                                   Elizabeth Galicia is a 79 year old  female who presents to clinic today for the following health issue(s):  Patient is concerned of organ prolapse. Patient states it feels like something is hanging out from the vagina and pressure. Patient states in the past she has had pelvic floor reconstruction approx .     Additional information: possible prolapse bladder    Chief Complaint   Patient presents with    prolapse     Patient is concerned of organ prolapse. Patient states it feels like something is hanging out from the vagina and pressure. Patient states in the past she has had pelvic floor reconstruction approx .      Elizabeth presents complaining of bulge that has been going on since the middle of November. Had COVID infection in August with significant coughing. Her symptoms have gotten better since that time due to seeing PT- seeing private PT for \"general health\". They discussed that coughing may have made symptoms of prolapse worse.   Wondering about [facility]- 30 minute strength training and if it's okay to continue. Hasn't done this since COVID infection in August.   She feels like she is able to empty her bladder completely when she voids. She not use vaginal splinting when having a bowel movement. She denies urinary incontinence, urgency or frequency.  She denies frequent urinary tract infections.  She denies hematuria, dysuria or bladder pain.  She has not tried wearing a pessary.  She has not tried physical therapy.  She has had  previous surgeries for pelvic organ prolapse- 2014 anterior and posterior colporrhaphy. Denies pain.  She is not sexually active.    Patient is not sexually active, No obstetric history on file..  Using hysterectomy and not sexually active for contraception.    reports that she has never smoked. She has never used smokeless tobacco.  STD " testing offered?  Declined  Health maintenance updated:  yes  Problem list and histories reviewed & adjusted, as indicated.  Additional history: as documented.    Patient Active Problem List   Diagnosis    HTN (hypertension)    Obesity (BMI 30-39.9)    Type 2 diabetes mellitus without complication (H)    ANCA-associated vasculitis (Microscopic polyangiitis)    Postoperative hypothyroidism    Anemia, unspecified type    Hyperplasia of renal artery (H)    Heterozygous for MTHFR gene mutation    CKD (chronic kidney disease) stage 3, GFR 30-59 ml/min (H)    Prophylactic antibiotic    Morbid obesity (H)    Seasonal allergic rhinitis    Microscopic polyangiitis (H)    Heterozygous MTHFR mutation [id number]    Mild persistent reactive airway disease with wheezing with acute exacerbation    Mixed hyperlipidemia     Past Surgical History:   Procedure Laterality Date    BIOPSY  20-bryan years ago    Left breast    COLONOSCOPY      DILATION AND CURETTAGE      ENT SURGERY      partial thyroidectomy; tonsillectomy    EXCISE LESION EYELID Left 03/10/2016    Procedure: EXCISE LESION EYELID;  Surgeon: Rg Nazario MD;  Location: [facility]    HAND SURGERY      HYSTERECTOMY VAGINAL, COLPORRHAPHY ANTERIOR, POSTERIOR, COMBINED N/A 09/09/2014    Procedure: COMBINED HYSTERECTOMY VAGINAL, COLPORRHAPHY ANTERIOR, POSTERIOR;  Surgeon: Shay Winkler MD;  Location: [facility]    HYSTERECTOMY, PAP NO LONGER INDICATED      LAPAROSCOPIC SALPINGO-OOPHORECTOMY  06/27/2011    Procedure:LAPAROSCOPIC SALPINGO-OOPHORECTOMY; resection of left pelvic mass. ; Surgeon:MAYRA OLEARY; Location:[facility]    ORTHOPEDIC SURGERY      SALPINGO OOPHORECTOMY,R/L/PEDRO LUIS      Salpingo Oophorectomy for torsion in past    THYROIDECTOMY      Partial      Social History     Tobacco Use    Smoking status: Never    Smokeless tobacco: Never   Substance Use Topics    Alcohol use: Yes     Comment: One cocktail every couple of weeks or less      Problem (# of Occurrences) Relation (Name,Age  of Onset)    Anxiety Disorder (1) Mother (Maria Ines): Women in that family were all considered  nervous     Unknown/Adopted (2) Mother (Maria Ines), Father (Unknown): Birth father had some kind of heart issue    Heart Disease (1) Other    Obesity (1) Mother (Maria Ines)    Hyperlipidemia (1) Mother (Maria Ines): Birth mother - I m adopted & have little health info           Negative family history of: Mental Illness              Current Outpatient Medications   Medication Sig Dispense Refill    acetaminophen (TYLENOL) 500 MG tablet Take 500-1,000 mg by mouth every 6 hours as needed for mild pain      albuterol (VENTOLIN HFA) 108 (90 Base) MCG/ACT inhaler Inhale 1-2 puffs into the lungs every 6 hours as needed for shortness of breath, wheezing or cough. 18 g 3    atorvastatin (LIPITOR) 20 MG tablet TAKE 1 TABLET (20 MG) BY MOUTH DAILY +++NEED APPOINTMENT+++ 90 tablet 0    CALCIUM LACTATE PO Take 1-2 tablets by mouth daily      Cholecalciferol (VITAMIN D3 PO) Take 1,000 Units by mouth daily      Cyanocobalamin ([employer] B-12 PO) Take 1 tablet by mouth daily      fexofenadine (ALLEGRA) 180 MG tablet TAKE 1 TABLET BY MOUTH EVERY DAY 90 tablet 2    fluticasone (ARNUITY ELLIPTA) 100 MCG/ACT inhaler Inhale 1 puff into the lungs daily. 30 each 1    fluticasone (FLOVENT HFA) 110 MCG/ACT inhaler INHALE 1 PUFF BY MOUTH IN TO THE LUNGS TWICE A DAY AS DIRECTED 24 g 1    gabapentin (NEURONTIN) 100 MG capsule Take 100-200 mg by mouth at bedtime      levothyroxine (SYNTHROID/LEVOTHROID) 75 MCG tablet TAKE 1 TABLET BY MOUTH EVERY DAY 60 tablet 0    liothyronine (CYTOMEL) 5 MCG tablet TAKE 1 TABLETS (5 MCG) BY MOUTH 2 TIMES DAILY 120 tablet 0    MAGNESIUM LACTATE PO Take 2 tablets by mouth daily      predniSONE (DELTASONE) 20 MG tablet Take 2 tablets (40 mg) daily x5 days.  Then take 1 tablet (20 mg) daily x5 days. 15 tablet 0    Semaglutide, 1 MG/DOSE, (OZEMPIC, 1 MG/DOSE,) 4 MG/3ML pen INJECT 1 MG SUBCUTANEOUS EVERY 7 DAYS 9 mL 1    UNABLE TO FIND  MEDICATION NAME: medical cannibus 20mg at bedtime      valACYclovir (VALTREX) 500 MG tablet TAKE 1 TABLET BY MOUTH TWICE A DAY 6 tablet 0     No current facility-administered medications for this visit.     Allergies   Allergen Reactions    Cranberries [Cranberry Extract]      Causes herpes flair-up    Dairy [Milk Products]      Can tolerate butter and hard cheeses     Perfume Other (See Comments)     Stuffy in nose, HA    Seasonal Allergies     Tilactase            OBJECTIVE:     Vitals:    24 1415   BP: 120/72   BP Location: Right arm   Patient Position: Sitting   Cuff Size: Adult Regular   Weight: 85.9 kg (189 lb 6.4 oz)       Exam:  GENERAL: Elizabeth is a female who is in no acute distress.  VULVA: Normal external female genitalia.  URETHRA: Normal on inspection and to palpation without urethral diverticulum.  There is no leaking during valsalva while the cystocele is reduced.  BARTHOLIN'S & SKENE'S: Normal on inspection and to palpation.  PERINEUM: The perineal body is intact and not tender to palpation.  VAGINA: The vaginal mucosa appears atrophic on inspection without abnormal discharge.  Examination with half of the speculum reveals a grade 1 cystocele. There is a grade 1 rectocele during valsalva during valsalva.  Elizabeth is able to perform a strong Kegel maneuver on command. Vaginal apex is well supported.   CERVIX:Absent  UTERUS: absent      ASSESSMENT/PLAN:                                                        ICD-10-CM    1. Weakening of rectovaginal tissue  N81.83         Elizabeth Galicia is a 79 year old  with grade 1 cystocele and rectocele. Patient is not having bothersome symptoms.   We discussed various treatments for Elizabeth's pelvic organ prolapse symptoms including Kegel maneuvers, physcial therapy, pessary use and various surgical options.   She has considered her treatment options and will discuss with her personal physical therapist. If symptoms worsen, or she desired  intervention, she will follow-up.       Arlyn Mohamud MD, MHS  [facility]  12/03/24

## 2024-12-03 ENCOUNTER — OFFICE VISIT (OUTPATIENT)
Dept: OBGYN | Facility: CLINIC | Age: 79
End: 2024-12-03
Payer: MEDICARE

## 2024-12-03 VITALS — SYSTOLIC BLOOD PRESSURE: 120 MMHG | WEIGHT: 189.4 LBS | DIASTOLIC BLOOD PRESSURE: 72 MMHG | BODY MASS INDEX: 31.04 KG/M2

## 2024-12-03 DIAGNOSIS — N81.83 WEAKENING OF RECTOVAGINAL TISSUE: Primary | ICD-10-CM

## 2024-12-03 PROCEDURE — 99204 OFFICE O/P NEW MOD 45 MIN: CPT | Performed by: STUDENT IN AN ORGANIZED HEALTH CARE EDUCATION/TRAINING PROGRAM

## 2024-12-28 ENCOUNTER — HEALTH MAINTENANCE LETTER (OUTPATIENT)
Age: 79
End: 2024-12-28

## 2025-01-11 ENCOUNTER — NURSE TRIAGE (OUTPATIENT)
Dept: NURSING | Facility: CLINIC | Age: 80
End: 2025-01-11
Payer: MEDICARE

## 2025-01-11 ENCOUNTER — OFFICE VISIT (OUTPATIENT)
Dept: URGENT CARE | Facility: URGENT CARE | Age: 80
End: 2025-01-11
Payer: MEDICARE

## 2025-01-11 VITALS
WEIGHT: 189 LBS | TEMPERATURE: 96.1 F | DIASTOLIC BLOOD PRESSURE: 84 MMHG | SYSTOLIC BLOOD PRESSURE: 140 MMHG | OXYGEN SATURATION: 100 % | BODY MASS INDEX: 30.97 KG/M2 | HEART RATE: 69 BPM

## 2025-01-11 DIAGNOSIS — R19.7 DIARRHEA OF PRESUMED INFECTIOUS ORIGIN: Primary | ICD-10-CM

## 2025-01-11 LAB
ALBUMIN SERPL BCG-MCNC: 4 G/DL (ref 3.5–5.2)
ALP SERPL-CCNC: 69 U/L (ref 40–150)
ALT SERPL W P-5'-P-CCNC: 7 U/L (ref 0–50)
ANION GAP SERPL CALCULATED.3IONS-SCNC: 7 MMOL/L (ref 7–15)
AST SERPL W P-5'-P-CCNC: 18 U/L (ref 0–45)
BILIRUB SERPL-MCNC: 0.3 MG/DL
BUN SERPL-MCNC: 13 MG/DL (ref 8–23)
CALCIUM SERPL-MCNC: 10.1 MG/DL (ref 8.8–10.4)
CHLORIDE SERPL-SCNC: 108 MMOL/L (ref 98–107)
CREAT SERPL-MCNC: 1.01 MG/DL (ref 0.51–0.95)
EGFRCR SERPLBLD CKD-EPI 2021: 57 ML/MIN/1.73M2
GLUCOSE SERPL-MCNC: 112 MG/DL (ref 70–99)
HCO3 SERPL-SCNC: 28 MMOL/L (ref 22–29)
POTASSIUM SERPL-SCNC: 4.2 MMOL/L (ref 3.4–5.3)
PROT SERPL-MCNC: 6.6 G/DL (ref 6.4–8.3)
SODIUM SERPL-SCNC: 143 MMOL/L (ref 135–145)

## 2025-01-11 PROCEDURE — 99213 OFFICE O/P EST LOW 20 MIN: CPT | Performed by: FAMILY MEDICINE

## 2025-01-11 PROCEDURE — 36415 COLL VENOUS BLD VENIPUNCTURE: CPT | Performed by: FAMILY MEDICINE

## 2025-01-11 PROCEDURE — 80053 COMPREHEN METABOLIC PANEL: CPT | Performed by: FAMILY MEDICINE

## 2025-01-11 NOTE — PROGRESS NOTES
"  Assessment & Plan     Diarrhea of presumed infectious origin  Some significant bloating   Pants aren't fitting as well   Unclear etiology   Encouraged to see primary to consider imaging if tests negative and no improvement   No obvious indication for imaging today   - Helicobacter pylori Antigen Stool; Future  - Enteric Bacteria and Virus Panel by MISSAEL Stool; Future  - C. difficile Toxin B PCR with reflex to C. difficile Antigen and Toxins A/B EIA; Future  - Comprehensive metabolic panel (BMP + Alb, Alk Phos, ALT, AST, Total. Bili, TP); Future          BMI  Estimated body mass index is 30.97 kg/m  as calculated from the following:    Height as of 11/6/24: 1.664 m (5' 5.5\").    Weight as of this encounter: 85.7 kg (189 lb).     No follow-ups on file.    Rohan Johnson is a 79 year old, presenting for the following health issues:  Cold Symptoms (The last few weeks lots of upper GI inflammation. When she eats she does not feel well. It is getting worse and worse. Stomach feels bloated and she has hiccups, belching and passing gas. Has some nausea no vomiting. H pylori in her history)    HPI     Epigastric bloating   Burping, farting, hiccupping, indigestion   Had h pylori in the past and it feels like this   Having either constipation or diarrhea. Wont go for a while then it will be either loose or diarrhea   No firm stools recently     Antibiotic use a couple months ago due to ear infection and sinus infection             Objective    BP (!) 140/84   Pulse 69   Temp (!) 96.1  F (35.6  C)   Wt 85.7 kg (189 lb)   SpO2 100%   BMI 30.97 kg/m    Body mass index is 30.97 kg/m .  Physical Exam  Constitutional:       General: She is not in acute distress.  HENT:      Head: Normocephalic.      Right Ear: Tympanic membrane, ear canal and external ear normal.      Left Ear: Tympanic membrane, ear canal and external ear normal.      Ears:      Comments: Left tympanostomy tube in place      Nose: Nose normal.      " Mouth/Throat:      Mouth: Mucous membranes are moist.      Pharynx: Oropharynx is clear.   Eyes:      General: No scleral icterus.  Pulmonary:      Effort: No respiratory distress.   Abdominal:      General: Bowel sounds are normal. There is distension.      Palpations: Abdomen is soft. There is no mass.      Tenderness: There is no abdominal tenderness. There is no guarding or rebound.      Hernia: No hernia is present.   Neurological:      Mental Status: She is alert.   Psychiatric:         Mood and Affect: Mood normal.         Behavior: Behavior normal.              Signed Electronically by: Louie Chang DO

## 2025-01-11 NOTE — TELEPHONE ENCOUNTER
"Patient calling reporting having upper GI inflammation for the past few weeks with symptoms worsening. Reports burning and pain in the upper abdomen with pain rated 6/10. Patient also has diarrhea. Reports history of h pylori and thinks it could be again. Denies shock, confusion and passing out. Advised to be seen in the ER with patient expressing understanding but reports the ER is full. Will consider visit today.     Tena López RN 01/11/25 12:09 PM    Health Triage Nurse Advisor    Reason for Disposition   Pain is mainly in upper abdomen  (if needed ask: \"is it mainly above the belly button?\")   [1] Pain lasts > 10 minutes AND [2] age > 50    Additional Information   Negative: Shock suspected (e.g., cold/pale/clammy skin, too weak to stand, low BP, rapid pulse)   Negative: Difficult to awaken or acting confused (e.g., disoriented, slurred speech)   Negative: Passed out (i.e., lost consciousness, collapsed and was not responding)   Negative: Sounds like a life-threatening emergency to the triager   Negative: Followed an abdomen (stomach) injury   Negative: SEVERE difficulty breathing (e.g., struggling for each breath, speaks in single words)   Negative: Shock suspected (e.g., cold/pale/clammy skin, too weak to stand, low BP, rapid pulse)   Negative: Difficult to awaken or acting confused (e.g., disoriented, slurred speech)   Negative: Passed out (i.e., lost consciousness, collapsed and was not responding)   Negative: Visible sweat on face or sweat dripping down face   Negative: Sounds like a life-threatening emergency to the triager   Negative: Followed an abdomen (stomach) injury   Negative: Chest pain   Negative: [1] Abdominal pain AND [2] pregnant < 20 weeks   Negative: [1] Abdominal pain AND [2] pregnant 20 or more weeks   Negative: [1] Abdominal pain AND [2] postpartum (from 0 to 6 weeks after delivery)   Negative: Abdomen bloating or swelling are main symptoms   Negative: [1] SEVERE pain (e.g., " excruciating) AND [2] present > 1 hour    Protocols used: Abdominal Pain - Female-A-AH, Abdominal Pain - Upper-A-AH

## 2025-01-13 PROCEDURE — 87338 HPYLORI STOOL AG IA: CPT | Performed by: FAMILY MEDICINE

## 2025-01-13 PROCEDURE — 87507 IADNA-DNA/RNA PROBE TQ 12-25: CPT | Mod: GZ | Performed by: FAMILY MEDICINE

## 2025-01-14 ENCOUNTER — APPOINTMENT (OUTPATIENT)
Dept: LAB | Facility: CLINIC | Age: 80
End: 2025-01-14
Payer: MEDICARE

## 2025-01-14 LAB
ADV 40+41 DNA STL QL NAA+NON-PROBE: NEGATIVE
ASTRO TYP 1-8 RNA STL QL NAA+NON-PROBE: NEGATIVE
C CAYETANENSIS DNA STL QL NAA+NON-PROBE: NEGATIVE
CAMPYLOBACTER DNA SPEC NAA+PROBE: NEGATIVE
CRYPTOSP DNA STL QL NAA+NON-PROBE: NEGATIVE
E COLI O157 DNA STL QL NAA+NON-PROBE: NORMAL
E HISTOLYT DNA STL QL NAA+NON-PROBE: NEGATIVE
EAEC ASTA GENE ISLT QL NAA+PROBE: NEGATIVE
EC STX1+STX2 GENES STL QL NAA+NON-PROBE: NEGATIVE
EPEC EAE GENE STL QL NAA+NON-PROBE: NEGATIVE
ETEC LTA+ST1A+ST1B TOX ST NAA+NON-PROBE: NEGATIVE
G LAMBLIA DNA STL QL NAA+NON-PROBE: NEGATIVE
NOROVIRUS GI+II RNA STL QL NAA+NON-PROBE: NEGATIVE
P SHIGELLOIDES DNA STL QL NAA+NON-PROBE: NEGATIVE
RVA RNA STL QL NAA+NON-PROBE: NEGATIVE
SALMONELLA SP RPOD STL QL NAA+PROBE: NEGATIVE
SAPO I+II+IV+V RNA STL QL NAA+NON-PROBE: NEGATIVE
SHIGELLA SP+EIEC IPAH ST NAA+NON-PROBE: NEGATIVE
V CHOLERAE DNA SPEC QL NAA+PROBE: NEGATIVE
VIBRIO DNA SPEC NAA+PROBE: NEGATIVE
Y ENTEROCOL DNA STL QL NAA+PROBE: NEGATIVE

## 2025-01-15 LAB — H PYLORI AG STL QL IA: NEGATIVE

## 2025-01-23 ENCOUNTER — TRANSFERRED RECORDS (OUTPATIENT)
Dept: HEALTH INFORMATION MANAGEMENT | Facility: CLINIC | Age: 80
End: 2025-01-23
Payer: MEDICARE

## 2025-01-27 ENCOUNTER — TRANSFERRED RECORDS (OUTPATIENT)
Dept: HEALTH INFORMATION MANAGEMENT | Facility: CLINIC | Age: 80
End: 2025-01-27
Payer: MEDICARE

## 2025-02-12 ENCOUNTER — TRANSFERRED RECORDS (OUTPATIENT)
Dept: HEALTH INFORMATION MANAGEMENT | Facility: CLINIC | Age: 80
End: 2025-02-12
Payer: MEDICARE

## 2025-02-18 DIAGNOSIS — E78.2 MIXED HYPERLIPIDEMIA: ICD-10-CM

## 2025-02-18 DIAGNOSIS — E89.0 POSTOPERATIVE HYPOTHYROIDISM: Chronic | ICD-10-CM

## 2025-02-19 RX ORDER — LIOTHYRONINE SODIUM 5 UG/1
TABLET ORAL
Qty: 120 TABLET | Refills: 0 | Status: SHIPPED | OUTPATIENT
Start: 2025-02-19

## 2025-02-19 RX ORDER — ATORVASTATIN CALCIUM 20 MG/1
20 TABLET, FILM COATED ORAL DAILY
Qty: 90 TABLET | Refills: 0 | Status: SHIPPED | OUTPATIENT
Start: 2025-02-19

## 2025-03-09 DIAGNOSIS — E89.0 POSTOPERATIVE HYPOTHYROIDISM: Chronic | ICD-10-CM

## 2025-03-11 RX ORDER — LEVOTHYROXINE SODIUM 75 UG/1
75 TABLET ORAL DAILY
Qty: 30 TABLET | Refills: 0 | Status: SHIPPED | OUTPATIENT
Start: 2025-03-11

## 2025-03-13 ASSESSMENT — ASTHMA QUESTIONNAIRES
ACT_TOTALSCORE: 24
QUESTION_4 LAST FOUR WEEKS HOW OFTEN HAVE YOU USED YOUR RESCUE INHALER OR NEBULIZER MEDICATION (SUCH AS ALBUTEROL): NOT AT ALL
ACT_TOTALSCORE: 24
QUESTION_2 LAST FOUR WEEKS HOW OFTEN HAVE YOU HAD SHORTNESS OF BREATH: NOT AT ALL
QUESTION_1 LAST FOUR WEEKS HOW MUCH OF THE TIME DID YOUR ASTHMA KEEP YOU FROM GETTING AS MUCH DONE AT WORK, SCHOOL OR AT HOME: NONE OF THE TIME
QUESTION_3 LAST FOUR WEEKS HOW OFTEN DID YOUR ASTHMA SYMPTOMS (WHEEZING, COUGHING, SHORTNESS OF BREATH, CHEST TIGHTNESS OR PAIN) WAKE YOU UP AT NIGHT OR EARLIER THAN USUAL IN THE MORNING: NOT AT ALL
QUESTION_5 LAST FOUR WEEKS HOW WOULD YOU RATE YOUR ASTHMA CONTROL: WELL CONTROLLED

## 2025-03-18 ENCOUNTER — OFFICE VISIT (OUTPATIENT)
Dept: ALLERGY | Facility: CLINIC | Age: 80
End: 2025-03-18
Payer: MEDICARE

## 2025-03-18 VITALS
RESPIRATION RATE: 20 BRPM | BODY MASS INDEX: 31.14 KG/M2 | HEART RATE: 71 BPM | SYSTOLIC BLOOD PRESSURE: 122 MMHG | DIASTOLIC BLOOD PRESSURE: 74 MMHG | OXYGEN SATURATION: 96 % | WEIGHT: 190 LBS

## 2025-03-18 DIAGNOSIS — J45.31 MILD PERSISTENT REACTIVE AIRWAY DISEASE WITH WHEEZING WITH ACUTE EXACERBATION: ICD-10-CM

## 2025-03-18 DIAGNOSIS — J01.00 SUBACUTE MAXILLARY SINUSITIS: Primary | ICD-10-CM

## 2025-03-18 DIAGNOSIS — J34.89 NASAL VESTIBULITIS: ICD-10-CM

## 2025-03-18 DIAGNOSIS — J45.30 MILD PERSISTENT ASTHMA WITHOUT COMPLICATION: ICD-10-CM

## 2025-03-18 PROCEDURE — 99204 OFFICE O/P NEW MOD 45 MIN: CPT | Performed by: INTERNAL MEDICINE

## 2025-03-18 PROCEDURE — 3074F SYST BP LT 130 MM HG: CPT | Performed by: INTERNAL MEDICINE

## 2025-03-18 PROCEDURE — 3078F DIAST BP <80 MM HG: CPT | Performed by: INTERNAL MEDICINE

## 2025-03-18 RX ORDER — PREDNISONE 20 MG/1
40 TABLET ORAL DAILY
Qty: 10 TABLET | Refills: 0 | Status: SHIPPED | OUTPATIENT
Start: 2025-03-18 | End: 2025-03-18

## 2025-03-18 RX ORDER — MUPIROCIN 20 MG/G
OINTMENT TOPICAL 2 TIMES DAILY
Qty: 15 G | Refills: 0 | Status: SHIPPED | OUTPATIENT
Start: 2025-03-18

## 2025-03-18 RX ORDER — PREDNISONE 5 MG/1
20 TABLET ORAL DAILY
Qty: 40 TABLET | Refills: 0 | Status: SHIPPED | OUTPATIENT
Start: 2025-03-18 | End: 2025-03-28

## 2025-03-18 NOTE — PROGRESS NOTES
Elizabeth Galicia was seen in the Allergy Clinic at [facility].    Elizabeth Galicia is a 79 year old female being seen today at the request of Kalani Deleon MD  in consultation for Mild persistent asthma.    She has a history of ANCA associated vasculitis as well as asthma and allergic rhinitis.  She was on allergy immunotherapy a few decades ago but did not notice any improvement.    She does have Arnuity available for her asthma and she is uncertain when she is supposed to be using this.  She is not currently not using this routinely.  When she gets upper respiratory illnesses she will have increased wheezing and coughing.  She gets frequently sick with changes of the season in the spring and the fall.    She uses Allegra as needed. Her last eosinophil count was 460.    Her sinuses get congested frequently.  She has increased right sided mucus.  Typically she has green nasal drainage.  Over the last 3 weeks this has been quite significant amount of drainage and is slowly improving.  She is flying to [place] in 2 days.    Because of the ANCA associated vasculitis (diagnosed 2017, cytoxan used) she has been on prolonged steroids for over a year in 2022 when MPO antibodies increased.  December 2023 decreased from 5 mg down to none by April 2024.          Past Medical History:   Diagnosis Date    ANCA-associated vasculitis (H)     Anxiety     Gastro-oesophageal reflux disease     Herniated lumbar intervertebral disc     Herpes     Heterozygous for MTHFR gene mutation     Per CareEverywhere    Hyperlipidemia     Lesion of upper eyelid LEFT    Obesity (BMI 30-39.9)     Postoperative hypothyroidism     Seasonal allergies     Type 2 diabetes mellitus without complication (H) 05/24/2017    Uncomplicated asthma Few years ago    Uterine prolapse      Family History   Problem Relation Age of Onset    Unknown/Adopted Mother     Hyperlipidemia Mother         Birth mother - I m adopted & have  little health info    Anxiety Disorder Mother         Women in that family were all considered  nervous     Obesity Mother     Unknown/Adopted Father         Birth father had some kind of heart issue    Heart Disease Other     Mental Illness No family hx of      Past Surgical History:   Procedure Laterality Date    BIOPSY  20-bryan years ago    Left breast    COLONOSCOPY      DILATION AND CURETTAGE      ENT SURGERY      partial thyroidectomy; tonsillectomy    EXCISE LESION EYELID Left 03/10/2016    Procedure: EXCISE LESION EYELID;  Surgeon: Rg Nazario MD;  Location: [facility]    HAND SURGERY      HYSTERECTOMY VAGINAL, COLPORRHAPHY ANTERIOR, POSTERIOR, COMBINED N/A 09/09/2014    Procedure: COMBINED HYSTERECTOMY VAGINAL, COLPORRHAPHY ANTERIOR, POSTERIOR;  Surgeon: Shay Winkler MD;  Location: [facility]    HYSTERECTOMY, PAP NO LONGER INDICATED      LAPAROSCOPIC SALPINGO-OOPHORECTOMY  06/27/2011    Procedure:LAPAROSCOPIC SALPINGO-OOPHORECTOMY; resection of left pelvic mass. ; Surgeon:MAYRA OLEARY; [facility]    ORTHOPEDIC SURGERY      SALPINGO OOPHORECTOMY,R/L/PEDRO LUIS      Salpingo Oophorectomy for torsion in past    THYROIDECTOMY      Partial       ENVIRONMENTAL HISTORY:   Pets inside the house include 2 cat(s).  Do you smoke cigarettes or other recreational drugs? No There is/are 0 smokers living in the house. The house does not have a damp basement.     SOCIAL HISTORY:   Elizabeth is RETIRED . She lives with her spouse.      Review of Systems      Current Outpatient Medications:     mupirocin (BACTROBAN) 2 % external ointment, Apply topically 2 times daily. Apply each nostril twice daily x 5 days, Disp: 15 g, Rfl: 0    predniSONE (DELTASONE) 5 MG tablet, Take 4 tablets (20 mg) by mouth daily for 10 days., Disp: 40 tablet, Rfl: 0    acetaminophen (TYLENOL) 500 MG tablet, Take 500-1,000 mg by mouth every 6 hours as needed for mild pain, Disp: , Rfl:     albuterol (VENTOLIN HFA) 108 (90 Base) MCG/ACT inhaler, Inhale 1-2  puffs into the lungs every 6 hours as needed for shortness of breath, wheezing or cough., Disp: 18 g, Rfl: 3    atorvastatin (LIPITOR) 20 MG tablet, TAKE 1 TABLET (20 MG) BY MOUTH DAILY +++NEED APPOINTMENT+++, Disp: 90 tablet, Rfl: 0    CALCIUM LACTATE PO, Take 1-2 tablets by mouth daily, Disp: , Rfl:     Cholecalciferol (VITAMIN D3 PO), Take 1,000 Units by mouth daily, Disp: , Rfl:     Cyanocobalamin (CVS B-12 PO), Take 1 tablet by mouth daily, Disp: , Rfl:     fexofenadine (ALLEGRA) 180 MG tablet, TAKE 1 TABLET BY MOUTH EVERY DAY, Disp: 90 tablet, Rfl: 2    fluticasone (ARNUITY ELLIPTA) 100 MCG/ACT inhaler, Inhale 1 puff into the lungs daily., Disp: 30 each, Rfl: 1    fluticasone (FLOVENT HFA) 110 MCG/ACT inhaler, INHALE 1 PUFF BY MOUTH IN TO THE LUNGS TWICE A DAY AS DIRECTED (Patient not taking: Reported on 3/18/2025), Disp: 24 g, Rfl: 1    gabapentin (NEURONTIN) 100 MG capsule, Take 100-200 mg by mouth at bedtime., Disp: , Rfl:     levothyroxine (SYNTHROID/LEVOTHROID) 75 MCG tablet, TAKE 1 TABLET BY MOUTH EVERY DAY, Disp: 30 tablet, Rfl: 0    liothyronine (CYTOMEL) 5 MCG tablet, TAKE 1 TABLETS BY MOUTH 2 TIMES DAILY, Disp: 120 tablet, Rfl: 0    MAGNESIUM LACTATE PO, Take 2 tablets by mouth daily, Disp: , Rfl:     predniSONE (DELTASONE) 20 MG tablet, Take 2 tablets (40 mg) daily x5 days.  Then take 1 tablet (20 mg) daily x5 days., Disp: 15 tablet, Rfl: 0    Semaglutide, 1 MG/DOSE, (OZEMPIC, 1 MG/DOSE,) 4 MG/3ML pen, INJECT 1 MG SUBCUTANEOUS EVERY 7 DAYS, Disp: 9 mL, Rfl: 0    UNABLE TO FIND, MEDICATION NAME: medical cannibus 20mg at bedtime, Disp: , Rfl:     valACYclovir (VALTREX) 500 MG tablet, Take 1 tablet (500 mg) by mouth 2 times daily., Disp: 6 tablet, Rfl: 2  Allergies   Allergen Reactions    Cranberries [Cranberry Extract]      Causes herpes flair-up    Dairy [Milk Products]      Can tolerate butter and hard cheeses     Perfume Other (See Comments)     Stuffy in nose, HA    Seasonal Allergies      Tilactase          EXAM:   /74 (BP Location: Left arm, Patient Position: Sitting, Cuff Size: Adult Regular)   Pulse 71   Resp 20   Wt 86.2 kg (190 lb)   SpO2 96%   BMI 31.14 kg/m      Physical Exam    Constitutional:       General: She is not in acute distress.     Appearance: Normal appearance. She is not ill-appearing.   HENT:      Head: Normocephalic and atraumatic.      Nose: Thick yellow mucus on right.     Mouth/Throat:      Mouth: Mucous membranes are moist.      Pharynx: Oropharynx is clear. No posterior oropharyngeal erythema.   Eyes:      General:         Right eye: No discharge.         Left eye: No discharge.   Cardiovascular:      Rate and Rhythm: Normal rate and regular rhythm.      Heart sounds: Normal heart sounds.   Pulmonary:      Effort: Pulmonary effort is normal.      Breath sounds: Normal breath sounds. No wheezing or rhonchi.   Skin:     General: Skin is warm.      Findings: No erythema or rash.   Neurological:      General: No focal deficit present.      Mental Status: She is alert. Mental status is at baseline.   Psychiatric:         Mood and Affect: Mood normal.         Behavior: Behavior normal.        ASSESSMENT/PLAN:  Elizabeth Galicia is a 79 year old female with a history of microscopic polyangiitis angiitis and is followed by rheumatology.  She also has a history of asthma and currently has increased right-sided mucus consistent with possible sinus infection for which she has had issues for the last 3 weeks.  Possible nasal vestibulitis will use mupirocin and also order a sinus CT scan.  Because of mild chest congestion symptoms before her flight will recommend starting Arnuity and also using Afrin before her flight.  Prednisone was prescribed to have on hand due to her upcoming travels.    Labs  Mupirocin each nostril twice daily x 5 days  Breathing tests  Prednisone on hand for travels  Sinus CT  Arnuity 1 puff daily until you return from [place].  Flonase 1-2 sprays  daily for the next week  Afrin before your flight    Follow-up in 2 months      Thank you for allowing me to participate in the care of Elizabeth NISA Grayson.      I spent 45 minutes on the date of the encounter doing chart review, history and exam, documentation and further coordination as noted above exclusive of separately reported interpretations    Tariq Cox MD  Allergy/Immunology  [facility]

## 2025-03-18 NOTE — LETTER
3/18/2025      Elizabeth Galicia  [address] [address]      Dear Colleague,    Thank you for referring your patient, Elizabeth Galicia, to the [facility] [place]. Please see a copy of my visit note below.    Elizabeth Galicia was seen in the Allergy Clinic at [facility].    Elizabeth Galicia is a 79 year old female being seen today at the request of Kalani Deleon MD  in consultation for Mild persistent asthma.    She has a history of ANCA associated vasculitis as well as asthma and allergic rhinitis.  She was on allergy immunotherapy a few decades ago but did not notice any improvement.    She does have Arnuity available for her asthma and she is uncertain when she is supposed to be using this.  She is not currently not using this routinely.  When she gets upper respiratory illnesses she will have increased wheezing and coughing.  She gets frequently sick with changes of the season in the spring and the fall.    She uses Allegra as needed. Her last eosinophil count was 460.    Her sinuses get congested frequently.  She has increased right sided mucus.  Typically she has green nasal drainage.  Over the last 3 weeks this has been quite significant amount of drainage and is slowly improving.  She is flying to [place] in 2 days.    Because of the ANCA associated vasculitis (diagnosed 2017, cytoxan used) she has been on prolonged steroids for over a year in 2022 when MPO antibodies increased.  December 2023 decreased from 5 mg down to none by April 2024.          Past Medical History:   Diagnosis Date     ANCA-associated vasculitis (H)      Anxiety      Gastro-oesophageal reflux disease      Herniated lumbar intervertebral disc      Herpes      Heterozygous for MTHFR gene mutation     Per CareEverywhere     Hyperlipidemia      Lesion of upper eyelid LEFT     Obesity (BMI 30-39.9)      Postoperative hypothyroidism      Seasonal allergies       Type 2 diabetes mellitus without complication (H) 05/24/2017     Uncomplicated asthma Few years ago     Uterine prolapse      Family History   Problem Relation Age of Onset     Unknown/Adopted Mother      Hyperlipidemia Mother         Birth mother - I m adopted & have little health info     Anxiety Disorder Mother         Women in that family were all considered  nervous      Obesity Mother      Unknown/Adopted Father         Birth father had some kind of heart issue     Heart Disease Other      Mental Illness No family hx of      Past Surgical History:   Procedure Laterality Date     BIOPSY  20-bryan years ago    Left breast     COLONOSCOPY       DILATION AND CURETTAGE       ENT SURGERY      partial thyroidectomy; tonsillectomy     EXCISE LESION EYELID Left 03/10/2016    Procedure: EXCISE LESION EYELID;  Surgeon: Rg Nazario MD;  Location: [facility]     HAND SURGERY       HYSTERECTOMY VAGINAL, COLPORRHAPHY ANTERIOR, POSTERIOR, COMBINED N/A 09/09/2014    Procedure: COMBINED HYSTERECTOMY VAGINAL, COLPORRHAPHY ANTERIOR, POSTERIOR;  Surgeon: Shay Winkler MD;  Location: [facility]     HYSTERECTOMY, PAP NO LONGER INDICATED       LAPAROSCOPIC SALPINGO-OOPHORECTOMY  06/27/2011    Procedure:LAPAROSCOPIC SALPINGO-OOPHORECTOMY; resection of left pelvic mass. ; Surgeon:MAYRA OLEARY; [facility]     ORTHOPEDIC SURGERY       SALPINGO OOPHORECTOMY,R/L/PEDRO LUIS      Salpingo Oophorectomy for torsion in past     THYROIDECTOMY      Partial       ENVIRONMENTAL HISTORY:   Pets inside the house include 2 cat(s).  Do you smoke cigarettes or other recreational drugs? No There is/are 0 smokers living in the house. The house does not have a damp basement.     SOCIAL HISTORY:   Elizabeth is RETIRED . She lives with her spouse.      Review of Systems      Current Outpatient Medications:      mupirocin (BACTROBAN) 2 % external ointment, Apply topically 2 times daily. Apply each nostril twice daily x 5 days, Disp: 15 g, Rfl: 0     predniSONE  (DELTASONE) 5 MG tablet, Take 4 tablets (20 mg) by mouth daily for 10 days., Disp: 40 tablet, Rfl: 0     acetaminophen (TYLENOL) 500 MG tablet, Take 500-1,000 mg by mouth every 6 hours as needed for mild pain, Disp: , Rfl:      albuterol (VENTOLIN HFA) 108 (90 Base) MCG/ACT inhaler, Inhale 1-2 puffs into the lungs every 6 hours as needed for shortness of breath, wheezing or cough., Disp: 18 g, Rfl: 3     atorvastatin (LIPITOR) 20 MG tablet, TAKE 1 TABLET (20 MG) BY MOUTH DAILY +++NEED APPOINTMENT+++, Disp: 90 tablet, Rfl: 0     CALCIUM LACTATE PO, Take 1-2 tablets by mouth daily, Disp: , Rfl:      Cholecalciferol (VITAMIN D3 PO), Take 1,000 Units by mouth daily, Disp: , Rfl:      Cyanocobalamin (CVS B-12 PO), Take 1 tablet by mouth daily, Disp: , Rfl:      fexofenadine (ALLEGRA) 180 MG tablet, TAKE 1 TABLET BY MOUTH EVERY DAY, Disp: 90 tablet, Rfl: 2     fluticasone (ARNUITY ELLIPTA) 100 MCG/ACT inhaler, Inhale 1 puff into the lungs daily., Disp: 30 each, Rfl: 1     fluticasone (FLOVENT HFA) 110 MCG/ACT inhaler, INHALE 1 PUFF BY MOUTH IN TO THE LUNGS TWICE A DAY AS DIRECTED (Patient not taking: Reported on 3/18/2025), Disp: 24 g, Rfl: 1     gabapentin (NEURONTIN) 100 MG capsule, Take 100-200 mg by mouth at bedtime., Disp: , Rfl:      levothyroxine (SYNTHROID/LEVOTHROID) 75 MCG tablet, TAKE 1 TABLET BY MOUTH EVERY DAY, Disp: 30 tablet, Rfl: 0     liothyronine (CYTOMEL) 5 MCG tablet, TAKE 1 TABLETS BY MOUTH 2 TIMES DAILY, Disp: 120 tablet, Rfl: 0     MAGNESIUM LACTATE PO, Take 2 tablets by mouth daily, Disp: , Rfl:      predniSONE (DELTASONE) 20 MG tablet, Take 2 tablets (40 mg) daily x5 days.  Then take 1 tablet (20 mg) daily x5 days., Disp: 15 tablet, Rfl: 0     Semaglutide, 1 MG/DOSE, (OZEMPIC, 1 MG/DOSE,) 4 MG/3ML pen, INJECT 1 MG SUBCUTANEOUS EVERY 7 DAYS, Disp: 9 mL, Rfl: 0     UNABLE TO FIND, MEDICATION NAME: medical cannibus 20mg at bedtime, Disp: , Rfl:      valACYclovir (VALTREX) 500 MG tablet, Take 1 tablet  (500 mg) by mouth 2 times daily., Disp: 6 tablet, Rfl: 2  Allergies   Allergen Reactions     Cranberries [Cranberry Extract]      Causes herpes flair-up     Dairy [Milk Products]      Can tolerate butter and hard cheeses      Perfume Other (See Comments)     Stuffy in nose, HA     Seasonal Allergies      Tilactase          EXAM:   /74 (BP Location: Left arm, Patient Position: Sitting, Cuff Size: Adult Regular)   Pulse 71   Resp 20   Wt 86.2 kg (190 lb)   SpO2 96%   BMI 31.14 kg/m      Physical Exam    Constitutional:       General: She is not in acute distress.     Appearance: Normal appearance. She is not ill-appearing.   HENT:      Head: Normocephalic and atraumatic.      Nose: Thick yellow mucus on right.     Mouth/Throat:      Mouth: Mucous membranes are moist.      Pharynx: Oropharynx is clear. No posterior oropharyngeal erythema.   Eyes:      General:         Right eye: No discharge.         Left eye: No discharge.   Cardiovascular:      Rate and Rhythm: Normal rate and regular rhythm.      Heart sounds: Normal heart sounds.   Pulmonary:      Effort: Pulmonary effort is normal.      Breath sounds: Normal breath sounds. No wheezing or rhonchi.   Skin:     General: Skin is warm.      Findings: No erythema or rash.   Neurological:      General: No focal deficit present.      Mental Status: She is alert. Mental status is at baseline.   Psychiatric:         Mood and Affect: Mood normal.         Behavior: Behavior normal.        ASSESSMENT/PLAN:  Elizabeth Galicia is a 79 year old female with a history of microscopic polyangiitis angiitis and is followed by rheumatology.  She also has a history of asthma and currently has increased right-sided mucus consistent with possible sinus infection for which she has had issues for the last 3 weeks.  Possible nasal vestibulitis will use mupirocin and also order a sinus CT scan.  Because of mild chest congestion symptoms before her flight will recommend starting  Arnuity and also using Afrin before her flight.  Prednisone was prescribed to have on hand due to her upcoming travels.    Labs  Mupirocin each nostril twice daily x 5 days  Breathing tests  Prednisone on hand for travels  Sinus CT  Arnuity 1 puff daily until you return from [place].  Flonase 1-2 sprays daily for the next week  Afrin before your flight    Follow-up in 2 months      Thank you for allowing me to participate in the care of Elizabeth Galicia.      I spent 45 minutes on the date of the encounter doing chart review, history and exam, documentation and further coordination as noted above exclusive of separately reported interpretations    Tariq Cox MD  Allergy/Immunology  [facility]         Again, thank you for allowing me to participate in the care of your patient.        Sincerely,        Tariq Cox MD    Electronically signed

## 2025-03-18 NOTE — PATIENT INSTRUCTIONS
Labs  Mupirocin each nostril twice daily x 5 days  Breathing tests  Prednisone on hand for travels  Sinus CT  Arnuity 1 puff daily until you return from [place].  Flonase 1-2 sprays daily for the next week  Afrin before your flight        Allergy Staff Appt Hours Shot Hours Location       Physician   Tariq Cox MD      Support Staff   CLARITZA Mirza RN, YUNIER ARAGON MA      Mondays Tuesdays Thursdays and Fridays:      [place] 7-5      Wednesdays         Close                Mondays, Tuesdays and Fridays:  7:20 - 3:40              [facility]  [address]  [place] [address]  Allergy appointment  line: [phone]    Pulmonary Function Scheduling:  [place]: [phone]           Questions about cost of your care  For questions about your cost of your visit, procedure, lab or imaging contact: [employer] Price Line [phone] or visit:  [email or web address]    Prescription Assistance  If you need assistance with your prescriptions (cost, coverage, etc) please contact: [facility] Prescription Assistance Program [phone]    Important Scheduling Information  All visits for food challenges, medication/drug allergy testing, and drug challenges MUST be scheduled through the allergy clinic nurse. Please contact them via [employer] or by calling the clinic at [phone] and asking to speak with an allergy nurse. They will provide additional information and instructions for the appointment. Discontinue oral antihistamines 7 days prior to the appointment. Discontinue nasal and ocular antihistamines 1 day prior to the appointment.    Appointments for skin testing: Appointment will last approximately 45 minutes.  Please call the appointment line for your clinic to schedule.  Discontinue oral antihistamines 7 days prior to the appointment.  Discontinue nasal and ocular antihistamines 1 days prior to appointment.    Thank  you for trusting us with your care. Please feel free to contact us with any questions or concerns you may have.

## 2025-03-19 ENCOUNTER — LAB (OUTPATIENT)
Dept: LAB | Facility: CLINIC | Age: 80
End: 2025-03-19
Payer: MEDICARE

## 2025-03-19 DIAGNOSIS — N18.30 CKD (CHRONIC KIDNEY DISEASE) STAGE 3, GFR 30-59 ML/MIN (H): Primary | ICD-10-CM

## 2025-03-19 DIAGNOSIS — E89.0 POSTOPERATIVE HYPOTHYROIDISM: Chronic | ICD-10-CM

## 2025-03-19 DIAGNOSIS — J45.31 MILD PERSISTENT REACTIVE AIRWAY DISEASE WITH WHEEZING WITH ACUTE EXACERBATION: ICD-10-CM

## 2025-03-19 DIAGNOSIS — E66.01 MORBID OBESITY (H): ICD-10-CM

## 2025-03-19 LAB — HGB BLD-MCNC: 13.8 G/DL (ref 11.7–15.7)

## 2025-03-20 LAB
A ALTERNATA IGE QN: <0.1 KU(A)/L
A FUMIGATUS IGE QN: <0.1 KU(A)/L
C HERBARUM IGE QN: <0.1 KU(A)/L
CALIF WALNUT POLN IGE QN: <0.1 KU(A)/L
CAT DANDER IGG QN: <0.1 KU(A)/L
CEDAR IGE QN: <0.1 KU(A)/L
COMMON RAGWEED IGE QN: <0.1 KU(A)/L
COTTONWOOD IGE QN: <0.1 KU(A)/L
D FARINAE IGE QN: <0.1 KU(A)/L
D PTERONYSS IGE QN: <0.1 KU(A)/L
DOG DANDER+EPITH IGE QN: <0.1 KU(A)/L
E PURPURASCENS IGE QN: <0.1 KU(A)/L
EAST WHITE PINE IGE QN: <0.1 KU(A)/L
ENGL PLANTAIN IGE QN: <0.1 KU(A)/L
FIREBUSH IGE QN: <0.1 KU(A)/L
GIANT RAGWEED IGE QN: <0.1 KU(A)/L
GOOSEFOOT IGE QN: <0.1 KU(A)/L
IGE SERPL-ACNC: 2 KU/L (ref 0–114)
JOHNSON GRASS IGE QN: <0.1 KU(A)/L
MAPLE IGE QN: <0.1 KU(A)/L
MUGWORT IGE QN: <0.1 KU(A)/L
NETTLE IGE QN: <0.1 KU(A)/L
P NOTATUM IGE QN: <0.1 KU(A)/L
RED MULBERRY IGE QN: <0.1 KU(A)/L
SALTWORT IGE QN: <0.1 KU(A)/L
SHEEP SORREL IGE QN: <0.1 KU(A)/L
SILVER BIRCH IGE QN: <0.1 KU(A)/L
T3FREE SERPL-MCNC: 3 PG/ML (ref 2–4.4)
T4 FREE SERPL-MCNC: 1.06 NG/DL (ref 0.9–1.7)
TIMOTHY IGE QN: <0.1 KU(A)/L
TSH SERPL DL<=0.005 MIU/L-ACNC: 1.14 UIU/ML (ref 0.3–4.2)
WHITE ASH IGE QN: <0.1 KU(A)/L
WHITE ELM IGE QN: <0.1 KU(A)/L
WHITE MULBERRY IGE QN: <0.1 KU(A)/L
WHITE OAK IGE QN: <0.1 KU(A)/L
WORMWOOD IGE QN: <0.1 KU(A)/L

## 2025-04-05 DIAGNOSIS — E89.0 POSTOPERATIVE HYPOTHYROIDISM: Chronic | ICD-10-CM

## 2025-04-07 DIAGNOSIS — E11.9 TYPE 2 DIABETES MELLITUS WITHOUT COMPLICATION, WITHOUT LONG-TERM CURRENT USE OF INSULIN (H): Chronic | ICD-10-CM

## 2025-04-08 DIAGNOSIS — J45.31 MILD PERSISTENT REACTIVE AIRWAY DISEASE WITH WHEEZING WITH ACUTE EXACERBATION: ICD-10-CM

## 2025-04-08 LAB — PULMONARY FUNCTION TEST-FENO: 21 PPB (ref 0–40)

## 2025-04-08 PROCEDURE — 94375 RESPIRATORY FLOW VOLUME LOOP: CPT | Performed by: INTERNAL MEDICINE

## 2025-04-08 PROCEDURE — 95012 NITRIC OXIDE EXP GAS DETER: CPT | Performed by: INTERNAL MEDICINE

## 2025-04-08 RX ORDER — SEMAGLUTIDE 1.34 MG/ML
1 INJECTION, SOLUTION SUBCUTANEOUS
Qty: 3 ML | Refills: 2 | Status: SHIPPED | OUTPATIENT
Start: 2025-04-08

## 2025-04-08 RX ORDER — LEVOTHYROXINE SODIUM 75 UG/1
75 TABLET ORAL DAILY
Qty: 90 TABLET | Refills: 3 | Status: SHIPPED | OUTPATIENT
Start: 2025-04-08

## 2025-04-08 NOTE — PROGRESS NOTES
Elizabeth Galicia comes into clinic today at the request of Dr. Cox Ordering Provider for spirometry  and FENO      This service provided today was under the supervising provider of the day Dr. Cox, who was available if needed.    Tony Blair, RT

## 2025-04-09 ENCOUNTER — TELEPHONE (OUTPATIENT)
Dept: FAMILY MEDICINE | Facility: CLINIC | Age: 80
End: 2025-04-09
Payer: MEDICARE

## 2025-04-09 DIAGNOSIS — M70.62 GREATER TROCHANTERIC BURSITIS OF BOTH HIPS: ICD-10-CM

## 2025-04-09 DIAGNOSIS — M54.16 LUMBAR RADICULOPATHY: Primary | ICD-10-CM

## 2025-04-09 DIAGNOSIS — M70.61 GREATER TROCHANTERIC BURSITIS OF BOTH HIPS: ICD-10-CM

## 2025-04-09 NOTE — TELEPHONE ENCOUNTER
Order/Referral Request    Who is requesting: patient    Orders being requested: Dr. Banerjee : comprehensive pain evaluation    Withdraw previous order for interventional  Reason service is needed/diagnosis: original order was not correct    When are orders needed by: asap    Has this been discussed with Provider: Yes    Does patient have a preference on a Group/Provider/Facility? [facility]    Does patient have an appointment scheduled?: No    Where to send orders: Place orders within Epic    Could we send this information to you in [facility] or would you prefer to receive a phone call?:   Patient would prefer a phone call   Okay to leave a detailed message?: Yes at Home number on file [phone] (home)

## 2025-04-09 NOTE — TELEPHONE ENCOUNTER
Called pt back and was informed that current referral was placed incorrectly.   Pt stated Pain Management Referral should state reason for referral as Comprehensive Pain Evaluation.   Huddle with the provider regarding the above.   Provider gave VOR[facility] and Pain Management Referral was placed to Deon Banerjee for a Comprehensive Pain Evaluation.     Elen PERALTA RN

## 2025-04-10 LAB
EXPTIME-PRE: 4.63 SEC
FEF2575-%PRED-PRE: 126 %
FEF2575-PRE: 1.97 L/SEC
FEF2575-PRED: 1.56 L/SEC
FEFMAX-%PRED-PRE: 86 %
FEFMAX-PRE: 4.43 L/SEC
FEFMAX-PRED: 5.14 L/SEC
FEV1-%PRED-PRE: 111 %
FEV1-PRE: 2.15 L
FEV1FEV6-PRE: 79 %
FEV1FEV6-PRED: 78 %
FEV1FVC-PRE: 79 %
FEV1FVC-PRED: 78 %
FIFMAX-PRE: 6.48 L/SEC
FVC-%PRED-PRE: 109 %
FVC-PRE: 2.73 L
FVC-PRED: 2.5 L

## 2025-04-23 ENCOUNTER — ANCILLARY PROCEDURE (OUTPATIENT)
Dept: CT IMAGING | Facility: CLINIC | Age: 80
End: 2025-04-23
Attending: INTERNAL MEDICINE
Payer: MEDICARE

## 2025-04-23 DIAGNOSIS — J01.00 SUBACUTE MAXILLARY SINUSITIS: ICD-10-CM

## 2025-04-23 PROCEDURE — 70486 CT MAXILLOFACIAL W/O DYE: CPT

## 2025-04-24 DIAGNOSIS — J32.0 MAXILLARY SINUSITIS, CHRONIC: Primary | ICD-10-CM

## 2025-04-28 ENCOUNTER — PATIENT OUTREACH (OUTPATIENT)
Dept: CARE COORDINATION | Facility: CLINIC | Age: 80
End: 2025-04-28
Payer: MEDICARE

## 2025-05-19 DIAGNOSIS — E78.2 MIXED HYPERLIPIDEMIA: ICD-10-CM

## 2025-05-19 ASSESSMENT — ASTHMA QUESTIONNAIRES
QUESTION_2 LAST FOUR WEEKS HOW OFTEN HAVE YOU HAD SHORTNESS OF BREATH: NOT AT ALL
QUESTION_5 LAST FOUR WEEKS HOW WOULD YOU RATE YOUR ASTHMA CONTROL: COMPLETELY CONTROLLED
ACT_TOTALSCORE: 25
QUESTION_1 LAST FOUR WEEKS HOW MUCH OF THE TIME DID YOUR ASTHMA KEEP YOU FROM GETTING AS MUCH DONE AT WORK, SCHOOL OR AT HOME: NONE OF THE TIME
QUESTION_3 LAST FOUR WEEKS HOW OFTEN DID YOUR ASTHMA SYMPTOMS (WHEEZING, COUGHING, SHORTNESS OF BREATH, CHEST TIGHTNESS OR PAIN) WAKE YOU UP AT NIGHT OR EARLIER THAN USUAL IN THE MORNING: NOT AT ALL
QUESTION_4 LAST FOUR WEEKS HOW OFTEN HAVE YOU USED YOUR RESCUE INHALER OR NEBULIZER MEDICATION (SUCH AS ALBUTEROL): NOT AT ALL

## 2025-05-20 ENCOUNTER — OFFICE VISIT (OUTPATIENT)
Dept: ALLERGY | Facility: CLINIC | Age: 80
End: 2025-05-20
Attending: INTERNAL MEDICINE
Payer: MEDICARE

## 2025-05-20 VITALS — OXYGEN SATURATION: 100 % | SYSTOLIC BLOOD PRESSURE: 146 MMHG | HEART RATE: 60 BPM | DIASTOLIC BLOOD PRESSURE: 86 MMHG

## 2025-05-20 DIAGNOSIS — T78.40XA ALLERGY, UNSPECIFIED, INITIAL ENCOUNTER: ICD-10-CM

## 2025-05-20 DIAGNOSIS — J34.89 NASAL VESTIBULITIS: ICD-10-CM

## 2025-05-20 DIAGNOSIS — J32.0 CHRONIC MAXILLARY SINUSITIS: ICD-10-CM

## 2025-05-20 DIAGNOSIS — J01.00 SUBACUTE MAXILLARY SINUSITIS: ICD-10-CM

## 2025-05-20 DIAGNOSIS — T78.1XXD ADVERSE FOOD REACTION, SUBSEQUENT ENCOUNTER: Primary | ICD-10-CM

## 2025-05-20 DIAGNOSIS — J45.30 MILD PERSISTENT ASTHMA WITHOUT COMPLICATION: ICD-10-CM

## 2025-05-20 PROCEDURE — 3077F SYST BP >= 140 MM HG: CPT | Performed by: INTERNAL MEDICINE

## 2025-05-20 PROCEDURE — 3079F DIAST BP 80-89 MM HG: CPT | Performed by: INTERNAL MEDICINE

## 2025-05-20 PROCEDURE — 99214 OFFICE O/P EST MOD 30 MIN: CPT | Performed by: INTERNAL MEDICINE

## 2025-05-20 RX ORDER — PREDNISONE 20 MG/1
TABLET ORAL
Qty: 15 TABLET | Refills: 0 | Status: SHIPPED | OUTPATIENT
Start: 2025-05-20

## 2025-05-20 RX ORDER — ATORVASTATIN CALCIUM 20 MG/1
20 TABLET, FILM COATED ORAL DAILY
Qty: 90 TABLET | Refills: 0 | Status: SHIPPED | OUTPATIENT
Start: 2025-05-20

## 2025-05-20 RX ORDER — DOXYCYCLINE 100 MG/1
100 CAPSULE ORAL 2 TIMES DAILY
Qty: 20 CAPSULE | Refills: 0 | Status: SHIPPED | OUTPATIENT
Start: 2025-05-20 | End: 2025-05-30

## 2025-05-20 NOTE — PATIENT INSTRUCTIONS
Allergy Staff Appt Hours Shot Hours Location       Physician   Tariq Cox MD      Support Staff   CLARITZA Mirza RN, MA Emily J., MA      Mondays Tuesdays Thursdays and Fridays:      [place] 7-5      Wednesdays         Close                Mondays, Tuesdays and Fridays:  7:20 - 3:40              [facility]  [address]  [place] [address]  Allergy appointment  line: [phone]    Pulmonary Function Scheduling:  [place]: [phone]           Questions about cost of your care  For questions about your cost of your visit, procedure, lab or imaging contact: [employer] Line [phone] or visit:  [email or web address]    Prescription Assistance  If you need assistance with your prescriptions (cost, coverage, etc) please contact: [employer] Prescription Assistance Program [phone]    Important Scheduling Information  All visits for food challenges, medication/drug allergy testing, and drug challenges MUST be scheduled through the allergy clinic nurse. Please contact them via [address] or by calling the clinic at [phone] and asking to speak with an allergy nurse. They will provide additional information and instructions for the appointment. Discontinue oral antihistamines 7 days prior to the appointment. Discontinue nasal and ocular antihistamines 1 day prior to the appointment.    Appointments for skin testing: Appointment will last approximately 45 minutes.  Please call the appointment line for your clinic to schedule.  Discontinue oral antihistamines 7 days prior to the appointment.  Discontinue nasal and ocular antihistamines 1 days prior to appointment.    Thank you for trusting us with your care. Please feel free to contact us with any questions or concerns you may have.

## 2025-05-20 NOTE — LETTER
5/20/2025      Elizabeth Galicia  [address] [id number]      Dear Colleague,    Thank you for referring your patient, Elizabeth Galicia, to the [facility] [place]. Please see a copy of my visit note below.    Elizabeth Galicia was seen in the Allergy Clinic at [facility].    Elizabeth Galicia is a 79 year old female being seen today for ongoing evaluation of chronic maxillary sinusitis.      She has questions about allergy contributing to her sinus disease.  She sees a chiropractor who feels that wheat and peanut is contributing to her sinus disease potentially from a fungal origin from the peanut and inflammatory origin from the wheat.  She has avoided both wheat and peanut for the last 6 weeks and feels like the mucus drainage has decreased slightly.    She has been using Arnuity daily.  She is wondering if she needs to continue with this.  She typically gets a flare of her asthma with upper respiratory illness.    At the last appointment in March she was prescribed mupirocin that did help clear up the nasal crusting temporarily.    A sinus CT scan was performed that showed opacification of the right maxillary sinus as well as ethmoid and frontal opacification on the right side.    Blood testing was negative for allergies.  She does continue with the Allegra and Flonase as needed.    She was treated in November 2024 with Augmentin and prednisone for an upper respiratory illness that did help her sinus symptoms.  She was referred to ENT but she has not made that appointment.      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  She was on allergy immunotherapy a few decades ago but did not notice any improvement.    When she gets upper respiratory illnesses she will have increased wheezing and coughing.  She gets frequently sick with changes of the season  in the spring and the fall.    Her sinuses get congested frequently.  She has increased right sided mucus.  Typically she has green nasal drainage.     Because of the ANCA associated vasculitis (diagnosed 2017, cytoxan used) she has been on prolonged steroids for over a year in 2022 when MPO antibodies increased.  December 2023 decreased from 5 mg down to none by April 2024.      SINUS CT    IMPRESSION:  1.  Ostiomeatal distribution of right-sided paranasal sinus disease with complete opacification of the right maxillary sinus and near complete opacification of the right frontal sinus. There are some bony changes in the walls of the right maxillary sinus and frontal sinus suggesting a component of chronic sinus disease. Widening of the maxillary antrum on the right.    Past Medical History:   Diagnosis Date     ANCA-associated vasculitis (H)      Anxiety      Gastro-oesophageal reflux disease      Herniated lumbar intervertebral disc      Herpes      Heterozygous for MTHFR gene mutation     Per CareEverywhere     Hyperlipidemia      Lesion of upper eyelid LEFT     Obesity (BMI 30-39.9)      Postoperative hypothyroidism      Seasonal allergies      Type 2 diabetes mellitus without complication (H) 05/24/2017     Uncomplicated asthma Few years ago     Uterine prolapse      Family History   Problem Relation Age of Onset     Unknown/Adopted Mother      Hyperlipidemia Mother         Birth mother - I m adopted & have little health info     Anxiety Disorder Mother         Women in that family were all considered  nervous      Obesity Mother      Unknown/Adopted Father         Birth father had some kind of heart issue     Heart Disease Other      Mental Illness No family hx of      Past Surgical History:   Procedure Laterality Date     BIOPSY  20-bryan years ago    Left breast     COLONOSCOPY       DILATION AND CURETTAGE       ENT SURGERY      partial thyroidectomy; tonsillectomy     EXCISE LESION EYELID Left 03/10/2016     Procedure: EXCISE LESION EYELID;  Surgeon: Rg Nazario MD;  Location: [facility]     HAND SURGERY       HYSTERECTOMY VAGINAL, COLPORRHAPHY ANTERIOR, POSTERIOR, COMBINED N/A 09/09/2014    Procedure: COMBINED HYSTERECTOMY VAGINAL, COLPORRHAPHY ANTERIOR, POSTERIOR;  Surgeon: Shay Winkler MD;  Location: [facility]     HYSTERECTOMY, PAP NO LONGER INDICATED       LAPAROSCOPIC SALPINGO-OOPHORECTOMY  06/27/2011    Procedure:LAPAROSCOPIC SALPINGO-OOPHORECTOMY; resection of left pelvic mass. ; Surgeon:MAYRA OLEARY; [facility]     ORTHOPEDIC SURGERY       SALPINGO OOPHORECTOMY,R/L/PEDRO LUIS      Salpingo Oophorectomy for torsion in past     THYROIDECTOMY      Partial         Current Outpatient Medications:      acetaminophen (TYLENOL) 500 MG tablet, Take 500-1,000 mg by mouth every 6 hours as needed for mild pain, Disp: , Rfl:      albuterol (VENTOLIN HFA) 108 (90 Base) MCG/ACT inhaler, Inhale 1-2 puffs into the lungs every 6 hours as needed for shortness of breath, wheezing or cough., Disp: 18 g, Rfl: 3     atorvastatin (LIPITOR) 20 MG tablet, TAKE 1 TABLET (20 MG) BY MOUTH DAILY +++NEED APPOINTMENT+++, Disp: 90 tablet, Rfl: 0     CALCIUM LACTATE PO, Take 1-2 tablets by mouth daily, Disp: , Rfl:      Cholecalciferol (VITAMIN D3 PO), Take 1,000 Units by mouth daily, Disp: , Rfl:      Cyanocobalamin (CVS B-12 PO), Take 1 tablet by mouth daily, Disp: , Rfl:      doxycycline hyclate (VIBRAMYCIN) 100 MG capsule, Take 1 capsule (100 mg) by mouth 2 times daily for 10 days., Disp: 20 capsule, Rfl: 0     fexofenadine (ALLEGRA) 180 MG tablet, TAKE 1 TABLET BY MOUTH EVERY DAY, Disp: 90 tablet, Rfl: 2     fluticasone (ARNUITY ELLIPTA) 100 MCG/ACT inhaler, Inhale 1 puff into the lungs daily., Disp: 30 each, Rfl: 1     gabapentin (NEURONTIN) 100 MG capsule, Take 100-200 mg by mouth at bedtime., Disp: , Rfl:      levothyroxine (SYNTHROID/LEVOTHROID) 75 MCG tablet, TAKE 1 TABLET BY MOUTH EVERY DAY, Disp: 90 tablet, Rfl: 3     MAGNESIUM  LACTATE PO, Take 2 tablets by mouth daily, Disp: , Rfl:      mupirocin (BACTROBAN) 2 % external ointment, Apply topically 2 times daily. Apply each nostril twice daily x 5 days, Disp: 15 g, Rfl: 0     predniSONE (DELTASONE) 20 MG tablet, Take 2 tabs daily x 5 days, then 1 tab daily x 5 days, Disp: 15 tablet, Rfl: 0     predniSONE (DELTASONE) 20 MG tablet, Take 2 tablets (40 mg) daily x5 days.  Then take 1 tablet (20 mg) daily x5 days., Disp: 15 tablet, Rfl: 0     Semaglutide, 1 MG/DOSE, (OZEMPIC, 1 MG/DOSE,) 4 MG/3ML pen, INJECT 1 MG SUBCUTANEOUS EVERY 7 DAYS, Disp: 3 mL, Rfl: 2     UNABLE TO FIND, MEDICATION NAME: medical cannibus 20mg at bedtime, Disp: , Rfl:      valACYclovir (VALTREX) 500 MG tablet, Take 1 tablet (500 mg) by mouth 2 times daily., Disp: 6 tablet, Rfl: 2     liothyronine (CYTOMEL) 5 MCG tablet, TAKE 1 TABLETS BY MOUTH 2 TIMES DAILY, Disp: 120 tablet, Rfl: 0  Allergies   Allergen Reactions     Cranberries [Cranberry Extract]      Causes herpes flair-up     Dairy [Milk Products]      Can tolerate butter and hard cheeses      Perfume Other (See Comments)     Stuffy in nose, HA     Seasonal Allergies      Tilactase          EXAM:   BP (!) 146/86 (BP Location: Left arm, Patient Position: Sitting, Cuff Size: Adult Regular)   Pulse 60   SpO2 100%     Constitutional:       General: She is not in acute distress.     Appearance: Normal appearance. She is not ill-appearing.   HENT:      Head: Normocephalic and atraumatic.      Nose: She has significant mucus on the right.     Mouth/Throat:      Mouth: Mucous membranes are moist.      Pharynx: Oropharynx is clear. No posterior oropharyngeal erythema.   Eyes:      General:         Right eye: No discharge.         Left eye: No discharge.   Cardiovascular:      Rate and Rhythm: Normal rate and regular rhythm.      Heart sounds: Normal heart sounds.   Pulmonary:      Effort: Pulmonary effort is normal.      Breath sounds: Normal breath sounds. No wheezing or  rhonchi.   Skin:     General: Skin is warm.      Findings: No erythema or rash.   Neurological:      General: No focal deficit present.      Mental Status: She is alert. Mental status is at baseline.   Psychiatric:         Mood and Affect: Mood normal.         Behavior: Behavior normal.          ASSESSMENT/PLAN:  Elizabeth Galicia is a 79 year old female seen today with a history of ANCA vasculitis and right-sided maxillary, ethmoid and frontal sinus disease.  Complete opacification of the right maxillary sinus.  She was referred to ENT in April but has not made that appointment.  She is aware of the phone number to call to make the appointment.  I do think getting an opinion from ENT would be helpful.  Will treat with with antibiotics and steroids.  Allergy testing was negative.    We discussed fungal sinusitis and in some detail today.  Her chiropractor feels she has fungal sinus disease and has recommended avoidance of wheat and peanut.  She did ask for food allergy testing.    She can stop the Arnuity at this time and use as needed with upper respiratory illness.    She will follow-up with me if symptoms increase prior to seeing ENT.    Thank you for allowing me to participate in the care of Elizabeth Galicia.      I spent 37 minutes on the date of the encounter doing chart review, history and exam, documentation and further coordination as noted above exclusive of separately reported interpretations    Tariq Cox MD  Allergy/Immunology  [facility]     Again, thank you for allowing me to participate in the care of your patient.        Sincerely,        Tariq Cox MD    Electronically signed

## 2025-05-20 NOTE — PROGRESS NOTES
Elizabeth Galicia was seen in the Allergy Clinic at [facility].    Elizabeth Galicia is a 79 year old female being seen today for ongoing evaluation of chronic maxillary sinusitis.      She has questions about allergy contributing to her sinus disease.  She sees a chiropractor who feels that wheat and peanut is contributing to her sinus disease potentially from a fungal origin from the peanut and inflammatory origin from the wheat.  She has avoided both wheat and peanut for the last 6 weeks and feels like the mucus drainage has decreased slightly.    She has been using Arnuity daily.  She is wondering if she needs to continue with this.  She typically gets a flare of her asthma with upper respiratory illness.    At the last appointment in March she was prescribed mupirocin that did help clear up the nasal crusting temporarily.    A sinus CT scan was performed that showed opacification of the right maxillary sinus as well as ethmoid and frontal opacification on the right side.    Blood testing was negative for allergies.  She does continue with the Allegra and Flonase as needed.    She was treated in November 2024 with Augmentin and prednisone for an upper respiratory illness that did help her sinus symptoms.  She was referred to ENT but she has not made that appointment.      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  She was on allergy immunotherapy a few decades ago but did not notice any improvement.    When she gets upper respiratory illnesses she will have increased wheezing and coughing.  She gets frequently sick with changes of the season in the spring and the fall.    Her sinuses get congested frequently.  She has increased right sided mucus.  Typically she has green nasal drainage.     Because of the ANCA associated vasculitis (diagnosed 2017, cytoxan used) she has been on prolonged steroids  for over a year in 2022 when MPO antibodies increased.  December 2023 decreased from 5 mg down to none by April 2024.      SINUS CT    IMPRESSION:  1.  Ostiomeatal distribution of right-sided paranasal sinus disease with complete opacification of the right maxillary sinus and near complete opacification of the right frontal sinus. There are some bony changes in the walls of the right maxillary sinus and frontal sinus suggesting a component of chronic sinus disease. Widening of the maxillary antrum on the right.    Past Medical History:   Diagnosis Date    ANCA-associated vasculitis (H)     Anxiety     Gastro-oesophageal reflux disease     Herniated lumbar intervertebral disc     Herpes     Heterozygous for MTHFR gene mutation     Per CareEverywhere    Hyperlipidemia     Lesion of upper eyelid LEFT    Obesity (BMI 30-39.9)     Postoperative hypothyroidism     Seasonal allergies     Type 2 diabetes mellitus without complication (H) 05/24/2017    Uncomplicated asthma Few years ago    Uterine prolapse      Family History   Problem Relation Age of Onset    Unknown/Adopted Mother     Hyperlipidemia Mother         Birth mother - I m adopted & have little health info    Anxiety Disorder Mother         Women in that family were all considered  nervous     Obesity Mother     Unknown/Adopted Father         Birth father had some kind of heart issue    Heart Disease Other     Mental Illness No family hx of      Past Surgical History:   Procedure Laterality Date    BIOPSY  20-bryan years ago    Left breast    COLONOSCOPY      DILATION AND CURETTAGE      ENT SURGERY      partial thyroidectomy; tonsillectomy    EXCISE LESION EYELID Left 03/10/2016    Procedure: EXCISE LESION EYELID;  Surgeon: Rg Nazario MD;  Location: [facility]    HAND SURGERY      HYSTERECTOMY VAGINAL, COLPORRHAPHY ANTERIOR, POSTERIOR, COMBINED N/A 09/09/2014    Procedure: COMBINED HYSTERECTOMY VAGINAL, COLPORRHAPHY ANTERIOR, POSTERIOR;  Surgeon: Shay Winkler  MD KESHAV;  Location: [facility]    HYSTERECTOMY, PAP NO LONGER INDICATED      LAPAROSCOPIC SALPINGO-OOPHORECTOMY  06/27/2011    Procedure:LAPAROSCOPIC SALPINGO-OOPHORECTOMY; resection of left pelvic mass. ; Surgeon:MAYRA OLEARY; Location[facility]    ORTHOPEDIC SURGERY      SALPINGO OOPHORECTOMY,R/L/PEDRO LUIS      Salpingo Oophorectomy for torsion in past    THYROIDECTOMY      Partial         Current Outpatient Medications:     acetaminophen (TYLENOL) 500 MG tablet, Take 500-1,000 mg by mouth every 6 hours as needed for mild pain, Disp: , Rfl:     albuterol (VENTOLIN HFA) 108 (90 Base) MCG/ACT inhaler, Inhale 1-2 puffs into the lungs every 6 hours as needed for shortness of breath, wheezing or cough., Disp: 18 g, Rfl: 3    atorvastatin (LIPITOR) 20 MG tablet, TAKE 1 TABLET (20 MG) BY MOUTH DAILY +++NEED APPOINTMENT+++, Disp: 90 tablet, Rfl: 0    CALCIUM LACTATE PO, Take 1-2 tablets by mouth daily, Disp: , Rfl:     Cholecalciferol (VITAMIN D3 PO), Take 1,000 Units by mouth daily, Disp: , Rfl:     Cyanocobalamin (CVS B-12 PO), Take 1 tablet by mouth daily, Disp: , Rfl:     doxycycline hyclate (VIBRAMYCIN) 100 MG capsule, Take 1 capsule (100 mg) by mouth 2 times daily for 10 days., Disp: 20 capsule, Rfl: 0    fexofenadine (ALLEGRA) 180 MG tablet, TAKE 1 TABLET BY MOUTH EVERY DAY, Disp: 90 tablet, Rfl: 2    fluticasone (ARNUITY ELLIPTA) 100 MCG/ACT inhaler, Inhale 1 puff into the lungs daily., Disp: 30 each, Rfl: 1    gabapentin (NEURONTIN) 100 MG capsule, Take 100-200 mg by mouth at bedtime., Disp: , Rfl:     levothyroxine (SYNTHROID/LEVOTHROID) 75 MCG tablet, TAKE 1 TABLET BY MOUTH EVERY DAY, Disp: 90 tablet, Rfl: 3    MAGNESIUM LACTATE PO, Take 2 tablets by mouth daily, Disp: , Rfl:     mupirocin (BACTROBAN) 2 % external ointment, Apply topically 2 times daily. Apply each nostril twice daily x 5 days, Disp: 15 g, Rfl: 0    predniSONE (DELTASONE) 20 MG tablet, Take 2 tabs daily x 5 days, then 1 tab daily x 5 days, Disp: 15 tablet,  Rfl: 0    predniSONE (DELTASONE) 20 MG tablet, Take 2 tablets (40 mg) daily x5 days.  Then take 1 tablet (20 mg) daily x5 days., Disp: 15 tablet, Rfl: 0    Semaglutide, 1 MG/DOSE, (OZEMPIC, 1 MG/DOSE,) 4 MG/3ML pen, INJECT 1 MG SUBCUTANEOUS EVERY 7 DAYS, Disp: 3 mL, Rfl: 2    UNABLE TO FIND, MEDICATION NAME: medical cannibus 20mg at bedtime, Disp: , Rfl:     valACYclovir (VALTREX) 500 MG tablet, Take 1 tablet (500 mg) by mouth 2 times daily., Disp: 6 tablet, Rfl: 2    liothyronine (CYTOMEL) 5 MCG tablet, TAKE 1 TABLETS BY MOUTH 2 TIMES DAILY, Disp: 120 tablet, Rfl: 0  Allergies   Allergen Reactions    Cranberries [Cranberry Extract]      Causes herpes flair-up    Dairy [Milk Products]      Can tolerate butter and hard cheeses     Perfume Other (See Comments)     Stuffy in nose, HA    Seasonal Allergies     Tilactase          EXAM:   BP (!) 146/86 (BP Location: Left arm, Patient Position: Sitting, Cuff Size: Adult Regular)   Pulse 60   SpO2 100%     Constitutional:       General: She is not in acute distress.     Appearance: Normal appearance. She is not ill-appearing.   HENT:      Head: Normocephalic and atraumatic.      Nose: She has significant mucus on the right.     Mouth/Throat:      Mouth: Mucous membranes are moist.      Pharynx: Oropharynx is clear. No posterior oropharyngeal erythema.   Eyes:      General:         Right eye: No discharge.         Left eye: No discharge.   Cardiovascular:      Rate and Rhythm: Normal rate and regular rhythm.      Heart sounds: Normal heart sounds.   Pulmonary:      Effort: Pulmonary effort is normal.      Breath sounds: Normal breath sounds. No wheezing or rhonchi.   Skin:     General: Skin is warm.      Findings: No erythema or rash.   Neurological:      General: No focal deficit present.      Mental Status: She is alert. Mental status is at baseline.   Psychiatric:         Mood and Affect: Mood normal.         Behavior: Behavior normal.          ASSESSMENT/PLAN:  Elizabeth  NISA Galicia is a 79 year old female seen today with a history of ANCA vasculitis and right-sided maxillary, ethmoid and frontal sinus disease.  Complete opacification of the right maxillary sinus.  She was referred to ENT in April but has not made that appointment.  She is aware of the phone number to call to make the appointment.  I do think getting an opinion from ENT would be helpful.  Will treat with with antibiotics and steroids.  Allergy testing was negative.    We discussed fungal sinusitis and in some detail today.  Her chiropractor feels she has fungal sinus disease and has recommended avoidance of wheat and peanut.  She did ask for food allergy testing.    She can stop the Arnuity at this time and use as needed with upper respiratory illness.    She will follow-up with me if symptoms increase prior to seeing ENT.    Thank you for allowing me to participate in the care of Elizabeth Galicia.      I spent 37 minutes on the date of the encounter doing chart review, history and exam, documentation and further coordination as noted above exclusive of separately reported interpretations    Tariq Cox MD  Allergy/Immunology  [facility]

## 2025-05-27 NOTE — TELEPHONE ENCOUNTER
FUTURE VISIT INFORMATION:  Appointment Date: 8/20/25  Appointment Time: 2:20 PM   REFERRAL INFORMATION:  Referring By: Tariq Cox MD   Referring Clinic: [facility]   Reason for Visit/Diagnosis: Maxillary sinusitis, chronic, ref'd by Tariq Cox MD, [place] [facility] [place]      NOTES STATUS DETAILS   OFFICE NOTE from referring provider [facility]   4/24/25 order, 3/18/25 OV: Tariq Cox MD    IMAGES *pertaining images & report*       CT, MRI, PET, NM, US, XRAYS, etc ... [facility]/ PACS 4/23/25 CT sinus

## 2025-05-29 ENCOUNTER — LAB (OUTPATIENT)
Dept: LAB | Facility: CLINIC | Age: 80
End: 2025-05-29
Payer: MEDICARE

## 2025-05-29 DIAGNOSIS — Z13.6 SCREENING FOR CARDIOVASCULAR CONDITION: ICD-10-CM

## 2025-05-29 DIAGNOSIS — E11.9 TYPE 2 DIABETES MELLITUS WITHOUT COMPLICATION (H): Primary | ICD-10-CM

## 2025-05-29 DIAGNOSIS — N18.30 CKD (CHRONIC KIDNEY DISEASE) STAGE 3, GFR 30-59 ML/MIN (H): ICD-10-CM

## 2025-05-29 DIAGNOSIS — T78.40XA ALLERGY, UNSPECIFIED, INITIAL ENCOUNTER: ICD-10-CM

## 2025-05-29 DIAGNOSIS — T78.1XXD ADVERSE FOOD REACTION, SUBSEQUENT ENCOUNTER: ICD-10-CM

## 2025-05-29 LAB
EST. AVERAGE GLUCOSE BLD GHB EST-MCNC: 114 MG/DL
HBA1C MFR BLD: 5.6 % (ref 0–5.6)

## 2025-05-30 ENCOUNTER — RESULTS FOLLOW-UP (OUTPATIENT)
Dept: FAMILY MEDICINE | Facility: CLINIC | Age: 80
End: 2025-05-30

## 2025-06-02 ENCOUNTER — RESULTS FOLLOW-UP (OUTPATIENT)
Dept: ALLERGY | Facility: CLINIC | Age: 80
End: 2025-06-02

## 2025-06-04 LAB — GLUTEN IGE QN: <0.1 KU(A)/L

## 2025-06-05 ENCOUNTER — PATIENT OUTREACH (OUTPATIENT)
Dept: CARE COORDINATION | Facility: CLINIC | Age: 80
End: 2025-06-05
Payer: MEDICARE

## 2025-06-10 ASSESSMENT — PAIN SCALES - PAIN ENJOYMENT GENERAL ACTIVITY SCALE (PEG)
PEG_TOTALSCORE: 3
AVG_PAIN_PASTWEEK: 3
INTERFERED_ENJOYMENT_LIFE: 3
INTERFERED_GENERAL_ACTIVITY: 3
INTERFERED_ENJOYMENT_LIFE: 3
AVG_PAIN_PASTWEEK: 3
INTERFERED_GENERAL_ACTIVITY: 3
PEG_TOTALSCORE: 3

## 2025-06-10 ASSESSMENT — ANXIETY QUESTIONNAIRES
GAD7 TOTAL SCORE: 5
5. BEING SO RESTLESS THAT IT IS HARD TO SIT STILL: NOT AT ALL
2. NOT BEING ABLE TO STOP OR CONTROL WORRYING: SEVERAL DAYS
6. BECOMING EASILY ANNOYED OR IRRITABLE: SEVERAL DAYS
3. WORRYING TOO MUCH ABOUT DIFFERENT THINGS: NOT AT ALL
1. FEELING NERVOUS, ANXIOUS, OR ON EDGE: MORE THAN HALF THE DAYS
GAD7 TOTAL SCORE: 5
7. FEELING AFRAID AS IF SOMETHING AWFUL MIGHT HAPPEN: SEVERAL DAYS
4. TROUBLE RELAXING: NOT AT ALL
7. FEELING AFRAID AS IF SOMETHING AWFUL MIGHT HAPPEN: SEVERAL DAYS
GAD7 TOTAL SCORE: 5
IF YOU CHECKED OFF ANY PROBLEMS ON THIS QUESTIONNAIRE, HOW DIFFICULT HAVE THESE PROBLEMS MADE IT FOR YOU TO DO YOUR WORK, TAKE CARE OF THINGS AT HOME, OR GET ALONG WITH OTHER PEOPLE: SOMEWHAT DIFFICULT
8. IF YOU CHECKED OFF ANY PROBLEMS, HOW DIFFICULT HAVE THESE MADE IT FOR YOU TO DO YOUR WORK, TAKE CARE OF THINGS AT HOME, OR GET ALONG WITH OTHER PEOPLE?: SOMEWHAT DIFFICULT

## 2025-06-11 ENCOUNTER — OFFICE VISIT (OUTPATIENT)
Dept: PALLIATIVE MEDICINE | Facility: CLINIC | Age: 80
End: 2025-06-11
Attending: FAMILY MEDICINE
Payer: MEDICARE

## 2025-06-11 VITALS — HEART RATE: 79 BPM | OXYGEN SATURATION: 97 % | SYSTOLIC BLOOD PRESSURE: 130 MMHG | DIASTOLIC BLOOD PRESSURE: 80 MMHG

## 2025-06-11 DIAGNOSIS — M54.16 LUMBAR RADICULOPATHY: ICD-10-CM

## 2025-06-11 DIAGNOSIS — M70.61 GREATER TROCHANTERIC BURSITIS OF BOTH HIPS: ICD-10-CM

## 2025-06-11 DIAGNOSIS — M47.812 CERVICAL SPONDYLOSIS WITHOUT MYELOPATHY: Primary | ICD-10-CM

## 2025-06-11 DIAGNOSIS — M79.18 MYOFASCIAL MUSCLE PAIN: ICD-10-CM

## 2025-06-11 DIAGNOSIS — M70.62 GREATER TROCHANTERIC BURSITIS OF BOTH HIPS: ICD-10-CM

## 2025-06-11 PROCEDURE — G2211 COMPLEX E/M VISIT ADD ON: HCPCS | Performed by: PAIN MEDICINE

## 2025-06-11 PROCEDURE — 99214 OFFICE O/P EST MOD 30 MIN: CPT | Performed by: PAIN MEDICINE

## 2025-06-11 PROCEDURE — 1125F AMNT PAIN NOTED PAIN PRSNT: CPT | Performed by: PAIN MEDICINE

## 2025-06-11 PROCEDURE — 3075F SYST BP GE 130 - 139MM HG: CPT | Performed by: PAIN MEDICINE

## 2025-06-11 PROCEDURE — 3079F DIAST BP 80-89 MM HG: CPT | Performed by: PAIN MEDICINE

## 2025-06-11 ASSESSMENT — PAIN SCALES - GENERAL: PAINLEVEL_OUTOF10: MILD PAIN (2)

## 2025-06-11 NOTE — PATIENT INSTRUCTIONS
- Further procedures recommended:    -None at this time  - Medication Management:    - recertify for medical cannabis [email or web address]  - Physical Therapy: None at this time  - Follow up:   -1 year    - consider referral for her ankle      Clinic Number:  [phone]  Call with any questions about your care and for scheduling assistance.   Calls are returned Monday through Friday between 8 AM and 4:30 PM. We usually get back to you within 2 business days depending on the issue/request.    If we are prescribing your medications:  For opioid medication refills, call the clinic or send a cashcloud message 7 days in advance.  Please include:  Name of requested medication  Name of the pharmacy.  For non-opioid medications, call your pharmacy directly to request a refill. Please allow 3-4 days to be processed.   Per [place] State Law:  All controlled substance prescriptions must be filled within 30 days of being written.    For those controlled substances allowing refills, pickup must occur within 30 days of last fill.      We believe regular attendance is key to your success in our program!    Any time you are unable to keep your appointment we ask that you call us at least 24 hours in advance to cancel.This will allow us to offer the appointment time to another patient.   Multiple missed appointments may lead to dismissal from the clinic.

## 2025-06-11 NOTE — PROGRESS NOTES
[facility] follow up      Reason for consultation:    Primary Care Provider is No Ref-Primary, Physician.  Pain medications are being prescribed by n/a.    Please see the [facility] health questionnaire which the patient completed and reviewed with me in detail.    Chief Complaint:    Chief Complaint   Patient presents with    Pain     MME prescribed prior to seeing patient:  Current MME:  rec   - Further procedures recommended:    -None at this time  - Medication Management:    - recertify for medical cannabis [email or web address]  - Physical Therapy: None at this time  - Follow up:   1 year      Interval: chronic ckd 1 improved from last apt  Still having a lot of anxiety caring for her fam  Consider ketamine tx for mood  Main pain is neck and ankle pain  LBp has improved  Doing daily PHYSICAL THERAPY  and workout regimen   Lost 60lbs  Currently not having any SE from the cannabis  The pt uses the cannabis most nights  The pt reports benefit in sleep and pain  Denies any recent falls  Denies overt progressive weakness  The patient notes significant improvement in her quality of life with medical cannabis  Pain does sig affecting quality of life   Cont to pilates and massage     Neck pain is mainly on the left   Feels like her neck goes out   The pain varies in severity and nature  Sometimes spasms  Sometimes sharp   Denies numbness ting burning down the arm  Denies weakness \    Cont to have bilat ankle pain   L>R  The actual worse pain is the dorsal surface of her foot  Pain history:     Patient is referred by PCP- interested in medical cannabis.  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 The pt is currently on pred 4mg. Of note when pt was on higher doses she did not have as sig pain levels.     Pt main concern is she cant sleep 2/2 to leg pain   Elizabeth NISA ClementeGrayson is a 73 year old  female w/ chronic bilateral lat leg/hip pain. The pain has gotten progressively worse over the last couple of years. In general she denies any specific inciting event.  The pain is currently worse on the L>R. The pain is intermittent. The pain is a deep aching pain. The pain is occasionally sharp.  The pain occasionally radiates down her lat leg. Neg numbness. Neg burning. Neg tingling. She denies any overt weakness. The pt did have a recent fall, but was not 2/2 to weakness, rather she tripped. The pain is worse when sleeping on her sides. The pain is worse with prolonged walking  The pain is slightly better when lying on her back. The pain is slightly better when changing positions. The pain is slightly bettter when sleeping her chair. The pt also does a lot of PHYSICAL THERAPY  And stretches for her IT band. The pt notes sig benefit, but in general the relief does not last.    Of note on occasion the pt will have more generalized pain. Specifically chest, shoulders, neg     The pt was using voltaren gel , but stopped 2/2 to benefit     The pt takes 2 tabs of 625mg at night. Occasionally she will take an additional tablet when being more active.     Of note pt has a gfr of 54, currently being seen by a rheumatologist.     Again pt main concern is not sleeping and she feels it makes every aspect of her life worse.     The pt continues to approach her symptoms from multiple directions and is now looking for other options.         Pain rating: intensity  Averages 4/10 on a 0-10 scale.      Current treatments include:  Medical cannabis  Acetaminophen  Previous medication treatments included:  voltaren gel        Other treatments have included:  Elizabeth NISA Galicia has not been seen at a pain clinic in the past.    PT: yes helped     Injections: no    Past Medical History:  Past Medical History:   Diagnosis Date    ANCA-associated vasculitis (H)     Anxiety     Gastro-oesophageal reflux disease     Herniated lumbar  intervertebral disc     Herpes     Heterozygous for MTHFR gene mutation     Per CareEverywhere    Hyperlipidemia     Lesion of upper eyelid LEFT    Obesity (BMI 30-39.9)     Postoperative hypothyroidism     Seasonal allergies     Type 2 diabetes mellitus without complication (H) 05/24/2017    Uncomplicated asthma Few years ago    Uterine prolapse      Past Surgical History:  Past Surgical History:   Procedure Laterality Date    BIOPSY  20-bryan years ago    Left breast    COLONOSCOPY      DILATION AND CURETTAGE      ENT SURGERY      partial thyroidectomy; tonsillectomy    EXCISE LESION EYELID Left 03/10/2016    Procedure: EXCISE LESION EYELID;  Surgeon: Rg Nazario MD;  Location: [facility]    HAND SURGERY      HYSTERECTOMY VAGINAL, COLPORRHAPHY ANTERIOR, POSTERIOR, COMBINED N/A 09/09/2014    Procedure: COMBINED HYSTERECTOMY VAGINAL, COLPORRHAPHY ANTERIOR, POSTERIOR;  Surgeon: Shay Winkler MD;  Location: [facility]    HYSTERECTOMY, PAP NO LONGER INDICATED      LAPAROSCOPIC SALPINGO-OOPHORECTOMY  06/27/2011    Procedure:LAPAROSCOPIC SALPINGO-OOPHORECTOMY; resection of left pelvic mass. ; Surgeon:MAYRA OLEARY; Location:[facility]    ORTHOPEDIC SURGERY      SALPINGO OOPHORECTOMY,R/L/PEDRO LUIS      Salpingo Oophorectomy for torsion in past    THYROIDECTOMY      Partial     Medications:  Current Outpatient Medications   Medication Sig Dispense Refill    acetaminophen (TYLENOL) 500 MG tablet Take 500-1,000 mg by mouth every 6 hours as needed for mild pain      albuterol (VENTOLIN HFA) 108 (90 Base) MCG/ACT inhaler Inhale 1-2 puffs into the lungs every 6 hours as needed for shortness of breath, wheezing or cough. 18 g 3    atorvastatin (LIPITOR) 20 MG tablet TAKE 1 TABLET (20 MG) BY MOUTH DAILY +++NEED APPOINTMENT+++ 90 tablet 0    CALCIUM LACTATE PO Take 1-2 tablets by mouth daily      Cholecalciferol (VITAMIN D3 PO) Take 1,000 Units by mouth daily      Cyanocobalamin ([employer] B-12 PO) Take 1 tablet by mouth daily      fluticasone  (ARNUITY ELLIPTA) 100 MCG/ACT inhaler Inhale 1 puff into the lungs daily. 30 each 1    gabapentin (NEURONTIN) 100 MG capsule Take 100-200 mg by mouth at bedtime.      levothyroxine (SYNTHROID/LEVOTHROID) 75 MCG tablet TAKE 1 TABLET BY MOUTH EVERY DAY 90 tablet 3    liothyronine (CYTOMEL) 5 MCG tablet TAKE 1 TABLETS BY MOUTH 2 TIMES DAILY 120 tablet 0    MAGNESIUM LACTATE PO Take 2 tablets by mouth daily      Semaglutide, 1 MG/DOSE, (OZEMPIC, 1 MG/DOSE,) 4 MG/3ML pen INJECT 1 MG SUBCUTANEOUS EVERY 7 DAYS 3 mL 2    UNABLE TO FIND MEDICATION NAME: medical cannibus 20mg at bedtime      valACYclovir (VALTREX) 500 MG tablet Take 1 tablet (500 mg) by mouth 2 times daily. 6 tablet 2    fexofenadine (ALLEGRA) 180 MG tablet TAKE 1 TABLET BY MOUTH EVERY DAY (Patient not taking: Reported on 6/11/2025) 90 tablet 2    mupirocin (BACTROBAN) 2 % external ointment Apply topically 2 times daily. Apply each nostril twice daily x 5 days (Patient not taking: Reported on 6/11/2025) 15 g 0     Allergies:     Allergies   Allergen Reactions    Cranberries [Cranberry Extract]      Causes herpes flair-up    Dairy [Milk Products]      Can tolerate butter and hard cheeses     Perfume Other (See Comments)     Stuffy in nose, HA    Seasonal Allergies     Tilactase      Family history:  Family History   Problem Relation Age of Onset    Unknown/Adopted Mother     Hyperlipidemia Mother         Birth mother - I m adopted & have little health info    Anxiety Disorder Mother         Women in that family were all considered  nervous     Obesity Mother     Unknown/Adopted Father         Birth father had some kind of heart issue    Heart Disease Other     Mental Illness No family hx of        Physical Exam:  Vitals:    06/11/25 0846   BP: 130/80   BP Location: Right arm   Pulse: 79   SpO2: 97%     Exam:  Constitutional: healthy, alert and no distress  Respiratory: Speaking in full sentences no accessory muscles use   Psychiatric: mentation appears normal  and affect normal/bright  Neg slump      Musculoskeletal exam:    Cervical spine: ROM slightly dec on the left  Neg spurling  +TTP on the left  5/5 UE    Neg slump        Assessment/Plan:  Elizabeth Galicia is a 79year old female who presents with the complaints of bilateral lateral leg/hip pain.   Elizabeth was seen today for pain.    Diagnoses and all orders for this visit:    Cervical spondylosis without myelopathy  -     Adult Pain Clinic Follow-Up Order; Future    Lumbar radiculopathy  -     Pain Management  Referral  -     Adult Pain Clinic Follow-Up Order; Future    Greater trochanteric bursitis of both hips  -     Pain Management  Referral    Myofascial muscle pain        - Further procedures recommended:    -None at this time  - Medication Management:    - recertify for medical cannabis [email or web address]  - Physical Therapy: None at this time  - Follow up:   1 year   - consider referral for her ankle              The total TIME spent on this patient on the day of the appointment was 30 minutes.   Time spent preparing to see the patient (reviewing records and tests)  Time spend face to face with the patient  Time spent ordering tests, medications, procedures and referrals  Time spent Referring and communicating with other healthcare professionals  Documenting clinical information in Epic      Deon Banerjee MD  [facility]  This note was created with voice recognition software, and while reviewed for accuracy, typos may remain.

## 2025-06-15 ENCOUNTER — HEALTH MAINTENANCE LETTER (OUTPATIENT)
Age: 80
End: 2025-06-15

## 2025-07-03 DIAGNOSIS — E89.0 POSTOPERATIVE HYPOTHYROIDISM: Chronic | ICD-10-CM

## 2025-07-03 DIAGNOSIS — E78.2 MIXED HYPERLIPIDEMIA: ICD-10-CM

## 2025-07-03 DIAGNOSIS — E11.9 TYPE 2 DIABETES MELLITUS WITHOUT COMPLICATION, WITHOUT LONG-TERM CURRENT USE OF INSULIN (H): Chronic | ICD-10-CM

## 2025-07-03 RX ORDER — SEMAGLUTIDE 1.34 MG/ML
1 INJECTION, SOLUTION SUBCUTANEOUS
Qty: 3 ML | Refills: 0 | Status: SHIPPED | OUTPATIENT
Start: 2025-07-03

## 2025-07-03 RX ORDER — LIOTHYRONINE SODIUM 5 UG/1
5 TABLET ORAL
Qty: 120 TABLET | Refills: 0 | Status: SHIPPED | OUTPATIENT
Start: 2025-07-03

## 2025-07-03 RX ORDER — ATORVASTATIN CALCIUM 20 MG/1
20 TABLET, FILM COATED ORAL DAILY
Qty: 90 TABLET | Refills: 0 | Status: SHIPPED | OUTPATIENT
Start: 2025-07-03

## 2025-08-12 ENCOUNTER — VIRTUAL VISIT (OUTPATIENT)
Dept: PHARMACY | Facility: CLINIC | Age: 80
End: 2025-08-12
Payer: COMMERCIAL

## 2025-08-12 DIAGNOSIS — Z78.9 TAKES DIETARY SUPPLEMENTS: ICD-10-CM

## 2025-08-12 DIAGNOSIS — E89.0 POSTOPERATIVE HYPOTHYROIDISM: ICD-10-CM

## 2025-08-12 DIAGNOSIS — E11.9 TYPE 2 DIABETES MELLITUS WITHOUT COMPLICATION, WITHOUT LONG-TERM CURRENT USE OF INSULIN (H): Primary | Chronic | ICD-10-CM

## 2025-08-12 DIAGNOSIS — R52 PAIN: ICD-10-CM

## 2025-08-12 DIAGNOSIS — M85.80 OSTEOPENIA, UNSPECIFIED LOCATION: ICD-10-CM

## 2025-08-12 DIAGNOSIS — J45.20 MILD INTERMITTENT ASTHMA, UNSPECIFIED WHETHER COMPLICATED: ICD-10-CM

## 2025-08-12 DIAGNOSIS — E78.5 HYPERLIPIDEMIA LDL GOAL <70: ICD-10-CM

## 2025-08-12 DIAGNOSIS — J30.1 SEASONAL ALLERGIC RHINITIS DUE TO POLLEN: ICD-10-CM

## 2025-08-12 PROCEDURE — 99207 PR NO CHARGE LOS: CPT | Mod: 95 | Performed by: PHARMACIST

## 2025-08-13 ENCOUNTER — TRANSFERRED RECORDS (OUTPATIENT)
Dept: HEALTH INFORMATION MANAGEMENT | Facility: CLINIC | Age: 80
End: 2025-08-13
Payer: MEDICARE

## 2025-08-20 ENCOUNTER — PRE VISIT (OUTPATIENT)
Dept: OTOLARYNGOLOGY | Facility: CLINIC | Age: 80
End: 2025-08-20

## 2025-09-04 DIAGNOSIS — E89.0 POSTOPERATIVE HYPOTHYROIDISM: Chronic | ICD-10-CM

## 2025-09-04 RX ORDER — LIOTHYRONINE SODIUM 5 UG/1
5 TABLET ORAL
Qty: 180 TABLET | Refills: 0 | Status: SHIPPED | OUTPATIENT
Start: 2025-09-04